# Patient Record
Sex: FEMALE | Race: BLACK OR AFRICAN AMERICAN | NOT HISPANIC OR LATINO | Employment: OTHER | URBAN - METROPOLITAN AREA
[De-identification: names, ages, dates, MRNs, and addresses within clinical notes are randomized per-mention and may not be internally consistent; named-entity substitution may affect disease eponyms.]

---

## 2023-02-22 LAB
CHOLESTEROL (MG/DL) IN SER/PLAS: 217 MG/DL (ref 0–199)
CHOLESTEROL IN HDL (MG/DL) IN SER/PLAS: 104.1 MG/DL
CHOLESTEROL/HDL RATIO: 2.1
COBALAMIN (VITAMIN B12) (PG/ML) IN SER/PLAS: >2000 PG/ML (ref 211–911)
LDL: 90 MG/DL (ref 0–99)
TRIGLYCERIDE (MG/DL) IN SER/PLAS: 114 MG/DL (ref 0–149)
VLDL: 23 MG/DL (ref 0–40)

## 2023-03-31 ENCOUNTER — TELEPHONE (OUTPATIENT)
Dept: PRIMARY CARE | Facility: CLINIC | Age: 78
End: 2023-03-31
Payer: MEDICARE

## 2023-03-31 DIAGNOSIS — M16.11 ARTHRITIS OF RIGHT HIP: Primary | ICD-10-CM

## 2023-03-31 NOTE — TELEPHONE ENCOUNTER
Spoke with patient.  Reviewed X-rays--hip showed  severe arthritis, knee showed mild arthritis.  Refer to hip ortho (also given name of Jun Rodríguez).  CT lung was stable.  Recheck in 1 year.  She will be getting bone marrow biopsy with Dr. Beach.  Discussed follow up in our office in 4 months.

## 2023-04-18 ENCOUNTER — HOSPITAL ENCOUNTER (OUTPATIENT)
Dept: DATA CONVERSION | Facility: HOSPITAL | Age: 78
End: 2023-04-18
Attending: RADIOLOGY | Admitting: RADIOLOGY
Payer: MEDICARE

## 2023-04-18 DIAGNOSIS — D47.2 MONOCLONAL GAMMOPATHY: ICD-10-CM

## 2023-04-19 LAB
ABCP4: NORMAL
CCOLL: NORMAL PER TUBE
CCOUN: 7.55 X10E9/L
CP4PH: NORMAL
FANTI: NORMAL
FCTOR: NORMAL
FCTSO: NORMAL
FSITE: NORMAL
GPERC: 65 %
L10: NEGATIVE
L19: NEGATIVE
L20: NORMAL
L38: NORMAL
L45: NEGATIVE
L56: NEGATIVE
LCD19: 12 % OF LYMPH
LCD4: 46 % OF LYMPH
LCD8: 29 % OF LYMPH
LDESC: NORMAL
LGPD1: NORMAL
LGPNO: NORMAL
LKAPP: NEGATIVE
LLAMB: NORMAL
LNK: 12 % OF LYMPH
LPERC: 13 %
MPERC: 5 %
PDESC: NORMAL
PERC4: 0.2 %
PPERC: 0.3 %
PV194: NORMAL
VIAB: NORMAL

## 2023-04-24 LAB
COMPLETE PATHOLOGY REPORT: NORMAL
CONVERTED ADDENDUM DIAGNOSIS 2: NORMAL
CONVERTED CLINICAL DIAGNOSIS-HISTORY: NORMAL
CONVERTED FINAL DIAGNOSIS: NORMAL
CONVERTED FINAL REPORT PDF LINK TO COPY AND PASTE: NORMAL
CONVERTED GROSS DESCRIPTION: NORMAL
CONVERTED MICROSCOPIC DESCRIPTION: NORMAL

## 2023-05-03 LAB — FISH ISCN RESULTS: NORMAL

## 2023-05-09 DIAGNOSIS — I10 BENIGN ESSENTIAL HYPERTENSION: Primary | ICD-10-CM

## 2023-05-09 LAB
FISH ISCN RESULTS: NORMAL

## 2023-05-09 RX ORDER — AMLODIPINE BESYLATE 10 MG/1
10 TABLET ORAL DAILY
COMMUNITY
End: 2023-05-09 | Stop reason: SDUPTHER

## 2023-05-10 PROBLEM — I10 BENIGN ESSENTIAL HYPERTENSION: Status: ACTIVE | Noted: 2023-05-10

## 2023-05-10 RX ORDER — AMLODIPINE BESYLATE 10 MG/1
10 TABLET ORAL DAILY
Qty: 90 TABLET | Refills: 1 | Status: SHIPPED | OUTPATIENT
Start: 2023-05-10 | End: 2023-08-09 | Stop reason: SDUPTHER

## 2023-05-12 DIAGNOSIS — I10 BENIGN ESSENTIAL HYPERTENSION: Primary | ICD-10-CM

## 2023-05-12 RX ORDER — LISINOPRIL AND HYDROCHLOROTHIAZIDE 10; 12.5 MG/1; MG/1
1 TABLET ORAL DAILY
COMMUNITY
End: 2023-05-12 | Stop reason: SDUPTHER

## 2023-05-13 RX ORDER — LISINOPRIL AND HYDROCHLOROTHIAZIDE 10; 12.5 MG/1; MG/1
1 TABLET ORAL DAILY
Qty: 90 TABLET | Refills: 1 | Status: SHIPPED | OUTPATIENT
Start: 2023-05-13 | End: 2023-08-09 | Stop reason: SDUPTHER

## 2023-05-15 LAB — CYTOGENETICS INTERPRETATION: NORMAL

## 2023-05-22 ENCOUNTER — TELEPHONE (OUTPATIENT)
Dept: PRIMARY CARE | Facility: CLINIC | Age: 78
End: 2023-05-22
Payer: MEDICARE

## 2023-08-09 ENCOUNTER — OFFICE VISIT (OUTPATIENT)
Dept: PRIMARY CARE | Facility: CLINIC | Age: 78
End: 2023-08-09
Payer: MEDICARE

## 2023-08-09 VITALS
HEIGHT: 61 IN | HEART RATE: 54 BPM | TEMPERATURE: 98.4 F | RESPIRATION RATE: 18 BRPM | OXYGEN SATURATION: 94 % | DIASTOLIC BLOOD PRESSURE: 78 MMHG | WEIGHT: 106.4 LBS | SYSTOLIC BLOOD PRESSURE: 124 MMHG | BODY MASS INDEX: 20.09 KG/M2

## 2023-08-09 DIAGNOSIS — E03.9 HYPOTHYROIDISM, UNSPECIFIED TYPE: Primary | ICD-10-CM

## 2023-08-09 DIAGNOSIS — I10 BENIGN ESSENTIAL HYPERTENSION: ICD-10-CM

## 2023-08-09 PROCEDURE — 3078F DIAST BP <80 MM HG: CPT | Performed by: FAMILY MEDICINE

## 2023-08-09 PROCEDURE — 99214 OFFICE O/P EST MOD 30 MIN: CPT | Performed by: FAMILY MEDICINE

## 2023-08-09 PROCEDURE — 3074F SYST BP LT 130 MM HG: CPT | Performed by: FAMILY MEDICINE

## 2023-08-09 PROCEDURE — 1125F AMNT PAIN NOTED PAIN PRSNT: CPT | Performed by: FAMILY MEDICINE

## 2023-08-09 PROCEDURE — 1159F MED LIST DOCD IN RCRD: CPT | Performed by: FAMILY MEDICINE

## 2023-08-09 PROCEDURE — 1160F RVW MEDS BY RX/DR IN RCRD: CPT | Performed by: FAMILY MEDICINE

## 2023-08-09 RX ORDER — LISINOPRIL AND HYDROCHLOROTHIAZIDE 10; 12.5 MG/1; MG/1
1 TABLET ORAL DAILY
Qty: 90 TABLET | Refills: 1 | Status: SHIPPED | OUTPATIENT
Start: 2023-08-09 | End: 2024-02-29 | Stop reason: SDUPTHER

## 2023-08-09 RX ORDER — LEVOTHYROXINE SODIUM 88 UG/1
88 TABLET ORAL DAILY
Qty: 90 TABLET | Refills: 1 | Status: SHIPPED | OUTPATIENT
Start: 2023-08-09 | End: 2024-02-29 | Stop reason: SDUPTHER

## 2023-08-09 RX ORDER — UBIDECARENONE 75 MG
500 CAPSULE ORAL DAILY
COMMUNITY

## 2023-08-09 RX ORDER — LEVOTHYROXINE SODIUM 88 UG/1
1 TABLET ORAL DAILY
COMMUNITY
Start: 2020-03-31 | End: 2023-08-09 | Stop reason: SDUPTHER

## 2023-08-09 RX ORDER — AMLODIPINE BESYLATE 10 MG/1
10 TABLET ORAL DAILY
Qty: 90 TABLET | Refills: 1 | Status: SHIPPED | OUTPATIENT
Start: 2023-08-09 | End: 2024-02-29 | Stop reason: SDUPTHER

## 2023-08-09 NOTE — PROGRESS NOTES
"Subjective   Patient ID: Sophie Mendosa is a 77 y.o. female who presents for Follow-up (HASN'T BEEN SEEN SINCE FEBRUARY AND WANTS TO CHECK ON HER HEALTH AND SHE NEEDS REFILLS ON HER MEDS).  From March--  Spoke with patient.  Reviewed X-rays--hip showed  severe arthritis, knee showed mild arthritis.  Refer to hip ortho (also given name of Jun Shethzgerald).  CT lung was stable.  Recheck in 1 year.  She will be getting bone marrow biopsy with Dr. Beach.  Discussed follow up in our office in 4 months.    Anxiety feels better recently.  Thinks is in part to having some conversations that she needed to have with some of the people inducing stress.    Had right hip injection with Dr. Everett.  Didn't work.  Knee hurts worse than the hip.  She has to think about whether or not she wants a hip replacement, but currently not planning to.    Not checking BP at home.    Itches all the time all over, dry  skin, no  rash  Started since last appointment.  Just using lotion.        Review of Systems    Objective   /78 (BP Location: Left arm, Patient Position: Sitting)   Pulse 54   Temp 36.9 °C (98.4 °F) (Oral)   Resp 18   Ht 1.549 m (5' 0.98\")   Wt 48.3 kg (106 lb 6.4 oz)   SpO2 94%   BMI 20.11 kg/m²     Physical Exam  Vitals reviewed.   Constitutional:       Appearance: Normal appearance.   Cardiovascular:      Rate and Rhythm: Normal rate and regular rhythm.      Heart sounds: No murmur heard.  Pulmonary:      Effort: Pulmonary effort is normal.      Breath sounds: Normal breath sounds.   Musculoskeletal:      Right lower leg: No edema.      Left lower leg: No edema.   Neurological:      Mental Status: She is alert.   Psychiatric:         Mood and Affect: Mood normal.         Behavior: Behavior normal.           Assessment/Plan   Problem List Items Addressed This Visit       Benign essential hypertension    Relevant Medications    amLODIPine (Norvasc) 10 mg tablet    lisinopriL-hydrochlorothiazide 10-12.5 mg " tablet     Other Visit Diagnoses       Hypothyroidism, unspecified type    -  Primary    Relevant Medications    levothyroxine (Synthroid, Levoxyl) 88 mcg tablet

## 2023-08-10 NOTE — PATIENT INSTRUCTIONS
BP stable on current medication, no change.  Blood Pressure Treatment goals include:   BP of 120/80, with no higher than 140/85 on consistent basis.   Exercise at 150 minutes weekly.  Eat a balanced diet, rich in fruits and vegetables, low in sodium and processed foods.  If you are overweight, work toward a goal BMI of less than 25.     Chronic back and right hip pain.  Had  injection for hip by Dr. Everett, but did not help.  Will be following  up with him.     For itching dry skin, can try Cetaphil lotion.    Hypothyroidism, on levothyroxine 88  mcg.  Plan to check levels next visit.    Plan to follow up in November for recheck and Medicare Wellness  visit.

## 2023-10-04 PROBLEM — D47.2 MONOCLONAL GAMMOPATHY OF UNKNOWN SIGNIFICANCE: Status: ACTIVE | Noted: 2023-10-04

## 2023-10-04 PROBLEM — M62.838 MUSCLE SPASM: Status: ACTIVE | Noted: 2023-10-04

## 2023-10-04 PROBLEM — M25.561 CHRONIC PAIN OF RIGHT KNEE: Status: ACTIVE | Noted: 2023-10-04

## 2023-10-04 PROBLEM — M51.36 DEGENERATION OF LUMBAR INTERVERTEBRAL DISC: Status: ACTIVE | Noted: 2023-10-04

## 2023-10-04 PROBLEM — D64.9 ANEMIA, UNSPECIFIED: Status: ACTIVE | Noted: 2018-10-20

## 2023-10-04 PROBLEM — M10.9 GOUT OF RIGHT WRIST: Status: ACTIVE | Noted: 2023-10-04

## 2023-10-04 PROBLEM — M20.20 HALLUX RIGIDUS: Status: ACTIVE | Noted: 2023-10-04

## 2023-10-04 PROBLEM — M51.369 DEGENERATION OF LUMBAR INTERVERTEBRAL DISC: Status: ACTIVE | Noted: 2023-10-04

## 2023-10-04 PROBLEM — K59.00 CONSTIPATION, UNSPECIFIED: Status: ACTIVE | Noted: 2018-10-23

## 2023-10-04 PROBLEM — I67.9 CVD (CEREBROVASCULAR DISEASE): Status: ACTIVE | Noted: 2023-10-04

## 2023-10-04 PROBLEM — M20.11 ACQUIRED HALLUX VALGUS OF RIGHT FOOT: Status: ACTIVE | Noted: 2023-10-04

## 2023-10-04 PROBLEM — M06.9 RHEUMATOID ARTHRITIS (MULTI): Status: ACTIVE | Noted: 2018-10-20

## 2023-10-04 PROBLEM — H25.10 NUCLEAR SCLEROSIS: Status: ACTIVE | Noted: 2023-10-04

## 2023-10-04 PROBLEM — M16.11 ARTHRITIS OF RIGHT HIP: Status: ACTIVE | Noted: 2023-10-04

## 2023-10-04 PROBLEM — E87.8 ELECTROLYTE AND FLUID DISORDER: Status: ACTIVE | Noted: 2023-10-04

## 2023-10-04 PROBLEM — R79.89 ABNORMAL THYROID BLOOD TEST: Status: ACTIVE | Noted: 2023-10-04

## 2023-10-04 PROBLEM — R60.9 EDEMA: Status: ACTIVE | Noted: 2023-10-04

## 2023-10-04 PROBLEM — R73.09 ELEVATED GLUCOSE: Status: ACTIVE | Noted: 2023-10-04

## 2023-10-04 PROBLEM — H18.419 ARCUS SENILIS: Status: ACTIVE | Noted: 2023-10-04

## 2023-10-04 PROBLEM — M48.061 LUMBAR CANAL STENOSIS: Status: ACTIVE | Noted: 2023-10-04

## 2023-10-04 PROBLEM — I73.9 PERIPHERAL ARTERY DISEASE (CMS-HCC): Status: ACTIVE | Noted: 2023-10-04

## 2023-10-04 PROBLEM — G56.00 SEVERE CARPAL TUNNEL SYNDROME: Status: ACTIVE | Noted: 2023-10-04

## 2023-10-04 PROBLEM — R91.8 PULMONARY NODULES: Status: ACTIVE | Noted: 2023-10-04

## 2023-10-04 PROBLEM — R20.2 PARESTHESIA: Status: ACTIVE | Noted: 2023-10-04

## 2023-10-04 PROBLEM — G56.01 RIGHT CARPAL TUNNEL SYNDROME: Status: ACTIVE | Noted: 2023-10-04

## 2023-10-04 PROBLEM — E04.9 HYPERPLASTIC GOITER: Status: ACTIVE | Noted: 2023-10-04

## 2023-10-04 PROBLEM — M20.40 HAMMER TOE: Status: ACTIVE | Noted: 2023-10-04

## 2023-10-04 PROBLEM — E53.8 VITAMIN B12 DEFICIENCY: Status: ACTIVE | Noted: 2023-10-04

## 2023-10-04 PROBLEM — M20.12 ACQUIRED HALLUX VALGUS OF LEFT FOOT: Status: ACTIVE | Noted: 2023-10-04

## 2023-10-04 PROBLEM — R10.13 EPIGASTRIC DISCOMFORT: Status: ACTIVE | Noted: 2023-10-04

## 2023-10-04 PROBLEM — M62.81 MUSCLE WEAKNESS (GENERALIZED): Status: ACTIVE | Noted: 2018-10-20

## 2023-10-04 PROBLEM — I69.354 HEMIPLEGIA AND HEMIPARESIS FOLLOWING CEREBRAL INFARCTION AFFECTING LEFT NON-DOMINANT SIDE (MULTI): Status: ACTIVE | Noted: 2018-10-20

## 2023-10-04 PROBLEM — M20.21 HALLUX RIGIDUS OF RIGHT FOOT: Status: ACTIVE | Noted: 2023-10-04

## 2023-10-04 PROBLEM — H52.03 HYPEROPIA OF BOTH EYES WITH ASTIGMATISM AND PRESBYOPIA: Status: ACTIVE | Noted: 2023-10-04

## 2023-10-04 PROBLEM — D25.9 FIBROID UTERUS: Status: ACTIVE | Noted: 2023-10-04

## 2023-10-04 PROBLEM — T50.8X5A RADIOGRAPHIC CONTRAST AGENT NEPHROPATHY: Status: ACTIVE | Noted: 2023-10-04

## 2023-10-04 PROBLEM — R00.1 SINUS BRADYCARDIA: Status: ACTIVE | Noted: 2023-10-04

## 2023-10-04 PROBLEM — I25.10 CORONARY ARTERY CALCIFICATION SEEN ON CT SCAN: Status: ACTIVE | Noted: 2023-10-04

## 2023-10-04 PROBLEM — R41.841 COGNITIVE COMMUNICATION DEFICIT: Status: ACTIVE | Noted: 2018-10-20

## 2023-10-04 PROBLEM — H25.013 CORTICAL AGE-RELATED CATARACT OF BOTH EYES: Status: ACTIVE | Noted: 2023-10-04

## 2023-10-04 PROBLEM — R63.4 ABNORMAL WEIGHT LOSS: Status: ACTIVE | Noted: 2023-10-04

## 2023-10-04 PROBLEM — Q66.50 CONGENITAL PES PLANUS: Status: ACTIVE | Noted: 2023-10-04

## 2023-10-04 PROBLEM — N14.11 RADIOGRAPHIC CONTRAST AGENT NEPHROPATHY: Status: ACTIVE | Noted: 2023-10-04

## 2023-10-04 PROBLEM — G56.02 LEFT CARPAL TUNNEL SYNDROME: Status: ACTIVE | Noted: 2023-10-04

## 2023-10-04 PROBLEM — I70.0 ATHEROSCLEROSIS OF AORTA (CMS-HCC): Status: ACTIVE | Noted: 2023-10-04

## 2023-10-04 PROBLEM — I63.321: Status: ACTIVE | Noted: 2018-10-20

## 2023-10-04 PROBLEM — R26.2 DIFFICULTY IN WALKING, NOT ELSEWHERE CLASSIFIED: Status: ACTIVE | Noted: 2018-10-20

## 2023-10-04 PROBLEM — F17.210 CIGARETTE SMOKER: Status: ACTIVE | Noted: 2023-10-04

## 2023-10-04 PROBLEM — F32.0 DEPRESSION, MAJOR, SINGLE EPISODE, MILD (CMS-HCC): Status: ACTIVE | Noted: 2023-10-04

## 2023-10-04 PROBLEM — G89.29 CHRONIC PAIN OF RIGHT KNEE: Status: ACTIVE | Noted: 2023-10-04

## 2023-10-04 PROBLEM — E55.9 VITAMIN D DEFICIENCY: Status: ACTIVE | Noted: 2023-10-04

## 2023-10-04 PROBLEM — K21.9 ACID REFLUX: Status: ACTIVE | Noted: 2023-10-04

## 2023-10-04 PROBLEM — R41.4 NEUROLOGIC NEGLECT SYNDROME: Status: ACTIVE | Noted: 2018-10-20

## 2023-10-04 PROBLEM — H52.4 HYPEROPIA OF BOTH EYES WITH ASTIGMATISM AND PRESBYOPIA: Status: ACTIVE | Noted: 2023-10-04

## 2023-10-04 PROBLEM — E78.5 HYPERLIPIDEMIA, UNSPECIFIED: Status: ACTIVE | Noted: 2018-10-20

## 2023-10-04 PROBLEM — E04.0 NONTOXIC (DIFFUSE) GOITER: Status: ACTIVE | Noted: 2023-10-04

## 2023-10-04 PROBLEM — H52.203 HYPEROPIA OF BOTH EYES WITH ASTIGMATISM AND PRESBYOPIA: Status: ACTIVE | Noted: 2023-10-04

## 2023-10-04 RX ORDER — NAPROXEN 500 MG/1
500 TABLET ORAL
COMMUNITY
Start: 2022-05-17 | End: 2024-02-29 | Stop reason: ENTERED-IN-ERROR

## 2023-10-04 RX ORDER — BENZONATATE 200 MG/1
1 CAPSULE ORAL 3 TIMES DAILY PRN
COMMUNITY
Start: 2022-12-06 | End: 2024-02-29 | Stop reason: ENTERED-IN-ERROR

## 2023-10-04 RX ORDER — NAPROXEN SODIUM 220 MG/1
1 TABLET, FILM COATED ORAL DAILY
COMMUNITY
Start: 2018-09-27 | End: 2024-03-05 | Stop reason: HOSPADM

## 2023-10-04 RX ORDER — ATORVASTATIN CALCIUM 40 MG/1
1 TABLET, FILM COATED ORAL DAILY
COMMUNITY
Start: 2018-09-27 | End: 2024-02-29 | Stop reason: SDUPTHER

## 2023-10-05 ENCOUNTER — TELEMEDICINE (OUTPATIENT)
Dept: HEMATOLOGY/ONCOLOGY | Facility: CLINIC | Age: 78
End: 2023-10-05
Payer: MEDICARE

## 2023-10-05 DIAGNOSIS — E53.8 B12 DEFICIENCY: ICD-10-CM

## 2023-10-05 DIAGNOSIS — D47.2 MGUS (MONOCLONAL GAMMOPATHY OF UNKNOWN SIGNIFICANCE): Primary | ICD-10-CM

## 2023-10-05 PROCEDURE — 99213 OFFICE O/P EST LOW 20 MIN: CPT | Performed by: NURSE PRACTITIONER

## 2023-10-05 PROCEDURE — 99213 OFFICE O/P EST LOW 20 MIN: CPT | Mod: 95 | Performed by: NURSE PRACTITIONER

## 2023-10-05 ASSESSMENT — ENCOUNTER SYMPTOMS: FATIGUE: 1

## 2023-10-05 NOTE — PROGRESS NOTES
Patient ID: Sophie Mendosa is a 78 y.o. female.  Referring Physician: No referring provider defined for this encounter.  Primary Care Provider: Madelaine Galvez MD  Visit Type: Follow Up - telephone visit      Subjective    This patient is a 78 year old female presenting for follow up evaluation of MGUS and B12 deficiency. She is a transfer of card from Dr Sidney Beach.    Current complaints include arthritis in her hip.  She is seeing orthopedic surgery soon.  She is also considering seeing rheumatology again for her rheumatoid arthritis.    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        Objective   5/10/23: Patient had a bone marrow biopsy due to complaining of worsening fatigue, rising creatinine and mild hypercalcemia.  We reviewed her results in detail.    -- NORMOCELLULAR BONE MARROW (20%-30%) WITH NORMAL MATURING TRILINEAGE  HEMATOPOIESIS.  -- APPROXIMATELY 5% LAMBDA+ PLASMA CELLS CONSISTENT WITH PERSISTENT PLASMA CELL  NEOPLASM.  -- SMALL LYMPHOID AGGREGATE, FAVOR REACTIVE.    FISH positive for IGH mutation.    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    Family History:  Reviewed    Family History   Problem Relation Name Age of Onset    Other (cardiac disorder) Mother      Other (CVA) Mother      Heart failure Mother      Heart attack Mother      Hypertension Mother      Prostate cancer  "Father      Diabetes Sister      Hypertension Sister      Diabetes Brother      Other (cardiac disorder) Brother      Heart attack Brother      Hypertension Brother      Heart failure Other PATERNAL HALF SISTER      Oncology History    No history exists.       Sophie Mendosa  reports that she has been smoking cigarettes. She has never used smokeless tobacco.  She  reports no history of alcohol use.  She  reports no history of drug use.    Physical Exam    WBC   Date/Time Value Ref Range Status   09/22/2023 11:16 AM 5.1 4.4 - 11.3 x10E9/L Final   04/05/2023 10:30 AM 5.5 4.4 - 11.3 x10E9/L Final   02/09/2023 10:58 AM 6.5 4.4 - 11.3 x10E9/L Final     nRBC   Date Value Ref Range Status   12/16/2021 0.0 0.0 - 0.0 /100 WBC Final   10/18/2018 0.0 0.0 - 0.0 /100 WBC Final   10/15/2018 0.0 0.0 - 0.0 /100 WBC Final     RBC   Date Value Ref Range Status   09/22/2023 4.57 4.00 - 5.20 x10E12/L Final   04/05/2023 4.44 4.00 - 5.20 x10E12/L Final   02/09/2023 4.72 4.00 - 5.20 x10E12/L Final     Hemoglobin   Date Value Ref Range Status   09/22/2023 12.4 12.0 - 16.0 g/dL Final   04/05/2023 12.1 12.0 - 16.0 g/dL Final   02/09/2023 12.8 12.0 - 16.0 g/dL Final     Hematocrit   Date Value Ref Range Status   09/22/2023 38.2 36.0 - 46.0 % Final   04/05/2023 36.6 36.0 - 46.0 % Final   02/09/2023 38.7 36.0 - 46.0 % Final     MCV   Date/Time Value Ref Range Status   09/22/2023 11:16 AM 84 80 - 100 fL Final   04/05/2023 10:30 AM 82 80 - 100 fL Final   02/09/2023 10:58 AM 82 80 - 100 fL Final     No results found for: \"MCH\"  MCHC   Date/Time Value Ref Range Status   09/22/2023 11:16 AM 32.5 32.0 - 36.0 g/dL Final   04/05/2023 10:30 AM 33.1 32.0 - 36.0 g/dL Final   02/09/2023 10:58 AM 33.1 32.0 - 36.0 g/dL Final     RDW   Date/Time Value Ref Range Status   09/22/2023 11:16 AM 15.8 (H) 11.5 - 14.5 % Final   04/05/2023 10:30 AM 15.5 (H) 11.5 - 14.5 % Final   02/09/2023 10:58 AM 15.1 (H) 11.5 - 14.5 % Final     Platelets   Date/Time Value Ref " "Range Status   09/22/2023 11:16  150 - 450 x10E9/L Final   04/05/2023 10:30  150 - 450 x10E9/L Final   02/09/2023 10:58  150 - 450 x10E9/L Final     No results found for: \"MPV\"  Neutrophils %   Date/Time Value Ref Range Status   09/22/2023 11:16 AM 63.0 40.0 - 80.0 % Final   04/05/2023 10:30 AM 56.9 40.0 - 80.0 % Final   02/09/2023 10:58 AM 60.3 40.0 - 80.0 % Final     Immature Granulocytes %, Automated   Date/Time Value Ref Range Status   09/22/2023 11:16 AM 0.0 0.0 - 0.9 % Final     Comment:      Immature Granulocyte Count (IG) includes promyelocytes,    myelocytes and metamyelocytes but does not include bands.   Percent differential counts (%) should be interpreted in the   context of the absolute cell counts (cells/L).     04/05/2023 10:30 AM 0.2 0.0 - 0.9 % Final     Comment:      Immature Granulocyte Count (IG) includes promyelocytes,    myelocytes and metamyelocytes but does not include bands.   Percent differential counts (%) should be interpreted in the   context of the absolute cell counts (cells/L).     02/09/2023 10:58 AM 0.2 0.0 - 0.9 % Final     Comment:      Immature Granulocyte Count (IG) includes promyelocytes,    myelocytes and metamyelocytes but does not include bands.   Percent differential counts (%) should be interpreted in the   context of the absolute cell counts (cells/L).       Lymphocytes %   Date/Time Value Ref Range Status   09/22/2023 11:16 AM 24.7 13.0 - 44.0 % Final   04/05/2023 10:30 AM 26.1 13.0 - 44.0 % Final   02/09/2023 10:58 AM 24.7 13.0 - 44.0 % Final     Monocytes %   Date/Time Value Ref Range Status   09/22/2023 11:16 AM 10.1 2.0 - 10.0 % Final   04/05/2023 10:30 AM 13.2 2.0 - 10.0 % Final   02/09/2023 10:58 AM 10.8 2.0 - 10.0 % Final     Eosinophils %   Date/Time Value Ref Range Status   09/22/2023 11:16 AM 1.6 0.0 - 6.0 % Final   04/05/2023 10:30 AM 2.5 0.0 - 6.0 % Final   02/09/2023 10:58 AM 3.1 0.0 - 6.0 % Final     Basophils %   Date/Time Value Ref " "Range Status   09/22/2023 11:16 AM 0.6 0.0 - 2.0 % Final   04/05/2023 10:30 AM 1.1 0.0 - 2.0 % Final   02/09/2023 10:58 AM 0.9 0.0 - 2.0 % Final     Neutrophils Absolute   Date/Time Value Ref Range Status   09/22/2023 11:16 AM 3.24 1.60 - 5.50 x10E9/L Final     Comment:      Percent differential counts (%) should be interpreted in the   context of the absolute cell counts (cells/L).     04/05/2023 10:30 AM 3.13 1.60 - 5.50 x10E9/L Final     Comment:      Percent differential counts (%) should be interpreted in the   context of the absolute cell counts (cells/L).     02/09/2023 10:58 AM 3.91 1.60 - 5.50 x10E9/L Final     Comment:      Percent differential counts (%) should be interpreted in the   context of the absolute cell counts (cells/L).       No results found for: \"IGABSOL\"  Lymphocytes Absolute   Date/Time Value Ref Range Status   09/22/2023 11:16 AM 1.27 0.80 - 3.00 x10E9/L Final   04/05/2023 10:30 AM 1.44 0.80 - 3.00 x10E9/L Final   02/09/2023 10:58 AM 1.60 0.80 - 3.00 x10E9/L Final     Monocytes Absolute   Date/Time Value Ref Range Status   09/22/2023 11:16 AM 0.52 0.05 - 0.80 x10E9/L Final   04/05/2023 10:30 AM 0.73 0.05 - 0.80 x10E9/L Final   02/09/2023 10:58 AM 0.70 0.05 - 0.80 x10E9/L Final     Eosinophils Absolute   Date/Time Value Ref Range Status   09/22/2023 11:16 AM 0.08 0.00 - 0.40 x10E9/L Final   04/05/2023 10:30 AM 0.14 0.00 - 0.40 x10E9/L Final   02/09/2023 10:58 AM 0.20 0.00 - 0.40 x10E9/L Final     Basophils Absolute   Date/Time Value Ref Range Status   09/22/2023 11:16 AM 0.03 0.00 - 0.10 x10E9/L Final   04/05/2023 10:30 AM 0.06 0.00 - 0.10 x10E9/L Final   02/09/2023 10:58 AM 0.06 0.00 - 0.10 x10E9/L Final       No components found for: \"PT\"  aPTT   Date/Time Value Ref Range Status   04/05/2023 10:30 AM 29 26 - 39 sec Final     Comment:       THE APTT IS NO LONGER USED FOR MONITORING     UNFRACTIONATED HEPARIN THERAPY.    FOR MONITORING HEPARIN THERAPY,     USE THE HEPARIN ASSAY.   "       Assessment/Plan      Assessment and Plan:   Assessment:    This is a 78 year old woman here for follow up evaluation of IgG K MGUS and B12 deficiency.  Doing well with oral B12, ok to continue 500 mcg a day. Bone marrow biopsy consistent with MGUS and not significantly changed from prior.  Therefore I do not think that this is the cause of her hypercalcemia, fatigue, or mildly worse renal function.  I encouraged hydration for now.  Of more concern is her depression.  She is describing extreme fatigue and admits that she has had a strained relationship with her son who is her closest contact.  She has been considering seeking counseling but has not yet had this yet. I encouraged her to do so. Otherwise we will continue monitoring her MGUS every 6 months.      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    # Weight loss, improving  - Ensure up to date age appropriate cancer screening  - Smoking cessation  - Follow with nutrition    # Depression  - Encouraged to seek counseling, patient currently agreeable.    # CKD  - Monitor for now.    Follow up in 6 months with labs and in person visit.       Rosanne M Casal, DNP, APN-BC, AOCNP

## 2023-10-24 ENCOUNTER — TELEPHONE (OUTPATIENT)
Dept: HEMATOLOGY/ONCOLOGY | Facility: HOSPITAL | Age: 78
End: 2023-10-24
Payer: MEDICARE

## 2023-10-25 ENCOUNTER — TELEPHONE (OUTPATIENT)
Dept: HEMATOLOGY/ONCOLOGY | Facility: CLINIC | Age: 78
End: 2023-10-25
Payer: MEDICARE

## 2023-10-25 NOTE — TELEPHONE ENCOUNTER
I was asked to call pt to review her schedule and ensure she was scheduled at the right time with the correct provider, per her preference. I reviewed appt with Casal DNP in 4/2024; pt believes that is too far out considering she hadn't seen Dr. Beach since May. I explained that the NP checked her labs and reported that everything is stable. I offered earlier appts at other locations and she said she is not flexible with location and prefers to be seen at [facility]. I explained that our availability at [facility] is limited. She expressed her frustration and I asked if there was any other way to assist her. She states she will keep the April appt and she will call me if she has any other needs going forward.

## 2023-11-28 ENCOUNTER — APPOINTMENT (OUTPATIENT)
Dept: PRIMARY CARE | Facility: CLINIC | Age: 78
End: 2023-11-28
Payer: MEDICARE

## 2023-12-21 ENCOUNTER — APPOINTMENT (OUTPATIENT)
Dept: HEMATOLOGY/ONCOLOGY | Facility: CLINIC | Age: 78
End: 2023-12-21
Payer: MEDICARE

## 2024-02-08 ENCOUNTER — APPOINTMENT (OUTPATIENT)
Dept: HEMATOLOGY/ONCOLOGY | Facility: CLINIC | Age: 79
End: 2024-02-08
Payer: MEDICARE

## 2024-02-29 ENCOUNTER — OFFICE VISIT (OUTPATIENT)
Dept: PRIMARY CARE | Facility: CLINIC | Age: 79
End: 2024-02-29
Payer: MEDICARE

## 2024-02-29 ENCOUNTER — APPOINTMENT (OUTPATIENT)
Dept: RADIOLOGY | Facility: HOSPITAL | Age: 79
DRG: 176 | End: 2024-02-29
Payer: MEDICARE

## 2024-02-29 ENCOUNTER — APPOINTMENT (OUTPATIENT)
Dept: CARDIOLOGY | Facility: HOSPITAL | Age: 79
DRG: 176 | End: 2024-02-29
Payer: MEDICARE

## 2024-02-29 ENCOUNTER — HOSPITAL ENCOUNTER (INPATIENT)
Facility: HOSPITAL | Age: 79
LOS: 4 days | Discharge: HOME | DRG: 176 | End: 2024-03-05
Attending: INTERNAL MEDICINE | Admitting: INTERNAL MEDICINE
Payer: MEDICARE

## 2024-02-29 VITALS
SYSTOLIC BLOOD PRESSURE: 120 MMHG | BODY MASS INDEX: 19.94 KG/M2 | OXYGEN SATURATION: 100 % | DIASTOLIC BLOOD PRESSURE: 80 MMHG | TEMPERATURE: 97.9 F | WEIGHT: 101.6 LBS | HEART RATE: 98 BPM | HEIGHT: 60 IN

## 2024-02-29 DIAGNOSIS — M20.40 HAMMER TOE, UNSPECIFIED LATERALITY: ICD-10-CM

## 2024-02-29 DIAGNOSIS — R73.09 ELEVATED GLUCOSE: ICD-10-CM

## 2024-02-29 DIAGNOSIS — I69.354 HEMIPLEGIA AND HEMIPARESIS FOLLOWING CEREBRAL INFARCTION AFFECTING LEFT NON-DOMINANT SIDE (MULTI): ICD-10-CM

## 2024-02-29 DIAGNOSIS — I48.92 ATRIAL FLUTTER BY ELECTROCARDIOGRAM (MULTI): ICD-10-CM

## 2024-02-29 DIAGNOSIS — I67.9 CVD (CEREBROVASCULAR DISEASE): ICD-10-CM

## 2024-02-29 DIAGNOSIS — E03.9 HYPOTHYROIDISM, UNSPECIFIED TYPE: ICD-10-CM

## 2024-02-29 DIAGNOSIS — Z00.00 ROUTINE GENERAL MEDICAL EXAMINATION AT HEALTH CARE FACILITY: Primary | ICD-10-CM

## 2024-02-29 DIAGNOSIS — I26.99 PULMONARY EMBOLISM, OTHER, UNSPECIFIED CHRONICITY, UNSPECIFIED WHETHER ACUTE COR PULMONALE PRESENT (MULTI): ICD-10-CM

## 2024-02-29 DIAGNOSIS — I10 BENIGN ESSENTIAL HYPERTENSION: ICD-10-CM

## 2024-02-29 DIAGNOSIS — R06.02 SHORTNESS OF BREATH ON EXERTION: ICD-10-CM

## 2024-02-29 DIAGNOSIS — Z01.89 ENCOUNTER FOR OTHER SPECIFIED SPECIAL EXAMINATIONS: ICD-10-CM

## 2024-02-29 DIAGNOSIS — R22.43 LOCALIZED SWELLING OF BOTH LOWER LEGS: ICD-10-CM

## 2024-02-29 DIAGNOSIS — Z12.31 ENCOUNTER FOR SCREENING MAMMOGRAM FOR BREAST CANCER: ICD-10-CM

## 2024-02-29 DIAGNOSIS — I26.99 PULMONARY EMBOLISM ON RIGHT (MULTI): Primary | ICD-10-CM

## 2024-02-29 DIAGNOSIS — Z87.891 PERSONAL HISTORY OF TOBACCO USE, PRESENTING HAZARDS TO HEALTH: ICD-10-CM

## 2024-02-29 DIAGNOSIS — N18.31 STAGE 3A CHRONIC KIDNEY DISEASE (MULTI): ICD-10-CM

## 2024-02-29 DIAGNOSIS — F32.0 DEPRESSION, MAJOR, SINGLE EPISODE, MILD (CMS-HCC): ICD-10-CM

## 2024-02-29 DIAGNOSIS — M06.9 RHEUMATOID ARTHRITIS, INVOLVING UNSPECIFIED SITE, UNSPECIFIED WHETHER RHEUMATOID FACTOR PRESENT (MULTI): ICD-10-CM

## 2024-02-29 DIAGNOSIS — I26.99 ACUTE PULMONARY EMBOLISM, UNSPECIFIED PULMONARY EMBOLISM TYPE, UNSPECIFIED WHETHER ACUTE COR PULMONALE PRESENT (MULTI): ICD-10-CM

## 2024-02-29 DIAGNOSIS — Z13.6 SCREENING FOR CARDIOVASCULAR CONDITION: ICD-10-CM

## 2024-02-29 DIAGNOSIS — Z13.1 DIABETES MELLITUS SCREENING: ICD-10-CM

## 2024-02-29 DIAGNOSIS — I48.92 ATRIAL FLUTTER, UNSPECIFIED TYPE (MULTI): ICD-10-CM

## 2024-02-29 DIAGNOSIS — Z78.0 ASYMPTOMATIC MENOPAUSAL STATE: ICD-10-CM

## 2024-02-29 LAB
ALBUMIN SERPL BCP-MCNC: 3.8 G/DL (ref 3.4–5)
ALBUMIN SERPL BCP-MCNC: 4.2 G/DL (ref 3.4–5)
ALBUMIN SERPL BCP-MCNC: 4.4 G/DL (ref 3.4–5)
ALP SERPL-CCNC: 100 U/L (ref 33–136)
ALP SERPL-CCNC: 90 U/L (ref 33–136)
ALP SERPL-CCNC: 93 U/L (ref 33–136)
ALT SERPL W P-5'-P-CCNC: 8 U/L (ref 7–45)
ALT SERPL W P-5'-P-CCNC: 8 U/L (ref 7–45)
ALT SERPL W P-5'-P-CCNC: 9 U/L (ref 7–45)
ANION GAP SERPL CALC-SCNC: 14 MMOL/L (ref 10–20)
ANION GAP SERPL CALC-SCNC: 15 MMOL/L (ref 10–20)
ANION GAP SERPL CALC-SCNC: 17 MMOL/L (ref 10–20)
APTT PPP: 30 SECONDS (ref 27–38)
APTT PPP: NORMAL S
AST SERPL W P-5'-P-CCNC: 12 U/L (ref 9–39)
AST SERPL W P-5'-P-CCNC: 14 U/L (ref 9–39)
AST SERPL W P-5'-P-CCNC: 23 U/L (ref 9–39)
BASOPHILS # BLD AUTO: 0.03 X10*3/UL (ref 0–0.1)
BASOPHILS NFR BLD AUTO: 0.7 %
BILIRUB SERPL-MCNC: 0.7 MG/DL (ref 0–1.2)
BNP SERPL-MCNC: 83 PG/ML (ref 0–99)
BUN SERPL-MCNC: 24 MG/DL (ref 6–23)
BUN SERPL-MCNC: 25 MG/DL (ref 6–23)
BUN SERPL-MCNC: 25 MG/DL (ref 6–23)
CALCIUM SERPL-MCNC: 10.2 MG/DL (ref 8.6–10.3)
CALCIUM SERPL-MCNC: 10.6 MG/DL (ref 8.6–10.3)
CALCIUM SERPL-MCNC: 10.7 MG/DL (ref 8.6–10.6)
CARDIAC TROPONIN I PNL SERPL HS: 12 NG/L (ref 0–13)
CHLORIDE SERPL-SCNC: 100 MMOL/L (ref 98–107)
CHLORIDE SERPL-SCNC: 101 MMOL/L (ref 98–107)
CHLORIDE SERPL-SCNC: 99 MMOL/L (ref 98–107)
CHOLEST SERPL-MCNC: 210 MG/DL (ref 0–199)
CHOLESTEROL/HDL RATIO: 2.2
CO2 SERPL-SCNC: 24 MMOL/L (ref 21–32)
CO2 SERPL-SCNC: 25 MMOL/L (ref 21–32)
CO2 SERPL-SCNC: 26 MMOL/L (ref 21–32)
CREAT SERPL-MCNC: 1.1 MG/DL (ref 0.5–1.05)
CREAT SERPL-MCNC: 1.15 MG/DL (ref 0.5–1.05)
CREAT SERPL-MCNC: 1.21 MG/DL (ref 0.5–1.05)
D DIMER PPP FEU-MCNC: 986 NG/ML FEU
EGFRCR SERPLBLD CKD-EPI 2021: 47 ML/MIN/1.73M*2
EGFRCR SERPLBLD CKD-EPI 2021: 49 ML/MIN/1.73M*2
EGFRCR SERPLBLD CKD-EPI 2021: 52 ML/MIN/1.73M*2
EOSINOPHIL # BLD AUTO: 0.05 X10*3/UL (ref 0–0.4)
EOSINOPHIL NFR BLD AUTO: 1.1 %
ERYTHROCYTE [DISTWIDTH] IN BLOOD BY AUTOMATED COUNT: 17.1 % (ref 11.5–14.5)
EST. AVERAGE GLUCOSE BLD GHB EST-MCNC: 128 MG/DL
GLUCOSE SERPL-MCNC: 87 MG/DL (ref 74–99)
GLUCOSE SERPL-MCNC: 92 MG/DL (ref 74–99)
GLUCOSE SERPL-MCNC: 93 MG/DL (ref 74–99)
HBA1C MFR BLD: 6.1 %
HCT VFR BLD AUTO: 39.3 % (ref 36–46)
HDLC SERPL-MCNC: 94.1 MG/DL
HGB BLD-MCNC: 13 G/DL (ref 12–16)
IMM GRANULOCYTES # BLD AUTO: 0.01 X10*3/UL (ref 0–0.5)
IMM GRANULOCYTES NFR BLD AUTO: 0.2 % (ref 0–0.9)
INR PPP: 1.1 (ref 0.9–1.1)
INR PPP: NORMAL
LDLC SERPL CALC-MCNC: 97 MG/DL
LYMPHOCYTES # BLD AUTO: 1.67 X10*3/UL (ref 0.8–3)
LYMPHOCYTES NFR BLD AUTO: 37.1 %
MAGNESIUM SERPL-MCNC: 2.3 MG/DL (ref 1.6–2.4)
MCH RBC QN AUTO: 27.3 PG (ref 26–34)
MCHC RBC AUTO-ENTMCNC: 33.1 G/DL (ref 32–36)
MCV RBC AUTO: 82 FL (ref 80–100)
MONOCYTES # BLD AUTO: 0.54 X10*3/UL (ref 0.05–0.8)
MONOCYTES NFR BLD AUTO: 12 %
NEUTROPHILS # BLD AUTO: 2.2 X10*3/UL (ref 1.6–5.5)
NEUTROPHILS NFR BLD AUTO: 48.9 %
NON HDL CHOLESTEROL: 116 MG/DL (ref 0–149)
NRBC BLD-RTO: 0 /100 WBCS (ref 0–0)
PLATELET # BLD AUTO: 360 X10*3/UL (ref 150–450)
POTASSIUM SERPL-SCNC: 4.2 MMOL/L (ref 3.5–5.3)
POTASSIUM SERPL-SCNC: 4.8 MMOL/L (ref 3.5–5.3)
POTASSIUM SERPL-SCNC: 6.3 MMOL/L (ref 3.5–5.3)
PROT SERPL-MCNC: 6.9 G/DL (ref 6.4–8.2)
PROT SERPL-MCNC: 7.5 G/DL (ref 6.4–8.2)
PROT SERPL-MCNC: 7.7 G/DL (ref 6.4–8.2)
PROTHROMBIN TIME: 12.1 SECONDS (ref 9.8–12.8)
PROTHROMBIN TIME: NORMAL S
RBC # BLD AUTO: 4.77 X10*6/UL (ref 4–5.2)
SODIUM SERPL-SCNC: 133 MMOL/L (ref 136–145)
SODIUM SERPL-SCNC: 136 MMOL/L (ref 136–145)
SODIUM SERPL-SCNC: 137 MMOL/L (ref 136–145)
T4 FREE SERPL-MCNC: 1.21 NG/DL (ref 0.61–1.12)
T4 FREE SERPL-MCNC: 1.59 NG/DL (ref 0.78–1.48)
TRIGL SERPL-MCNC: 93 MG/DL (ref 0–149)
TSH SERPL-ACNC: 0.27 MIU/L (ref 0.44–3.98)
TSH SERPL-ACNC: 0.3 MIU/L (ref 0.44–3.98)
VLDL: 19 MG/DL (ref 0–40)
WBC # BLD AUTO: 4.5 X10*3/UL (ref 4.4–11.3)

## 2024-02-29 PROCEDURE — 99291 CRITICAL CARE FIRST HOUR: CPT | Performed by: INTERNAL MEDICINE

## 2024-02-29 PROCEDURE — 1160F RVW MEDS BY RX/DR IN RCRD: CPT | Performed by: FAMILY MEDICINE

## 2024-02-29 PROCEDURE — 36415 COLL VENOUS BLD VENIPUNCTURE: CPT | Performed by: INTERNAL MEDICINE

## 2024-02-29 PROCEDURE — 84439 ASSAY OF FREE THYROXINE: CPT

## 2024-02-29 PROCEDURE — 83735 ASSAY OF MAGNESIUM: CPT

## 2024-02-29 PROCEDURE — 85025 COMPLETE CBC W/AUTO DIFF WBC: CPT | Performed by: INTERNAL MEDICINE

## 2024-02-29 PROCEDURE — 96361 HYDRATE IV INFUSION ADD-ON: CPT

## 2024-02-29 PROCEDURE — 96374 THER/PROPH/DIAG INJ IV PUSH: CPT | Mod: 59

## 2024-02-29 PROCEDURE — 85730 THROMBOPLASTIN TIME PARTIAL: CPT | Performed by: INTERNAL MEDICINE

## 2024-02-29 PROCEDURE — 96366 THER/PROPH/DIAG IV INF ADDON: CPT

## 2024-02-29 PROCEDURE — 83880 ASSAY OF NATRIURETIC PEPTIDE: CPT | Performed by: INTERNAL MEDICINE

## 2024-02-29 PROCEDURE — 80061 LIPID PANEL: CPT

## 2024-02-29 PROCEDURE — 1159F MED LIST DOCD IN RCRD: CPT | Performed by: FAMILY MEDICINE

## 2024-02-29 PROCEDURE — 3079F DIAST BP 80-89 MM HG: CPT | Performed by: FAMILY MEDICINE

## 2024-02-29 PROCEDURE — 80053 COMPREHEN METABOLIC PANEL: CPT

## 2024-02-29 PROCEDURE — 85610 PROTHROMBIN TIME: CPT | Performed by: INTERNAL MEDICINE

## 2024-02-29 PROCEDURE — 93005 ELECTROCARDIOGRAM TRACING: CPT

## 2024-02-29 PROCEDURE — 2500000005 HC RX 250 GENERAL PHARMACY W/O HCPCS

## 2024-02-29 PROCEDURE — 3074F SYST BP LT 130 MM HG: CPT | Performed by: FAMILY MEDICINE

## 2024-02-29 PROCEDURE — 71275 CT ANGIOGRAPHY CHEST: CPT | Performed by: RADIOLOGY

## 2024-02-29 PROCEDURE — 99213 OFFICE O/P EST LOW 20 MIN: CPT | Performed by: FAMILY MEDICINE

## 2024-02-29 PROCEDURE — 84443 ASSAY THYROID STIM HORMONE: CPT

## 2024-02-29 PROCEDURE — 2550000001 HC RX 255 CONTRASTS: Performed by: INTERNAL MEDICINE

## 2024-02-29 PROCEDURE — 1170F FXNL STATUS ASSESSED: CPT | Performed by: FAMILY MEDICINE

## 2024-02-29 PROCEDURE — 1125F AMNT PAIN NOTED PAIN PRSNT: CPT | Performed by: FAMILY MEDICINE

## 2024-02-29 PROCEDURE — 99285 EMERGENCY DEPT VISIT HI MDM: CPT | Mod: 25

## 2024-02-29 PROCEDURE — 84484 ASSAY OF TROPONIN QUANT: CPT | Performed by: INTERNAL MEDICINE

## 2024-02-29 PROCEDURE — 96376 TX/PRO/DX INJ SAME DRUG ADON: CPT

## 2024-02-29 PROCEDURE — 93000 ELECTROCARDIOGRAM COMPLETE: CPT | Performed by: FAMILY MEDICINE

## 2024-02-29 PROCEDURE — 96365 THER/PROPH/DIAG IV INF INIT: CPT

## 2024-02-29 PROCEDURE — 71046 X-RAY EXAM CHEST 2 VIEWS: CPT

## 2024-02-29 PROCEDURE — 85379 FIBRIN DEGRADATION QUANT: CPT | Performed by: INTERNAL MEDICINE

## 2024-02-29 PROCEDURE — 99397 PER PM REEVAL EST PAT 65+ YR: CPT | Performed by: FAMILY MEDICINE

## 2024-02-29 PROCEDURE — 71046 X-RAY EXAM CHEST 2 VIEWS: CPT | Performed by: RADIOLOGY

## 2024-02-29 PROCEDURE — 2500000004 HC RX 250 GENERAL PHARMACY W/ HCPCS (ALT 636 FOR OP/ED)

## 2024-02-29 PROCEDURE — 80053 COMPREHEN METABOLIC PANEL: CPT | Mod: 91 | Performed by: INTERNAL MEDICINE

## 2024-02-29 PROCEDURE — 71275 CT ANGIOGRAPHY CHEST: CPT

## 2024-02-29 PROCEDURE — G0439 PPPS, SUBSEQ VISIT: HCPCS | Performed by: FAMILY MEDICINE

## 2024-02-29 PROCEDURE — 83036 HEMOGLOBIN GLYCOSYLATED A1C: CPT

## 2024-02-29 RX ORDER — ATORVASTATIN CALCIUM 40 MG/1
40 TABLET, FILM COATED ORAL DAILY
Qty: 90 TABLET | Refills: 1 | Status: SHIPPED | OUTPATIENT
Start: 2024-02-29 | End: 2024-06-10 | Stop reason: SDUPTHER

## 2024-02-29 RX ORDER — METOPROLOL TARTRATE 1 MG/ML
5 INJECTION, SOLUTION INTRAVENOUS
Status: COMPLETED | OUTPATIENT
Start: 2024-02-29 | End: 2024-02-29

## 2024-02-29 RX ORDER — HEPARIN SODIUM 5000 [USP'U]/ML
2000-4000 INJECTION, SOLUTION INTRAVENOUS; SUBCUTANEOUS EVERY 4 HOURS PRN
Status: DISCONTINUED | OUTPATIENT
Start: 2024-02-29 | End: 2024-03-05

## 2024-02-29 RX ORDER — BUPROPION HYDROCHLORIDE 150 MG/1
150 TABLET ORAL EVERY MORNING
Qty: 90 TABLET | Refills: 1 | Status: SHIPPED | OUTPATIENT
Start: 2024-02-29 | End: 2024-08-27

## 2024-02-29 RX ORDER — HEPARIN SODIUM 5000 [USP'U]/ML
80 INJECTION, SOLUTION INTRAVENOUS; SUBCUTANEOUS ONCE
Status: COMPLETED | OUTPATIENT
Start: 2024-02-29 | End: 2024-03-01

## 2024-02-29 RX ORDER — HEPARIN SODIUM 10000 [USP'U]/100ML
0-4500 INJECTION, SOLUTION INTRAVENOUS CONTINUOUS
Status: DISCONTINUED | OUTPATIENT
Start: 2024-02-29 | End: 2024-03-05

## 2024-02-29 RX ORDER — LEVOTHYROXINE SODIUM 88 UG/1
88 TABLET ORAL DAILY
Qty: 90 TABLET | Refills: 1 | Status: SHIPPED | OUTPATIENT
Start: 2024-02-29 | End: 2024-03-05 | Stop reason: HOSPADM

## 2024-02-29 RX ORDER — LISINOPRIL AND HYDROCHLOROTHIAZIDE 10; 12.5 MG/1; MG/1
1 TABLET ORAL DAILY
Qty: 90 TABLET | Refills: 1 | Status: SHIPPED | OUTPATIENT
Start: 2024-02-29 | End: 2024-03-05 | Stop reason: HOSPADM

## 2024-02-29 RX ORDER — AMLODIPINE BESYLATE 10 MG/1
10 TABLET ORAL DAILY
Qty: 90 TABLET | Refills: 1 | Status: SHIPPED | OUTPATIENT
Start: 2024-02-29 | End: 2024-03-05 | Stop reason: HOSPADM

## 2024-02-29 RX ADMIN — IOHEXOL 75 ML: 350 INJECTION, SOLUTION INTRAVENOUS at 21:08

## 2024-02-29 RX ADMIN — METOPROLOL TARTRATE 5 MG: 5 INJECTION INTRAVENOUS at 18:00

## 2024-02-29 RX ADMIN — METOPROLOL TARTRATE 5 MG: 5 INJECTION INTRAVENOUS at 17:55

## 2024-02-29 RX ADMIN — METOPROLOL TARTRATE 5 MG: 5 INJECTION INTRAVENOUS at 18:06

## 2024-02-29 RX ADMIN — SODIUM CHLORIDE, POTASSIUM CHLORIDE, SODIUM LACTATE AND CALCIUM CHLORIDE 1000 ML: 600; 310; 30; 20 INJECTION, SOLUTION INTRAVENOUS at 21:35

## 2024-02-29 ASSESSMENT — ACTIVITIES OF DAILY LIVING (ADL)
BATHING: INDEPENDENT
DRESSING: INDEPENDENT
DOING_HOUSEWORK: INDEPENDENT
TAKING_MEDICATION: INDEPENDENT
MANAGING_FINANCES: INDEPENDENT
GROCERY_SHOPPING: INDEPENDENT

## 2024-02-29 ASSESSMENT — COLUMBIA-SUICIDE SEVERITY RATING SCALE - C-SSRS
6. HAVE YOU EVER DONE ANYTHING, STARTED TO DO ANYTHING, OR PREPARED TO DO ANYTHING TO END YOUR LIFE?: NO
1. IN THE PAST MONTH, HAVE YOU WISHED YOU WERE DEAD OR WISHED YOU COULD GO TO SLEEP AND NOT WAKE UP?: NO
2. HAVE YOU ACTUALLY HAD ANY THOUGHTS OF KILLING YOURSELF?: NO

## 2024-02-29 ASSESSMENT — ENCOUNTER SYMPTOMS
OCCASIONAL FEELINGS OF UNSTEADINESS: 0
DEPRESSION: 1
LOSS OF SENSATION IN FEET: 0

## 2024-02-29 ASSESSMENT — PAIN - FUNCTIONAL ASSESSMENT: PAIN_FUNCTIONAL_ASSESSMENT: 0-10

## 2024-02-29 ASSESSMENT — PAIN SCALES - GENERAL
PAINLEVEL: 8
PAINLEVEL_OUTOF10: 0 - NO PAIN
PAINLEVEL_OUTOF10: 0 - NO PAIN

## 2024-02-29 ASSESSMENT — PATIENT HEALTH QUESTIONNAIRE - PHQ9
10. IF YOU CHECKED OFF ANY PROBLEMS, HOW DIFFICULT HAVE THESE PROBLEMS MADE IT FOR YOU TO DO YOUR WORK, TAKE CARE OF THINGS AT HOME, OR GET ALONG WITH OTHER PEOPLE: SOMEWHAT DIFFICULT
SUM OF ALL RESPONSES TO PHQ9 QUESTIONS 1 AND 2: 2
2. FEELING DOWN, DEPRESSED OR HOPELESS: SEVERAL DAYS
1. LITTLE INTEREST OR PLEASURE IN DOING THINGS: SEVERAL DAYS

## 2024-02-29 NOTE — ED PROVIDER NOTES
CC: Rapid Heart Rate     HPI:  This is a 78-year-old female with past medical history of hypertension and hypothyroidism who presents to the emergency department with rapid heart rate.  Patient was at her outpatient primary care doctor's office and had an elevated heart rate.  An EKG was obtained which apparently showed atrial flutter and she was sent to the emergency department for further evaluation and management.  On evaluation the patient is currently asymptomatic.  She denies chest pain, shortness of breath, lightheadedness.  She did endorse dizziness upon lying flat for the EKG in her primary care doctor's office.  States that over the past couple of weeks she has become short of breath when walking up and down stairs and while doing dishes at home.  She also endorses intermittent swelling of the bilateral lower extremities.  She denies any fevers or recent illnesses.  She does have a history of hypothyroidism and reports compliance with her levothyroxine.  She does drink coffee and black tea however denies any alcohol use.  No other symptoms at this time.    Limitations to history: None  Independent historian(s): None  Records Reviewed: Recent available ED and inpatient notes reviewed in EMR.    PMHx/PSHx:  Per HPI.   - has a past medical history of Major depressive disorder, single episode, unspecified and Personal history of other diseases of the circulatory system.  - has a past surgical history that includes Tonsillectomy (12/16/2013); Tubal ligation (12/16/2013); Other surgical history (12/16/2013); MR angio head wo IV contrast (9/24/2018); MR angio neck wo IV contrast (9/24/2018); and CT guided percutaneous biopsy bone deep (4/18/2023).    Medications:  Reviewed in EMR. See EMR for complete list of medications and doses.    Allergies:  Gabapentin and Garlic    Social History:  - Tobacco:  reports that she has been smoking cigarettes. She has never used smokeless tobacco.   - Alcohol:  reports no  history of alcohol use.   - Illicit Drugs:  reports no history of drug use.     ROS:  Per HPI.       ???????????????????????????????????????????????????????????????  Triage Vitals:  T 36.4 °C (97.5 °F)  HR (!) 140  BP (!) 123/93  RR 18  O2 100 %      Physical Exam    General: Patient resting comfortably in bed, no acute distress, breathing easily, well appearing, and appropriately conversational without confusion or gross mental status changes.  Head: Normocephalic. Atraumatic.  Neck:  FROM. No gross masses.   Eyes: EOMI. No scleral icterus or injection.  ENT: Moist mucous membranes, no apparent trauma or lesions.  CV: Regular rhythm. No murmurs, rubs, gallops appreciated. 2+ radial pulses bilaterally.  Resp: Clear to auscultation bilaterally. No respiratory distress.   GI: Soft, non-distended.  No tenderness with palpation.     EXT: Mild bilateral peripheral edema, contusions, or wounds.  Skin: Warm and dry, no rashes or lesions.  Neuro: Alert and oriented.  Cranial nerves II-XII grossly intact.  No focal neurological deficits.  Motor and sensation intact throughout.  Speech fluent.  Psych: Appropriate mood and behavior, converses and responds appropriately.    ???????????????????????????????????????????????????????????????  Labs:   Labs Reviewed   CBC WITH AUTO DIFFERENTIAL - Abnormal       Result Value    WBC 4.5      nRBC 0.0      RBC 4.77      Hemoglobin 13.0      Hematocrit 39.3      MCV 82      MCH 27.3      MCHC 33.1      RDW 17.1 (*)     Platelets 360      Neutrophils % 48.9      Immature Granulocytes %, Automated 0.2      Lymphocytes % 37.1      Monocytes % 12.0      Eosinophils % 1.1      Basophils % 0.7      Neutrophils Absolute 2.20      Immature Granulocytes Absolute, Automated 0.01      Lymphocytes Absolute 1.67      Monocytes Absolute 0.54      Eosinophils Absolute 0.05      Basophils Absolute 0.03     B-TYPE NATRIURETIC PEPTIDE - Normal    BNP 83      Narrative:        <100 pg/mL - Heart failure  unlikely  100-299 pg/mL - Intermediate probability of acute heart                  failure exacerbation. Correlate with clinical                  context and patient history.    >=300 pg/mL - Heart Failure likely. Correlate with clinical                  context and patient history.    BNP testing is performed using different testing methodology at [facility] than at other [facility] hospitals. Direct result comparisons should only be made within the same method.      TROPONIN SERIES- (INITIAL, 1 HR)    Narrative:     The following orders were created for panel order Troponin Series, (0, 1 HR).  Procedure                               Abnormality         Status                     ---------                               -----------         ------                     Troponin I, High Sensiti...[[id number]]                                                 Troponin, High Sensitivi...[[id number]]                      In process                   Please view results for these tests on the individual orders.   SERIAL TROPONIN, 1 HOUR   PROTIME-INR   APTT   COMPREHENSIVE METABOLIC PANEL   TSH WITH REFLEX TO FREE T4 IF ABNORMAL   MAGNESIUM        Imaging:   XR chest 2 views   Final Result   No acute cardiopulmonary process.        Findings suggestive of COPD.        MACRO:   None        Signed by: Giselle Menon 2/29/2024 4:46 PM   Dictation workstation:   [id number]           EKG:  With mild guarding 16: 07: Rate of 137 bpm, atrial flutter, normal axis, normal QTc interval, normal QRS interval, no evidence of ST segment elevations or depressions, no pattern T wave abnormalities.  Atrial flutter new when compared to prior EKG obtained May 17, 2022.    MDM:  This is a 78-year-old female who presents emergency department with new onset atrial flutter.  She is hemodynamically stable with heart rates in the 140s upon arrival.  Her physical exam is largely unremarkable.  She has mild peripheral edema bilaterally however  no JVD and no rhonchi on exam.  EKG obtained with a rate of 137 showing atrial flutter.  She has no history of atrial fibrillation or atrial flutter.  Differential considered for her onset of atrial flutter include hypothyroidism, infection, heart failure.  Labs are obtained and the patient is treated with 5 mg of metoprolol for 3 doses.  Heart rates came down to the 120s however not substantially rate controlled.  Labs including CBC, CMP, mag, coags, TSH with reflex T4 pending at this time.  Chest x-ray obtained is unremarkable.  Patient's [id number] score is 4, however will defer anticoagulation at this time.  After labs are returned patient would likely require admission for new onset atrial flutter, workup, and rate controlled.  At this time the patient be handed off to oncoming emergency department provider pending these labs and admission.  She otherwise remained hemodynamically stable.    Patient seen by and discussed with the attending emergency medicine physician.     ED Course:  Diagnoses as of 02/29/24 1839   Atrial flutter, unspecified type (CMS/HCC)       Social Determinants Limiting Care:  None identified    Disposition:  Handoff    Justo Briones, DO   Emergency Medicine PGY-2  [facility]      Procedures ? SmartLinks last updated 2/29/2024 6:39 PM        Justo Briones,   Resident  02/29/24 1829       Justo Briones,   Resident  02/29/24 1839

## 2024-02-29 NOTE — PROGRESS NOTES
Emergency Medicine Transition of Care Note.      I received Sophie Mendosa in signout from Dr. Briones.  Please see the previous ED provider note for all HPI, PE and MDM up to the time of signout at 1800. This is in addition to the primary record. In brief Sophie Mendosa is an 78 y.o. female presenting for   Chief Complaint   Patient presents with    Rapid Heart Rate    .  At the time of signout we were awaiting: Lab work and further workup, disposition likely admission      Diagnoses as of 03/05/24 1731   Atrial flutter, unspecified type (CMS/HCC)   Acute pulmonary embolism, unspecified pulmonary embolism type, unspecified whether acute cor pulmonale present (CMS/HCC)   Pulmonary embolism on right (CMS/HCC)     Diagnoses as of 03/05/24 1731   Atrial flutter, unspecified type (CMS/HCC)   Acute pulmonary embolism, unspecified pulmonary embolism type, unspecified whether acute cor pulmonale present (CMS/HCC)   Pulmonary embolism on right (CMS/HCC)         Medical Decision Making    Briefly, this is a 78-year-old female who presented to her primary care physician, was found to be in A-fib/flutter, and recommended to be seen at  emergency department.  While here, has remained asymptomatic.  Lab work was notable for SINTIA, with mildly elevated uremia, in addition to low TSH, and mildy elevated  T4.  D-dimer was noted to be 986, otherwise unremarkable CBC and coags. CT PE was performed and was positive for a PE.  Started on high intensity heparin therapy, remained hemodynamically stable in the ED.  Will be admitted for new onset A-fib/a flutter, in the setting of acute pulmonary embolism.    Impression:  Final diagnoses:   [I48.92] Atrial flutter, unspecified type (CMS/HCC)       Sandeep Sanders, DO  Emergency Medicine , PGY-2

## 2024-02-29 NOTE — PATIENT INSTRUCTIONS
Immunizations recommended:  --Flu shot every fall.  --Pneumonia vaccines up to  date  --Shingrix,to reduce risk of getting shingles.  It can be done at the pharmacy  --Tetanus every 10 years.  --Covid vaccine.  --RSV vaccine once.    Colonoscopy should be done every 10 years after the age of 45, unless there was a previous abnormality, or family history of colon cancer.  Alternatives would be Cologuard every 3 years (looks for genetic evidence of colon cancer in stool), or stool FIT stool test yearly (looks for microscopic blood in stool)    Mammogram screening recommendations are changing, but at this time, I recommend a mammogram every 1-2 years in your 40's, and yearly after the age of 50.  Having a family history of breast cancer may change that.  ordered    You should try to get 150 minutes of exercise weekly.  Be sure to eat a diet rich in fruits and vegetables, and low in saturated fats and sodium.  Strive for a healthy BMI of less than 25.     Make sure to wear sun screen when outside, and check for changing or unusual moles.    Pre-menopause recommendations are for 1000 mg calcium and 1000 units vitamin D3 daily.  After menopause calcium recommendation is 1200 mg, with 1000 units of vitamin D3  Bone density can be done every 2 years to assess bone loss.--ordered    I recommend you get a Living Will and Durable Power of  for Healthcare. These documents state what care you would want if you couldn't speak for yourself. They help your loved ones in caring for you if that happens.   Once you have those forms completed, you can keep a copy on file with your doctor.    Specialists being seen:  Hematologist for MGUS  Orthopedist for  hip arthritis  Cardiologist, Dr. Hackett    Refer to  podiatry for symptomatic bunion.    Discussed   depression  and  stress.  Will check with in office  therapist,  to  see if she has recommendations for therapist that specializes with seniors.  Medication  would also  be  helpful.  Will retry Bupropion, which is  also used for smoking  cessation.  If  not  tolerated,  will  change to  a different one.    History of stroke, with residual left sided weakness.  Advised to restart atorvastatin and aspirin 81  mg to reduce  the risk of having another stroke.    Continue to  work on quitting smoking.  CT  for  lung cancer screening is  due in March.    BP is stable.  No changes in medication made.    For constipation, can try daily miralax, then  as needed.    Fasting blood work done today.  EKG  done in office, since  heart  rate was  elevated  on exam.   It showed atrial  flutter, with fast heart rate.  Sent  to ED for further treatment and evaluation.    Called ahead to ED, son will transport, since stable.

## 2024-02-29 NOTE — PROGRESS NOTES
Subjective   Reason for Visit: Sophie Mendosa is an 78 y.o. female here for a Medicare Wellness visit.     Past Medical, Surgical, and Family History reviewed and updated in chart.         Her with son,  Howard.  He has concern of  her continuing to live on her own  in house with stairs going  up and down, a lot to take care of.  Looking  at getting her into a senior  living environment, and she is agreeable.    Hip continued pain after injection.  Told she needs replacement, but she is not ready for that.    Complains of  foot pain.   Does have bunion    Has PAD    Son notes depression  and stress.  Wonders if there  is  a therapist  that helps with  older folks.  Patient had  tried Bupropion, but  says  it  made her feel crazy.    Is  still smoking.  Currently smokes  1 pack/2  weeks.  Has smoked  for over  50 years.  Due  for CT lung cancer  screen in March.    Has constipation.  Wants to know what would be safe to take.    Reviewed  list of medication.  Not taking  atorvastatin or  aspirin.   Wonders  why  she needs to take it.    Son notes she has had an ongoing cough, although patient thinks it is better than it had been.        Patient Care Team:  Madelaine Galvez MD as PCP - General  Madelaine Galvez MD as PCP - [employer] PCP     Review of Systems    Objective   Vitals:  /80 (BP Location: Right arm, Patient Position: Sitting, BP Cuff Size: Adult)   Pulse 98   Temp 36.6 °C (97.9 °F) (Oral)   Ht 1.524 m (5')   Wt 46.1 kg (101 lb 9.6 oz)   SpO2 100%   BMI 19.84 kg/m²       Physical Exam  Vitals reviewed.   Constitutional:       Appearance: Normal appearance.   Cardiovascular:      Rate and Rhythm: Regular rhythm. Tachycardia present.      Comments: Heart rate 130 when  listening to heart.  Pulmonary:      Effort: Pulmonary effort is normal.      Breath sounds: Normal breath sounds.   Musculoskeletal:      Right lower leg: No edema.      Left lower leg: No edema.      Comments: Anjum  big  toe.   Neurological:      Mental Status: She is alert.   Psychiatric:         Mood and Affect: Mood normal.         Behavior: Behavior normal.         Thought Content: Thought content normal.         Judgment: Judgment normal.         Assessment/Plan   Problem List Items Addressed This Visit       Benign essential hypertension    Relevant Medications    amLODIPine (Norvasc) 10 mg tablet    lisinopriL-hydrochlorothiazide 10-12.5 mg tablet    Other Relevant Orders    3 Month Follow Up In [facility] - PCP    CVD (cerebrovascular disease)    Relevant Medications    atorvastatin (Lipitor) 40 mg tablet    Depression, major, single episode, mild (CMS/HCC)    Relevant Medications    buPROPion XL (Wellbutrin XL) 150 mg 24 hr tablet    Elevated glucose    Relevant Orders    Hemoglobin A1C (Completed)    Hammer toe    Relevant Orders    Referral to Podiatry    Hemiplegia and hemiparesis following cerebral infarction affecting left non-dominant side (CMS/HCC)    Rheumatoid arthritis (CMS/HCC)    Stage 3a chronic kidney disease (CMS/HCC)     Other Visit Diagnoses       Routine general medical examination at health care facility    -  Primary    Diabetes mellitus screening        Relevant Orders    Comprehensive Metabolic Panel (Completed)    Screening for cardiovascular condition        Relevant Orders    ECG 12 lead    Lipid panel (Completed)    Personal history of tobacco use, presenting hazards to health        Relevant Orders    CT lung screening low dose    Asymptomatic menopausal state        Relevant Orders    XR DEXA bone density    Encounter for screening mammogram for breast cancer        Relevant Orders    BI mammo bilateral screening tomosynthesis    Hypothyroidism, unspecified type        Relevant Medications    levothyroxine (Synthroid, Levoxyl) 88 mcg tablet    Other Relevant Orders    TSH with reflex to Free T4 if abnormal (Completed)    Thyroxine, Free (Completed)    Body mass index (BMI) of 20.0  to 20.9 in adult        Atrial flutter by electrocardiogram (CMS/HCC)

## 2024-02-29 NOTE — ED TRIAGE NOTES
PT SENT TO ER BY PCP FOR A-FLUTTER, PT DENIES CARDIAC HX, DENIES CP/N/V/D/F/CHILLS, PT STATES SHE FELT DIZZY LAYING DOWN FOR EKG, C/O SOB FOR APPROX A MONTH

## 2024-03-01 ENCOUNTER — TELEPHONE (OUTPATIENT)
Dept: PRIMARY CARE | Facility: CLINIC | Age: 79
End: 2024-03-01

## 2024-03-01 ENCOUNTER — APPOINTMENT (OUTPATIENT)
Dept: VASCULAR MEDICINE | Facility: HOSPITAL | Age: 79
DRG: 176 | End: 2024-03-01
Payer: MEDICARE

## 2024-03-01 ENCOUNTER — APPOINTMENT (OUTPATIENT)
Dept: CARDIOLOGY | Facility: HOSPITAL | Age: 79
DRG: 176 | End: 2024-03-01
Payer: MEDICARE

## 2024-03-01 PROBLEM — I26.99 PULMONARY EMBOLISM ON RIGHT (MULTI): Status: ACTIVE | Noted: 2024-03-01

## 2024-03-01 PROBLEM — I26.99 PULMONARY EMBOLISM, OTHER, UNSPECIFIED CHRONICITY, UNSPECIFIED WHETHER ACUTE COR PULMONALE PRESENT (MULTI): Status: ACTIVE | Noted: 2024-03-01

## 2024-03-01 LAB
ANION GAP SERPL CALC-SCNC: 13 MMOL/L (ref 10–20)
AORTIC VALVE MEAN GRADIENT: 3.3 MMHG
AORTIC VALVE PEAK VELOCITY: 1.18 M/S
AV PEAK GRADIENT: 5.5 MMHG
AVA (PEAK VEL): 0.84 CM2
AVA (VTI): 0.83 CM2
BASOPHILS # BLD AUTO: 0.04 X10*3/UL (ref 0–0.1)
BASOPHILS NFR BLD AUTO: 0.8 %
BUN SERPL-MCNC: 24 MG/DL (ref 6–23)
CALCIUM SERPL-MCNC: 9.6 MG/DL (ref 8.6–10.3)
CHLORIDE SERPL-SCNC: 101 MMOL/L (ref 98–107)
CO2 SERPL-SCNC: 26 MMOL/L (ref 21–32)
CREAT SERPL-MCNC: 1.13 MG/DL (ref 0.5–1.05)
EGFRCR SERPLBLD CKD-EPI 2021: 51 ML/MIN/1.73M*2
EJECTION FRACTION APICAL 4 CHAMBER: 46.3
EJECTION FRACTION: 50 %
EOSINOPHIL # BLD AUTO: 0.2 X10*3/UL (ref 0–0.4)
EOSINOPHIL NFR BLD AUTO: 3.8 %
ERYTHROCYTE [DISTWIDTH] IN BLOOD BY AUTOMATED COUNT: 15.3 % (ref 11.5–14.5)
FLUAV RNA RESP QL NAA+PROBE: NOT DETECTED
FLUBV RNA RESP QL NAA+PROBE: NOT DETECTED
GLUCOSE SERPL-MCNC: 88 MG/DL (ref 74–99)
HCT VFR BLD AUTO: 39.2 % (ref 36–46)
HGB BLD-MCNC: 13 G/DL (ref 12–16)
HOLD SPECIMEN: NORMAL
IMM GRANULOCYTES # BLD AUTO: 0.01 X10*3/UL (ref 0–0.5)
IMM GRANULOCYTES NFR BLD AUTO: 0.2 % (ref 0–0.9)
LEFT ATRIUM VOLUME AREA LENGTH INDEX BSA: 43.3 ML/M2
LEFT VENTRICLE INTERNAL DIMENSION DIASTOLE: 3.81 CM (ref 3.5–6)
LEFT VENTRICULAR OUTFLOW TRACT DIAMETER: 1.61 CM
LYMPHOCYTES # BLD AUTO: 2.03 X10*3/UL (ref 0.8–3)
LYMPHOCYTES NFR BLD AUTO: 38.2 %
MAGNESIUM SERPL-MCNC: 2 MG/DL (ref 1.6–2.4)
MCH RBC QN AUTO: 27.2 PG (ref 26–34)
MCHC RBC AUTO-ENTMCNC: 33.2 G/DL (ref 32–36)
MCV RBC AUTO: 82 FL (ref 80–100)
MITRAL VALVE E/A RATIO: 3.27
MONOCYTES # BLD AUTO: 0.53 X10*3/UL (ref 0.05–0.8)
MONOCYTES NFR BLD AUTO: 10 %
NEUTROPHILS # BLD AUTO: 2.5 X10*3/UL (ref 1.6–5.5)
NEUTROPHILS NFR BLD AUTO: 47 %
NRBC BLD-RTO: 0 /100 WBCS (ref 0–0)
PLATELET # BLD AUTO: 278 X10*3/UL (ref 150–450)
POTASSIUM SERPL-SCNC: 4.1 MMOL/L (ref 3.5–5.3)
RBC # BLD AUTO: 4.78 X10*6/UL (ref 4–5.2)
RIGHT VENTRICLE PEAK SYSTOLIC PRESSURE: 26.9 MMHG
SARS-COV-2 RNA RESP QL NAA+PROBE: NOT DETECTED
SODIUM SERPL-SCNC: 136 MMOL/L (ref 136–145)
TRICUSPID ANNULAR PLANE SYSTOLIC EXCURSION: 1.8 CM
UFH PPP CHRO-ACNC: 0.5 IU/ML
UFH PPP CHRO-ACNC: 0.5 IU/ML
UFH PPP CHRO-ACNC: 1.2 IU/ML
WBC # BLD AUTO: 5.3 X10*3/UL (ref 4.4–11.3)

## 2024-03-01 PROCEDURE — 93970 EXTREMITY STUDY: CPT

## 2024-03-01 PROCEDURE — 1100000001 HC PRIVATE ROOM DAILY

## 2024-03-01 PROCEDURE — 93306 TTE W/DOPPLER COMPLETE: CPT | Performed by: INTERNAL MEDICINE

## 2024-03-01 PROCEDURE — 85025 COMPLETE CBC W/AUTO DIFF WBC: CPT | Performed by: HOSPITALIST

## 2024-03-01 PROCEDURE — 93306 TTE W/DOPPLER COMPLETE: CPT

## 2024-03-01 PROCEDURE — 87636 SARSCOV2 & INF A&B AMP PRB: CPT | Performed by: NURSE PRACTITIONER

## 2024-03-01 PROCEDURE — 2500000001 HC RX 250 WO HCPCS SELF ADMINISTERED DRUGS (ALT 637 FOR MEDICARE OP): Performed by: NURSE PRACTITIONER

## 2024-03-01 PROCEDURE — 83735 ASSAY OF MAGNESIUM: CPT | Performed by: HOSPITALIST

## 2024-03-01 PROCEDURE — 99222 1ST HOSP IP/OBS MODERATE 55: CPT | Performed by: INTERNAL MEDICINE

## 2024-03-01 PROCEDURE — 80048 BASIC METABOLIC PNL TOTAL CA: CPT | Performed by: HOSPITALIST

## 2024-03-01 PROCEDURE — 99222 1ST HOSP IP/OBS MODERATE 55: CPT | Performed by: NURSE PRACTITIONER

## 2024-03-01 PROCEDURE — 36415 COLL VENOUS BLD VENIPUNCTURE: CPT | Performed by: HOSPITALIST

## 2024-03-01 PROCEDURE — 93970 EXTREMITY STUDY: CPT | Performed by: INTERNAL MEDICINE

## 2024-03-01 PROCEDURE — 93971 EXTREMITY STUDY: CPT | Performed by: INTERNAL MEDICINE

## 2024-03-01 PROCEDURE — 2500000004 HC RX 250 GENERAL PHARMACY W/ HCPCS (ALT 636 FOR OP/ED)

## 2024-03-01 PROCEDURE — 2500000001 HC RX 250 WO HCPCS SELF ADMINISTERED DRUGS (ALT 637 FOR MEDICARE OP): Performed by: HOSPITALIST

## 2024-03-01 PROCEDURE — 36415 COLL VENOUS BLD VENIPUNCTURE: CPT | Performed by: INTERNAL MEDICINE

## 2024-03-01 PROCEDURE — 85520 HEPARIN ASSAY: CPT | Performed by: INTERNAL MEDICINE

## 2024-03-01 PROCEDURE — 85520 HEPARIN ASSAY: CPT

## 2024-03-01 RX ORDER — UBIDECARENONE 75 MG
500 CAPSULE ORAL DAILY
Status: DISCONTINUED | OUTPATIENT
Start: 2024-03-01 | End: 2024-03-05 | Stop reason: HOSPADM

## 2024-03-01 RX ORDER — ATORVASTATIN CALCIUM 40 MG/1
40 TABLET, FILM COATED ORAL DAILY
Status: DISCONTINUED | OUTPATIENT
Start: 2024-03-01 | End: 2024-03-05 | Stop reason: HOSPADM

## 2024-03-01 RX ORDER — ACETAMINOPHEN 325 MG/1
650 TABLET ORAL EVERY 4 HOURS PRN
Status: DISCONTINUED | OUTPATIENT
Start: 2024-03-01 | End: 2024-03-05 | Stop reason: HOSPADM

## 2024-03-01 RX ORDER — POLYETHYLENE GLYCOL 3350 17 G/17G
17 POWDER, FOR SOLUTION ORAL DAILY
Status: DISCONTINUED | OUTPATIENT
Start: 2024-03-01 | End: 2024-03-05 | Stop reason: HOSPADM

## 2024-03-01 RX ORDER — BUPROPION HYDROCHLORIDE 150 MG/1
150 TABLET ORAL EVERY MORNING
Status: DISCONTINUED | OUTPATIENT
Start: 2024-03-01 | End: 2024-03-05 | Stop reason: HOSPADM

## 2024-03-01 RX ORDER — NAPROXEN SODIUM 220 MG/1
81 TABLET, FILM COATED ORAL DAILY
Status: DISCONTINUED | OUTPATIENT
Start: 2024-03-01 | End: 2024-03-05

## 2024-03-01 RX ORDER — ACETAMINOPHEN 325 MG/1
650 TABLET ORAL EVERY 4 HOURS PRN
Status: DISCONTINUED | OUTPATIENT
Start: 2024-03-01 | End: 2024-03-01

## 2024-03-01 RX ORDER — ONDANSETRON 4 MG/1
4 TABLET, ORALLY DISINTEGRATING ORAL EVERY 8 HOURS PRN
Status: DISCONTINUED | OUTPATIENT
Start: 2024-03-01 | End: 2024-03-05 | Stop reason: HOSPADM

## 2024-03-01 RX ORDER — ONDANSETRON HYDROCHLORIDE 2 MG/ML
4 INJECTION, SOLUTION INTRAVENOUS EVERY 8 HOURS PRN
Status: DISCONTINUED | OUTPATIENT
Start: 2024-03-01 | End: 2024-03-05 | Stop reason: HOSPADM

## 2024-03-01 RX ORDER — TALC
3 POWDER (GRAM) TOPICAL DAILY
Status: DISCONTINUED | OUTPATIENT
Start: 2024-03-01 | End: 2024-03-05 | Stop reason: HOSPADM

## 2024-03-01 RX ORDER — METOPROLOL TARTRATE 25 MG/1
25 TABLET, FILM COATED ORAL 2 TIMES DAILY
Status: DISCONTINUED | OUTPATIENT
Start: 2024-03-01 | End: 2024-03-05 | Stop reason: HOSPADM

## 2024-03-01 RX ORDER — LEVOTHYROXINE SODIUM 75 UG/1
75 TABLET ORAL DAILY
Status: DISCONTINUED | OUTPATIENT
Start: 2024-03-01 | End: 2024-03-05 | Stop reason: HOSPADM

## 2024-03-01 RX ORDER — AMLODIPINE BESYLATE 10 MG/1
10 TABLET ORAL DAILY
Status: DISCONTINUED | OUTPATIENT
Start: 2024-03-01 | End: 2024-03-05 | Stop reason: HOSPADM

## 2024-03-01 RX ADMIN — LEVOTHYROXINE SODIUM 75 MCG: 75 TABLET ORAL at 09:20

## 2024-03-01 RX ADMIN — METOPROLOL TARTRATE 25 MG: 25 TABLET, FILM COATED ORAL at 11:04

## 2024-03-01 RX ADMIN — HEPARIN SODIUM 800 UNITS/HR: 10000 INJECTION, SOLUTION INTRAVENOUS at 00:01

## 2024-03-01 RX ADMIN — CYANOCOBALAMIN TAB 500 MCG 500 MCG: 500 TAB at 09:20

## 2024-03-01 RX ADMIN — Medication 3 MG: at 20:10

## 2024-03-01 RX ADMIN — AMLODIPINE BESYLATE 10 MG: 10 TABLET ORAL at 09:15

## 2024-03-01 RX ADMIN — LISINOPRIL: 10 TABLET ORAL at 09:17

## 2024-03-01 RX ADMIN — ATORVASTATIN CALCIUM 40 MG: 40 TABLET, FILM COATED ORAL at 09:15

## 2024-03-01 RX ADMIN — HEPARIN SODIUM 3700 UNITS: 5000 INJECTION INTRAVENOUS; SUBCUTANEOUS at 00:01

## 2024-03-01 RX ADMIN — ASPIRIN 81 MG CHEWABLE TABLET 81 MG: 81 TABLET CHEWABLE at 09:19

## 2024-03-01 RX ADMIN — BUPROPION HYDROCHLORIDE 150 MG: 150 TABLET, FILM COATED, EXTENDED RELEASE ORAL at 09:17

## 2024-03-01 SDOH — SOCIAL STABILITY: SOCIAL INSECURITY: ARE THERE ANY APPARENT SIGNS OF INJURIES/BEHAVIORS THAT COULD BE RELATED TO ABUSE/NEGLECT?: NO

## 2024-03-01 SDOH — SOCIAL STABILITY: SOCIAL INSECURITY: DO YOU FEEL UNSAFE GOING BACK TO THE PLACE WHERE YOU ARE LIVING?: NO

## 2024-03-01 SDOH — SOCIAL STABILITY: SOCIAL INSECURITY: ARE YOU OR HAVE YOU BEEN THREATENED OR ABUSED PHYSICALLY, EMOTIONALLY, OR SEXUALLY BY ANYONE?: NO

## 2024-03-01 SDOH — SOCIAL STABILITY: SOCIAL INSECURITY: ABUSE: ADULT

## 2024-03-01 SDOH — SOCIAL STABILITY: SOCIAL INSECURITY: DOES ANYONE TRY TO KEEP YOU FROM HAVING/CONTACTING OTHER FRIENDS OR DOING THINGS OUTSIDE YOUR HOME?: NO

## 2024-03-01 SDOH — SOCIAL STABILITY: SOCIAL INSECURITY: HAS ANYONE EVER THREATENED TO HURT YOUR FAMILY OR YOUR PETS?: NO

## 2024-03-01 SDOH — SOCIAL STABILITY: SOCIAL INSECURITY: HAVE YOU HAD THOUGHTS OF HARMING ANYONE ELSE?: NO

## 2024-03-01 SDOH — SOCIAL STABILITY: SOCIAL INSECURITY: WERE YOU ABLE TO COMPLETE ALL THE BEHAVIORAL HEALTH SCREENINGS?: YES

## 2024-03-01 SDOH — SOCIAL STABILITY: SOCIAL INSECURITY: DO YOU FEEL ANYONE HAS EXPLOITED OR TAKEN ADVANTAGE OF YOU FINANCIALLY OR OF YOUR PERSONAL PROPERTY?: NO

## 2024-03-01 ASSESSMENT — COGNITIVE AND FUNCTIONAL STATUS - GENERAL
DAILY ACTIVITIY SCORE: 24
MOVING TO AND FROM BED TO CHAIR: A LITTLE
WALKING IN HOSPITAL ROOM: A LITTLE
DAILY ACTIVITIY SCORE: 24
PATIENT BASELINE BEDBOUND: NO
STANDING UP FROM CHAIR USING ARMS: A LITTLE
MOBILITY SCORE: 24
MOBILITY SCORE: 20
CLIMB 3 TO 5 STEPS WITH RAILING: A LITTLE

## 2024-03-01 ASSESSMENT — PAIN SCALES - GENERAL
PAINLEVEL_OUTOF10: 0 - NO PAIN

## 2024-03-01 ASSESSMENT — PATIENT HEALTH QUESTIONNAIRE - PHQ9
SUM OF ALL RESPONSES TO PHQ9 QUESTIONS 1 & 2: 1
2. FEELING DOWN, DEPRESSED OR HOPELESS: SEVERAL DAYS
1. LITTLE INTEREST OR PLEASURE IN DOING THINGS: NOT AT ALL

## 2024-03-01 ASSESSMENT — ENCOUNTER SYMPTOMS
HEMATOLOGIC/LYMPHATIC NEGATIVE: 1
ARTHRALGIAS: 1
ALLERGIC/IMMUNOLOGIC NEGATIVE: 1
NEUROLOGICAL NEGATIVE: 1
ENDOCRINE NEGATIVE: 1
FATIGUE: 1
PALPITATIONS: 1
SHORTNESS OF BREATH: 1
GASTROINTESTINAL NEGATIVE: 1
PSYCHIATRIC NEGATIVE: 1

## 2024-03-01 ASSESSMENT — ACTIVITIES OF DAILY LIVING (ADL)
LACK_OF_TRANSPORTATION: NO
JUDGMENT_ADEQUATE_SAFELY_COMPLETE_DAILY_ACTIVITIES: YES
GROOMING: INDEPENDENT
HEARING - RIGHT EAR: FUNCTIONAL
ADEQUATE_TO_COMPLETE_ADL: YES
HEARING - LEFT EAR: FUNCTIONAL
WALKS IN HOME: INDEPENDENT
LACK_OF_TRANSPORTATION: YES
BATHING: INDEPENDENT
DRESSING YOURSELF: INDEPENDENT
PATIENT'S MEMORY ADEQUATE TO SAFELY COMPLETE DAILY ACTIVITIES?: YES
FEEDING YOURSELF: INDEPENDENT
TOILETING: INDEPENDENT

## 2024-03-01 ASSESSMENT — LIFESTYLE VARIABLES
PRESCIPTION_ABUSE_PAST_12_MONTHS: NO
HOW OFTEN DO YOU HAVE A DRINK CONTAINING ALCOHOL: NEVER
AUDIT-C TOTAL SCORE: 0
HOW MANY STANDARD DRINKS CONTAINING ALCOHOL DO YOU HAVE ON A TYPICAL DAY: PATIENT DOES NOT DRINK
HOW OFTEN DO YOU HAVE 6 OR MORE DRINKS ON ONE OCCASION: NEVER
SUBSTANCE_ABUSE_PAST_12_MONTHS: NO
SKIP TO QUESTIONS 9-10: 1
AUDIT-C TOTAL SCORE: 0

## 2024-03-01 ASSESSMENT — PAIN - FUNCTIONAL ASSESSMENT: PAIN_FUNCTIONAL_ASSESSMENT: 0-10

## 2024-03-01 NOTE — CARE PLAN
The patient's goals for the shift include      The clinical goals for the shift include to stay HDS

## 2024-03-01 NOTE — HOSPITAL COURSE
Sophie Mendosa is a 78 y.o. female, with a PMH of HTN, depression, and hypothyroidism, who is being admitted to the observation unit at [facility] on 2/29/2024 for acute PE and new onset atrial flutter.     Patient intitally presented to the ED on 2/29/2024 for rapid heart rate. She was seen by her PCP earlier that day, found to be in atrial flutter per EKG, and referred to the ED. Patient is asymptomatic for the most part, denying CP, SOB, and palpations. She does note some dizziness when laying flat, DEL VALLE, and intermittent BLE edema.    In the ED, VS notable for HR of 140 and EKG revealed atrial flutter with RVR 2:1 conduction. Labs with Cr at baseline, normal BNP, elevated d-dimer, and abnormal TFTs. CXR without acute process, though suggestive of COPD, and CT PE revealed RLL acute PE w/ PAH, as well as severe coronary artery calcifications and developing interstitial edema.      Patient is a 78 y.o. female admitted under observation status for acute RLL pulmonary embolism and new onset atrial flutter.     Acute RLL PE  Pulmonary Artery HTN  - BLE/BUE US ordered to r/o DVT as source  - Echo ordered to r/o right heart strain/failure  - Continue Heparin gtt, likely transition to DOAC  - Monitor for bleeding/complications at least 12 hours    New onset Atrial Flutter  - RVR with 2:1 conduction per EKG in the ED  - Given 5mg IVP Metop x3  - Rhythm currently ***  - Start PO BB if no right heart failure on echo  - Anticoagulated with Heparin gtt  - Cardiology consulted  - Continuous tele  - Keep K+ >4 and Mg >2    Coronary Artery Calcifications  - Per chest CT  - Negative stress test 2021  - Continue ASA and statin    Hypothyroidism  - TSH low 0.27 and free T4 elevate 1.21  - Decrease Levothyroxine to 75mcg daily  - Follow up outpatient in 6 weeks for TFTs after discharge     HTN  - BP stable   - c/w home Norvasc and hydrochlorothiazide-Lisinopril  - Adjust medications as indicated     HLD  - Continue statin  -  FU for lipid panel as indicated outpatient     Hx of Anxiety/Depression  - Stable on current regimen  - c/w Wellbutrin    Non-Contributory Comorbidities  - Monitor for acute issues and treat as indicated  - c/w home meds as appropriate, adjust based on clinical course      VTE Prophylaxis: SCDs/TEDs, ambulation, SQ Heparin gtt  Code Status: Full Code    Chart, medical history, and labs/testing reviewed in detail.     Disposition: Home pending cardiology recommendations and transition to PO anticoagulation

## 2024-03-01 NOTE — CONSULTS
Inpatient consult to Cardiology  Consult performed by: SHANNON Chaves  Consult ordered by: SHANNON Hong  Reason for consult: New a flutter with PE      History Of Present Illness:    Sophie Mendosa is a 78 y.o. female with past medical history significant for Coronary artery calcification on CT scan, Hypertension, Hypothyroidism, Gout, CVA 9/2018, GERD. Presented with elevated heart rate and shortness of breath.  Cardiology is consulted for new atrial flutter with PE.     Patient reports that she went to his scheduled PCP appointment yesterday.  States for the last several months that she has noticed on and off shortness of breath.  States may be secondary to possible COPD and her chronic tobacco use.  Also endorses intermittent chest discomfort which she thought was secondary to possible reflux as it was associated when she felt abdominal fullness.  Discomfort was nonpleuritic nonexertional.  While at her PCP office she was noted to be tachycardic.  ECG was obtained which showed new onset atrial flutter with rapid ventricular response. States she did not feel any palpations or her heart racing.  Denies any dizziness, lightheadedness, syncope or near syncope.   She was referred to the emergency room for further evaluation.    EKG on presentation showed atrial flutter 2:1 AV conduction.  CT PE demonstrated small filling defect in a right lower lobe segmental branch extending distally consistent with acute pulmonary embolus, calculated RV to LV ratio of 1.0, right pulmonary artery is dilated up to 3 cm which can be seen with pulmonary arterial hypertension, cardiomegaly, severe coronary artery calcifications noted, mild interlobular septal thickening suggestive of developing interstitial edema.  Initial vital signs temp 36.4  RR 18 /93 pulse ox 100% on room air. She was treated with LR 1,000ml, IV metoprolol 5mg x 3 and started on heparin drip.     Follows with cardiologist   "Megan.     Home cardiac meds  Amlodipine 10 mg daily  Aspirin 81 mg daily  Atorvastatin 40 mg daily  Lisinopril-hydrochlorothiazide 10-12.5 mg daily      Last Recorded Vitals:  Vitals:    03/01/24 0230 03/01/24 0300 03/01/24 0324 03/01/24 0817   BP: 115/69 113/76 115/74 111/71   BP Location:   Right arm Left arm   Patient Position:   Lying Lying   Pulse: 70 81 59 (!) 5   Resp: 17 18 18 18   Temp:   36.5 °C (97.7 °F) 36.6 °C (97.9 °F)   TempSrc:   Temporal Oral   SpO2: 94% 96% 93% 100%   Weight:       Height:           Last Labs:  LABS:  CMP:  Results from last 7 days   Lab Units 03/01/24 0459 02/29/24 1926 02/29/24 1825 02/29/24  1526   SODIUM mmol/L 136 136 133* 137   POTASSIUM mmol/L 4.1 4.2 6.3* 4.8   CHLORIDE mmol/L 101 101 100 99   CO2 mmol/L 26 25 24 26   ANION GAP mmol/L 13 14 15 17   BUN mg/dL 24* 24* 25* 25*   CREATININE mg/dL 1.13* 1.10* 1.15* 1.21*   EGFR mL/min/1.73m*2 51* 52* 49* 47*   MAGNESIUM mg/dL 2.00  --  2.30  --    ALBUMIN g/dL  --  3.8 4.2 4.4   ALT U/L  --  8 8 9   AST U/L  --  12 23 14   BILIRUBIN TOTAL mg/dL  --  0.7 0.7 0.7     CBC:  Results from last 7 days   Lab Units 03/01/24 0458 02/29/24  1644   WBC AUTO x10*3/uL 5.3 4.5   HEMOGLOBIN g/dL 13.0 13.0   HEMATOCRIT % 39.2 39.3   PLATELETS AUTO x10*3/uL 278 360   MCV fL 82 82     COAG:   Results from last 7 days   Lab Units 02/29/24 1926   INR  1.1     ABO: No results found for: \"ABO\"  HEME/ENDO:  Results from last 7 days   Lab Units 02/29/24 1825 02/29/24  1526   TSH mIU/L 0.27* 0.30*   HEMOGLOBIN A1C %  --  6.1*      CARDIAC:   Results from last 7 days   Lab Units 02/29/24  1825 02/29/24  1644   TROPHS ng/L 12  --    BNP pg/mL  --  83     Recent Labs     02/29/24  1526 02/21/23  0000 10/28/20  1502 05/12/20  1329   CHOL 210* 217* 160 209*   LDLF  --  90 54 92   LDLCALC 97  --   --   --    HDL 94.1 104.1 92.5 91.3   TRIG 93 114 67 128        Imagine results  CT angio chest for pulmonary embolism   Final Result   1. Small filling " defect in a right lower lobe segmental branch   extending distally consistent with acute pulmonary embolus.   Calculated RV to LV ratio of 1.0.   2. Right pulmonary artery is dilated up to 3 cm which can be seen   with pulmonary arterial hypertension.   3. Cardiomegaly. Severe coronary artery calcifications noted.   4. Mild interlobular septal thickening suggestive of developing   interstitial edema.   5. Centrilobular and paraseptal emphysema noted.   6. Additional findings as described above.             MACRO:   Giselle Menon discussed the significance and urgency of this critical   finding by telephone with  Dr. Wilkerson on 2/29/2024 at 9:45 pm.   (**-RCF-**) Findings:  See findings.        Signed by: Giselle Menon 2/29/2024 9:46 PM   Dictation workstation:   [id number]      XR chest 2 views   Final Result   No acute cardiopulmonary process.        Findings suggestive of COPD.        MACRO:   None        Signed by: Giselle Menon 2/29/2024 4:46 PM   Dictation workstation:   [id number]      Transthoracic Echo (TTE) Complete    (Results Pending)      Last I/O:  I/O last 3 completed shifts:  In: 1000 (21.6 mL/kg) [IV Piggyback:1000]  Out: - (0 mL/kg)   Weight: 46.2 kg     Past Cardiology Tests (Last 3 Years):  EKG:  ECG 12 lead 02/29/2024 (Preliminary)    I reviewed telemetry which showed afluter flutter RVR HR 100s    Echo:  3/1/2024   1. Left ventricular systolic function is low normal with a 50-55% estimated ejection fraction.   2. Apical inferior segment is abnormal.   3. The left atrium is moderately dilated.   4. RVSP within normal limits.   5. Left ventricular cavity size is decreased.   6. A speherical Echodensity measuring roughly 1.4 cm X 1 cm is seen attached to the inferior apical wall. There is mild hypokinesia in this region. This is felt to represent thrombus although cardiac mass is not fully excluded. Consider cardiac MRI.   7. Critical Findings of LV Thrombus communicated to Primary Service and  Cardiology Consult Service at 09:38 via [employer] Secure Chat.    9/26/2018   1. The left ventricular systolic function is normal with a 60-65% estimated ejection fraction.   2. Slightly elevated RVSP.   3. There is mild to moderate tricuspid regurgitation.   4. The estimated PASP is 37-42 mmHg.   5. There is plaque visualized in the ascending aorta.      Cath:  No results found for this or any previous visit from the past 1095 days.    Stress Test:  1/20/2021   1. The resting ejection fraction was estimated at 55 to 60% with a peak exercise ejection fraction estimated at 65 to 70%.   2. Normal global left ventricular systolic function.   3. The submaximal level of stress was achieved.   4. There were no stress-induced wall motion abnormalities. This is a negative stress echo test for ischemia.    Cardiac Imaging:  No results found for this or any previous visit from the past 1095 days.      Past Medical History:  She has a past medical history of Major depressive disorder, single episode, unspecified and Personal history of other diseases of the circulatory system.    Past Surgical History:  She has a past surgical history that includes Tonsillectomy (12/16/2013); Tubal ligation (12/16/2013); Other surgical history (12/16/2013); MR angio head wo IV contrast (9/24/2018); MR angio neck wo IV contrast (9/24/2018); and CT guided percutaneous biopsy bone deep (4/18/2023).      Social History:  She reports that she has been smoking cigarettes. She has never used smokeless tobacco. She reports that she does not drink alcohol and does not use drugs.    Family History:  Family History   Problem Relation Name Age of Onset    Other (cardiac disorder) Mother      Other (CVA) Mother      Heart failure Mother      Heart attack Mother      Hypertension Mother      Prostate cancer Father      Diabetes Sister      Hypertension Sister      Diabetes Brother      Other (cardiac disorder) Brother      Heart attack Brother      Hypertension  Brother      Heart failure Other PATERNAL HALF SISTER         Allergies:  Gabapentin and Garlic    Inpatient Medications:  Scheduled medications   Medication Dose Route Frequency    amLODIPine  10 mg oral Daily    aspirin  81 mg oral Daily    atorvastatin  40 mg oral Daily    buPROPion XL  150 mg oral q AM    cyanocobalamin  500 mcg oral Daily    levothyroxine  75 mcg oral Daily    lisinopril 10 mg, hydroCHLOROthiazide 12.5 mg for Zestoretic/Prinizide   oral Daily    melatonin  3 mg oral Daily    perflutren lipid microspheres  0.5-10 mL of dilution intravenous Once in imaging    perflutren protein A microsphere  0.5 mL intravenous Once in imaging    polyethylene glycol  17 g oral Daily    sulfur hexafluoride microsphr  2 mL intravenous Once in imaging     PRN medications   Medication    acetaminophen    heparin    ondansetron ODT    Or    ondansetron     Continuous Medications   Medication Dose Last Rate    heparin  0-4,500 Units/hr 600 Units/hr (03/01/24 0815)     Outpatient Medications:  Current Outpatient Medications   Medication Instructions    amLODIPine (NORVASC) 10 mg, oral, Daily    aspirin 81 mg chewable tablet 1 tablet, oral, Daily    atorvastatin (LIPITOR) 40 mg, oral, Daily    buPROPion XL (WELLBUTRIN XL) 150 mg, oral, Every morning, Do not crush, chew, or split.    cyanocobalamin (VITAMIN B-12) 500 mcg, oral, Daily    levothyroxine (SYNTHROID, LEVOXYL) 88 mcg, oral, Daily    lisinopriL-hydrochlorothiazide 10-12.5 mg tablet 1 tablet, oral, Daily       Physical Exam:  GENERAL: alert, cooperative, pleasant, in no acute distress  SKIN: warm, dry  NECK: no JVD  CARDIAC: Regular rate and rhythm no murmurs  CHEST: Normal respiratory efforts, lungs clear to auscultation bilaterally.  ABDOMEN: soft, nondistended  EXTREMITIES: no lower extremity edema  NEURO: Alert and oriented x 3.  Grossly normal.  Moves all 4 extremities.      Assessment/Plan   Sophie Mendosa is a 78 y.o. female with past medical history  "significant for Coronary artery calcification on CT scan, Hypertension, Hypothyroidism, Gout, CVA 9/2018, GERD. Presented with elevated heart rate and shortness of breath.  Cardiology is consulted for new atrial flutter with PE.     New onset atrial flutter with rapid ventricular response- [id number] score for Atrial Fibrillation Stroke Risk is 6  which puts pt at \"moderate-high\" for thromboembolic event. Previously on no AV laquita blockers.   Left ventricular thrombus- noted on echo 1.4 cm x 1cm attached to inferior apical wall.   Pulmonary embolism noted on CT scan  Severe coronary artery calcification on CT scan-On outpatient statin   HTN- acceptable at this time   Coronary artery calcification noted on CT scan  Abnormal thyroid studies/hx of hypothyroidism- management per primary       Recommendations   Recommend obtaining cardiac MRI for further evaluation of LV thrombus vs possible mass  Rate control with metoprolol tartrate 25 mg BID  Hold amlodipine and Lisinopril/hydrochlorothiazide to allow room for beta blocker uptitration  Okay to transition to NOAC- Per PE protocol   No cardiac indications to remain on aspirin.         Code Status:  Full Code    Sean Ray, APRN-CNP  "

## 2024-03-01 NOTE — SIGNIFICANT EVENT
Patient has been identified as having an emergent need for administration of iodinated contrast for CT scan prior to result of laboratory studies OR despite known elevated GFR due to possibility of life and/or limb threatening pathology.    I acknowledge the risks and benefits of emergently proceeding with contrast administration including that, at present, it is the position of the American College of Radiology that contrast induced nephropathy (SANDRA) is a rare but possible consequence. At this time the benefits of proceeding with contrast administration outweigh the risks.    Attempts will be made to mitigate possible SANDRA risk with IV fluid hydration if able.    Sandeep Sanders, DO  PGY-2, Emergency medicine   [facility]

## 2024-03-01 NOTE — CARE PLAN
The patient's goals for the shift include  Getting a therapeutic assay.    The clinical goals for the shift include to stay HDS    Problem: Pain  Goal: My pain/discomfort is manageable  Outcome: Progressing     Problem: Safety  Goal: Patient will be injury free during hospitalization  Outcome: Progressing  Goal: I will remain free of falls  Outcome: Progressing     Problem: Daily Care  Goal: Daily care needs are met  Outcome: Progressing     Problem: Psychosocial Needs  Goal: Demonstrates ability to cope with hospitalization/illness  Outcome: Progressing  Goal: Collaborate with me, my family, and caregiver to identify my specific goals  Outcome: Progressing     Problem: Discharge Barriers  Goal: My discharge needs are met  Outcome: Progressing     Problem: Skin  Goal: Decreased wound size/increased tissue granulation at next dressing change  Outcome: Progressing  Goal: Participates in plan/prevention/treatment measures  Outcome: Progressing  Goal: Prevent/manage excess moisture  Outcome: Progressing  Goal: Prevent/minimize sheer/friction injuries  Outcome: Progressing  Goal: Promote/optimize nutrition  Outcome: Progressing  Goal: Promote skin healing  Outcome: Progressing     Problem: Pain - Adult  Goal: Verbalizes/displays adequate comfort level or baseline comfort level  Outcome: Progressing     Problem: Safety - Adult  Goal: Free from fall injury  Outcome: Progressing     Problem: Discharge Planning  Goal: Discharge to home or other facility with appropriate resources  Outcome: Progressing     Problem: Chronic Conditions and Co-morbidities  Goal: Patient's chronic conditions and co-morbidity symptoms are monitored and maintained or improved  Outcome: Progressing

## 2024-03-01 NOTE — H&P
History Of Present Illness  Sophie Mendosa is a 78 y.o. female presenting with palpitations. Patient was at her primary care office and on EKG was noted to be in  aflutter RVR.  She was told she will need to go to the ER.   She was brought to the hospital her son who is a .  She was in aflutter RVR and also noted to have a PE, admitted for cards consult and heparinized.     Past Medical History  Past Medical History:   Diagnosis Date    Major depressive disorder, single episode, unspecified     Major depression, single episode    Personal history of other diseases of the circulatory system     History of hypertension       Surgical History  Past Surgical History:   Procedure Laterality Date    CT GUIDED PERCUTANEOUS BIOPSY BONE DEEP  4/18/2023    CT GUIDED PERCUTANEOUS BIOPSY BONE DEEP [facility] CT    MR HEAD ANGIO WO IV CONTRAST  9/24/2018    MR HEAD ANGIO WO IV CONTRAST 9/24/2018 [facility] CLINICAL LEGACY    MR NECK ANGIO WO IV CONTRAST  9/24/2018    MR NECK ANGIO WO IV CONTRAST 9/24/2018 [facility] CLINICAL LEGACY    OTHER SURGICAL HISTORY  12/16/2013    Breast Biopsy During Breast Surgery    TONSILLECTOMY  12/16/2013    Tonsillectomy    TUBAL LIGATION  12/16/2013    Tubal Ligation        Social History  She reports that she has been smoking cigarettes. She has never used smokeless tobacco. She reports that she does not drink alcohol and does not use drugs.    Family History  Family History   Problem Relation Name Age of Onset    Other (cardiac disorder) Mother      Other (CVA) Mother      Heart failure Mother      Heart attack Mother      Hypertension Mother      Prostate cancer Father      Diabetes Sister      Hypertension Sister      Diabetes Brother      Other (cardiac disorder) Brother      Heart attack Brother      Hypertension Brother      Heart failure Other PATERNAL HALF SISTER         Allergies  Gabapentin and Garlic    Review of Systems   Constitutional:  Positive for fatigue.   HENT: Negative.    "  Respiratory:  Positive for shortness of breath.    Cardiovascular:  Positive for palpitations.   Gastrointestinal: Negative.    Endocrine: Negative.    Genitourinary: Negative.    Musculoskeletal:  Positive for arthralgias.   Skin: Negative.    Allergic/Immunologic: Negative.    Neurological: Negative.    Hematological: Negative.    Psychiatric/Behavioral: Negative.          Physical Exam  Constitutional:       Appearance: Normal appearance.   HENT:      Mouth/Throat:      Mouth: Mucous membranes are moist.   Cardiovascular:      Rate and Rhythm: Rhythm irregular.      Comments: A flutter on tele   Pulmonary:      Effort: Pulmonary effort is normal.      Breath sounds: Normal breath sounds.   Abdominal:      Palpations: Abdomen is soft.   Musculoskeletal:         General: Normal range of motion.   Skin:     General: Skin is warm and dry.   Neurological:      General: No focal deficit present.      Mental Status: She is alert.          Last Recorded Vitals  Blood pressure 101/65, pulse 75, temperature 36.6 °C (97.9 °F), temperature source Oral, resp. rate 17, height 1.5 m (4' 11.06\"), weight 46.2 kg (101 lb 14.4 oz), SpO2 100 %.    Relevant Results  Scheduled medications  [Held by provider] amLODIPine, 10 mg, oral, Daily  aspirin, 81 mg, oral, Daily  atorvastatin, 40 mg, oral, Daily  buPROPion XL, 150 mg, oral, q AM  cyanocobalamin, 500 mcg, oral, Daily  levothyroxine, 75 mcg, oral, Daily  [Held by provider] lisinopril 10 mg, hydroCHLOROthiazide 12.5 mg for Zestoretic/Prinizide, , oral, Daily  melatonin, 3 mg, oral, Daily  metoprolol tartrate, 25 mg, oral, BID  perflutren lipid microspheres, 0.5-10 mL of dilution, intravenous, Once in imaging  perflutren protein A microsphere, 0.5 mL, intravenous, Once in imaging  polyethylene glycol, 17 g, oral, Daily  sulfur hexafluoride microsphr, 2 mL, intravenous, Once in imaging      Continuous medications  heparin, 0-4,500 Units/hr, Last Rate: 600 Units/hr (03/01/24 " 0815)      PRN medications  PRN medications: acetaminophen, heparin, ondansetron ODT **OR** ondansetron     Upper extremity venous duplex bilateral   Final Result      Lower extremity venous duplex bilateral   Final Result      Transthoracic Echo (TTE) Complete   Final Result      CT angio chest for pulmonary embolism   Final Result   1. Small filling defect in a right lower lobe segmental branch   extending distally consistent with acute pulmonary embolus.   Calculated RV to LV ratio of 1.0.   2. Right pulmonary artery is dilated up to 3 cm which can be seen   with pulmonary arterial hypertension.   3. Cardiomegaly. Severe coronary artery calcifications noted.   4. Mild interlobular septal thickening suggestive of developing   interstitial edema.   5. Centrilobular and paraseptal emphysema noted.   6. Additional findings as described above.             MACRO:   Giselle Menon discussed the significance and urgency of this critical   finding by telephone with  Dr. Wilkerson on 2/29/2024 at 9:45 pm.   (**-RCF-**) Findings:  See findings.        Signed by: Giselle Menon 2/29/2024 9:46 PM   Dictation workstation:   [id number]      XR chest 2 views   Final Result   No acute cardiopulmonary process.        Findings suggestive of COPD.        MACRO:   None        Signed by: Giselle Menon 2/29/2024 4:46 PM   Dictation workstation:   [id number]      MR cardiac angio chest w and wo IV contrast for morph FUNCT valve DZ and GREAT vessels    (Results Pending)             Assessment/Plan   Principal Problem:    Pulmonary embolism, other, unspecified chronicity, unspecified whether acute cor pulmonale present (CMS/HCC)  Active Problems:    Pulmonary embolism on right (CMS/HCC)    Sophie Mendosa is a 78 y.o. female, with a PMH of HTN, depression, and hypothyroidism, who is being admitted to the observation unit at [facility] on 2/29/2024 for acute PE and new onset atrial flutter.     Patient intitally presented to the ED  on 2/29/2024 for rapid heart rate. She was seen by her PCP earlier that day, found to be in atrial flutter per EKG, and referred to the ED. Patient is asymptomatic for the most part, denying CP, SOB, and palpations. She does note some dizziness when laying flat, DEL VALLE, and intermittent BLE edema.    In the ED, VS notable for HR of 140 and EKG revealed atrial flutter with RVR 2:1 conduction. Labs with Cr at baseline, normal BNP, elevated d-dimer, and abnormal TFTs. CXR without acute process, though suggestive of COPD, and CT PE revealed RLL acute PE w/ PAH, as well as severe coronary artery calcifications and developing interstitial edema.      Patient is a 78 y.o. female admitted under observation status for acute RLL pulmonary embolism and new onset atrial flutter.     Acute RLL PE  Pulmonary Artery HTN  - BLE/BUE US ordered to r/o DVT as source  - Echo ordered to r/o right heart strain/failure  - Continue Heparin gtt, likely transition to DOAC  - Monitor for bleeding/complications at least 12 hours    New onset Atrial Flutter  - RVR with 2:1 conduction per EKG in the ED  - Given 5mg IVP Metop x3  - Rhythm currently aflutter   - Start PO BB if no right heart failure on echo  - Anticoagulated with Heparin gtt  - Cardiology consulted  - Continuous tele  - Keep K+ >4 and Mg >2    Coronary Artery Calcifications  - Per chest CT  - Negative stress test 2021  - Continue ASA and statin    Hypothyroidism  - TSH low 0.27 and free T4 elevate 1.21  - Decrease Levothyroxine to 75mcg daily  - Follow up outpatient in 6 weeks for TFTs after discharge     HTN  - BP stable   - c/w home Norvasc and hydrochlorothiazide-Lisinopril  - Adjust medications as indicated     HLD  - Continue statin  - FU for lipid panel as indicated outpatient     Hx of Anxiety/Depression  - Stable on current regimen  - c/w Wellbutrin    Non-Contributory Comorbidities  - Monitor for acute issues and treat as indicated  - c/w home meds as appropriate, adjust  based on clinical course      VTE Prophylaxis: SCDs/TEDs, ambulation, SQ Heparin gtt  Code Status: Full Code    Chart, medical history, and labs/testing reviewed in detail.     Disposition: Home pending cardiology recommendations and transition to PO anticoagulation     Deandra Reeves CNP

## 2024-03-01 NOTE — PROGRESS NOTES
03/01/24 1439   Discharge Planning   Living Arrangements Alone   Support Systems Children   Assistance Needed Patient uses a cane   Type of Residence Private residence   Home or Post Acute Services None   Patient expects to be discharged to: Home   Does the patient need discharge transport arranged? Yes   RoundTrip coordination needed? Yes   Financial Resource Strain   How hard is it for you to pay for the very basics like food, housing, medical care, and heating? Somewhat   Housing Stability   In the last 12 months, was there a time when you were not able to pay the mortgage or rent on time? N   In the last 12 months, how many places have you lived? 1   In the last 12 months, was there a time when you did not have a steady place to sleep or slept in a shelter (including now)? N   Transportation Needs   In the past 12 months, has lack of transportation kept you from medical appointments or from getting medications? no   In the past 12 months, has lack of transportation kept you from meetings, work, or from getting things needed for daily living? No     Met with patient at bedside to  discuss her preferences for care upon discharge. Discussed how patient manages health at home. Lives alone in a house .  Independent in all ADLs.  No home O2 or dialysis.  Patient uses a cane. Patient normally uses paratransit to get to appointments.  Her son also helps her as needed.   No additional resources or needs identified. Reviewed today's plan of care.  Patient verbalized understanding as evidenced by teach back method. Patient plans to return home at discharge & follow up with her PCP.    Patient upgraded to inpatient status  Pharm is Francheska Horton and Suman Barker, OLIVIAN RN [facility]

## 2024-03-01 NOTE — PROGRESS NOTES
03/01/24 1444   Current Planned Discharge Disposition   Current Planned Discharge Disposition Home

## 2024-03-01 NOTE — PROGRESS NOTES
Pharmacy Medication History Review    Sophie Mendosa is a 78 y.o. female admitted for No Principal Problem: There is no principal problem currently on the Problem List. Please update the Problem List and refresh.. Pharmacy reviewed the patient's [id number] medications and allergies for accuracy.    The list below reflectives the updated PTA list. Please review each medication in order reconciliation for additional clarification and justification.  Prior to Admission medications    Medication Sig Start Date End Date Taking? Authorizing Provider                                                                                                                The list below reflectives the updated allergy list. Please review each documented allergy for additional clarification and justification.  Allergies  Reviewed by Janine Walsh RN on 2/29/2024        Severity Reactions Comments    Gabapentin Not Specified Other     Garlic Not Specified Unknown             Below are additional concerns with the patient's PTA list.  Prior to Admission Medications   Prescriptions Last Dose Informant   amLODIPine (Norvasc) 10 mg tablet 2/28/2024    Sig: Take 1 tablet (10 mg) by mouth once daily.   aspirin 81 mg chewable tablet 2/28/2024    Sig: Chew 1 tablet (81 mg) once daily.   atorvastatin (Lipitor) 40 mg tablet     Sig: Take 1 tablet (40 mg) by mouth once daily.   buPROPion XL (Wellbutrin XL) 150 mg 24 hr tablet     Sig: Take 1 tablet (150 mg) by mouth once daily in the morning. Do not crush, chew, or split.   cyanocobalamin (Vitamin B-12) 500 mcg tablet 2/28/2024    Sig: Take 1 tablet (500 mcg) by mouth once daily.   levothyroxine (Synthroid, Levoxyl) 88 mcg tablet 2/29/2024    Sig: Take 1 tablet (88 mcg) by mouth once daily.   lisinopriL-hydrochlorothiazide 10-12.5 mg tablet 2/28/2024    Sig: Take 1 tablet by mouth once daily.      Facility-Administered Medications: None      Per patient. The Doctor just started the  Wellbutrin and Lipitor again. Hasn't started taking yet.     Shivani Edwards, SALhT

## 2024-03-02 ENCOUNTER — APPOINTMENT (OUTPATIENT)
Dept: CARDIOLOGY | Facility: HOSPITAL | Age: 79
DRG: 176 | End: 2024-03-02
Payer: MEDICARE

## 2024-03-02 LAB
ANION GAP SERPL CALC-SCNC: 11 MMOL/L (ref 10–20)
ATRIAL RATE: 274 BPM
BUN SERPL-MCNC: 23 MG/DL (ref 6–23)
CALCIUM SERPL-MCNC: 9.3 MG/DL (ref 8.6–10.3)
CHLORIDE SERPL-SCNC: 103 MMOL/L (ref 98–107)
CO2 SERPL-SCNC: 27 MMOL/L (ref 21–32)
CREAT SERPL-MCNC: 1.11 MG/DL (ref 0.5–1.05)
EGFRCR SERPLBLD CKD-EPI 2021: 52 ML/MIN/1.73M*2
ERYTHROCYTE [DISTWIDTH] IN BLOOD BY AUTOMATED COUNT: 16 % (ref 11.5–14.5)
GLUCOSE SERPL-MCNC: 97 MG/DL (ref 74–99)
HCT VFR BLD AUTO: 38.9 % (ref 36–46)
HGB BLD-MCNC: 12.2 G/DL (ref 12–16)
HOLD SPECIMEN: NORMAL
MAGNESIUM SERPL-MCNC: 2 MG/DL (ref 1.6–2.4)
MCH RBC QN AUTO: 27.1 PG (ref 26–34)
MCHC RBC AUTO-ENTMCNC: 31.4 G/DL (ref 32–36)
MCV RBC AUTO: 86 FL (ref 80–100)
NRBC BLD-RTO: 0 /100 WBCS (ref 0–0)
P AXIS: 110 DEGREES
P OFFSET: 215 MS
P ONSET: 151 MS
PLATELET # BLD AUTO: 230 X10*3/UL (ref 150–450)
POTASSIUM SERPL-SCNC: 4.3 MMOL/L (ref 3.5–5.3)
Q ONSET: 225 MS
QRS COUNT: 22 BEATS
QRS DURATION: 78 MS
QT INTERVAL: 262 MS
QTC CALCULATION(BAZETT): 395 MS
QTC FREDERICIA: 344 MS
R AXIS: 61 DEGREES
RBC # BLD AUTO: 4.5 X10*6/UL (ref 4–5.2)
SODIUM SERPL-SCNC: 137 MMOL/L (ref 136–145)
T AXIS: 252 DEGREES
T OFFSET: 356 MS
UFH PPP CHRO-ACNC: 0.5 IU/ML
VENTRICULAR RATE: 137 BPM
WBC # BLD AUTO: 4.3 X10*3/UL (ref 4.4–11.3)

## 2024-03-02 PROCEDURE — 36415 COLL VENOUS BLD VENIPUNCTURE: CPT | Performed by: INTERNAL MEDICINE

## 2024-03-02 PROCEDURE — 83735 ASSAY OF MAGNESIUM: CPT | Performed by: NURSE PRACTITIONER

## 2024-03-02 PROCEDURE — 2500000004 HC RX 250 GENERAL PHARMACY W/ HCPCS (ALT 636 FOR OP/ED): Performed by: NURSE PRACTITIONER

## 2024-03-02 PROCEDURE — 2500000001 HC RX 250 WO HCPCS SELF ADMINISTERED DRUGS (ALT 637 FOR MEDICARE OP): Performed by: NURSE PRACTITIONER

## 2024-03-02 PROCEDURE — 93005 ELECTROCARDIOGRAM TRACING: CPT

## 2024-03-02 PROCEDURE — 80048 BASIC METABOLIC PNL TOTAL CA: CPT | Performed by: NURSE PRACTITIONER

## 2024-03-02 PROCEDURE — 85520 HEPARIN ASSAY: CPT | Performed by: INTERNAL MEDICINE

## 2024-03-02 PROCEDURE — 36415 COLL VENOUS BLD VENIPUNCTURE: CPT | Performed by: NURSE PRACTITIONER

## 2024-03-02 PROCEDURE — 85027 COMPLETE CBC AUTOMATED: CPT | Performed by: NURSE PRACTITIONER

## 2024-03-02 PROCEDURE — 1100000001 HC PRIVATE ROOM DAILY

## 2024-03-02 RX ADMIN — BUPROPION HYDROCHLORIDE 150 MG: 150 TABLET, FILM COATED, EXTENDED RELEASE ORAL at 09:33

## 2024-03-02 RX ADMIN — ATORVASTATIN CALCIUM 40 MG: 40 TABLET, FILM COATED ORAL at 09:33

## 2024-03-02 RX ADMIN — Medication 3 MG: at 20:51

## 2024-03-02 RX ADMIN — CYANOCOBALAMIN TAB 500 MCG 500 MCG: 500 TAB at 09:33

## 2024-03-02 RX ADMIN — METOPROLOL TARTRATE 25 MG: 25 TABLET, FILM COATED ORAL at 20:52

## 2024-03-02 RX ADMIN — LEVOTHYROXINE SODIUM 75 MCG: 75 TABLET ORAL at 09:33

## 2024-03-02 RX ADMIN — METOPROLOL TARTRATE 25 MG: 25 TABLET, FILM COATED ORAL at 09:33

## 2024-03-02 RX ADMIN — POLYETHYLENE GLYCOL 3350 17 G: 17 POWDER, FOR SOLUTION ORAL at 09:33

## 2024-03-02 RX ADMIN — ASPIRIN 81 MG CHEWABLE TABLET 81 MG: 81 TABLET CHEWABLE at 09:33

## 2024-03-02 RX ADMIN — HEPARIN SODIUM 600 UNITS/HR: 10000 INJECTION, SOLUTION INTRAVENOUS at 09:34

## 2024-03-02 ASSESSMENT — COGNITIVE AND FUNCTIONAL STATUS - GENERAL
MOBILITY SCORE: 24
DAILY ACTIVITIY SCORE: 24
MOBILITY SCORE: 24
DAILY ACTIVITIY SCORE: 24

## 2024-03-02 ASSESSMENT — PAIN SCALES - GENERAL
PAINLEVEL_OUTOF10: 0 - NO PAIN

## 2024-03-02 ASSESSMENT — PAIN - FUNCTIONAL ASSESSMENT
PAIN_FUNCTIONAL_ASSESSMENT: 0-10
PAIN_FUNCTIONAL_ASSESSMENT: 0-10

## 2024-03-02 NOTE — CARE PLAN
Addended by: IRENE PAREDES on: 9/12/2019 03:45 PM     Modules accepted: Orders     The patient's goals for the shift include  Control heart rate      The clinical goals for the shift include free from falls      Problem: Pain  Goal: My pain/discomfort is manageable  Outcome: Progressing     Problem: Safety  Goal: Patient will be injury free during hospitalization  Outcome: Progressing  Goal: I will remain free of falls  Outcome: Progressing     Problem: Daily Care  Goal: Daily care needs are met  Outcome: Progressing     Problem: Psychosocial Needs  Goal: Demonstrates ability to cope with hospitalization/illness  Outcome: Progressing  Goal: Collaborate with me, my family, and caregiver to identify my specific goals  Outcome: Progressing     Problem: Discharge Barriers  Goal: My discharge needs are met  Outcome: Progressing     Problem: Skin  Goal: Decreased wound size/increased tissue granulation at next dressing change  Outcome: Progressing  Goal: Participates in plan/prevention/treatment measures  Outcome: Progressing  Goal: Prevent/manage excess moisture  Outcome: Progressing  Goal: Prevent/minimize sheer/friction injuries  Outcome: Progressing  Goal: Promote/optimize nutrition  Outcome: Progressing  Goal: Promote skin healing  Outcome: Progressing     Problem: Pain - Adult  Goal: Verbalizes/displays adequate comfort level or baseline comfort level  Outcome: Progressing     Problem: Safety - Adult  Goal: Free from fall injury  Outcome: Progressing     Problem: Discharge Planning  Goal: Discharge to home or other facility with appropriate resources  Outcome: Progressing     Problem: Chronic Conditions and Co-morbidities  Goal: Patient's chronic conditions and co-morbidity symptoms are monitored and maintained or improved  Outcome: Progressing

## 2024-03-02 NOTE — PROGRESS NOTES
PROGRESS NOTE - INTERNAL MEDICINE     PATIENT NAME:  Sophie Mendosa    MRN:  [id number]  SERVICE DATE:  3/2/2024       ADMITTING PHYSICIAN:  Lena Cordero MD    ASSESSMENT AND PLAN    Principal Problem:    Pulmonary embolism, other, unspecified chronicity, unspecified whether acute cor pulmonale present (CMS/HCC)  Active Problems:    Pulmonary embolism on right (CMS/HCC)      Right lower lobe segmental PE, without cor pulmonale  New onset atrial flutter with RVR  Probable LV thrombus versus prominent trabeculae versus cardiac tumor  Severe coronary artery calcifications on CT scan  Hypertension  Hypothyroidism, with suppressed TSH on oral supplement therapy    Plan:  IV heparin with plan to transition to oral agents  Awaiting cardiac MRI  Continue statins  Continue metoprolol, titrate for heart rate/BP      DISPOSITION PLAN:  Pending MRI and cardiol input.      INTERVAL HISTORY OF PRESENT ILLNESS:  No chest pain/sob. No n/v/d. Oral intake good. Feeling better. No fever/chills. acute events from last 24 hrs reviewed.    Pertinent ROS:  No abdominal pain / No Bleeding / No rashes    Discussed with nursing and case management team and the specialists involved in this patient's care. Reviewed the EMR and documentation from other care-givers.      OBJECTIVE  PHYSICAL EXAM:     GENERAL: AAOx3, cooperative resting comfortably  SKIN: Skin turgor normal. No rashes  HEENT: no epistaxis, Moist mucosa.  LUNGS: Vesicular breath sounds, with no wheeze, no crepitations  CARDIAC: REGULAR. S1 and S2; no rubs, no murmur  ABDOMEN: Abdomen soft, non-tender. +BS.  EXTREMITIES: No edema, Good capillary refill.   NEURO: Insight GOOD. No invol movements. Gait not assessed  MUSCULOSKELETAL: No acute inflammation           3/1/2024     3:24 AM 3/1/2024     8:17 AM 3/1/2024    11:45 AM 3/1/2024     4:38 PM 3/1/2024     7:36 PM 3/2/2024    12:05 AM 3/2/2024     4:21 AM   Vitals   Systolic 115 111 101 92 96 91 117   Diastolic 74 71 65 57 57  56 76   Heart Rate 59 5 75 64 96 64 83   Temp 36.5 °C (97.7 °F) 36.6 °C (97.9 °F)  36.7 °C (98.1 °F) 36.6 °C (97.9 °F) 36.4 °C (97.5 °F) 36.7 °C (98.1 °F)   Resp 18 18 17 17 16 16 18     Body mass index is 20.54 kg/m².    Intake/Output Summary (Last 24 hours) at 3/2/2024 0730  Last data filed at 3/1/2024 1800  Gross per 24 hour   Intake 372.37 ml   Output --   Net 372.37 ml           Current Facility-Administered Medications:     acetaminophen (Tylenol) tablet 650 mg, 650 mg, oral, q4h PRN, TOLU Arroyo-CNP    [Held by provider] amLODIPine (Norvasc) tablet 10 mg, 10 mg, oral, Daily, Basilia Lincoln MD, 10 mg at 03/01/24 0915    aspirin chewable tablet 81 mg, 81 mg, oral, Daily, Basilia Lincoln MD, 81 mg at 03/01/24 0919    atorvastatin (Lipitor) tablet 40 mg, 40 mg, oral, Daily, Basilia Lincoln MD, 40 mg at 03/01/24 0915    buPROPion XL (Wellbutrin XL) 24 hr tablet 150 mg, 150 mg, oral, q AM, Basilia Lincoln MD, 150 mg at 03/01/24 0917    cyanocobalamin (Vitamin B-12) tablet 500 mcg, 500 mcg, oral, Daily, Basilia Lincoln MD, 500 mcg at 03/01/24 0920    heparin (porcine) injection 2,000-4,000 Units, 2,000-4,000 Units, intravenous, q4h PRN, Sandeep King, DO    heparin 25,000 Units in dextrose 5% 250 mL (100 Units/mL) infusion (premix), 0-4,500 Units/hr, intravenous, Continuous, Sandeep King, DO, Last Rate: 6 mL/hr at 03/01/24 1649, 600 Units/hr at 03/01/24 1649    levothyroxine (Synthroid, Levoxyl) tablet 75 mcg, 75 mcg, oral, Daily, Basilia Lincoln MD, 75 mcg at 03/01/24 0920    [Held by provider] lisinopril 10 mg, hydroCHLOROthiazide 12.5 mg for Zestoretic/Prinizide, , oral, Daily, Basilia Lincoln MD, Given at 03/01/24 0917    melatonin tablet 3 mg, 3 mg, oral, Daily, Basilia Lincoln MD, 3 mg at 03/01/24 2010    metoprolol tartrate (Lopressor) tablet 25 mg, 25 mg, oral, BID, Sean Ray, APRN-CNP, 25 mg at 03/01/24 1104    ondansetron ODT (Zofran-ODT) disintegrating tablet 4 mg, 4 mg, oral, q8h  "PRN **OR** ondansetron (Zofran) injection 4 mg, 4 mg, intravenous, q8h PRN, Basilia Lincoln MD    perflutren lipid microspheres (Definity) injection 0.5-10 mL of dilution, 0.5-10 mL of dilution, intravenous, Once in imaging, Basilia Lincoln MD    perflutren protein A microsphere (Optison) injection 0.5 mL, 0.5 mL, intravenous, Once in imaging, Basilia Lincoln MD    polyethylene glycol (Glycolax, Miralax) packet 17 g, 17 g, oral, Daily, Basilia Lincoln MD    sulfur hexafluoride microsphr (Lumason) injection 24.28 mg, 2 mL, intravenous, Once in imaging, Basilia Lincoln MD    DATA:   Diagnostic tests reviewed for today's visit:    Most recent labs  Results from last 7 days   Lab Units 03/02/24  0547 03/01/24  0458 02/29/24  1644   WBC AUTO x10*3/uL 4.3* 5.3 4.5   HEMOGLOBIN g/dL 12.2 13.0 13.0   HEMATOCRIT % 38.9 39.2 39.3   PLATELETS AUTO x10*3/uL 230 278 360     Results from last 7 days   Lab Units 03/01/24  0459 02/29/24  1926 02/29/24  1825 02/29/24  1526   SODIUM mmol/L 136 136 133* 137   POTASSIUM mmol/L 4.1 4.2 6.3* 4.8   CHLORIDE mmol/L 101 101 100 99   CO2 mmol/L 26 25 24 26   BUN mg/dL 24* 24* 25* 25*   CREATININE mg/dL 1.13* 1.10* 1.15* 1.21*   CALCIUM mg/dL 9.6 10.2 10.6* 10.7*   PROTEIN TOTAL g/dL  --  6.9 7.7 7.5   BILIRUBIN TOTAL mg/dL  --  0.7 0.7 0.7   ALK PHOS U/L  --  90 93 100   ALT U/L  --  8 8 9   AST U/L  --  12 23 14   GLUCOSE mg/dL 88 93 87 92             No results found for: \"TROPONINT\"         Upper extremity venous duplex bilateral   Final Result      Lower extremity venous duplex bilateral   Final Result      Transthoracic Echo (TTE) Complete   Final Result      CT angio chest for pulmonary embolism   Final Result   1. Small filling defect in a right lower lobe segmental branch   extending distally consistent with acute pulmonary embolus.   Calculated RV to LV ratio of 1.0.   2. Right pulmonary artery is dilated up to 3 cm which can be seen   with pulmonary arterial hypertension.   3. " Cardiomegaly. Severe coronary artery calcifications noted.   4. Mild interlobular septal thickening suggestive of developing   interstitial edema.   5. Centrilobular and paraseptal emphysema noted.   6. Additional findings as described above.             MACRO:   Giselle Menon discussed the significance and urgency of this critical   finding by telephone with  Dr. Wilkerson on 2/29/2024 at 9:45 pm.   (**-RCF-**) Findings:  See findings.        Signed by: Giselle Menon 2/29/2024 9:46 PM   Dictation workstation:   [id number]      XR chest 2 views   Final Result   No acute cardiopulmonary process.        Findings suggestive of COPD.        MACRO:   None        Signed by: Giselle Menon 2/29/2024 4:46 PM   Dictation workstation:   [id number]      MR cardiac angio chest w and wo IV contrast for morph FUNCT valve DZ and GREAT vessels    (Results Pending)         SIGNATURE: Wallace Finn MD PATIENT NAME: Sophie Mendosa   DATE: 3/2/2024 MRN: [id number]   TIME: 7:30 AM

## 2024-03-03 PROBLEM — N18.31 STAGE 3A CHRONIC KIDNEY DISEASE (MULTI): Status: ACTIVE | Noted: 2024-03-03

## 2024-03-03 LAB
ANION GAP SERPL CALC-SCNC: 13 MMOL/L (ref 10–20)
BUN SERPL-MCNC: 22 MG/DL (ref 6–23)
CALCIUM SERPL-MCNC: 9.1 MG/DL (ref 8.6–10.3)
CHLORIDE SERPL-SCNC: 103 MMOL/L (ref 98–107)
CO2 SERPL-SCNC: 26 MMOL/L (ref 21–32)
CREAT SERPL-MCNC: 1.11 MG/DL (ref 0.5–1.05)
EGFRCR SERPLBLD CKD-EPI 2021: 52 ML/MIN/1.73M*2
ERYTHROCYTE [DISTWIDTH] IN BLOOD BY AUTOMATED COUNT: 15.2 % (ref 11.5–14.5)
GLUCOSE SERPL-MCNC: 91 MG/DL (ref 74–99)
HCT VFR BLD AUTO: 35.4 % (ref 36–46)
HGB BLD-MCNC: 11.5 G/DL (ref 12–16)
MAGNESIUM SERPL-MCNC: 1.9 MG/DL (ref 1.6–2.4)
MCH RBC QN AUTO: 26.5 PG (ref 26–34)
MCHC RBC AUTO-ENTMCNC: 32.5 G/DL (ref 32–36)
MCV RBC AUTO: 82 FL (ref 80–100)
NRBC BLD-RTO: 0 /100 WBCS (ref 0–0)
PLATELET # BLD AUTO: 246 X10*3/UL (ref 150–450)
POTASSIUM SERPL-SCNC: 4.5 MMOL/L (ref 3.5–5.3)
RBC # BLD AUTO: 4.34 X10*6/UL (ref 4–5.2)
SODIUM SERPL-SCNC: 137 MMOL/L (ref 136–145)
UFH PPP CHRO-ACNC: 0.5 IU/ML
WBC # BLD AUTO: 4.4 X10*3/UL (ref 4.4–11.3)

## 2024-03-03 PROCEDURE — 2500000004 HC RX 250 GENERAL PHARMACY W/ HCPCS (ALT 636 FOR OP/ED): Performed by: NURSE PRACTITIONER

## 2024-03-03 PROCEDURE — 36415 COLL VENOUS BLD VENIPUNCTURE: CPT | Performed by: NURSE PRACTITIONER

## 2024-03-03 PROCEDURE — 80048 BASIC METABOLIC PNL TOTAL CA: CPT | Performed by: NURSE PRACTITIONER

## 2024-03-03 PROCEDURE — 85520 HEPARIN ASSAY: CPT | Performed by: INTERNAL MEDICINE

## 2024-03-03 PROCEDURE — 1100000001 HC PRIVATE ROOM DAILY

## 2024-03-03 PROCEDURE — 2500000001 HC RX 250 WO HCPCS SELF ADMINISTERED DRUGS (ALT 637 FOR MEDICARE OP): Performed by: NURSE PRACTITIONER

## 2024-03-03 PROCEDURE — 85027 COMPLETE CBC AUTOMATED: CPT | Performed by: NURSE PRACTITIONER

## 2024-03-03 PROCEDURE — 83735 ASSAY OF MAGNESIUM: CPT | Performed by: NURSE PRACTITIONER

## 2024-03-03 RX ADMIN — POLYETHYLENE GLYCOL 3350 17 G: 17 POWDER, FOR SOLUTION ORAL at 08:45

## 2024-03-03 RX ADMIN — METOPROLOL TARTRATE 25 MG: 25 TABLET, FILM COATED ORAL at 08:43

## 2024-03-03 RX ADMIN — BUPROPION HYDROCHLORIDE 150 MG: 150 TABLET, FILM COATED, EXTENDED RELEASE ORAL at 08:37

## 2024-03-03 RX ADMIN — METOPROLOL TARTRATE 25 MG: 25 TABLET, FILM COATED ORAL at 20:37

## 2024-03-03 RX ADMIN — ATORVASTATIN CALCIUM 40 MG: 40 TABLET, FILM COATED ORAL at 08:43

## 2024-03-03 RX ADMIN — CYANOCOBALAMIN TAB 500 MCG 500 MCG: 500 TAB at 08:37

## 2024-03-03 RX ADMIN — ASPIRIN 81 MG CHEWABLE TABLET 81 MG: 81 TABLET CHEWABLE at 08:37

## 2024-03-03 RX ADMIN — LEVOTHYROXINE SODIUM 75 MCG: 75 TABLET ORAL at 08:37

## 2024-03-03 RX ADMIN — Medication 3 MG: at 20:37

## 2024-03-03 ASSESSMENT — COGNITIVE AND FUNCTIONAL STATUS - GENERAL
DAILY ACTIVITIY SCORE: 24
MOBILITY SCORE: 24

## 2024-03-03 ASSESSMENT — PAIN SCALES - GENERAL: PAINLEVEL_OUTOF10: 0 - NO PAIN

## 2024-03-03 NOTE — NURSING NOTE
Patient awake and alert resting in bed. Denies any issues. Very pleasant and cooperative with assessment. Safety maintained.

## 2024-03-03 NOTE — PROGRESS NOTES
PROGRESS NOTE - INTERNAL MEDICINE     PATIENT NAME:  Sophie Mendosa    MRN:  [id number]  SERVICE DATE:  3/3/2024       ADMITTING PHYSICIAN:  Lena Cordero MD    ASSESSMENT AND PLAN    Principal Problem:    Pulmonary embolism, other, unspecified chronicity, unspecified whether acute cor pulmonale present (CMS/HCC)  Active Problems:    Pulmonary embolism on right (CMS/HCC)    Hypertension with CKD 3a.  Right lower lobe segmental PE, without cor pulmonale  New onset atrial flutter with RVR  Probable LV thrombus versus prominent trabeculae versus cardiac tumor  Severe coronary artery calcifications on CT scan  Hypothyroidism, with suppressed TSH on oral supplement therapy  CKD3a POA.     Plan:  IV heparin with plan to transition to oral agents  Awaiting cardiac MRI  Continue statins  Continue metoprolol, titrate for heart rate/BP        DISPOSITION PLAN:  Pending MRI and cardiol input.        INTERVAL HISTORY OF PRESENT ILLNESS:  No chest pain/sob. No n/v/d. Oral intake good. Feeling better. No fever/chills. acute events from last 24 hrs reviewed.     Pertinent ROS:  No abdominal pain / No Bleeding / No rashes     Discussed with nursing and case management team and the specialists involved in this patient's care. Reviewed the EMR and documentation from other care-givers.        OBJECTIVE  PHYSICAL EXAM:      GENERAL: AAOx3, cooperative resting comfortably  SKIN: Skin turgor normal. No rashes  HEENT: no epistaxis, Moist mucosa.  LUNGS: Vesicular breath sounds, with no wheeze, no crepitations  CARDIAC: REGULAR. S1 and S2; no rubs, no murmur  ABDOMEN: Abdomen soft, non-tender. +BS.  EXTREMITIES: No edema, Good capillary refill.   NEURO: Insight GOOD. No invol movements. Gait not assessed  MUSCULOSKELETAL: No acute inflammation            3/2/2024     8:07 AM 3/2/2024    11:15 AM 3/2/2024     3:54 PM 3/2/2024     8:52 PM 3/2/2024    11:36 PM 3/3/2024     3:31 AM 3/3/2024     8:00 AM   Vitals   Systolic 111 108 106 96 107  97 101   Diastolic 74 60 72 84 72 59 62   Heart Rate 77 71 64 72 63 62 69   Temp 36.6 °C (97.9 °F) 36.7 °C (98 °F) 36.6 °C (97.9 °F)  36.3 °C (97.4 °F) 36.6 °C (97.9 °F) 36.5 °C (97.7 °F)   Resp 18 18 18  16 16 16     Body mass index is 20.54 kg/m².  No intake or output data in the 24 hours ending 03/03/24 0943        Current Facility-Administered Medications:     acetaminophen (Tylenol) tablet 650 mg, 650 mg, oral, q4h PRN, SHANNON Hong    [Held by provider] amLODIPine (Norvasc) tablet 10 mg, 10 mg, oral, Daily, SHANNON Hong, 10 mg at 03/01/24 0915    aspirin chewable tablet 81 mg, 81 mg, oral, Daily, SHANNON Hong, 81 mg at 03/03/24 0837    atorvastatin (Lipitor) tablet 40 mg, 40 mg, oral, Daily, SHANNON Hong, 40 mg at 03/03/24 0843    buPROPion XL (Wellbutrin XL) 24 hr tablet 150 mg, 150 mg, oral, q AM, SHANNON Hong, 150 mg at 03/03/24 0837    cyanocobalamin (Vitamin B-12) tablet 500 mcg, 500 mcg, oral, Daily, SHANNON Hong, 500 mcg at 03/03/24 0837    heparin (porcine) injection 2,000-4,000 Units, 2,000-4,000 Units, intravenous, q4h PRN, SHANNON Hong    heparin 25,000 Units in dextrose 5% 250 mL (100 Units/mL) infusion (premix), 0-4,500 Units/hr, intravenous, Continuous, SHANNON Hong, Last Rate: 6 mL/hr at 03/02/24 1311, 600 Units/hr at 03/02/24 1311    levothyroxine (Synthroid, Levoxyl) tablet 75 mcg, 75 mcg, oral, Daily, SHANNON Hong, 75 mcg at 03/03/24 0837    [Held by provider] lisinopril 10 mg, hydroCHLOROthiazide 12.5 mg for Zestoretic/Prinizide, , oral, Daily, SHANNON Hong, Given at 03/01/24 0917    melatonin tablet 3 mg, 3 mg, oral, Daily, SHANNON Hong, 3 mg at 03/02/24 2051    metoprolol tartrate (Lopressor) tablet 25 mg, 25 mg, oral, BID, SHANNON Hong, 25 mg at 03/03/24 0843    ondansetron ODT (Zofran-ODT) disintegrating tablet 4 mg, 4 mg, oral, q8h  "PRN **OR** ondansetron (Zofran) injection 4 mg, 4 mg, intravenous, q8h PRN, SHANNON Hong    perflutren lipid microspheres (Definity) injection 0.5-10 mL of dilution, 0.5-10 mL of dilution, intravenous, Once in imaging, SHANNON Hong    perflutren protein A microsphere (Optison) injection 0.5 mL, 0.5 mL, intravenous, Once in imaging, SHANNON Hong    polyethylene glycol (Glycolax, Miralax) packet 17 g, 17 g, oral, Daily, SHANNON Hong, 17 g at 03/03/24 0845    sulfur hexafluoride microsphr (Lumason) injection 24.28 mg, 2 mL, intravenous, Once in imaging, SHANNON Hong    DATA:   Diagnostic tests reviewed for today's visit:    Most recent labs  Results from last 7 days   Lab Units 03/03/24  0649 03/02/24  0547 03/01/24  0458   WBC AUTO x10*3/uL 4.4 4.3* 5.3   HEMOGLOBIN g/dL 11.5* 12.2 13.0   HEMATOCRIT % 35.4* 38.9 39.2   PLATELETS AUTO x10*3/uL 246 230 278     Results from last 7 days   Lab Units 03/03/24  0649 03/02/24  0547 03/01/24  0459 02/29/24  1926 02/29/24  1825 02/29/24  1526   SODIUM mmol/L 137 137 136 136 133* 137   POTASSIUM mmol/L 4.5 4.3 4.1 4.2 6.3* 4.8   CHLORIDE mmol/L 103 103 101 101 100 99   CO2 mmol/L 26 27 26 25 24 26   BUN mg/dL 22 23 24* 24* 25* 25*   CREATININE mg/dL 1.11* 1.11* 1.13* 1.10* 1.15* 1.21*   CALCIUM mg/dL 9.1 9.3 9.6 10.2 10.6* 10.7*   PROTEIN TOTAL g/dL  --   --   --  6.9 7.7 7.5   BILIRUBIN TOTAL mg/dL  --   --   --  0.7 0.7 0.7   ALK PHOS U/L  --   --   --  90 93 100   ALT U/L  --   --   --  8 8 9   AST U/L  --   --   --  12 23 14   GLUCOSE mg/dL 91 97 88 93 87 92             No results found for: \"TROPONINT\"         Upper extremity venous duplex bilateral   Final Result      Lower extremity venous duplex bilateral   Final Result      Transthoracic Echo (TTE) Complete   Final Result      CT angio chest for pulmonary embolism   Final Result   1. Small filling defect in a right lower lobe segmental branch   extending " distally consistent with acute pulmonary embolus.   Calculated RV to LV ratio of 1.0.   2. Right pulmonary artery is dilated up to 3 cm which can be seen   with pulmonary arterial hypertension.   3. Cardiomegaly. Severe coronary artery calcifications noted.   4. Mild interlobular septal thickening suggestive of developing   interstitial edema.   5. Centrilobular and paraseptal emphysema noted.   6. Additional findings as described above.             MACRO:   Giselle Menon discussed the significance and urgency of this critical   finding by telephone with  Dr. Wilkerson on 2/29/2024 at 9:45 pm.   (**-RCF-**) Findings:  See findings.        Signed by: Giselle Menon 2/29/2024 9:46 PM   Dictation workstation:   [id number]      XR chest 2 views   Final Result   No acute cardiopulmonary process.        Findings suggestive of COPD.        MACRO:   None        Signed by: Giselle Menon 2/29/2024 4:46 PM   Dictation workstation:   [id number]      MR cardiac angio chest w and wo IV contrast for morph FUNCT valve DZ and GREAT vessels    (Results Pending)         SIGNATURE: Wallace Finn MD PATIENT NAME: Sophie Mendosa   DATE: 3/3/2024 MRN: [id number]   TIME: 9:43 AM

## 2024-03-04 ENCOUNTER — APPOINTMENT (OUTPATIENT)
Dept: RADIOLOGY | Facility: HOSPITAL | Age: 79
DRG: 176 | End: 2024-03-04
Payer: MEDICARE

## 2024-03-04 LAB
ANION GAP SERPL CALC-SCNC: 10 MMOL/L (ref 10–20)
BUN SERPL-MCNC: 24 MG/DL (ref 6–23)
CALCIUM SERPL-MCNC: 9 MG/DL (ref 8.6–10.3)
CHLORIDE SERPL-SCNC: 102 MMOL/L (ref 98–107)
CO2 SERPL-SCNC: 29 MMOL/L (ref 21–32)
CREAT SERPL-MCNC: 1.21 MG/DL (ref 0.5–1.05)
EGFRCR SERPLBLD CKD-EPI 2021: 47 ML/MIN/1.73M*2
ERYTHROCYTE [DISTWIDTH] IN BLOOD BY AUTOMATED COUNT: 15.5 % (ref 11.5–14.5)
GLUCOSE SERPL-MCNC: 101 MG/DL (ref 74–99)
HCT VFR BLD AUTO: 35.1 % (ref 36–46)
HGB BLD-MCNC: 11.4 G/DL (ref 12–16)
MAGNESIUM SERPL-MCNC: 1.8 MG/DL (ref 1.6–2.4)
MCH RBC QN AUTO: 26.7 PG (ref 26–34)
MCHC RBC AUTO-ENTMCNC: 32.5 G/DL (ref 32–36)
MCV RBC AUTO: 82 FL (ref 80–100)
NRBC BLD-RTO: 0 /100 WBCS (ref 0–0)
PLATELET # BLD AUTO: 238 X10*3/UL (ref 150–450)
POTASSIUM SERPL-SCNC: 4.5 MMOL/L (ref 3.5–5.3)
RBC # BLD AUTO: 4.27 X10*6/UL (ref 4–5.2)
SODIUM SERPL-SCNC: 136 MMOL/L (ref 136–145)
WBC # BLD AUTO: 5.8 X10*3/UL (ref 4.4–11.3)

## 2024-03-04 PROCEDURE — 2500000001 HC RX 250 WO HCPCS SELF ADMINISTERED DRUGS (ALT 637 FOR MEDICARE OP): Performed by: NURSE PRACTITIONER

## 2024-03-04 PROCEDURE — 99232 SBSQ HOSP IP/OBS MODERATE 35: CPT | Performed by: NURSE PRACTITIONER

## 2024-03-04 PROCEDURE — 75565 CARD MRI VELOC FLOW MAPPING: CPT

## 2024-03-04 PROCEDURE — A9575 INJ GADOTERATE MEGLUMI 0.1ML: HCPCS | Performed by: INTERNAL MEDICINE

## 2024-03-04 PROCEDURE — 36415 COLL VENOUS BLD VENIPUNCTURE: CPT | Performed by: NURSE PRACTITIONER

## 2024-03-04 PROCEDURE — 2500000004 HC RX 250 GENERAL PHARMACY W/ HCPCS (ALT 636 FOR OP/ED): Performed by: NURSE PRACTITIONER

## 2024-03-04 PROCEDURE — RXMED WILLOW AMBULATORY MEDICATION CHARGE

## 2024-03-04 PROCEDURE — 1200000002 HC GENERAL ROOM WITH TELEMETRY DAILY

## 2024-03-04 PROCEDURE — 83735 ASSAY OF MAGNESIUM: CPT | Performed by: NURSE PRACTITIONER

## 2024-03-04 PROCEDURE — 71555 MRI ANGIO CHEST W OR W/O DYE: CPT | Performed by: INTERNAL MEDICINE

## 2024-03-04 PROCEDURE — 85027 COMPLETE CBC AUTOMATED: CPT | Performed by: NURSE PRACTITIONER

## 2024-03-04 PROCEDURE — 2550000001 HC RX 255 CONTRASTS: Performed by: INTERNAL MEDICINE

## 2024-03-04 PROCEDURE — 80048 BASIC METABOLIC PNL TOTAL CA: CPT | Performed by: NURSE PRACTITIONER

## 2024-03-04 RX ORDER — GADOTERATE MEGLUMINE 376.9 MG/ML
16 INJECTION INTRAVENOUS
Status: COMPLETED | OUTPATIENT
Start: 2024-03-04 | End: 2024-03-04

## 2024-03-04 RX ORDER — LEVOTHYROXINE SODIUM 75 UG/1
75 TABLET ORAL DAILY
Qty: 30 TABLET | Refills: 0 | Status: SHIPPED | OUTPATIENT
Start: 2024-03-05 | End: 2024-04-09 | Stop reason: SDUPTHER

## 2024-03-04 RX ADMIN — METOPROLOL TARTRATE 25 MG: 25 TABLET, FILM COATED ORAL at 10:18

## 2024-03-04 RX ADMIN — POLYETHYLENE GLYCOL 3350 17 G: 17 POWDER, FOR SOLUTION ORAL at 10:18

## 2024-03-04 RX ADMIN — HEPARIN SODIUM 600 UNITS/HR: 10000 INJECTION, SOLUTION INTRAVENOUS at 05:28

## 2024-03-04 RX ADMIN — Medication 3 MG: at 21:13

## 2024-03-04 RX ADMIN — ASPIRIN 81 MG CHEWABLE TABLET 81 MG: 81 TABLET CHEWABLE at 10:18

## 2024-03-04 RX ADMIN — METOPROLOL TARTRATE 25 MG: 25 TABLET, FILM COATED ORAL at 21:13

## 2024-03-04 RX ADMIN — CYANOCOBALAMIN TAB 500 MCG 500 MCG: 500 TAB at 10:18

## 2024-03-04 RX ADMIN — GADOTERATE MEGLUMINE 16 ML: 376.9 INJECTION INTRAVENOUS at 15:21

## 2024-03-04 RX ADMIN — BUPROPION HYDROCHLORIDE 150 MG: 150 TABLET, FILM COATED, EXTENDED RELEASE ORAL at 10:18

## 2024-03-04 RX ADMIN — ATORVASTATIN CALCIUM 40 MG: 40 TABLET, FILM COATED ORAL at 10:18

## 2024-03-04 RX ADMIN — LEVOTHYROXINE SODIUM 75 MCG: 75 TABLET ORAL at 10:18

## 2024-03-04 ASSESSMENT — PAIN SCALES - GENERAL: PAINLEVEL_OUTOF10: 0 - NO PAIN

## 2024-03-04 NOTE — PROGRESS NOTES
"Subjective Data:  Denies chest pain,   No further shortness of breath..    Tele,  A flutter, rat e60    Overnight Events:    none     Objective Data:  Last Recorded Vitals:  Vitals:    24 2326 24 04524 07   BP: 122/84 107/68 116/75 120/73   BP Location: Left arm Right arm Right arm Right arm   Patient Position: Lying Lying Lying Lying   Pulse: 82 62 68 62   Resp: 16 16 16 18   Temp: 36.6 °C (97.9 °F) 36.4 °C (97.6 °F) 36.3 °C (97.3 °F) 36.6 °C (97.9 °F)   TempSrc: Temporal Temporal Temporal Temporal   SpO2: 99% 99% 99% 97%   Weight:       Height:         Medical Gas Therapy: None (Room air)  Weight  Av.2 kg (101 lb 12 oz)  Min: 46.1 kg (101 lb 9.6 oz)  Max: 46.2 kg (101 lb 14.4 oz)    LABS:  CMP:  Results from last 7 days   Lab Units 24  0659 24  0649 24  0547 24  0459 24  1926 24  1825 24  1526   SODIUM mmol/L 136 137 137 136 136 133* 137   POTASSIUM mmol/L 4.5 4.5 4.3 4.1 4.2 6.3* 4.8   CHLORIDE mmol/L 102 103 103 101 101 100 99   CO2 mmol/L 29 26 27 26 25 24 26   ANION GAP mmol/L 10 13 11 13 14 15 17   BUN mg/dL 24* 22 23 24* 24* 25* 25*   CREATININE mg/dL 1.21* 1.11* 1.11* 1.13* 1.10* 1.15* 1.21*   EGFR mL/min/1.73m*2 47* 52* 52* 51* 52* 49* 47*   MAGNESIUM mg/dL 1.80 1.90 2.00 2.00  --  2.30  --    ALBUMIN g/dL  --   --   --   --  3.8 4.2 4.4   ALT U/L  --   --   --   --  8 8 9   AST U/L  --   --   --   --  12 23 14   BILIRUBIN TOTAL mg/dL  --   --   --   --  0.7 0.7 0.7     CBC:  Results from last 7 days   Lab Units 24  0659 24  0649 24  0547 24  0458 24  1644   WBC AUTO x10*3/uL 5.8 4.4 4.3* 5.3 4.5   HEMOGLOBIN g/dL 11.4* 11.5* 12.2 13.0 13.0   HEMATOCRIT % 35.1* 35.4* 38.9 39.2 39.3   PLATELETS AUTO x10*3/uL 238 246 230 278 360   MCV fL 82 82 86 82 82     COAG:   Results from last 7 days   Lab Units 24  1926   INR  1.1     ABO: No results found for: \"ABO\"  HEME/ENDO:  Results from last  " days   Lab Units 02/29/24  1825 02/29/24  1526   TSH mIU/L 0.27* 0.30*   HEMOGLOBIN A1C %  --  6.1*      CARDIAC:   Results from last 7 days   Lab Units 02/29/24  1825 02/29/24  1644   TROPHS ng/L 12  --    BNP pg/mL  --  83      Results from last 7 days   Lab Units 02/29/24  1526   HEMOGLOBIN A1C % 6.1*   LDL CALC mg/dL 97   VLDL mg/dL 19   CHOLESTEROL/HDL RATIO  2.2        Last I/O:  No intake or output data in the 24 hours ending 03/04/24 1157  Net IO Since Admission: 1,372.37 mL [03/04/24 1157]      Imaging Results:  ECG 12 lead    Result Date: 3/3/2024  Atrial flutter  with 2:1 conduction. Rate 134    ECG 12 lead    Result Date: 3/2/2024  Atrial flutter with 2:1 AV conduction Anterior infarct (cited on or before 25-SEP-2018) ST & T wave abnormality, consider lateral ischemia Abnormal ECG When compared with ECG of 17-MAY-2022 11:34, Significant changes have occurred See ED provider note for full interpretation and clinical correlation Confirmed by Denise Anaya ([id number]) on 3/2/2024 7:59:24 PM    Lower extremity venous duplex bilateral    Result Date: 3/1/2024             [place] [address]   Tel [phone] and Fax [phone]  Vascular Lab Report [facility] US LOWER EXTREMITY VENOUS DUPLEX BILATERAL  Patient Name:      AMY NO     Reading Physician:  [id number] Marco Salcedo MD, RPVI Study Date:        3/1/2024             Ordering Physician: [id number] JAKE LEON MRN/PID:           [id number]             Technologist:       Chrissy Bullard RVT Accession#:        [id number]         Technologist 2: Date of Birth/Age: 1945 / 78 years Encounter#:         [id number] Gender:            F Admission Status:  Observation          Location Performed: [facility]  Diagnosis/ICD: Other pulmonary embolism without acute cor pulmonale-I26.99  CPT Codes:     78139 Peripheral venous duplex scan for DVT complete  CONCLUSIONS: Right Lower Venous: No evidence of acute deep vein thrombus visualized in the right lower extremity. There are chronic changes visualized in the popliteal vein. Duplicated femoral vein. Left Lower Venous: No evidence of acute deep vein thrombus visualized in the left lower extremity. There are chronic changes visualized in the popliteal vein.  Imaging & Doppler Findings:  Right                 Compressible Thrombus        Flow Distal External Iliac     Yes        None   Spontaneous/Phasic CFV                       Yes        None   Spontaneous/Phasic PFV                       Yes        None FV Proximal               Yes        None   Spontaneous/Phasic FV Mid                    Yes        None FV Distal                 Yes        None Popliteal               Partial    Chronic  Spontaneous/Phasic Peroneal                  Yes        None PTV                       Yes        None  Left                  Compress Thrombus        Flow Distal External Iliac   Yes      None   Spontaneous/Phasic CFV                     Yes      None   Spontaneous/Phasic PFV                     Yes      None FV Proximal             Yes      None   Spontaneous/Phasic FV Mid                  Yes      None FV Distal               Yes      None Popliteal             Partial  Chronic  Spontaneous/Phasic Peroneal                Yes      None PTV                     Yes      None  [id number] Marco Salcedo MD, RPVI Electronically signed by [id number] Marco Salcedo MD, RPVI on 3/1/2024 at 11:02:18 AM  ** Final **     Upper extremity venous duplex bilateral    Result Date: 3/1/2024             [place] [address]   Tel [phone] and Fax [phone]  Vascular Lab Report [facility] US UPPER EXTREMITY VENOUS DUPLEX BILATERAL  Patient Name:      AMY GANDHI MARYBEL     Reading Physician:  [id number] Salcedo                                                              KEITH FLORES Study Date:        3/1/2024             Ordering Physician: [id number] JAKE LEON MRN/PID:           [id number]             Technologist:       Chrissy Bullard RVT Accession#:        [id number]         Technologist 2: Date of Birth/Age: 1945 / 78 years Encounter#:         [id number] Gender:            F Admission Status:  Observation          Location Performed: [facility]  Diagnosis/ICD: Other pulmonary embolism without acute cor pulmonale-I26.99 CPT Codes:     99253 Peripheral venous duplex scan for DVT complete  CONCLUSIONS: Right Upper Venous: No evidence of acute deep vein thrombus visualized in the right upper extremity. Left Upper Venous: No evidence of acute deep vein thrombus visualized in the left upper extremity.  Imaging & Doppler Findings:  Right               Compressible Thrombus        Flow Internal Jugular        Yes        None   Spontaneous/Phasic Subclavian              Yes        None Subclavian Proximal     Yes        None   Spontaneous/Phasic Subclavian Mid          Yes        None Subclavian Distal       Yes        None   Spontaneous/Phasic Axillary                Yes        None   Spontaneous/Phasic Brachial                Yes        None Cephalic                Yes        None Basilic                 Yes        None  Left                Compress Thrombus        Flow Internal Jugular      Yes      None   Spontaneous/Phasic Subclavian            Yes      None Subclavian Proximal   Yes      None   Spontaneous/Phasic Subclavian Mid        Yes      None Subclavian Distal     Yes      None   Spontaneous/Phasic Axillary              Yes      None   Spontaneous/Phasic Brachial              Yes      None Cephalic              Yes      None Basilic               Yes      None  [id number] Marco Salcedo MD, KEITH Electronically signed by [id number] KEITH Fuentes MD on 3/1/2024 at 11:01:01 AM  **  Final **     Transthoracic Echo (TTE) Complete    Result Date: 3/1/2024   [facility], [address] [place] [address]              Tel [phone] and Fax [phone] TRANSTHORACIC ECHOCARDIOGRAM REPORT  Patient Name:      AMY NO    Reading Physician:    [id number] Mohamud Jimenez DO Study Date:        3/1/2024            Ordering Provider:    [id number] CHEL BLAIR MRN/PID:           [id number]            Fellow: Accession#:        [id number]        Nurse: Date of Birth/Age: 1945 / 78      Sonographer:          Natalya levine                                     RDNYLA Gender:            F                   Additional Staff: Height:            150.00 cm           Admit Date:           2/29/2024 Weight:            46.20 kg            Admission Status:     Observation -                                                              Priority discharge BSA / BMI:         1.39 m2 / 20.53     Encounter#:           [id number]                    kg/m2                                        Department Location:  [facility] Non                                                              Invasive Blood Pressure: 115 /69 mmHg Study Type:    TRANSTHORACIC ECHO (TTE) COMPLETE Diagnosis/ICD: Encounter for other specified special examinations-Z01.89 CPT Code:      Echo Complete w Full Doppler-96062 Patient History: Pertinent History: HTN. Study Detail: The following Echo studies were performed: 2D, M-Mode, Doppler,               color flow and 3D. Technically challenging study due to body               habitus and prominent lung artifact.  Critical Event Critical Event: Test was completed as per department protocol. Critical Finding: Echo dencity seen in LV. Time Test was Completed: 9:00:10 AM Notified: Jerod. Attending notification time: 9:13:18 AM  PHYSICIAN  INTERPRETATION: Left Ventricle: The left ventricular systolic function is low normal, with an estimated ejection fraction of 50-55%. Wall motion is abnormal. The left ventricular cavity size is decreased. The left ventricular septal wall thickness is mildly increased. There is mildly increased left ventricular posterior wall thickness. There is mild concentric left ventricular hypertrophy. Left ventricular diastolic filling was indeterminate. A speherical Echodensity measuring roughly 1.4 cm X 1 cm is seen attached to the inferior apical wall. There is mild hypokinesia in this region. This is felt to represent thrombus although cardiac mass is not fully excluded. Consider cardiac MRI. LV Wall Scoring: The apical inferior segment is hypokinetic. Left Atrium: The left atrium is moderately dilated. Right Ventricle: The right ventricle is normal in size. There is normal right ventricular global systolic function. Right Atrium: The right atrium is mildly dilated. Aortic Valve: The aortic valve appears structurally normal. There is no evidence of aortic valve regurgitation. The peak instantaneous gradient of the aortic valve is 5.5 mmHg. The mean gradient of the aortic valve is 3.3 mmHg. Mitral Valve: The mitral valve is mildly thickened. There is mild mitral valve regurgitation which is centrally directed. Tricuspid Valve: The tricuspid valve is structurally normal. There is mild tricuspid regurgitation. The tricuspid valve regurgitant jet flows toward the atrial septum. The Doppler estimated RVSP is within normal limits at 26.9 mmHg. Pulmonic Valve: The pulmonic valve is not well visualized. The pulmonic valve regurgitation was not well visualized. Pericardium: There is no pericardial effusion noted. Aorta: The aortic root is normal. There is no dilatation of the ascending aorta. There is no dilatation of the aortic root. Systemic Veins: The inferior vena cava appears to be of normal size. In comparison to the previous  echocardiogram(s): Compared with study from 9/26/2018,. Interval decline in the claculated LVEF and development of LV Thrombus.  CONCLUSIONS:  1. Left ventricular systolic function is low normal with a 50-55% estimated ejection fraction.  2. Apical inferior segment is abnormal.  3. The left atrium is moderately dilated.  4. RVSP within normal limits.  5. Left ventricular cavity size is decreased.  6. A speherical Echodensity measuring roughly 1.4 cm X 1 cm is seen attached to the inferior apical wall. There is mild hypokinesia in this region. This is felt to represent thrombus although cardiac mass is not fully excluded. Consider cardiac MRI.  7. Critical Findings of LV Thrombus communicated to Primary Service and Cardiology Consult Service at 09:38 via [employer]. QUANTITATIVE DATA SUMMARY: 2D MEASUREMENTS:                           Normal Ranges: LAs:           5.13 cm    (2.7-4.0cm) IVSd:          1.30 cm    (0.6-1.1cm) LVPWd:         1.34 cm    (0.6-1.1cm) LVIDd:         3.81 cm    (3.9-5.9cm) LVIDs:         2.95 cm LV Mass Index: 128.0 g/m2 LV % FS        22.4 % LA VOLUME:                               Normal Ranges: LA Vol A4C:        62.9 ml    (22+/-6mL/m2) LA Vol A2C:        55.2 ml LA Vol BP:         59.9 ml LA Vol Index A4C:  45.4 ml/m2 LA Vol Index A2C:  39.8 ml/m2 LA Vol Index BP:   43.3 ml/m2 LA Area A4C:       20.9 cm2 LA Area A2C:       19.9 cm2 LA Major Axis A4C: 5.9 cm LA Major Axis A2C: 6.1 cm LA Volume Index:   43.3 ml/m2 LA Vol A4C:        57.3 ml LA Vol A2C:        52.7 ml M-MODE MEASUREMENTS:                  Normal Ranges: Ao Root: 2.70 cm (2.0-3.7cm) LAs:     5.10 cm (2.7-4.0cm) AORTA MEASUREMENTS:                      Normal Ranges: Ao Sinus, d: 2.90 cm (2.1-3.5cm) Ao STJ, d:   3.20 cm (1.7-3.4cm) Asc Ao, d:   3.20 cm (2.1-3.4cm) LV SYSTOLIC FUNCTION BY 2D PLANIMETRY (MOD):                     Normal Ranges: EF-A4C View: 46.3 % (>=55%) EF-A2C View: 58.3 % EF-Biplane:  49.8 % LV  DIASTOLIC FUNCTION:                             Normal Ranges: MV Peak E:      0.88 m/s    (0.7-1.2 m/s) MV Peak A:      0.27 m/s    (0.42-0.7 m/s) E/A Ratio:      3.27        (1.0-2.2) MV A Dur:       129.18 msec PulmV Sys Mina:  33.71 cm/s PulmV Vazquez Mina: 44.39 cm/s PulmV S/D Mina:  0.76 MITRAL VALVE:                      Normal Ranges: MV Vmax:    0.95 m/s (<=1.3m/s) MV peak PG: 3.6 mmHg (<5mmHg) MV mean P.2 mmHg (<2mmHg) MV VTI:     21.45 cm (10-13cm) MV DT:      181 msec (150-240msec) AORTIC VALVE:                                   Normal Ranges: AoV Vmax:                1.18 m/s (<=1.7m/s) AoV Peak P.5 mmHg (<20mmHg) AoV Mean PG:             3.3 mmHg (1.7-11.5mmHg) LVOT Max Mina:            0.48 m/s (<=1.1m/s) AoV VTI:                 22.06 cm (18-25cm) LVOT VTI:                8.90 cm LVOT Diameter:           1.61 cm  (1.8-2.4cm) AoV Area, VTI:           0.83 cm2 (2.5-5.5cm2) AoV Area,Vmax:           0.84 cm2 (2.5-4.5cm2) AoV Dimensionless Index: 0.40  RIGHT VENTRICLE: RV Basal 3.70 cm RV Mid   2.70 cm RV Major 6.5 cm TAPSE:   18.0 mm TRICUSPID VALVE/RVSP:                             Normal Ranges: Peak TR Velocity: 2.44 m/s Est. RA Pressure: 3 mmHg RV Syst Pressure: 26.9 mmHg (< 30mmHg) IVC Diam:         1.70 cm PULMONIC VALVE:                         Normal Ranges: PV Accel Time: 97 msec  (>120ms) PV Max Mina:    0.9 m/s  (0.6-0.9m/s) PV Max PG:     3.0 mmHg Pulmonary Veins: PulmV Vazquez Mina: 44.39 cm/s PulmV S/D Mina:  0.76 PulmV Sys Mina:  33.71 cm/s  [id number] Mohamud Jimenez DO Electronically signed on 3/1/2024 at 9:42:38 AM  Wall Scoring  ** Final **     CT angio chest for pulmonary embolism    Result Date: 2024  Interpreted By:  Giselle Menon, STUDY: CT ANGIO CHEST FOR PULMONARY EMBOLISM;  2024 9:18 pm   INDICATION: Signs/Symptoms:r/o PE.   COMPARISON: CT scan of the chest 03/15/2023   ACCESSION NUMBER(S): [id number]   ORDERING CLINICIAN: ZAC TRIPATHI   TECHNIQUE: Helical data  acquisition of the chest was obtained after intravenous administration of  75 mL of Omnipaque 350. Images were reformatted in axial, coronal, and sagittal planes. Axial and coronal MIPS were reconstructed and reviewed.   FINDINGS: HEART AND VESSELS: There is a small filling defect in a right lower lobe segmental branch extending distally consistent with acute pulmonary embolus. Calculated RV to LV ratio 1.0   Right pulmonary artery is dilated up to 3 cm which can be seen with pulmonary arterial hypertension.   Ectatic ascending thoracic aorta. There is scattered calcified plaque in the aorta and arch vessels.   Severe coronary artery calcifications are seen.The study is not optimized for evaluation of coronary arteries.   The cardiac chambers are enlarged.   Small pericardial effusion.   MEDIASTINUM AND HILLARY, LOWER NECK AND AXILLA: Heterogeneous enlarged thyroid gland.   No evidence of thoracic lymphadenopathy by CT criteria.   Small hiatal hernia.   LUNGS AND AIRWAYS: Note is made of debris in the mid trachea likely relate to retained mucus..   Advanced centrilobular and paraseptal emphysema. There is mild interlobular septal thickening and ground-glass opacities suggestive of developing interstitial edema. No effusion. Bibasilar atelectasis and or scarring. No pneumothorax.   UPPER ABDOMEN: The visualized subdiaphragmatic structures demonstrate no remarkable findings.   CHEST WALL AND OSSEOUS STRUCTURES: Bilateral shoulder osteoarthrosis. Multilevel degenerative changes are present.       1. Small filling defect in a right lower lobe segmental branch extending distally consistent with acute pulmonary embolus. Calculated RV to LV ratio of 1.0. 2. Right pulmonary artery is dilated up to 3 cm which can be seen with pulmonary arterial hypertension. 3. Cardiomegaly. Severe coronary artery calcifications noted. 4. Mild interlobular septal thickening suggestive of developing interstitial edema. 5. Centrilobular and  paraseptal emphysema noted. 6. Additional findings as described above.     MACRO: Giselle Menon discussed the significance and urgency of this critical finding by telephone with  Dr. Wilkerson on 2/29/2024 at 9:45 pm. (**-F-**) Findings:  See findings.   Signed by: Giselle Menon 2/29/2024 9:46 PM Dictation workstation:   [id number]    XR chest 2 views    Result Date: 2/29/2024  Interpreted By:  Giselle Menon, STUDY: XR CHEST 2 VIEWS;  2/29/2024 4:38 pm   INDICATION: Signs/Symptoms:New atrial flutter.   COMPARISON: Chest x-ray 09/26/2020   ACCESSION NUMBER(S): [id number]   ORDERING CLINICIAN: VIRGIL WILKERSON   FINDINGS: Multiple overlying leads are present.   CARDIOMEDIASTINAL SILHOUETTE: Cardiomediastinal silhouette is stable in size and configuration. Atherosclerotic calcification of the aorta.   LUNGS: No consolidation, pleural effusion or pneumothorax. Hyperinflation of the lungs with flattening of the hemidiaphragms and interstitial prominence suggestive of COPD..   ABDOMEN: No remarkable upper abdominal findings.   BONES: Multilevel degenerative changes of the spine.       No acute cardiopulmonary process.   Findings suggestive of COPD.   MACRO: None   Signed by: Giselle Menon 2/29/2024 4:46 PM Dictation workstation:   [id number]          Past Cardiology Tests (Last 3 Years):  EKG:  Results for orders placed during the hospital encounter of 02/29/24    ECG 12 lead    Narrative  Atrial flutter with 2:1 AV conduction  Anterior infarct (cited on or before 25-SEP-2018)  ST & T wave abnormality, consider lateral ischemia  Abnormal ECG  When compared with ECG of 17-MAY-2022 11:34,  Significant changes have occurred  See ED provider note for full interpretation and clinical correlation  Confirmed by Denise Anaya ([id number]) on 3/2/2024 7:59:24 PM      Results for orders placed in visit on 02/29/24    ECG 12 lead    Narrative  Atrial flutter  with 2:1 conduction.  Rate 134    Echo:    3/1/2024   1. Left  ventricular systolic function is low normal with a 50-55% estimated ejection fraction.   2. Apical inferior segment is abnormal.   3. The left atrium is moderately dilated.   4. RVSP within normal limits.   5. Left ventricular cavity size is decreased.   6. A speherical Echodensity measuring roughly 1.4 cm X 1 cm is seen attached to the inferior apical wall. There is mild hypokinesia in this region. This is felt to represent thrombus although cardiac mass is not fully excluded. Consider cardiac MRI.   7. Critical Findings of LV Thrombus communicated to Primary Service and Cardiology Consult Service at 09:38 via [employer].     9/26/2018   1. The left ventricular systolic function is normal with a 60-65% estimated ejection fraction.   2. Slightly elevated RVSP.   3. There is mild to moderate tricuspid regurgitation.   4. The estimated PASP is 37-42 mmHg.   5. There is plaque visualized in the ascending aorta.    Cath:  No results found for this or any previous visit.    Stress Test:  No results found for this or any previous visit.    Cardiac Imaging:  No results found for this or any previous visit.      Inpatient Medications:  Scheduled medications   Medication Dose Route Frequency    [Held by provider] amLODIPine  10 mg oral Daily    aspirin  81 mg oral Daily    atorvastatin  40 mg oral Daily    buPROPion XL  150 mg oral q AM    cyanocobalamin  500 mcg oral Daily    levothyroxine  75 mcg oral Daily    [Held by provider] lisinopril 10 mg, hydroCHLOROthiazide 12.5 mg for Zestoretic/Prinizide   oral Daily    melatonin  3 mg oral Daily    metoprolol tartrate  25 mg oral BID    perflutren lipid microspheres  0.5-10 mL of dilution intravenous Once in imaging    perflutren protein A microsphere  0.5 mL intravenous Once in imaging    polyethylene glycol  17 g oral Daily    sulfur hexafluoride microsphr  2 mL intravenous Once in imaging     PRN medications   Medication    acetaminophen    heparin    ondansetron ODT  "   Or    ondansetron     Continuous Medications   Medication Dose Last Rate    heparin  0-4,500 Units/hr 600 Units/hr (03/04/24 0543)       Physical Exam:    GENERAL: alert, cooperative, pleasant, in no acute distress  SKIN: warm, dry  NECK: no JVD  CARDIAC: Regular rate and rhythm no murmurs  CHEST: Normal respiratory efforts, lungs clear to auscultation bilaterally.  ABDOMEN: soft, nondistended  EXTREMITIES: no lower extremity edema  NEURO: Alert and oriented x 3.  Grossly normal.  Moves all 4 extremities.      Assessment/Plan     Sophie Mendosa is a 78 y.o. female with past medical history significant for Coronary artery calcification on CT scan, Hypertension, Hypothyroidism, Gout, CVA 9/2018, GERD. Presented with elevated heart rate and shortness of breath.  Cardiology is consulted for new atrial flutter with PE.      New onset atrial flutter with rapid ventricular response- [id number] score for Atrial Fibrillation Stroke Risk is 6  which puts pt at \"moderate-high\" for thromboembolic event. Previously on no AV laquita blockers.  Rate now controlled.   Left ventricular thrombus- noted on echo 1.4 cm x 1cm attached to inferior apical wall.   Pulmonary embolism noted on CT scan  Severe coronary artery calcification on CT scan- On outpatient statin   HTN- acceptable at this time   Abnormal thyroid studies/hx of hypothyroidism- management per primary         Recommendations   Recommend obtaining cardiac MRI for further evaluation of LV thrombus vs possible mass  Rate control with metoprolol tartrate 25 mg BID  Hold amlodipine and Lisinopril/hydrochlorothiazide to allow room for beta blocker uptitration  Okay to transition to DOAC- Per PE protocol   No cardiac indications to remain on aspirin.     Code Status:  Full Code    Salina Pan, APRN-CNP  "

## 2024-03-04 NOTE — PROGRESS NOTES
PROGRESS NOTE - INTERNAL MEDICINE     PATIENT NAME:  Sophie Mendosa    MRN:  [id number]  SERVICE DATE:  3/4/2024       ADMITTING PHYSICIAN:  Lena Cordero MD    ASSESSMENT AND PLAN    Principal Problem:    Pulmonary embolism, other, unspecified chronicity, unspecified whether acute cor pulmonale present (CMS/HCC)  Active Problems:    Pulmonary embolism on right (CMS/HCC)    Hypertension with CKD 3a.  Right lower lobe segmental PE, without cor pulmonale  New onset atrial flutter with RVR  LV thrombus confirmed by MRI reported 7.15pm  Acute systolic HF. Nil acute. EF 48%.  Severe coronary artery calcifications on CT scan  Hypothyroidism, with suppressed TSH on oral supplement therapy  CKD3a POA.     Plan:  IV heparin with plan to transition to oral agents  Continue statins  Continue metoprolol, titrate for heart rate/BP  ?DOAC vs Warfarin.        DISPOSITION PLAN:  Pending AC      INTERVAL HISTORY OF PRESENT ILLNESS:  No chest pain/sob. No n/v/d. Oral intake good. Feeling better. No fever/chills. acute events from last 24 hrs reviewed.     Pertinent ROS:  No abdominal pain / No Bleeding / No rashes     Discussed with nursing and case management team and the specialists involved in this patient's care. Reviewed the EMR and documentation from other care-givers.        OBJECTIVE  PHYSICAL EXAM:      GENERAL: AAOx3, cooperative resting comfortably  SKIN: Skin turgor normal. No rashes  HEENT: no epistaxis, Moist mucosa.  LUNGS: Vesicular breath sounds, with no wheeze, no crepitations  CARDIAC: REGULAR. S1 and S2; no rubs, no murmur  ABDOMEN: Abdomen soft, non-tender. +BS.  EXTREMITIES: No edema, Good capillary refill.   NEURO: Insight GOOD. No invol movements. Gait not assessed  MUSCULOSKELETAL: No acute inflammation            3/3/2024     8:00 AM 3/3/2024    11:43 AM 3/3/2024     3:45 PM 3/3/2024     8:10 PM 3/3/2024    11:26 PM 3/4/2024     4:51 AM 3/4/2024     7:27 AM   Vitals   Systolic 101 108 118 122 107 116 120    Diastolic 62 84 79 84 68 75 73   Heart Rate 69 64 84 82 62 68 62   Temp 36.5 °C (97.7 °F) 36.7 °C (98.1 °F) 36.6 °C (97.9 °F) 36.6 °C (97.9 °F) 36.4 °C (97.6 °F) 36.3 °C (97.3 °F) 36.6 °C (97.9 °F)   Resp 16 16 16 16 16 16 18     Body mass index is 20.54 kg/m².  No intake or output data in the 24 hours ending 03/04/24 0920        Current Facility-Administered Medications:     acetaminophen (Tylenol) tablet 650 mg, 650 mg, oral, q4h PRN, SHANNON Hong    [Held by provider] amLODIPine (Norvasc) tablet 10 mg, 10 mg, oral, Daily, SHANNON Hong, 10 mg at 03/01/24 0915    aspirin chewable tablet 81 mg, 81 mg, oral, Daily, SHANNON Hong, 81 mg at 03/03/24 0837    atorvastatin (Lipitor) tablet 40 mg, 40 mg, oral, Daily, SHANNON Hong, 40 mg at 03/03/24 0843    buPROPion XL (Wellbutrin XL) 24 hr tablet 150 mg, 150 mg, oral, q AM, SHANNON Hong, 150 mg at 03/03/24 0837    cyanocobalamin (Vitamin B-12) tablet 500 mcg, 500 mcg, oral, Daily, SHANNON Hong, 500 mcg at 03/03/24 0837    heparin (porcine) injection 2,000-4,000 Units, 2,000-4,000 Units, intravenous, q4h PRN, SHANNON Hong    heparin 25,000 Units in dextrose 5% 250 mL (100 Units/mL) infusion (premix), 0-4,500 Units/hr, intravenous, Continuous, SHANNON Hong, Last Rate: 6 mL/hr at 03/04/24 0528, 600 Units/hr at 03/04/24 0528    levothyroxine (Synthroid, Levoxyl) tablet 75 mcg, 75 mcg, oral, Daily, SHANNON Hong, 75 mcg at 03/03/24 0837    [Held by provider] lisinopril 10 mg, hydroCHLOROthiazide 12.5 mg for Zestoretic/Prinizide, , oral, Daily, SHANNON Hong, Given at 03/01/24 0917    melatonin tablet 3 mg, 3 mg, oral, Daily, SHANNON Hong, 3 mg at 03/03/24 2037    metoprolol tartrate (Lopressor) tablet 25 mg, 25 mg, oral, BID, SHANNON Hong, 25 mg at 03/03/24 2037    ondansetron ODT (Zofran-ODT) disintegrating tablet 4 mg, 4  "mg, oral, q8h PRN **OR** ondansetron (Zofran) injection 4 mg, 4 mg, intravenous, q8h PRN, SHANNON Hong    perflutren lipid microspheres (Definity) injection 0.5-10 mL of dilution, 0.5-10 mL of dilution, intravenous, Once in imaging, SHANNON Hong    perflutren protein A microsphere (Optison) injection 0.5 mL, 0.5 mL, intravenous, Once in imaging, SHANNON Hong    polyethylene glycol (Glycolax, Miralax) packet 17 g, 17 g, oral, Daily, SHANNON Hong, 17 g at 03/03/24 0845    sulfur hexafluoride microsphr (Lumason) injection 24.28 mg, 2 mL, intravenous, Once in imaging, SHANNON Hong    DATA:   Diagnostic tests reviewed for today's visit:    Most recent labs  Results from last 7 days   Lab Units 03/04/24  0659 03/03/24  0649 03/02/24  0547   WBC AUTO x10*3/uL 5.8 4.4 4.3*   HEMOGLOBIN g/dL 11.4* 11.5* 12.2   HEMATOCRIT % 35.1* 35.4* 38.9   PLATELETS AUTO x10*3/uL 238 246 230     Results from last 7 days   Lab Units 03/04/24  0659 03/03/24  0649 03/02/24  0547 03/01/24  0459 02/29/24  1926 02/29/24  1825 02/29/24  1526   SODIUM mmol/L 136 137 137   < > 136 133* 137   POTASSIUM mmol/L 4.5 4.5 4.3   < > 4.2 6.3* 4.8   CHLORIDE mmol/L 102 103 103   < > 101 100 99   CO2 mmol/L 29 26 27   < > 25 24 26   BUN mg/dL 24* 22 23   < > 24* 25* 25*   CREATININE mg/dL 1.21* 1.11* 1.11*   < > 1.10* 1.15* 1.21*   CALCIUM mg/dL 9.0 9.1 9.3   < > 10.2 10.6* 10.7*   PROTEIN TOTAL g/dL  --   --   --   --  6.9 7.7 7.5   BILIRUBIN TOTAL mg/dL  --   --   --   --  0.7 0.7 0.7   ALK PHOS U/L  --   --   --   --  90 93 100   ALT U/L  --   --   --   --  8 8 9   AST U/L  --   --   --   --  12 23 14   GLUCOSE mg/dL 101* 91 97   < > 93 87 92    < > = values in this interval not displayed.             No results found for: \"TROPONINT\"         Upper extremity venous duplex bilateral   Final Result      Lower extremity venous duplex bilateral   Final Result      Transthoracic Echo (TTE) " Complete   Final Result      CT angio chest for pulmonary embolism   Final Result   1. Small filling defect in a right lower lobe segmental branch   extending distally consistent with acute pulmonary embolus.   Calculated RV to LV ratio of 1.0.   2. Right pulmonary artery is dilated up to 3 cm which can be seen   with pulmonary arterial hypertension.   3. Cardiomegaly. Severe coronary artery calcifications noted.   4. Mild interlobular septal thickening suggestive of developing   interstitial edema.   5. Centrilobular and paraseptal emphysema noted.   6. Additional findings as described above.             MACRO:   Giselle Menon discussed the significance and urgency of this critical   finding by telephone with  Dr. Wilkerson on 2/29/2024 at 9:45 pm.   (**-RCF-**) Findings:  See findings.        Signed by: Giselle Menon 2/29/2024 9:46 PM   Dictation workstation:   [id number]      XR chest 2 views   Final Result   No acute cardiopulmonary process.        Findings suggestive of COPD.        MACRO:   None        Signed by: Giselle Menon 2/29/2024 4:46 PM   Dictation workstation:   [id number]      MR cardiac angio chest w and wo IV contrast for morph FUNCT valve DZ and GREAT vessels    (Results Pending)         SIGNATURE: Wallace Finn MD PATIENT NAME: Sophie Mendosa   DATE: 3/4/2024 MRN: [id number]   TIME: 9:20 AM

## 2024-03-04 NOTE — NURSING NOTE
Rapid Response Nurse Note:     Due to bedside nurse difficulty obtaining access, an ultrasound-guided IV was placed.  Access was obtained after 1 attempt an 20 gauge was placed in the right Forearm.  Confirmed via ultrasound, blood return noted.  Saline lock placed.  Tegaderm used to secure IV. Patient tolerated the procedure well. Education provided to bedside nurse and patient. No questions/concerns at this time.

## 2024-03-05 ENCOUNTER — PHARMACY VISIT (OUTPATIENT)
Dept: PHARMACY | Facility: CLINIC | Age: 79
End: 2024-03-05
Payer: COMMERCIAL

## 2024-03-05 ENCOUNTER — APPOINTMENT (OUTPATIENT)
Dept: CARDIOLOGY | Facility: HOSPITAL | Age: 79
DRG: 176 | End: 2024-03-05
Payer: MEDICARE

## 2024-03-05 VITALS
HEART RATE: 90 BPM | TEMPERATURE: 98.1 F | RESPIRATION RATE: 18 BRPM | BODY MASS INDEX: 20.54 KG/M2 | SYSTOLIC BLOOD PRESSURE: 118 MMHG | OXYGEN SATURATION: 98 % | DIASTOLIC BLOOD PRESSURE: 80 MMHG | HEIGHT: 59 IN | WEIGHT: 101.9 LBS

## 2024-03-05 LAB
ANION GAP SERPL CALC-SCNC: 10 MMOL/L (ref 10–20)
BUN SERPL-MCNC: 20 MG/DL (ref 6–23)
CALCIUM SERPL-MCNC: 8.9 MG/DL (ref 8.6–10.3)
CHLORIDE SERPL-SCNC: 103 MMOL/L (ref 98–107)
CO2 SERPL-SCNC: 28 MMOL/L (ref 21–32)
CREAT SERPL-MCNC: 1.17 MG/DL (ref 0.5–1.05)
EGFRCR SERPLBLD CKD-EPI 2021: 48 ML/MIN/1.73M*2
ERYTHROCYTE [DISTWIDTH] IN BLOOD BY AUTOMATED COUNT: 15.6 % (ref 11.5–14.5)
GLUCOSE SERPL-MCNC: 94 MG/DL (ref 74–99)
HCT VFR BLD AUTO: 34.4 % (ref 36–46)
HGB BLD-MCNC: 11 G/DL (ref 12–16)
MAGNESIUM SERPL-MCNC: 1.7 MG/DL (ref 1.6–2.4)
MCH RBC QN AUTO: 26.3 PG (ref 26–34)
MCHC RBC AUTO-ENTMCNC: 32 G/DL (ref 32–36)
MCV RBC AUTO: 82 FL (ref 80–100)
NRBC BLD-RTO: 0 /100 WBCS (ref 0–0)
PLATELET # BLD AUTO: 232 X10*3/UL (ref 150–450)
POTASSIUM SERPL-SCNC: 4.6 MMOL/L (ref 3.5–5.3)
RBC # BLD AUTO: 4.18 X10*6/UL (ref 4–5.2)
SODIUM SERPL-SCNC: 136 MMOL/L (ref 136–145)
WBC # BLD AUTO: 6 X10*3/UL (ref 4.4–11.3)

## 2024-03-05 PROCEDURE — 93005 ELECTROCARDIOGRAM TRACING: CPT

## 2024-03-05 PROCEDURE — 99232 SBSQ HOSP IP/OBS MODERATE 35: CPT | Performed by: NURSE PRACTITIONER

## 2024-03-05 PROCEDURE — 83735 ASSAY OF MAGNESIUM: CPT | Performed by: NURSE PRACTITIONER

## 2024-03-05 PROCEDURE — RXMED WILLOW AMBULATORY MEDICATION CHARGE

## 2024-03-05 PROCEDURE — 82374 ASSAY BLOOD CARBON DIOXIDE: CPT | Performed by: NURSE PRACTITIONER

## 2024-03-05 PROCEDURE — 2500000001 HC RX 250 WO HCPCS SELF ADMINISTERED DRUGS (ALT 637 FOR MEDICARE OP): Performed by: NURSE PRACTITIONER

## 2024-03-05 PROCEDURE — 85027 COMPLETE CBC AUTOMATED: CPT | Performed by: NURSE PRACTITIONER

## 2024-03-05 PROCEDURE — 36415 COLL VENOUS BLD VENIPUNCTURE: CPT | Performed by: NURSE PRACTITIONER

## 2024-03-05 RX ORDER — WARFARIN SODIUM 5 MG/1
5 TABLET ORAL DAILY
Status: DISCONTINUED | OUTPATIENT
Start: 2024-03-05 | End: 2024-03-05

## 2024-03-05 RX ORDER — METOPROLOL TARTRATE 25 MG/1
25 TABLET, FILM COATED ORAL 2 TIMES DAILY
Qty: 60 TABLET | Refills: 0 | Status: SHIPPED | OUTPATIENT
Start: 2024-03-05 | End: 2024-03-22 | Stop reason: HOSPADM

## 2024-03-05 RX ADMIN — CYANOCOBALAMIN TAB 500 MCG 500 MCG: 500 TAB at 09:07

## 2024-03-05 RX ADMIN — APIXABAN 10 MG: 5 TABLET, FILM COATED ORAL at 10:39

## 2024-03-05 RX ADMIN — ATORVASTATIN CALCIUM 40 MG: 40 TABLET, FILM COATED ORAL at 09:07

## 2024-03-05 RX ADMIN — BUPROPION HYDROCHLORIDE 150 MG: 150 TABLET, FILM COATED, EXTENDED RELEASE ORAL at 09:07

## 2024-03-05 RX ADMIN — LEVOTHYROXINE SODIUM 75 MCG: 75 TABLET ORAL at 09:07

## 2024-03-05 RX ADMIN — METOPROLOL TARTRATE 25 MG: 25 TABLET, FILM COATED ORAL at 09:07

## 2024-03-05 NOTE — CARE PLAN
The patient's goals for the shift include      The clinical goals for the shift include maintain safety/ free from injury this shift    Over the shift, the patient did not make progress toward the following goals.

## 2024-03-05 NOTE — PROGRESS NOTES
PROGRESS NOTE - INTERNAL MEDICINE     PATIENT NAME:  Sophie Mendosa    MRN:  [id number]  SERVICE DATE:  3/5/2024       ADMITTING PHYSICIAN:  Lena Cordero MD    ASSESSMENT AND PLAN    Principal Problem:    Pulmonary embolism, other, unspecified chronicity, unspecified whether acute cor pulmonale present (CMS/[facility])  Active Problems:    Pulmonary embolism on right (CMS/HCC)      Hypertension with CKD 3a.  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 Nil acute. EF 48%.  Severe coronary artery calcifications on CT scan  Hypothyroidism, with suppressed TSH on oral supplement therapy  CKD3a POA.     Plan:  DOACs.   Continue statins  Continue metoprolol, titrate for heart rate/BP  Discharge is planned for today to home. Hospital course , medication changes and Investigation's conducted were reviewed with the patient / Family. Activity, Diet and Medications after discharge were reviewed with the patient / Family. Medication reconciliation done - pls refer to medication reconciliation sheet.Follow up with Primary Care Physician were reviewed with the patient / Family. Discharge planning was discussed with staff. Refer to discharge orders sheet. Total time to coordinate disch process incl counseling family, coordinating with other care givers,  and pharmacist was >35 min.         INTERVAL HISTORY OF PRESENT ILLNESS:  No chest pain/sob. No n/v/d. Oral intake good. Feeling better. No fever/chills. acute events from last 24 hrs reviewed.     Pertinent ROS:  No abdominal pain / No Bleeding / No rashes     Discussed with nursing and case management team and the specialists involved in this patient's care. Reviewed the EMR and documentation from other care-givers.        OBJECTIVE  PHYSICAL EXAM:      GENERAL: AAOx3, cooperative resting comfortably  SKIN: Skin turgor normal. No rashes  HEENT: no epistaxis, Moist  mucosa.  LUNGS: Vesicular breath sounds, with no wheeze, no crepitations  CARDIAC: REGULAR. S1 and S2; no rubs, no murmur  ABDOMEN: Abdomen soft, non-tender. +BS.  EXTREMITIES: No edema, Good capillary refill.   NEURO: Insight GOOD. No invol movements. Gait not assessed  MUSCULOSKELETAL: No acute inflammation              3/4/2024     7:27 AM 3/4/2024    12:17 PM 3/4/2024     4:11 PM 3/4/2024     7:40 PM 3/4/2024    11:53 PM 3/5/2024     4:08 AM 3/5/2024     7:45 AM   Vitals   Systolic 120 132 124 100 115 106 112   Diastolic 73 77 88 60 61 66 66   Heart Rate 62 61 84 81 61 67 58   Temp 36.6 °C (97.9 °F) 37 °C (98.6 °F) 36.7 °C (98.1 °F) 36.6 °C (97.9 °F) 36.7 °C (98.1 °F) 37.1 °C (98.8 °F) 36 °C (96.8 °F)   Resp 18 18 18 16 16 16 17     Body mass index is 20.54 kg/m².  No intake or output data in the 24 hours ending 03/05/24 0933        Current Facility-Administered Medications:     acetaminophen (Tylenol) tablet 650 mg, 650 mg, oral, q4h PRN, SHANNON Hong    [Held by provider] amLODIPine (Norvasc) tablet 10 mg, 10 mg, oral, Daily, SHANNON Hong, 10 mg at 03/01/24 0915    atorvastatin (Lipitor) tablet 40 mg, 40 mg, oral, Daily, SHANNON Hnog, 40 mg at 03/05/24 0907    buPROPion XL (Wellbutrin XL) 24 hr tablet 150 mg, 150 mg, oral, q AM, SHANNON oHng, 150 mg at 03/05/24 0907    cyanocobalamin (Vitamin B-12) tablet 500 mcg, 500 mcg, oral, Daily, SHANNON Hong, 500 mcg at 03/05/24 0907    heparin (porcine) injection 2,000-4,000 Units, 2,000-4,000 Units, intravenous, q4h PRN, SHANNON Hong    heparin 25,000 Units in dextrose 5% 250 mL (100 Units/mL) infusion (premix), 0-4,500 Units/hr, intravenous, Continuous, SHANNON Hong, Last Rate: 6 mL/hr at 03/04/24 0528, 600 Units/hr at 03/04/24 0528    levothyroxine (Synthroid, Levoxyl) tablet 75 mcg, 75 mcg, oral, Daily, SHANNON Hong, 75 mcg at 03/05/24 0907    [Held by provider]  lisinopril 10 mg, hydroCHLOROthiazide 12.5 mg for Zestoretic/Prinizide, , oral, Daily, SHANNON Hong, Given at 03/01/24 0917    melatonin tablet 3 mg, 3 mg, oral, Daily, SHANNON Hong, 3 mg at 03/04/24 2113    metoprolol tartrate (Lopressor) tablet 25 mg, 25 mg, oral, BID, SHANNON oHng, 25 mg at 03/05/24 0907    ondansetron ODT (Zofran-ODT) disintegrating tablet 4 mg, 4 mg, oral, q8h PRN **OR** ondansetron (Zofran) injection 4 mg, 4 mg, intravenous, q8h PRN, SHANNON Hong    perflutren lipid microspheres (Definity) injection 0.5-10 mL of dilution, 0.5-10 mL of dilution, intravenous, Once in imaging, SHANNON Hong    perflutren protein A microsphere (Optison) injection 0.5 mL, 0.5 mL, intravenous, Once in imaging, SHANNON Hong    polyethylene glycol (Glycolax, Miralax) packet 17 g, 17 g, oral, Daily, SHANNON Hong, 17 g at 03/04/24 1018    sulfur hexafluoride microsphr (Lumason) injection 24.28 mg, 2 mL, intravenous, Once in imaging, SHANNON Hong    DATA:   Diagnostic tests reviewed for today's visit:    Most recent labs  Results from last 7 days   Lab Units 03/05/24  0602 03/04/24  0659 03/03/24  0649   WBC AUTO x10*3/uL 6.0 5.8 4.4   HEMOGLOBIN g/dL 11.0* 11.4* 11.5*   HEMATOCRIT % 34.4* 35.1* 35.4*   PLATELETS AUTO x10*3/uL 232 238 246     Results from last 7 days   Lab Units 03/05/24  0602 03/04/24  0659 03/03/24  0649 03/01/24  0459 02/29/24  1926 02/29/24  1825 02/29/24  1526   SODIUM mmol/L 136 136 137   < > 136 133* 137   POTASSIUM mmol/L 4.6 4.5 4.5   < > 4.2 6.3* 4.8   CHLORIDE mmol/L 103 102 103   < > 101 100 99   CO2 mmol/L 28 29 26   < > 25 24 26   BUN mg/dL 20 24* 22   < > 24* 25* 25*   CREATININE mg/dL 1.17* 1.21* 1.11*   < > 1.10* 1.15* 1.21*   CALCIUM mg/dL 8.9 9.0 9.1   < > 10.2 10.6* 10.7*   PROTEIN TOTAL g/dL  --   --   --   --  6.9 7.7 7.5   BILIRUBIN TOTAL mg/dL  --   --   --   --  0.7 0.7 0.7  "  ALK PHOS U/L  --   --   --   --  90 93 100   ALT U/L  --   --   --   --  8 8 9   AST U/L  --   --   --   --  12 23 14   GLUCOSE mg/dL 94 101* 91   < > 93 87 92    < > = values in this interval not displayed.             No results found for: \"TROPONINT\"         MR cardiac angio chest w and wo IV contrast for morph FUNCT valve DZ and GREAT vessels   Final Result   1. Normal LV size (EDVi 68 ml/m2) with mildly reduced systolic   function (LVEF 48%).   2. Severe hypokinesis of the apical inferior segment and true apex.   3. There is evidence of a small LV thrombus measuring 1.1 x 0.58 cm   adherent to the apical inferior wall.   4. 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   5. Moderate mitral regurgitation.   6. Normal RV size and systolic function (RVEF 59%).                  Signed by: Tenisha Mendez 3/4/2024 7:14 PM   Dictation workstation:   [id number]      Upper extremity venous duplex bilateral   Final Result      Lower extremity venous duplex bilateral   Final Result      Transthoracic Echo (TTE) Complete   Final Result      CT angio chest for pulmonary embolism   Final Result   1. Small filling defect in a right lower lobe segmental branch   extending distally consistent with acute pulmonary embolus.   Calculated RV to LV ratio of 1.0.   2. Right pulmonary artery is dilated up to 3 cm which can be seen   with pulmonary arterial hypertension.   3. Cardiomegaly. Severe coronary artery calcifications noted.   4. Mild interlobular septal thickening suggestive of developing   interstitial edema.   5. Centrilobular and paraseptal emphysema noted.   6. Additional findings as described above.             MACRO:   Giselle Menon discussed the significance and urgency of this critical   finding by telephone with  Dr. Wilkerson on 2/29/2024 at 9:45 pm.   (**-RCF-**) Findings:  See findings.        Signed by: Giselle " Lynsey 2/29/2024 9:46 PM   Dictation workstation:   [id number]      XR chest 2 views   Final Result   No acute cardiopulmonary process.        Findings suggestive of COPD.        MACRO:   None        Signed by: Giselle Menon 2/29/2024 4:46 PM   Dictation workstation:   [id number]            SIGNATURE: Wallace Finn MD PATIENT NAME: Sophie Mendosa   DATE: 3/5/2024 MRN: [id number]   TIME: 9:33 AM

## 2024-03-05 NOTE — PROGRESS NOTES
Subjective Data:  Denies chest pain,   No further shortness of breath.  Hoping to be discharged home today    Reviewed plan and results of cardiac MRI with patient.     Tele,  A flutter, rate 60    Overnight Events:    none     Objective Data:  Last Recorded Vitals:  Vitals:    24 1940 24 2353 24 0408 24 0745   BP: 100/60 115/61 106/66 112/66   BP Location:   Left arm    Patient Position:   Lying    Pulse: 81 61 67 58   Resp: 16 16 16 17   Temp: 36.6 °C (97.9 °F) 36.7 °C (98.1 °F) 37.1 °C (98.8 °F) 36 °C (96.8 °F)   TempSrc:   Temporal Temporal   SpO2: 100% 98% 98% 100%   Weight:       Height:         Medical Gas Therapy: None (Room air)  Weight  Av.2 kg (101 lb 12 oz)  Min: 46.1 kg (101 lb 9.6 oz)  Max: 46.2 kg (101 lb 14.4 oz)    LABS:  CMP:  Results from last 7 days   Lab Units 24  0602 24  0659 24  0649 24  0547 24  0459 24  1926 24  1825 24  1526   SODIUM mmol/L 136 136 137 137 136 136 133* 137   POTASSIUM mmol/L 4.6 4.5 4.5 4.3 4.1 4.2 6.3* 4.8   CHLORIDE mmol/L 103 102 103 103 101 101 100 99   CO2 mmol/L 28 29 26 27 26 25 24 26   ANION GAP mmol/L 10 10 13 11 13 14 15 17   BUN mg/dL 20 24* 22 23 24* 24* 25* 25*   CREATININE mg/dL 1.17* 1.21* 1.11* 1.11* 1.13* 1.10* 1.15* 1.21*   EGFR mL/min/1.73m*2 48* 47* 52* 52* 51* 52* 49* 47*   MAGNESIUM mg/dL 1.70 1.80 1.90 2.00 2.00  --  2.30  --    ALBUMIN g/dL  --   --   --   --   --  3.8 4.2 4.4   ALT U/L  --   --   --   --   --  8 8 9   AST U/L  --   --   --   --   --  12 23 14   BILIRUBIN TOTAL mg/dL  --   --   --   --   --  0.7 0.7 0.7       CBC:  Results from last 7 days   Lab Units 24  0602 24  0659 24  0649 24  0547 24  0458 24  1644   WBC AUTO x10*3/uL 6.0 5.8 4.4 4.3* 5.3 4.5   HEMOGLOBIN g/dL 11.0* 11.4* 11.5* 12.2 13.0 13.0   HEMATOCRIT % 34.4* 35.1* 35.4* 38.9 39.2 39.3   PLATELETS AUTO x10*3/uL 232 238 246 230 278 360   MCV fL 82 82 82 86 82 82  "      COAG:   Results from last 7 days   Lab Units 02/29/24  1926   INR  1.1       ABO: No results found for: \"ABO\"  HEME/ENDO:  Results from last 7 days   Lab Units 02/29/24  1825 02/29/24  1526   TSH mIU/L 0.27* 0.30*   HEMOGLOBIN A1C %  --  6.1*        CARDIAC:   Results from last 7 days   Lab Units 02/29/24  1825 02/29/24  1644   TROPHS ng/L 12  --    BNP pg/mL  --  83        Results from last 7 days   Lab Units 02/29/24  1526   HEMOGLOBIN A1C % 6.1*   LDL CALC mg/dL 97   VLDL mg/dL 19   CHOLESTEROL/HDL RATIO  2.2          Last I/O:  No intake or output data in the 24 hours ending 03/05/24 1132  Net IO Since Admission: 1,372.37 mL [03/05/24 1132]      Imaging Results:  ECG 12 lead    Result Date: 3/3/2024  Atrial flutter  with 2:1 conduction. Rate 134    ECG 12 lead    Result Date: 3/2/2024  Atrial flutter with 2:1 AV conduction Anterior infarct (cited on or before 25-SEP-2018) ST & T wave abnormality, consider lateral ischemia Abnormal ECG When compared with ECG of 17-MAY-2022 11:34, Significant changes have occurred See ED provider note for full interpretation and clinical correlation Confirmed by Denise Anaya ([id number]) on 3/2/2024 7:59:24 PM    Lower extremity venous duplex bilateral    Result Date: 3/1/2024             [place] [address]   Tel [phone] and Fax [phone]  Vascular Lab Report [facility] US LOWER EXTREMITY VENOUS DUPLEX BILATERAL  Patient Name:      AMY A MARYBEL     Reading Physician:  [id number] Marco Salcedo MD, RPVI Study Date:        3/1/2024             Ordering Physician: [id number] JAKE LEON MRN/PID:           [id number]             Technologist:       Chrissy Bullard RVT Accession#:        [id number]         Technologist 2: Date of Birth/Age: 1945 / 78 years Encounter#:         [id number] Gender:            F " Admission Status:  Observation          Location Performed: [facility]  Diagnosis/ICD: Other pulmonary embolism without acute cor pulmonale-I26.99 CPT Codes:     89380 Peripheral venous duplex scan for DVT complete  CONCLUSIONS: Right Lower Venous: No evidence of acute deep vein thrombus visualized in the right lower extremity. There are chronic changes visualized in the popliteal vein. Duplicated femoral vein. Left Lower Venous: No evidence of acute deep vein thrombus visualized in the left lower extremity. There are chronic changes visualized in the popliteal vein.  Imaging & Doppler Findings:  Right                 Compressible Thrombus        Flow Distal External Iliac     Yes        None   Spontaneous/Phasic CFV                       Yes        None   Spontaneous/Phasic PFV                       Yes        None FV Proximal               Yes        None   Spontaneous/Phasic FV Mid                    Yes        None FV Distal                 Yes        None Popliteal               Partial    Chronic  Spontaneous/Phasic Peroneal                  Yes        None PTV                       Yes        None  Left                  Compress Thrombus        Flow Distal External Iliac   Yes      None   Spontaneous/Phasic CFV                     Yes      None   Spontaneous/Phasic PFV                     Yes      None FV Proximal             Yes      None   Spontaneous/Phasic FV Mid                  Yes      None FV Distal               Yes      None Popliteal             Partial  Chronic  Spontaneous/Phasic Peroneal                Yes      None PTV                     Yes      None  [id number] Marco Salcedo MD, RPVI Electronically signed by [id number] Marco Salcedo MD, RPGABRIELLE on 3/1/2024 at 11:02:18 AM  ** Final **     Upper extremity venous duplex bilateral    Result Date: 3/1/2024             [place] [address]   Tel [phone] and Fax [phone]  Vascular Lab Report [facility] US  UPPER EXTREMITY VENOUS DUPLEX BILATERAL  Patient Name:      AMY NO     Reading Physician:  [id number] Marco Salcedo MD, RPVI Study Date:        3/1/2024             Ordering Physician: [id number] JAKE LEON MRN/PID:           [id number]             Technologist:       Chrissy Bullard RVT Accession#:        [id number]         Technologist 2: Date of Birth/Age: 1945 / 78 years Encounter#:         [id number] Gender:            F Admission Status:  Observation          Location Performed: [facility]  Diagnosis/ICD: Other pulmonary embolism without acute cor pulmonale-I26.99 CPT Codes:     02320 Peripheral venous duplex scan for DVT complete  CONCLUSIONS: Right Upper Venous: No evidence of acute deep vein thrombus visualized in the right upper extremity. Left Upper Venous: No evidence of acute deep vein thrombus visualized in the left upper extremity.  Imaging & Doppler Findings:  Right               Compressible Thrombus        Flow Internal Jugular        Yes        None   Spontaneous/Phasic Subclavian              Yes        None Subclavian Proximal     Yes        None   Spontaneous/Phasic Subclavian Mid          Yes        None Subclavian Distal       Yes        None   Spontaneous/Phasic Axillary                Yes        None   Spontaneous/Phasic Brachial                Yes        None Cephalic                Yes        None Basilic                 Yes        None  Left                Compress Thrombus        Flow Internal Jugular      Yes      None   Spontaneous/Phasic Subclavian            Yes      None Subclavian Proximal   Yes      None   Spontaneous/Phasic Subclavian Mid        Yes      None Subclavian Distal     Yes      None   Spontaneous/Phasic Axillary              Yes      None   Spontaneous/Phasic Brachial              Yes      None Cephalic              Yes       None Basilic               Yes      None  [id number] Marco Salcedo MD, RPGABRIELLE Electronically signed by [id number] Marco Salcedo MD, RPGABRIELLE on 3/1/2024 at 11:01:01 AM  ** Final **     Transthoracic Echo (TTE) Complete    Result Date: 3/1/2024   [facility], [address] [place] [address]              Tel [phone] and Fax [phone] TRANSTHORACIC ECHOCARDIOGRAM REPORT  Patient Name:      AMY NO    Reading Physician:    [id number] Jimenez DO Study Date:        3/1/2024            Ordering Provider:    [id number] CHEL BLAIR MRN/PID:           [id number]            Fellow: Accession#:        [id number]        Nurse: Date of Birth/Age: 1945 / 78      Sonographer:          Natalya levine RDCS Gender:            F                   Additional Staff: Height:            150.00 cm           Admit Date:           2/29/2024 Weight:            46.20 kg            Admission Status:     Observation -                                                              Priority discharge BSA / BMI:         1.39 m2 / 20.53     Encounter#:           [id number]                    kg/m2                                        Department Location:  [facility] Non                                                              Invasive Blood Pressure: 115 /69 mmHg Study Type:    TRANSTHORACIC ECHO (TTE) COMPLETE Diagnosis/ICD: Encounter for other specified special examinations-Z01.89 CPT Code:      Echo Complete w Full Doppler-32570 Patient History: Pertinent History: HTN. Study Detail: The following Echo studies were performed: 2D, M-Mode, Doppler,               color flow and 3D. Technically challenging study due to body               habitus and prominent lung artifact.  Critical Event Critical Event: Test was completed as per department  protocol. Critical Finding: Echo dencity seen in LV. Time Test was Completed: 9:00:10 AM Notified: Cireddu. Attending notification time: 9:13:18 AM  PHYSICIAN INTERPRETATION: Left Ventricle: The left ventricular systolic function is low normal, with an estimated ejection fraction of 50-55%. Wall motion is abnormal. The left ventricular cavity size is decreased. The left ventricular septal wall thickness is mildly increased. There is mildly increased left ventricular posterior wall thickness. There is mild concentric left ventricular hypertrophy. Left ventricular diastolic filling was indeterminate. A speherical Echodensity measuring roughly 1.4 cm X 1 cm is seen attached to the inferior apical wall. There is mild hypokinesia in this region. This is felt to represent thrombus although cardiac mass is not fully excluded. Consider cardiac MRI. LV Wall Scoring: The apical inferior segment is hypokinetic. Left Atrium: The left atrium is moderately dilated. Right Ventricle: The right ventricle is normal in size. There is normal right ventricular global systolic function. Right Atrium: The right atrium is mildly dilated. Aortic Valve: The aortic valve appears structurally normal. There is no evidence of aortic valve regurgitation. The peak instantaneous gradient of the aortic valve is 5.5 mmHg. The mean gradient of the aortic valve is 3.3 mmHg. Mitral Valve: The mitral valve is mildly thickened. There is mild mitral valve regurgitation which is centrally directed. Tricuspid Valve: The tricuspid valve is structurally normal. There is mild tricuspid regurgitation. The tricuspid valve regurgitant jet flows toward the atrial septum. The Doppler estimated RVSP is within normal limits at 26.9 mmHg. Pulmonic Valve: The pulmonic valve is not well visualized. The pulmonic valve regurgitation was not well visualized. Pericardium: There is no pericardial effusion noted. Aorta: The aortic root is normal. There is no dilatation of the  ascending aorta. There is no dilatation of the aortic root. Systemic Veins: The inferior vena cava appears to be of normal size. In comparison to the previous echocardiogram(s): Compared with study from 9/26/2018,. Interval decline in the claculated LVEF and development of LV Thrombus.  CONCLUSIONS:  1. Left ventricular systolic function is low normal with a 50-55% estimated ejection fraction.  2. Apical inferior segment is abnormal.  3. The left atrium is moderately dilated.  4. RVSP within normal limits.  5. Left ventricular cavity size is decreased.  6. A speherical Echodensity measuring roughly 1.4 cm X 1 cm is seen attached to the inferior apical wall. There is mild hypokinesia in this region. This is felt to represent thrombus although cardiac mass is not fully excluded. Consider cardiac MRI.  7. Critical Findings of LV Thrombus communicated to Primary Service and Cardiology Consult Service at 09:38 via [employer]. QUANTITATIVE DATA SUMMARY: 2D MEASUREMENTS:                           Normal Ranges: LAs:           5.13 cm    (2.7-4.0cm) IVSd:          1.30 cm    (0.6-1.1cm) LVPWd:         1.34 cm    (0.6-1.1cm) LVIDd:         3.81 cm    (3.9-5.9cm) LVIDs:         2.95 cm LV Mass Index: 128.0 g/m2 LV % FS        22.4 % LA VOLUME:                               Normal Ranges: LA Vol A4C:        62.9 ml    (22+/-6mL/m2) LA Vol A2C:        55.2 ml LA Vol BP:         59.9 ml LA Vol Index A4C:  45.4 ml/m2 LA Vol Index A2C:  39.8 ml/m2 LA Vol Index BP:   43.3 ml/m2 LA Area A4C:       20.9 cm2 LA Area A2C:       19.9 cm2 LA Major Axis A4C: 5.9 cm LA Major Axis A2C: 6.1 cm LA Volume Index:   43.3 ml/m2 LA Vol A4C:        57.3 ml LA Vol A2C:        52.7 ml M-MODE MEASUREMENTS:                  Normal Ranges: Ao Root: 2.70 cm (2.0-3.7cm) LAs:     5.10 cm (2.7-4.0cm) AORTA MEASUREMENTS:                      Normal Ranges: Ao Sinus, d: 2.90 cm (2.1-3.5cm) Ao STJ, d:   3.20 cm (1.7-3.4cm) Asc Ao, d:   3.20 cm  (2.1-3.4cm) LV SYSTOLIC FUNCTION BY 2D PLANIMETRY (MOD):                     Normal Ranges: EF-A4C View: 46.3 % (>=55%) EF-A2C View: 58.3 % EF-Biplane:  49.8 % LV DIASTOLIC FUNCTION:                             Normal Ranges: MV Peak E:      0.88 m/s    (0.7-1.2 m/s) MV Peak A:      0.27 m/s    (0.42-0.7 m/s) E/A Ratio:      3.27        (1.0-2.2) MV A Dur:       129.18 msec PulmV Sys Mina:  33.71 cm/s PulmV Vazquez Mina: 44.39 cm/s PulmV S/D Mina:  0.76 MITRAL VALVE:                      Normal Ranges: MV Vmax:    0.95 m/s (<=1.3m/s) MV peak PG: 3.6 mmHg (<5mmHg) MV mean P.2 mmHg (<2mmHg) MV VTI:     21.45 cm (10-13cm) MV DT:      181 msec (150-240msec) AORTIC VALVE:                                   Normal Ranges: AoV Vmax:                1.18 m/s (<=1.7m/s) AoV Peak P.5 mmHg (<20mmHg) AoV Mean PG:             3.3 mmHg (1.7-11.5mmHg) LVOT Max Mina:            0.48 m/s (<=1.1m/s) AoV VTI:                 22.06 cm (18-25cm) LVOT VTI:                8.90 cm LVOT Diameter:           1.61 cm  (1.8-2.4cm) AoV Area, VTI:           0.83 cm2 (2.5-5.5cm2) AoV Area,Vmax:           0.84 cm2 (2.5-4.5cm2) AoV Dimensionless Index: 0.40  RIGHT VENTRICLE: RV Basal 3.70 cm RV Mid   2.70 cm RV Major 6.5 cm TAPSE:   18.0 mm TRICUSPID VALVE/RVSP:                             Normal Ranges: Peak TR Velocity: 2.44 m/s Est. RA Pressure: 3 mmHg RV Syst Pressure: 26.9 mmHg (< 30mmHg) IVC Diam:         1.70 cm PULMONIC VALVE:                         Normal Ranges: PV Accel Time: 97 msec  (>120ms) PV Max Mina:    0.9 m/s  (0.6-0.9m/s) PV Max PG:     3.0 mmHg Pulmonary Veins: PulmV Vazquez Mina: 44.39 cm/s PulmV S/D Mina:  0.76 PulmV Sys Mina:  33.71 cm/s  [id number] Mohamud Jiemnez DO Electronically signed on 3/1/2024 at 9:42:38 AM  Wall Scoring  ** Final **     CT angio chest for pulmonary embolism    Result Date: 2024  Interpreted By:  Giselle Menon, STUDY: CT ANGIO CHEST FOR PULMONARY EMBOLISM;  2024 9:18 pm   INDICATION:  Signs/Symptoms:r/o PE.   COMPARISON: CT scan of the chest 03/15/2023   ACCESSION NUMBER(S): [id number]   ORDERING CLINICIAN: ZAC TRIPATHI   TECHNIQUE: Helical data acquisition of the chest was obtained after intravenous administration of  75 mL of Omnipaque 350. Images were reformatted in axial, coronal, and sagittal planes. Axial and coronal MIPS were reconstructed and reviewed.   FINDINGS: HEART AND VESSELS: There is a small filling defect in a right lower lobe segmental branch extending distally consistent with acute pulmonary embolus. Calculated RV to LV ratio 1.0   Right pulmonary artery is dilated up to 3 cm which can be seen with pulmonary arterial hypertension.   Ectatic ascending thoracic aorta. There is scattered calcified plaque in the aorta and arch vessels.   Severe coronary artery calcifications are seen.The study is not optimized for evaluation of coronary arteries.   The cardiac chambers are enlarged.   Small pericardial effusion.   MEDIASTINUM AND HILLARY, LOWER NECK AND AXILLA: Heterogeneous enlarged thyroid gland.   No evidence of thoracic lymphadenopathy by CT criteria.   Small hiatal hernia.   LUNGS AND AIRWAYS: Note is made of debris in the mid trachea likely relate to retained mucus..   Advanced centrilobular and paraseptal emphysema. There is mild interlobular septal thickening and ground-glass opacities suggestive of developing interstitial edema. No effusion. Bibasilar atelectasis and or scarring. No pneumothorax.   UPPER ABDOMEN: The visualized subdiaphragmatic structures demonstrate no remarkable findings.   CHEST WALL AND OSSEOUS STRUCTURES: Bilateral shoulder osteoarthrosis. Multilevel degenerative changes are present.       1. Small filling defect in a right lower lobe segmental branch extending distally consistent with acute pulmonary embolus. Calculated RV to LV ratio of 1.0. 2. Right pulmonary artery is dilated up to 3 cm which can be seen with pulmonary arterial hypertension. 3.  Cardiomegaly. Severe coronary artery calcifications noted. 4. Mild interlobular septal thickening suggestive of developing interstitial edema. 5. Centrilobular and paraseptal emphysema noted. 6. Additional findings as described above.     MACRO: Giselle Menon discussed the significance and urgency of this critical finding by telephone with  Dr. Wilkerson on 2/29/2024 at 9:45 pm. (**-RCF-**) Findings:  See findings.   Signed by: Giselle Menon 2/29/2024 9:46 PM Dictation workstation:   [id number]    XR chest 2 views    Result Date: 2/29/2024  Interpreted By:  Giselle Menon, STUDY: XR CHEST 2 VIEWS;  2/29/2024 4:38 pm   INDICATION: Signs/Symptoms:New atrial flutter.   COMPARISON: Chest x-ray 09/26/2020   ACCESSION NUMBER(S): [id number]   ORDERING CLINICIAN: VIRGIL WILKERSON   FINDINGS: Multiple overlying leads are present.   CARDIOMEDIASTINAL SILHOUETTE: Cardiomediastinal silhouette is stable in size and configuration. Atherosclerotic calcification of the aorta.   LUNGS: No consolidation, pleural effusion or pneumothorax. Hyperinflation of the lungs with flattening of the hemidiaphragms and interstitial prominence suggestive of COPD..   ABDOMEN: No remarkable upper abdominal findings.   BONES: Multilevel degenerative changes of the spine.       No acute cardiopulmonary process.   Findings suggestive of COPD.   MACRO: None   Signed by: Giselle Menon 2/29/2024 4:46 PM Dictation workstation:   [id number]          Past Cardiology Tests (Last 3 Years):  EKG:  Results for orders placed during the hospital encounter of 02/29/24    ECG 12 lead    Narrative  Atrial flutter with 2:1 AV conduction  Anterior infarct (cited on or before 25-SEP-2018)  ST & T wave abnormality, consider lateral ischemia  Abnormal ECG  When compared with ECG of 17-MAY-2022 11:34,  Significant changes have occurred  See ED provider note for full interpretation and clinical correlation  Confirmed by Denise Anaya ([id number]) on 3/2/2024 7:59:24  PM      Results for orders placed in visit on 02/29/24    ECG 12 lead    Narrative  Atrial flutter  with 2:1 conduction.  Rate 134    Echo:    3/1/2024   1. Left ventricular systolic function is low normal with a 50-55% estimated ejection fraction.   2. Apical inferior segment is abnormal.   3. The left atrium is moderately dilated.   4. RVSP within normal limits.   5. Left ventricular cavity size is decreased.   6. A speherical Echodensity measuring roughly 1.4 cm X 1 cm is seen attached to the inferior apical wall. There is mild hypokinesia in this region. This is felt to represent thrombus although cardiac mass is not fully excluded. Consider cardiac MRI.   7. Critical Findings of LV Thrombus communicated to Primary Service and Cardiology Consult Service at 09:38 via [employer].     9/26/2018   1. The left ventricular systolic function is normal with a 60-65% estimated ejection fraction.   2. Slightly elevated RVSP.   3. There is mild to moderate tricuspid regurgitation.   4. The estimated PASP is 37-42 mmHg.   5. There is plaque visualized in the ascending aorta.    Cath:  No results found for this or any previous visit.    Stress Test:  No results found for this or any previous visit.    Cardiac Imaging:  No results found for this or any previous visit.      Inpatient Medications:  Scheduled medications   Medication Dose Route Frequency    [Held by provider] amLODIPine  10 mg oral Daily    apixaban  10 mg oral BID    Followed by    [START ON 3/12/2024] apixaban  5 mg oral BID    atorvastatin  40 mg oral Daily    buPROPion XL  150 mg oral q AM    cyanocobalamin  500 mcg oral Daily    levothyroxine  75 mcg oral Daily    [Held by provider] lisinopril 10 mg, hydroCHLOROthiazide 12.5 mg for Zestoretic/Prinizide   oral Daily    melatonin  3 mg oral Daily    metoprolol tartrate  25 mg oral BID    perflutren lipid microspheres  0.5-10 mL of dilution intravenous Once in imaging    perflutren protein A microsphere   "0.5 mL intravenous Once in imaging    polyethylene glycol  17 g oral Daily    sulfur hexafluoride microsphr  2 mL intravenous Once in imaging     PRN medications   Medication    acetaminophen    ondansetron ODT    Or    ondansetron     Continuous Medications   Medication Dose Last Rate       Physical Exam:    GENERAL: alert, cooperative, pleasant, in no acute distress  SKIN: warm, dry  NECK: no JVD  CARDIAC: Regular rate and rhythm no murmurs  CHEST: Normal respiratory efforts, lungs clear to auscultation bilaterally.  ABDOMEN: soft, nondistended  EXTREMITIES: no lower extremity edema  NEURO: Alert and oriented x 3.  Grossly normal.  Moves all 4 extremities.      Assessment/Plan     Cardiac MRI on 3/4/2024  IMPRESSION:  1. Normal LV size (EDVi 68 ml/m2) with mildly reduced systolic  function (LVEF 48%).  2. Severe hypokinesis of the apical inferior segment and true apex.  3. There is evidence of a small LV thrombus measuring 1.1 x 0.58 cm  adherent to the apical inferior wall.  4. Subendocardial infarction of the apical inferior wall (50% wall  thickness) and transmural infarction of the true apex (% wall  thickness).  5. Moderate mitral regurgitation.  6. Normal RV size and systolic function (RVEF 59%).    Sophie Mendosa is a 78 y.o. female with past medical history significant for Coronary artery calcification on CT scan, Hypertension, Hypothyroidism, Gout, CVA 9/2018, GERD. Presented with elevated heart rate and shortness of breath.  Cardiology is consulted for new atrial flutter with PE.      New onset atrial flutter with rapid ventricular response- [id number] score for Atrial Fibrillation Stroke Risk is 6  which puts pt at \"moderate-high\" for thromboembolic event. Previously on no AV laquita blockers.  Rate now controlled.   Left ventricular thrombus- noted on echo 1.4 cm x 1cm attached to inferior apical wall.   Pulmonary embolism noted on CT scan  Severe coronary artery calcification on CT scan- On " outpatient statin   HTN- acceptable at this time , see med changes below  Abnormal thyroid studies/hx of hypothyroidism- management per primary       Recommendations     -- Rate control with metoprolol tartrate 25 mg BID, continue upon discharge.   -- Hold amlodipine and Lisinopril/hydrochlorothiazide and do not continue upon discharge.   -- Continue with Eliquis for PE / LV thrombus / Atrial flutter  -- Continue statin    OK to discharge home from a cardiology standpoint at the discretion of the primary team  Follow up with established cardiologist, Dr. CHELSEY Hackett within 3 weeks of hospital discharge.     Discussed plan of care with Dr. Dolan    Code Status:  Full Code    Salina Pan, APRN-CNP

## 2024-03-06 ENCOUNTER — PATIENT OUTREACH (OUTPATIENT)
Dept: PRIMARY CARE | Facility: CLINIC | Age: 79
End: 2024-03-06
Payer: MEDICARE

## 2024-03-06 DIAGNOSIS — I26.99 PULMONARY EMBOLISM, UNSPECIFIED CHRONICITY, UNSPECIFIED PULMONARY EMBOLISM TYPE, UNSPECIFIED WHETHER ACUTE COR PULMONALE PRESENT (MULTI): ICD-10-CM

## 2024-03-06 NOTE — DISCHARGE SUMMARY
Discharge Summary    Admit Date: 2/29/2024  Discharge Date: 3/5/2024      Discharge Diagnosis  Hypertension with CKD 3a.  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 Nil acute. EF 48%.  Severe coronary artery calcifications on CT scan  Hypothyroidism, with suppressed TSH on oral supplement therapy  CKD3a POA.       Issues Requiring Follow-Up  LV thrombus    Test Results Pending At Discharge  Pending Labs       No current pending labs.            Hospital Course  Admitted for palpitations.  Noted to have right lower lower lobe segmental PE.  Echo suggestive of LV thrombus versus hypertrophic chordae.  MRI confirmed LV thrombus.  Patient was on IV heparin, that was transition to DOAC's prior to discharge.  She will needs ongoing follow-up with cardiology as OP.    Pertinent Physical Exam At Time of Discharge  Physical Exam    Home Medications     Medication List      START taking these medications     Eliquis DVT-PE Treat 30D Start 5 mg (74 tabs) tablet; Generic drug:   apixaban; Take 2 tablets (10 mg) by mouth 2 times a day for 7 days, then   take 1 tablet (5 mg) by mouth 2 times a day.   metoprolol tartrate 25 mg tablet; Commonly known as: Lopressor; Take 1   tablet (25 mg) by mouth 2 times a day.     CHANGE how you take these medications     levothyroxine 75 mcg tablet; Commonly known as: Synthroid, Levoxyl; Take   1 tablet (75 mcg) by mouth once daily. Do not start before March 5, 2024.;   What changed: medication strength, how much to take     CONTINUE taking these medications     atorvastatin 40 mg tablet; Commonly known as: Lipitor; Take 1 tablet (40   mg) by mouth once daily.   buPROPion  mg 24 hr tablet; Commonly known as: Wellbutrin XL; Take   1 tablet (150 mg) by mouth once daily in the morning. Do not crush, chew,   or split.   cyanocobalamin 500 mcg tablet; Commonly known as: Vitamin B-12     STOP taking  these medications     amLODIPine 10 mg tablet; Commonly known as: Norvasc   aspirin 81 mg chewable tablet   lisinopriL-hydrochlorothiazide 10-12.5 mg tablet       Outpatient Follow-Up  Future Appointments   Date Time Provider Department Center   3/18/2024 10:00 AM Familia Hackett MD [id number] [facility]   4/4/2024  3:00 PM Rosanne M Casal, APRN-CNP, DNP [id number] [facility]   5/30/2024 10:30 AM Madelaine Galvez MD [id number] [facility]       Wallace Finn MD

## 2024-03-06 NOTE — PROGRESS NOTES
Discharge Facility: [facility]  Discharge Diagnosis: Pulmonary embolism right lower lobe  Admission Date: 2/29/2024  Discharge Date: 3/5/2024    PCP Appointment Date: 3/12/2024 11:00  Specialist Appointment Date: 3/18/2024 10:00 Dr. Familia Hackett Cardiology,  4/4/2024 15:00 Rosanne Casal, CNP Hem/Oncology  Hospital Encounter and Summary: Linked     2 attempts were made to reach patient to assess needs. No contact made. See summary:  Wrap Up  Wrap Up Additional Comments: 78yoF PMHx of coronary artery calcification on CT scan, hypertension, hypothyroidism, gout, CVA 9/2018, GERD. admitted for SOB and palpitations. Noted to have right lower lower lobe segmental PE. MRI confirmed LV thrombus. Cardiology consulted for new diagnosis of atrial flutter and PE. Patient treated with IV heparin and metoprolol. Patient discharged to home self care. Rx's for apixaban and metoprolol given. Levothyroxine decreased to 75 mcg daily. Amlodipine, aspirin, and lisinopril-HCTZ stopped. Cardiology and Hematology/Oncology follow ups scheduled. (3/6/2024 11:41 AM)

## 2024-03-08 DIAGNOSIS — F32.0 DEPRESSION, MAJOR, SINGLE EPISODE, MILD (CMS-HCC): Primary | ICD-10-CM

## 2024-03-08 NOTE — RESULT ENCOUNTER NOTE
The thyroid dose is too high, but it looks like it was adjusted while in the hospital.  Will recheck level  in about 2 months.  A1C is in the prediabetic range, but better than  last check (2 years ago).  Cholesterol level  is good.  Kidney function is slightly reduced.  Controlling BP and glucose will help.  Also should avoid taking frequent anti-inflammatories, such as ibuprofen and naproxen (Advil, Motrin, Aleve).  Tylenol (acetaminophen) is OK.    Also, can let patient know that our in house therapist recommends referral to [facility] for help managing depression.  I've put a referral order.

## 2024-03-12 ENCOUNTER — OFFICE VISIT (OUTPATIENT)
Dept: PRIMARY CARE | Facility: CLINIC | Age: 79
End: 2024-03-12
Payer: MEDICARE

## 2024-03-12 VITALS
SYSTOLIC BLOOD PRESSURE: 120 MMHG | BODY MASS INDEX: 20.88 KG/M2 | HEIGHT: 59 IN | WEIGHT: 103.6 LBS | HEART RATE: 75 BPM | DIASTOLIC BLOOD PRESSURE: 94 MMHG

## 2024-03-12 DIAGNOSIS — I48.3 TYPICAL ATRIAL FLUTTER (MULTI): ICD-10-CM

## 2024-03-12 DIAGNOSIS — I10 PRIMARY HYPERTENSION: ICD-10-CM

## 2024-03-12 DIAGNOSIS — I26.99 PULMONARY EMBOLISM ON RIGHT (MULTI): Primary | ICD-10-CM

## 2024-03-12 PROCEDURE — 1111F DSCHRG MED/CURRENT MED MERGE: CPT | Performed by: FAMILY MEDICINE

## 2024-03-12 PROCEDURE — 3080F DIAST BP >= 90 MM HG: CPT | Performed by: FAMILY MEDICINE

## 2024-03-12 PROCEDURE — 1159F MED LIST DOCD IN RCRD: CPT | Performed by: FAMILY MEDICINE

## 2024-03-12 PROCEDURE — 4004F PT TOBACCO SCREEN RCVD TLK: CPT | Performed by: FAMILY MEDICINE

## 2024-03-12 PROCEDURE — 3074F SYST BP LT 130 MM HG: CPT | Performed by: FAMILY MEDICINE

## 2024-03-12 PROCEDURE — 1160F RVW MEDS BY RX/DR IN RCRD: CPT | Performed by: FAMILY MEDICINE

## 2024-03-12 PROCEDURE — 1126F AMNT PAIN NOTED NONE PRSNT: CPT | Performed by: FAMILY MEDICINE

## 2024-03-12 PROCEDURE — 99495 TRANSJ CARE MGMT MOD F2F 14D: CPT | Performed by: FAMILY MEDICINE

## 2024-03-12 ASSESSMENT — PATIENT HEALTH QUESTIONNAIRE - PHQ9
2. FEELING DOWN, DEPRESSED OR HOPELESS: SEVERAL DAYS
SUM OF ALL RESPONSES TO PHQ9 QUESTIONS 1 AND 2: 2
10. IF YOU CHECKED OFF ANY PROBLEMS, HOW DIFFICULT HAVE THESE PROBLEMS MADE IT FOR YOU TO DO YOUR WORK, TAKE CARE OF THINGS AT HOME, OR GET ALONG WITH OTHER PEOPLE: SOMEWHAT DIFFICULT
1. LITTLE INTEREST OR PLEASURE IN DOING THINGS: SEVERAL DAYS

## 2024-03-12 ASSESSMENT — ENCOUNTER SYMPTOMS
OCCASIONAL FEELINGS OF UNSTEADINESS: 0
DEPRESSION: 1
LOSS OF SENSATION IN FEET: 0

## 2024-03-12 ASSESSMENT — PAIN SCALES - GENERAL: PAINLEVEL: 0-NO PAIN

## 2024-03-12 NOTE — PROGRESS NOTES
"Subjective   Patient ID: Sophie Mendosa is a 78 y.o. female who presents for Follow-up (Patient is In for Hospital follow-up. Patient is doing well.).  Seen for wellness visit on 2/29/24, and found to be in atrial  flutter  with rapid response.  Sent  to ED, and was  admitted  2/29/24-3/5/24.    Copied  from discharge summary:  \"Discharge Diagnosis  Hypertension with CKD 3a.  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 Nil acute. EF 48%.  Severe coronary artery calcifications on CT scan  Hypothyroidism, with suppressed TSH on oral supplement therapy  CKD3a POA.      Issues Requiring Follow-Up  LV thrombus     Test Results Pending At Discharge  Pending Labs        No current pending labs.      Hospital Course  Admitted for palpitations.  Noted to have right lower lower lobe segmental PE.  Echo suggestive of LV thrombus versus hypertrophic chordae.  MRI confirmed LV thrombus.  Patient was on IV heparin, that was transition to DOAC's prior to discharge.  She will needs ongoing follow-up with cardiology as OP.     Pertinent Physical Exam At Time of Discharge  Physical Exam     Home Medications     Medication List      START taking these medications    · Eliquis DVT-PE Treat 30D Start 5 mg (74 tabs) tablet; Generic drug:   apixaban; Take 2 tablets (10 mg) by mouth 2 times a day for 7 days, then   take 1 tablet (5 mg) by mouth 2 times a day.  · metoprolol tartrate 25 mg tablet; Commonly known as: Lopressor; Take 1   tablet (25 mg) by mouth 2 times a day.     CHANGE how you take these medications    · levothyroxine 75 mcg tablet; Commonly known as: Synthroid, Levoxyl; Take   1 tablet (75 mcg) by mouth once daily. Do not start before March 5, 2024.;   What changed: medication strength, how much to take     CONTINUE taking these medications    · atorvastatin 40 mg tablet; Commonly known as: Lipitor; Take 1 tablet (40   mg) by " "mouth once daily.  · buPROPion  mg 24 hr tablet; Commonly known as: Wellbutrin XL; Take   1 tablet (150 mg) by mouth once daily in the morning. Do not crush, chew,   or split.  · cyanocobalamin 500 mcg tablet; Commonly known as: Vitamin B-12     STOP taking these medications    · amLODIPine 10 mg tablet; Commonly known as: Norvasc  · aspirin 81 mg chewable tablet  · lisinopriL-hydrochlorothiazide 10-12.5 mg tablet\"      Some of the medications make nauseous, but doesn't last long.  For constipation, miralax in the hospital helped,  so will  continue.  Continues  to work on smoking cessation.  Taking  bupropion.  Thyroid medication dosage decreased based on  level  drawn in  office.  Hasn't  felt any CP or palpitations.  No CP.  Is  feeling better.        Review of Systems    Objective   BP (!) 120/94 (BP Location: Left arm, Patient Position: Sitting, BP Cuff Size: Adult)   Pulse 75   Ht 1.499 m (4' 11\")   Wt 47 kg (103 lb 9.6 oz)   BMI 20.92 kg/m²     Physical Exam  Vitals reviewed.   Constitutional:       Appearance: Normal appearance.   Cardiovascular:      Rate and Rhythm: Regular rhythm. Tachycardia present.      Comments: Pulse 110 when listening  to heart,   then slowed down.    Pulmonary:      Effort: Pulmonary effort is normal.      Breath sounds: Normal breath sounds.   Musculoskeletal:      Right lower leg: No edema.      Left lower leg: No edema.   Neurological:      Mental Status: She is alert.           Assessment/Plan   Problem List Items Addressed This Visit       Hypertension    Pulmonary embolism on right (CMS/HCC) - Primary     Other Visit Diagnoses       Typical atrial flutter (CMS/HCC)                   "

## 2024-03-12 NOTE — DOCUMENTATION CLARIFICATION NOTE
"    PATIENT:               AMY NO  ACCT #:                  [id number]  MRN:                       [id number]  :                       1945  ADMIT DATE:       2024 4:13 PM  DISCH DATE:        3/5/2024 3:27 PM  RESPONDING PROVIDER #:        [id number]          PROVIDER RESPONSE TEXT:    new onset atrial flutter    CDI QUERY TEXT:    [facility]_CV CHF      Instruction:    Based on your assessment of the patient and the clinical information, please provide the requested documentation by clicking on the appropriate radio button and enter any additional information if prompted.    Question: Please clarify the diagnosis of  Acute systolic heart failure      When answering this query, please exercise your independent professional judgment. The fact that a question is being asked, does not imply that any particular answer is desired or expected.    The patient's clinical indicators include:  Clinical Information: Patient admitted with acute PE without mention of acute cor pulmonale and new onset of atrial flutter    Documented Diagnosis: Acute systolic heart failure on the 3/4/24 Medicine note    Clinical Indicators and Documentation:  -Vital Signs:  -ECHO: 3/1/24 TTE: \"The left ventricular systolic function is low normal, with an estimated ejection fraction of 50-55 percent.\"  -CXR: 24 CXR: \"Cardiomediastinal silhouette is stable in size and configuration. No consolidation, pleural effusion or pneumothorax\"  -BNP: 24-83  -Physical exam findings:  3/1/24 cardiology note  -Lung Sounds: lungs clear to auscultation bilaterally  -JVD: negative  -Peripheral Edema: negative  -Supplemental Oxygen:  none  -Cardiac Consult-yes  -3/1/24 Cardiac MRI: \"Normal LV size -EDVi 68 ml/m2- with mildly reduced systolic function LVEF 48 percent\"      Treatment: none    Risk Factors: PE, atrial flutter, advanced age  Options provided:  -- Acute systolic heart failure was ruled out after study, patient not diagnosed with any type " of heart failure at this time  -- Acute systolic heart failure was ruled out after study, patient has chronic diastolic heart failure  -- Acute systolic heart failure as evidenced by, Please specify additional information below  -- Other - I will add my own diagnosis  -- Refer to Clinical Documentation Reviewer    Query created by: Ambika Ingram on 3/12/2024 1:49 PM      Electronically signed by:  RICARDO ORTEGA 3/12/2024 3:55 PM

## 2024-03-12 NOTE — DOCUMENTATION CLARIFICATION NOTE
"    PATIENT:               AMY NO  ACCT #:                  [id number]  MRN:                       [id number]  :                       1945  ADMIT DATE:       2024 4:13 PM  DISCH DATE:        3/5/2024 3:27 PM  RESPONDING PROVIDER #:        [id number]          PROVIDER RESPONSE TEXT:    The subendocardial infarction/transmural infarction found on the 3/1/24 cardiac MRI is considered an old MI    CDI QUERY TEXT:    [facility]_Abnormal Studies      Instruction:    Based on your assessment of the patient and the clinical information, please provide the requested documentation by clicking on the appropriate radio button and enter any additional information if prompted.    Question: Is there a diagnosis indicative of the 3/1/24 cardiac MRI image study showing subendocardial infarction of the apical inferior wall 50% wall thickness and transmural infarction    When answering this query, please exercise your independent professional judgment. The fact that a question is being asked, does not imply that any particular answer is desired or expected.    The patient's clinical indicators include:  Clinical Information: Patient admitted with acute PE without mention of acute cor pulmonale and new onset of atrial flutter    Clinical Indicators:  -24 CXR: \"Cardiomediastinal silhouette is stable in size and configuration. No consolidation, pleural effusion or pneumothorax.\"    -24 CT angio of chest: \"Cardiomegaly. Severe coronary artery calcifications noted. Mild interlobular septal thickening suggestive of developing interstitial edema.\"    -3/1/24 TTE: \"The left ventricular systolic function is low normal, with an estimated ejection fraction of 50-55%. Wall motion is abnormal.\"    -3/1/24 MR cardiac angio chest: \"Subendocardial infarction of the apical inferior wall 50% wall thickness and transmural infarction of the true apex % wall thickness\"    Treatment: Heparin, aspirin, Atorvastatin, " Metoprolol    Risk Factors: Advanced age, HTN, coronary artery calcifications, abnormal cardiac MRI  Options provided:  -- The subendocardial infarction/transmural infarction found on the 3/1/24 cardiac MRI is considered an old MI  -- The subendocardial infarction/transmural infarction found on the 3/1/24 cardiac MRI is considered an acute MI  -- Other - I will add my own diagnosis  -- Refer to Clinical Documentation Reviewer    Query created by: Ambika Ingram on 3/12/2024 2:30 PM      Electronically signed by:  RICARDO ORTEGA 3/12/2024 3:55 PM

## 2024-03-13 NOTE — DOCUMENTATION CLARIFICATION NOTE
"    PATIENT:               AMY NO  ACCT #:                  [id number]  MRN:                       [id number]  :                       1945  ADMIT DATE:       2024 4:13 PM  DISCH DATE:        3/5/2024 3:27 PM  RESPONDING PROVIDER #:        [id number]          PROVIDER RESPONSE TEXT:    Acute systolic heart failure was ruled out after study, patient has chronic diastolic heart failure    CDI QUERY TEXT:    [facility]_CV CHF      Instruction:    Based on your assessment of the patient and the clinical information, please provide the requested documentation by clicking on the appropriate radio button and enter any additional information if prompted.    Question: Please clarify the diagnosis of  Acute systolic heart failure      When answering this query, please exercise your independent professional judgment. The fact that a question is being asked, does not imply that any particular answer is desired or expected.    The patient's clinical indicators include:  Clinical Information: Patient admitted with acute PE without mention of acute cor pulmonale and new onset of atrial flutter    Documented Diagnosis: Acute systolic heart failure on the 3/4/24 Medicine note    Clinical Indicators and Documentation:  -Vital Signs:  -ECHO: 3/1/24 TTE: \"The left ventricular systolic function is low normal, with an estimated ejection fraction of 50-55 percent.\"  -CXR: 24 CXR: \"Cardiomediastinal silhouette is stable in size and configuration. No consolidation, pleural effusion or pneumothorax\"  -BNP: 24-83  -Physical exam findings:  3/1/24 cardiology note  -Lung Sounds: lungs clear to auscultation bilaterally  -JVD: negative  -Peripheral Edema: negative  -Supplemental Oxygen:  none  -Cardiac Consult-yes  -3/1/24 Cardiac MRI: \"Normal LV size -EDVi 68 ml/m2- with mildly reduced systolic function LVEF 48 percent\"      Treatment: none    Risk Factors: PE, atrial flutter, advanced age  Options provided:  -- Acute systolic " heart failure was ruled out after study, patient not diagnosed with any type of heart failure at this time  -- Acute systolic heart failure was ruled out after study, patient has chronic diastolic heart failure  -- Acute systolic heart failure as evidenced by, Please specify additional information below  -- Other - I will add my own diagnosis  -- Refer to Clinical Documentation Reviewer    Query created by: Ambika Ingram on 3/12/2024 5:58 PM      Electronically signed by:  RICARDO ORTEGA 3/13/2024 11:31 AM

## 2024-03-13 NOTE — PATIENT INSTRUCTIONS
Follow  up of hospitalization for new onset atrial  flutter with rapid response, and pulmonary embolism.  Taking  eliquis as blood thinner to  reduce risk of another blood clot.  Taking metoprolol to  help slow the rate, and control BP.  Lisinopril hydrochlorothiazide and aspirin have been stopped.    Will  be  following up with cardiology, Dr. Familia Hackett.    For cholesterol,  continue atorvastatin.    For depression,  and to help quit smoking, continue bupropion.    For  thyroid, dose changed to 75 mcg daily.  Will need  to recheck the level in about 2 months.    For help with depression, referral is in for geriatric psychiatry.

## 2024-03-17 ENCOUNTER — APPOINTMENT (OUTPATIENT)
Dept: RADIOLOGY | Facility: HOSPITAL | Age: 79
DRG: 291 | End: 2024-03-17
Payer: MEDICARE

## 2024-03-17 ENCOUNTER — HOSPITAL ENCOUNTER (INPATIENT)
Facility: HOSPITAL | Age: 79
LOS: 5 days | Discharge: HOME | DRG: 291 | End: 2024-03-22
Attending: EMERGENCY MEDICINE | Admitting: INTERNAL MEDICINE
Payer: MEDICARE

## 2024-03-17 ENCOUNTER — APPOINTMENT (OUTPATIENT)
Dept: CARDIOLOGY | Facility: HOSPITAL | Age: 79
DRG: 291 | End: 2024-03-17
Payer: MEDICARE

## 2024-03-17 DIAGNOSIS — N18.31 STAGE 3A CHRONIC KIDNEY DISEASE (MULTI): ICD-10-CM

## 2024-03-17 DIAGNOSIS — I10 BENIGN ESSENTIAL HYPERTENSION: ICD-10-CM

## 2024-03-17 DIAGNOSIS — K29.70 GASTRITIS WITHOUT BLEEDING, UNSPECIFIED CHRONICITY, UNSPECIFIED GASTRITIS TYPE: ICD-10-CM

## 2024-03-17 DIAGNOSIS — I48.92 ATRIAL FLUTTER, UNSPECIFIED TYPE (MULTI): ICD-10-CM

## 2024-03-17 DIAGNOSIS — R06.02 SOB (SHORTNESS OF BREATH): ICD-10-CM

## 2024-03-17 DIAGNOSIS — I49.5 SINUS NODE DYSFUNCTION (MULTI): Chronic | ICD-10-CM

## 2024-03-17 DIAGNOSIS — J18.9 PNEUMONIA DUE TO INFECTIOUS ORGANISM, UNSPECIFIED LATERALITY, UNSPECIFIED PART OF LUNG: Primary | ICD-10-CM

## 2024-03-17 DIAGNOSIS — I10 PRIMARY HYPERTENSION: ICD-10-CM

## 2024-03-17 DIAGNOSIS — I48.4 ATYPICAL ATRIAL FLUTTER (MULTI): ICD-10-CM

## 2024-03-17 DIAGNOSIS — K76.9 LIVER LESION: ICD-10-CM

## 2024-03-17 DIAGNOSIS — I48.3 TYPICAL ATRIAL FLUTTER (MULTI): ICD-10-CM

## 2024-03-17 LAB
ALBUMIN SERPL BCP-MCNC: 4 G/DL (ref 3.4–5)
ALP SERPL-CCNC: 93 U/L (ref 33–136)
ALT SERPL W P-5'-P-CCNC: 37 U/L (ref 7–45)
ANION GAP SERPL CALC-SCNC: 20 MMOL/L (ref 10–20)
AST SERPL W P-5'-P-CCNC: 31 U/L (ref 9–39)
BASOPHILS # BLD AUTO: 0.02 X10*3/UL (ref 0–0.1)
BASOPHILS NFR BLD AUTO: 0.2 %
BILIRUB SERPL-MCNC: 0.9 MG/DL (ref 0–1.2)
BNP SERPL-MCNC: 3535 PG/ML (ref 0–99)
BUN SERPL-MCNC: 26 MG/DL (ref 6–23)
CALCIUM SERPL-MCNC: 9.7 MG/DL (ref 8.6–10.3)
CARDIAC TROPONIN I PNL SERPL HS: 45 NG/L (ref 0–13)
CARDIAC TROPONIN I PNL SERPL HS: 53 NG/L (ref 0–13)
CHLORIDE SERPL-SCNC: 99 MMOL/L (ref 98–107)
CO2 SERPL-SCNC: 21 MMOL/L (ref 21–32)
CREAT SERPL-MCNC: 1.21 MG/DL (ref 0.5–1.05)
D DIMER PPP FEU-MCNC: 320 NG/ML FEU
EGFRCR SERPLBLD CKD-EPI 2021: 47 ML/MIN/1.73M*2
EOSINOPHIL # BLD AUTO: 0 X10*3/UL (ref 0–0.4)
EOSINOPHIL NFR BLD AUTO: 0 %
ERYTHROCYTE [DISTWIDTH] IN BLOOD BY AUTOMATED COUNT: 15.5 % (ref 11.5–14.5)
FLUAV RNA RESP QL NAA+PROBE: NOT DETECTED
FLUBV RNA RESP QL NAA+PROBE: NOT DETECTED
GLUCOSE SERPL-MCNC: 168 MG/DL (ref 74–99)
HCT VFR BLD AUTO: 35.7 % (ref 36–46)
HGB BLD-MCNC: 11.6 G/DL (ref 12–16)
IMM GRANULOCYTES # BLD AUTO: 0.03 X10*3/UL (ref 0–0.5)
IMM GRANULOCYTES NFR BLD AUTO: 0.3 % (ref 0–0.9)
LYMPHOCYTES # BLD AUTO: 1.03 X10*3/UL (ref 0.8–3)
LYMPHOCYTES NFR BLD AUTO: 11.3 %
MCH RBC QN AUTO: 26.7 PG (ref 26–34)
MCHC RBC AUTO-ENTMCNC: 32.5 G/DL (ref 32–36)
MCV RBC AUTO: 82 FL (ref 80–100)
MONOCYTES # BLD AUTO: 0.73 X10*3/UL (ref 0.05–0.8)
MONOCYTES NFR BLD AUTO: 8 %
MRSA DNA SPEC QL NAA+PROBE: NOT DETECTED
NEUTROPHILS # BLD AUTO: 7.34 X10*3/UL (ref 1.6–5.5)
NEUTROPHILS NFR BLD AUTO: 80.2 %
NRBC BLD-RTO: 0 /100 WBCS (ref 0–0)
PLATELET # BLD AUTO: 298 X10*3/UL (ref 150–450)
POTASSIUM SERPL-SCNC: 4.6 MMOL/L (ref 3.5–5.3)
PROT SERPL-MCNC: 6.9 G/DL (ref 6.4–8.2)
RBC # BLD AUTO: 4.34 X10*6/UL (ref 4–5.2)
SARS-COV-2 RNA RESP QL NAA+PROBE: NOT DETECTED
SODIUM SERPL-SCNC: 135 MMOL/L (ref 136–145)
WBC # BLD AUTO: 9.2 X10*3/UL (ref 4.4–11.3)

## 2024-03-17 PROCEDURE — 74177 CT ABD & PELVIS W/CONTRAST: CPT | Performed by: RADIOLOGY

## 2024-03-17 PROCEDURE — 2500000001 HC RX 250 WO HCPCS SELF ADMINISTERED DRUGS (ALT 637 FOR MEDICARE OP): Performed by: STUDENT IN AN ORGANIZED HEALTH CARE EDUCATION/TRAINING PROGRAM

## 2024-03-17 PROCEDURE — 71046 X-RAY EXAM CHEST 2 VIEWS: CPT | Performed by: STUDENT IN AN ORGANIZED HEALTH CARE EDUCATION/TRAINING PROGRAM

## 2024-03-17 PROCEDURE — 85025 COMPLETE CBC W/AUTO DIFF WBC: CPT | Performed by: EMERGENCY MEDICINE

## 2024-03-17 PROCEDURE — 93010 ELECTROCARDIOGRAM REPORT: CPT | Performed by: STUDENT IN AN ORGANIZED HEALTH CARE EDUCATION/TRAINING PROGRAM

## 2024-03-17 PROCEDURE — 87636 SARSCOV2 & INF A&B AMP PRB: CPT | Performed by: EMERGENCY MEDICINE

## 2024-03-17 PROCEDURE — 93005 ELECTROCARDIOGRAM TRACING: CPT

## 2024-03-17 PROCEDURE — 2500000004 HC RX 250 GENERAL PHARMACY W/ HCPCS (ALT 636 FOR OP/ED): Performed by: EMERGENCY MEDICINE

## 2024-03-17 PROCEDURE — 36415 COLL VENOUS BLD VENIPUNCTURE: CPT | Performed by: EMERGENCY MEDICINE

## 2024-03-17 PROCEDURE — 2500000004 HC RX 250 GENERAL PHARMACY W/ HCPCS (ALT 636 FOR OP/ED): Performed by: STUDENT IN AN ORGANIZED HEALTH CARE EDUCATION/TRAINING PROGRAM

## 2024-03-17 PROCEDURE — 2500000001 HC RX 250 WO HCPCS SELF ADMINISTERED DRUGS (ALT 637 FOR MEDICARE OP): Performed by: EMERGENCY MEDICINE

## 2024-03-17 PROCEDURE — 74177 CT ABD & PELVIS W/CONTRAST: CPT

## 2024-03-17 PROCEDURE — 2500000004 HC RX 250 GENERAL PHARMACY W/ HCPCS (ALT 636 FOR OP/ED): Performed by: PEDIATRICS

## 2024-03-17 PROCEDURE — 83880 ASSAY OF NATRIURETIC PEPTIDE: CPT | Performed by: EMERGENCY MEDICINE

## 2024-03-17 PROCEDURE — 85379 FIBRIN DEGRADATION QUANT: CPT | Performed by: EMERGENCY MEDICINE

## 2024-03-17 PROCEDURE — 71046 X-RAY EXAM CHEST 2 VIEWS: CPT

## 2024-03-17 PROCEDURE — 99285 EMERGENCY DEPT VISIT HI MDM: CPT | Mod: 25

## 2024-03-17 PROCEDURE — 96374 THER/PROPH/DIAG INJ IV PUSH: CPT

## 2024-03-17 PROCEDURE — 2500000005 HC RX 250 GENERAL PHARMACY W/O HCPCS: Performed by: EMERGENCY MEDICINE

## 2024-03-17 PROCEDURE — 2500000004 HC RX 250 GENERAL PHARMACY W/ HCPCS (ALT 636 FOR OP/ED): Performed by: INTERNAL MEDICINE

## 2024-03-17 PROCEDURE — 96375 TX/PRO/DX INJ NEW DRUG ADDON: CPT

## 2024-03-17 PROCEDURE — 80053 COMPREHEN METABOLIC PANEL: CPT | Performed by: EMERGENCY MEDICINE

## 2024-03-17 PROCEDURE — 2550000001 HC RX 255 CONTRASTS: Performed by: EMERGENCY MEDICINE

## 2024-03-17 PROCEDURE — 99223 1ST HOSP IP/OBS HIGH 75: CPT | Performed by: STUDENT IN AN ORGANIZED HEALTH CARE EDUCATION/TRAINING PROGRAM

## 2024-03-17 PROCEDURE — 1100000001 HC PRIVATE ROOM DAILY

## 2024-03-17 PROCEDURE — 87641 MR-STAPH DNA AMP PROBE: CPT | Performed by: EMERGENCY MEDICINE

## 2024-03-17 PROCEDURE — 84484 ASSAY OF TROPONIN QUANT: CPT | Performed by: EMERGENCY MEDICINE

## 2024-03-17 PROCEDURE — 2500000001 HC RX 250 WO HCPCS SELF ADMINISTERED DRUGS (ALT 637 FOR MEDICARE OP): Performed by: INTERNAL MEDICINE

## 2024-03-17 RX ORDER — POLYETHYLENE GLYCOL 3350 17 G/17G
17 POWDER, FOR SOLUTION ORAL 2 TIMES DAILY
Status: DISCONTINUED | OUTPATIENT
Start: 2024-03-17 | End: 2024-03-22 | Stop reason: HOSPADM

## 2024-03-17 RX ORDER — ACETAMINOPHEN 650 MG/1
650 SUPPOSITORY RECTAL EVERY 4 HOURS PRN
Status: DISCONTINUED | OUTPATIENT
Start: 2024-03-17 | End: 2024-03-22 | Stop reason: HOSPADM

## 2024-03-17 RX ORDER — UBIDECARENONE 75 MG
500 CAPSULE ORAL DAILY
Status: DISCONTINUED | OUTPATIENT
Start: 2024-03-17 | End: 2024-03-22 | Stop reason: HOSPADM

## 2024-03-17 RX ORDER — VANCOMYCIN HYDROCHLORIDE 1 G/200ML
1000 INJECTION, SOLUTION INTRAVENOUS ONCE
Status: COMPLETED | OUTPATIENT
Start: 2024-03-17 | End: 2024-03-17

## 2024-03-17 RX ORDER — ACETAMINOPHEN 325 MG/1
650 TABLET ORAL EVERY 4 HOURS PRN
Status: DISCONTINUED | OUTPATIENT
Start: 2024-03-17 | End: 2024-03-22 | Stop reason: HOSPADM

## 2024-03-17 RX ORDER — LEVOTHYROXINE SODIUM 75 UG/1
75 TABLET ORAL DAILY
Status: DISCONTINUED | OUTPATIENT
Start: 2024-03-17 | End: 2024-03-17

## 2024-03-17 RX ORDER — METOPROLOL TARTRATE 25 MG/1
25 TABLET, FILM COATED ORAL 2 TIMES DAILY
Status: DISCONTINUED | OUTPATIENT
Start: 2024-03-17 | End: 2024-03-17

## 2024-03-17 RX ORDER — LEVOTHYROXINE SODIUM 50 UG/1
50 TABLET ORAL DAILY
Status: DISCONTINUED | OUTPATIENT
Start: 2024-03-18 | End: 2024-03-22 | Stop reason: HOSPADM

## 2024-03-17 RX ORDER — ONDANSETRON HYDROCHLORIDE 2 MG/ML
4 INJECTION, SOLUTION INTRAVENOUS EVERY 8 HOURS PRN
Status: DISCONTINUED | OUTPATIENT
Start: 2024-03-17 | End: 2024-03-22 | Stop reason: HOSPADM

## 2024-03-17 RX ORDER — FUROSEMIDE 10 MG/ML
40 INJECTION INTRAMUSCULAR; INTRAVENOUS DAILY
Status: DISCONTINUED | OUTPATIENT
Start: 2024-03-17 | End: 2024-03-17

## 2024-03-17 RX ORDER — ONDANSETRON 4 MG/1
4 TABLET, FILM COATED ORAL EVERY 8 HOURS PRN
Status: DISCONTINUED | OUTPATIENT
Start: 2024-03-17 | End: 2024-03-22 | Stop reason: HOSPADM

## 2024-03-17 RX ORDER — DIGOXIN 0.25 MG/ML
125 INJECTION INTRAMUSCULAR; INTRAVENOUS DAILY
Status: DISCONTINUED | OUTPATIENT
Start: 2024-03-18 | End: 2024-03-18

## 2024-03-17 RX ORDER — BUPROPION HYDROCHLORIDE 150 MG/1
150 TABLET ORAL EVERY MORNING
Status: DISCONTINUED | OUTPATIENT
Start: 2024-03-17 | End: 2024-03-22 | Stop reason: HOSPADM

## 2024-03-17 RX ORDER — DILTIAZEM HYDROCHLORIDE 30 MG/1
30 TABLET, FILM COATED ORAL EVERY 6 HOURS
Status: DISCONTINUED | OUTPATIENT
Start: 2024-03-17 | End: 2024-03-18

## 2024-03-17 RX ORDER — FUROSEMIDE 10 MG/ML
40 INJECTION INTRAMUSCULAR; INTRAVENOUS
Status: DISCONTINUED | OUTPATIENT
Start: 2024-03-18 | End: 2024-03-18

## 2024-03-17 RX ORDER — DIGOXIN 0.25 MG/ML
500 INJECTION INTRAMUSCULAR; INTRAVENOUS ONCE
Status: COMPLETED | OUTPATIENT
Start: 2024-03-17 | End: 2024-03-17

## 2024-03-17 RX ORDER — METOPROLOL TARTRATE 50 MG/1
50 TABLET ORAL 2 TIMES DAILY
Status: DISCONTINUED | OUTPATIENT
Start: 2024-03-17 | End: 2024-03-18

## 2024-03-17 RX ORDER — FUROSEMIDE 10 MG/ML
20 INJECTION INTRAMUSCULAR; INTRAVENOUS ONCE
Status: COMPLETED | OUTPATIENT
Start: 2024-03-17 | End: 2024-03-17

## 2024-03-17 RX ORDER — ATORVASTATIN CALCIUM 40 MG/1
40 TABLET, FILM COATED ORAL NIGHTLY
Status: DISCONTINUED | OUTPATIENT
Start: 2024-03-17 | End: 2024-03-22 | Stop reason: HOSPADM

## 2024-03-17 RX ORDER — BISACODYL 10 MG/1
10 SUPPOSITORY RECTAL ONCE
Status: COMPLETED | OUTPATIENT
Start: 2024-03-17 | End: 2024-03-17

## 2024-03-17 RX ORDER — DIGOXIN 0.25 MG/ML
250 INJECTION INTRAMUSCULAR; INTRAVENOUS ONCE
Status: COMPLETED | OUTPATIENT
Start: 2024-03-17 | End: 2024-03-17

## 2024-03-17 RX ORDER — POLYETHYLENE GLYCOL 3350 17 G/17G
17 POWDER, FOR SOLUTION ORAL DAILY
Status: DISCONTINUED | OUTPATIENT
Start: 2024-03-17 | End: 2024-03-17

## 2024-03-17 RX ORDER — ACETAMINOPHEN 160 MG/5ML
650 SOLUTION ORAL EVERY 4 HOURS PRN
Status: DISCONTINUED | OUTPATIENT
Start: 2024-03-17 | End: 2024-03-22 | Stop reason: HOSPADM

## 2024-03-17 RX ADMIN — CYANOCOBALAMIN TAB 500 MCG 500 MCG: 500 TAB at 13:13

## 2024-03-17 RX ADMIN — DIGOXIN 250 MCG: 0.25 INJECTION INTRAMUSCULAR; INTRAVENOUS at 20:00

## 2024-03-17 RX ADMIN — BUPROPION HYDROCHLORIDE 150 MG: 150 TABLET, EXTENDED RELEASE ORAL at 13:13

## 2024-03-17 RX ADMIN — METOPROLOL TARTRATE 25 MG: 25 TABLET, FILM COATED ORAL at 13:13

## 2024-03-17 RX ADMIN — VANCOMYCIN HYDROCHLORIDE 1000 MG: 1 INJECTION, SOLUTION INTRAVENOUS at 05:58

## 2024-03-17 RX ADMIN — FUROSEMIDE 40 MG: 10 INJECTION, SOLUTION INTRAVENOUS at 14:05

## 2024-03-17 RX ADMIN — DIGOXIN 500 MCG: 0.25 INJECTION INTRAMUSCULAR; INTRAVENOUS at 15:12

## 2024-03-17 RX ADMIN — PIPERACILLIN SODIUM AND TAZOBACTAM SODIUM 3.38 G: 3; .375 INJECTION, SOLUTION INTRAVENOUS at 19:09

## 2024-03-17 RX ADMIN — IOHEXOL 75 ML: 350 INJECTION, SOLUTION INTRAVENOUS at 04:23

## 2024-03-17 RX ADMIN — PIPERACILLIN SODIUM AND TAZOBACTAM SODIUM 3.38 G: 3; .375 INJECTION, SOLUTION INTRAVENOUS at 14:05

## 2024-03-17 RX ADMIN — POLYETHYLENE GLYCOL (3350) 17 G: 17 POWDER, FOR SOLUTION ORAL at 09:00

## 2024-03-17 RX ADMIN — LEVOTHYROXINE SODIUM 75 MCG: 75 TABLET ORAL at 13:13

## 2024-03-17 RX ADMIN — BISACODYL 10 MG: 10 SUPPOSITORY RECTAL at 13:45

## 2024-03-17 RX ADMIN — APIXABAN 5 MG: 5 TABLET, FILM COATED ORAL at 20:00

## 2024-03-17 RX ADMIN — APIXABAN 5 MG: 5 TABLET, FILM COATED ORAL at 05:58

## 2024-03-17 RX ADMIN — POLYETHYLENE GLYCOL 3350 17 G: 17 POWDER, FOR SOLUTION ORAL at 20:00

## 2024-03-17 RX ADMIN — METOPROLOL TARTRATE 50 MG: 50 TABLET, FILM COATED ORAL at 20:00

## 2024-03-17 RX ADMIN — DIPHENHYDRAMINE HYDROCHLORIDE AND LIDOCAINE HYDROCHLORIDE AND ALUMINUM HYDROXIDE AND MAGNESIUM HYDRO 10 ML: KIT at 06:09

## 2024-03-17 RX ADMIN — DILTIAZEM HYDROCHLORIDE 30 MG: 30 TABLET, FILM COATED ORAL at 20:00

## 2024-03-17 RX ADMIN — FUROSEMIDE 20 MG: 10 INJECTION, SOLUTION INTRAVENOUS at 05:58

## 2024-03-17 RX ADMIN — DILTIAZEM HYDROCHLORIDE 30 MG: 30 TABLET, FILM COATED ORAL at 14:05

## 2024-03-17 RX ADMIN — ATORVASTATIN CALCIUM 40 MG: 40 TABLET, FILM COATED ORAL at 20:00

## 2024-03-17 SDOH — SOCIAL STABILITY: SOCIAL INSECURITY: HAS ANYONE EVER THREATENED TO HURT YOUR FAMILY OR YOUR PETS?: NO

## 2024-03-17 SDOH — SOCIAL STABILITY: SOCIAL INSECURITY: WERE YOU ABLE TO COMPLETE ALL THE BEHAVIORAL HEALTH SCREENINGS?: YES

## 2024-03-17 SDOH — SOCIAL STABILITY: SOCIAL INSECURITY: DO YOU FEEL UNSAFE GOING BACK TO THE PLACE WHERE YOU ARE LIVING?: NO

## 2024-03-17 SDOH — SOCIAL STABILITY: SOCIAL INSECURITY: DOES ANYONE TRY TO KEEP YOU FROM HAVING/CONTACTING OTHER FRIENDS OR DOING THINGS OUTSIDE YOUR HOME?: NO

## 2024-03-17 SDOH — SOCIAL STABILITY: SOCIAL INSECURITY: HAVE YOU HAD THOUGHTS OF HARMING ANYONE ELSE?: NO

## 2024-03-17 SDOH — SOCIAL STABILITY: SOCIAL INSECURITY: ARE YOU OR HAVE YOU BEEN THREATENED OR ABUSED PHYSICALLY, EMOTIONALLY, OR SEXUALLY BY ANYONE?: NO

## 2024-03-17 SDOH — SOCIAL STABILITY: SOCIAL INSECURITY: ABUSE: ADULT

## 2024-03-17 SDOH — SOCIAL STABILITY: SOCIAL INSECURITY: DO YOU FEEL ANYONE HAS EXPLOITED OR TAKEN ADVANTAGE OF YOU FINANCIALLY OR OF YOUR PERSONAL PROPERTY?: NO

## 2024-03-17 SDOH — SOCIAL STABILITY: SOCIAL INSECURITY: ARE THERE ANY APPARENT SIGNS OF INJURIES/BEHAVIORS THAT COULD BE RELATED TO ABUSE/NEGLECT?: NO

## 2024-03-17 ASSESSMENT — LIFESTYLE VARIABLES
HOW OFTEN DO YOU HAVE 6 OR MORE DRINKS ON ONE OCCASION: NEVER
SKIP TO QUESTIONS 9-10: 1
AUDIT-C TOTAL SCORE: 0
AUDIT-C TOTAL SCORE: 0
HOW OFTEN DO YOU HAVE A DRINK CONTAINING ALCOHOL: NEVER
HOW MANY STANDARD DRINKS CONTAINING ALCOHOL DO YOU HAVE ON A TYPICAL DAY: PATIENT DOES NOT DRINK

## 2024-03-17 ASSESSMENT — COGNITIVE AND FUNCTIONAL STATUS - GENERAL
HELP NEEDED FOR BATHING: A LITTLE
DAILY ACTIVITIY SCORE: 18
MOVING FROM LYING ON BACK TO SITTING ON SIDE OF FLAT BED WITH BEDRAILS: A LITTLE
STANDING UP FROM CHAIR USING ARMS: A LITTLE
DAILY ACTIVITIY SCORE: 24
EATING MEALS: A LITTLE
WALKING IN HOSPITAL ROOM: A LITTLE
MOBILITY SCORE: 24
DRESSING REGULAR UPPER BODY CLOTHING: A LITTLE
MOVING TO AND FROM BED TO CHAIR: A LITTLE
TOILETING: A LITTLE
MOBILITY SCORE: 18
DRESSING REGULAR LOWER BODY CLOTHING: A LITTLE
CLIMB 3 TO 5 STEPS WITH RAILING: A LITTLE
TURNING FROM BACK TO SIDE WHILE IN FLAT BAD: A LITTLE
PATIENT BASELINE BEDBOUND: NO
PERSONAL GROOMING: A LITTLE

## 2024-03-17 ASSESSMENT — ACTIVITIES OF DAILY LIVING (ADL)
JUDGMENT_ADEQUATE_SAFELY_COMPLETE_DAILY_ACTIVITIES: YES
FEEDING YOURSELF: INDEPENDENT
WALKS IN HOME: INDEPENDENT
HEARING - RIGHT EAR: FUNCTIONAL
GROOMING: INDEPENDENT
LACK_OF_TRANSPORTATION: NO
DRESSING YOURSELF: INDEPENDENT
HEARING - LEFT EAR: FUNCTIONAL
PATIENT'S MEMORY ADEQUATE TO SAFELY COMPLETE DAILY ACTIVITIES?: YES
TOILETING: INDEPENDENT
ADEQUATE_TO_COMPLETE_ADL: YES
BATHING: INDEPENDENT

## 2024-03-17 ASSESSMENT — PAIN DESCRIPTION - ORIENTATION
ORIENTATION: MID
ORIENTATION: MID

## 2024-03-17 ASSESSMENT — PAIN - FUNCTIONAL ASSESSMENT
PAIN_FUNCTIONAL_ASSESSMENT: 0-10

## 2024-03-17 ASSESSMENT — PAIN SCALES - GENERAL
PAINLEVEL_OUTOF10: 0 - NO PAIN
PAINLEVEL_OUTOF10: 7
PAINLEVEL_OUTOF10: 7
PAINLEVEL_OUTOF10: 0 - NO PAIN
PAINLEVEL_OUTOF10: 0 - NO PAIN

## 2024-03-17 ASSESSMENT — PAIN DESCRIPTION - DESCRIPTORS
DESCRIPTORS: ACHING

## 2024-03-17 ASSESSMENT — PAIN DESCRIPTION - PAIN TYPE
TYPE: ACUTE PAIN
TYPE: ACUTE PAIN

## 2024-03-17 ASSESSMENT — PAIN DESCRIPTION - FREQUENCY: FREQUENCY: CONSTANT/CONTINUOUS

## 2024-03-17 ASSESSMENT — PATIENT HEALTH QUESTIONNAIRE - PHQ9
SUM OF ALL RESPONSES TO PHQ9 QUESTIONS 1 & 2: 0
1. LITTLE INTEREST OR PLEASURE IN DOING THINGS: NOT AT ALL
2. FEELING DOWN, DEPRESSED OR HOPELESS: NOT AT ALL

## 2024-03-17 ASSESSMENT — PAIN DESCRIPTION - ONSET: ONSET: AWAKENED FROM SLEEP

## 2024-03-17 ASSESSMENT — PAIN DESCRIPTION - PROGRESSION
CLINICAL_PROGRESSION: NOT CHANGED
CLINICAL_PROGRESSION: NOT CHANGED

## 2024-03-17 ASSESSMENT — PAIN DESCRIPTION - LOCATION
LOCATION: CHEST
LOCATION: CHEST

## 2024-03-17 NOTE — ED PROVIDER NOTES
HPI   Chief Complaint   Patient presents with    Chest Pain     Patient presents to ED via EMS from home for CP 7/10 that started yesterday. Patient has associated nausea and vomiting with SOB. Slight headache on left side. She started taking a new med, Eliquis and metoprolol, and has felt bad ever since.       HPI  Patient is a 78-year-old female with a past medical history significant for recently diagnosed PE, atrial flutter with RVR currently on Eliquis who presented to the emergency room for nausea without vomiting, gagging, epigastric discomfort, chest heaviness with shortness of breath.  She has chills and feels warm.  It has been ongoing since she was hospitalized a couple of weeks ago.  She states that she was recently admitted for tachycardia and was told that she had a blood clot and started on the Eliquis which she has been taking except for tonight dose.  She has no new leg pain or swelling.  She has no pleuritic chest discomfort.  Per report she was hypoxic to 89% and she states that she quit smoking 2 weeks ago and has not needed oxygen at home since prior hospitalization.  She states that today she was more short of breath with laying down but again only feels some chest heaviness without pleurisy.    PMHx: As above  PSHx: Denies  FamilyHx: Denies pertinent   SocialHx:  Quit smoking 2 weeks ago  Allergies: Gabapentin  Medications: See Medication Reconciliation     ROS  As above but otherwise denies    Physical Exam    GENERAL: Awake and Alert, No Acute Distress  HEENT: AT/NC, PERRL, EOMI, Normal Oropharynx, No Signs of Dehydration  NECK: Normal Inspection, No JVD  CARDIOVASCULAR: RRR, No M/R/G  RESPIRATORY: mild basilar crackles. No Wheezes, Rales or Rhonchi, Chest Wall Non-tender  ABDOMEN: Tender over the epigastric region but otherwise soft, non-tender abdomen, Normal Bowel Sounds, No Distention  BACK: No CVA Tenderness  SKIN: Normal Color, Warm, Dry, No Rashes   EXTREMITIES: Non-Tender, Full ROM,  No Pedal Edema  NEURO: A&O x 3, Normal Motor and Sensation, Normal Mood and Affect    Nursing Assessment and Vitals Reviewed    EKG on arrival showed a sinus tachycardia at 125 bpm.  There are T wave inversions in lead V3 through V6.  V3 is new compared to prior.  No ischemic ST changes.  No axis deviation.    Medical Decision  Patient is a 78-year-old female with a past medical history significant for recently diagnosed PE, atrial flutter with RVR currently on Eliquis who presented to the emergency room for nausea without vomiting, gagging, epigastric discomfort, chest heaviness with shortness of breath.  She has chills and feels warm.  It has been ongoing since she was hospitalized a couple of weeks ago.  She states that she was recently admitted for tachycardia and was told that she had a blood clot and started on the Eliquis which she has been taking except for tonight dose.  She has no new leg pain or swelling.  She has no pleuritic chest discomfort.  Per report she was hypoxic to 89% and she states that she quit smoking 2 weeks ago and has not needed oxygen at home since prior hospitalization.  She states that today she was more short of breath with laying down but again only feels some chest heaviness without pleurisy.    On evaluation patient have mild bibasilar crackles..  No audible murmurs and abdomen is tender in the epigastric region.    Review of records show patient was discharged from our facility on March 5.  At that time she had a right lower lobe segmental PE without cor pulmonale and atrial flutter with RVR.  She had noted acute systolic heart failure with an EF of 40%.  There is severe coronary artery calcifications on CT.    Workup for patient today included labs that revealed an elevated BUN and creatinine.  BNP is 3535.  Initial troponin is 45 and a delta is ordered and currently pending.  She has hyperglycemia without signs of acidosis.  She has no leukocytosis and anemia is improved from  prior.  She is negative for influenza and COVID.  Chest x-ray showed focal wedge-shaped consolidation in the peripheral lung with a small new right pleural effusion.  Patient is started on antibiotics due to concern for pneumonia and Lasix due to concern for fluid overload.  CT PE showed findings concerning for hepatic congestion or hepatitis, hepatic lesions of unknown significance at this time requiring further evaluation.  There are findings concerning for gastritis.  Other chronic findings per report she has a small right-sided pleural effusion and cardiomegaly with right atrial enlargement.  There are degenerative changes to the right hip.    Patient is started on BMX, antibiotics, Lasix and admitted to Dr. Grant for further workup and management. I discussed rectal disimpaction with patient given CT findings, however, patient is declining at this time and is opting for MiraLAX first to see if it helps her along. It is ordered at this time.                                [place] Coma Scale Score: 15                     Patient History   Past Medical History:   Diagnosis Date    Major depressive disorder, single episode, unspecified     Major depression, single episode    Personal history of other diseases of the circulatory system     History of hypertension     Past Surgical History:   Procedure Laterality Date    CT GUIDED PERCUTANEOUS BIOPSY BONE DEEP  4/18/2023    CT GUIDED PERCUTANEOUS BIOPSY BONE DEEP [facility] CT    MR HEAD ANGIO WO IV CONTRAST  9/24/2018    MR HEAD ANGIO WO IV CONTRAST 9/24/2018 [facility]GAC[facility]    MR NECK ANGIO WO IV CONTRAST  9/24/2018    MR NECK ANGIO WO IV CONTRAST 9/24/2018 [facility] CLINICAL LEGAC[facility]    OTHER SURGICAL HISTORY  12/16/2013    Breast Biopsy During Breast Surgery    TONSILLECTOMY  12/16/2013    Tonsillectomy    TUBAL LIGATION  12/16/2013    Tubal Ligation     Family History   Problem Relation Name Age of Onset    Other (cardiac disorder) Mother      Other (CVA) Mother       Heart failure Mother      Heart attack Mother      Hypertension Mother      Prostate cancer Father      Diabetes Sister      Hypertension Sister      Diabetes Brother      Other (cardiac disorder) Brother      Heart attack Brother      Hypertension Brother      Heart failure Other PATERNAL HALF SISTER      Social History     Tobacco Use    Smoking status: Every Day     Types: Cigarettes    Smokeless tobacco: Never    Tobacco comments:     Working on quitting   Vaping Use    Vaping Use: Never used   Substance Use Topics    Alcohol use: Never    Drug use: Never       Physical Exam   ED Triage Vitals [03/17/24 0218]   Temperature Heart Rate Respirations BP   36.3 °C (97.4 °F) (!) 125 16 (!) 137/105      Pulse Ox Temp Source Heart Rate Source Patient Position   (!) 92 % Oral Monitor Sitting      BP Location FiO2 (%)     Right arm --       Physical Exam    ED Course & MDM   Diagnoses as of 03/17/24 0557   Pneumonia due to infectious organism, unspecified laterality, unspecified part of lung   SOB (shortness of breath)   Gastritis without bleeding, unspecified chronicity, unspecified gastritis type   Liver lesion       Medical Decision Making      Procedure  Procedures     Deidre Hassan MD  03/17/24 0555       Deidre Hassan MD  03/17/24 0557       Deidre Hassan MD  03/17/24 0609       Deidre Hassan MD  03/17/24 0609

## 2024-03-17 NOTE — CONSULTS
Inpatient consult to Cardiology  Consult performed by: Delon DENNISON MD  Consult ordered by: Wallace Finn MD  Reason for consult: HF and atrial fibrillation        History Of Present Illness:    Sophie Mendosa is a 78 y.o. female with history of acute on chronic systolic heart failure, probably precipitated by AF with RVR, recently diagnosed LV thrombus, noted in MRI, recent right lower lobe segmental PE, without cor pulmonale, hypertension, CKD, severe coronary artery calcification, hypothyroidism presenting with  progressively worsening shortness of breath, PND, orthopnea.      A full hospital course review was completed. She felt fullness in the epigastric region.  She also felt nauseous.  No documented fever but felt warm and sweaty when she was short of breath.  She felt like wearing a tight dress.  She denies having chest pain.     In the ER, she was noted to have atrial fibrillation with tachycardia.  Imaging was suggestive of possible pulmonary infection versus congestion and hepatic congestion with possible liver lesions.  There was gastric wall thickening also.  Patient was given a dose of vancomycin.      Last Recorded Vitals:  Vitals:    03/17/24 0218 03/17/24 0400 03/17/24 0924 03/17/24 1143   BP: (!) 137/105 (!) 142/112 (!) 141/102 (!) 135/98   BP Location: Right arm      Patient Position: Sitting      Pulse: (!) 125 (!) 125 (!) 128 (!) 129   Resp: 16 (!) 29 18 17   Temp: 36.3 °C (97.4 °F)  36.6 °C (97.9 °F) 36.6 °C (97.8 °F)   TempSrc: Oral   Temporal   SpO2: (!) 92% 99% 92% 92%   Weight: 45.8 kg (101 lb)      Height: 1.524 m (5')          Last Labs:  CBC - 3/17/2024:  2:24 AM  9.2 11.6 298    35.7      CMP - 3/17/2024:  2:24 AM  9.7 6.9 31 --- 0.9   _ 4.0 37 93      PTT - 2/29/2024:  7:26 PM  1.1   12.1 30     Troponin I, High Sensitivity   Date/Time Value Ref Range Status   03/17/2024 06:01 AM 53 (HH) 0 - 13 ng/L Final   03/17/2024 02:24 AM 45 (H) 0 - 13 ng/L Final   02/29/2024 06:25 PM  12 0 - 13 ng/L Final     BNP   Date/Time Value Ref Range Status   03/17/2024 02:24 AM 3,535 (H) 0 - 99 pg/mL Final   02/29/2024 04:44 PM 83 0 - 99 pg/mL Final     Hemoglobin A1C   Date/Time Value Ref Range Status   02/29/2024 03:26 PM 6.1 (H) see below % Final   12/16/2021 04:00 PM 6.4 (A) % Final     Comment:          Diagnosis of Diabetes-Adults   Non-Diabetic: < or = 5.6%   Increased risk for developing diabetes: 5.7-6.4%   Diagnostic of diabetes: > or = 6.5%  .       Monitoring of Diabetes                Age (y)     Therapeutic Goal (%)   Adults:          >18           <7.0   Pediatrics:    13-18           <7.5                   7-12           <8.0                   0- 6            7.5-8.5   American Diabetes Association. Diabetes Care 33(S1), Jan 2010.     09/24/2018 12:00 AM 5.9 % Final     Comment:          Diagnosis of Diabetes-Adults   Non-Diabetic: < or = 5.6%   Increased risk for developing diabetes: 5.7-6.4%   Diagnostic of diabetes: > or = 6.5%  .       Monitoring of Diabetes                Age (y)     Therapeutic Goal (%)   Adults:          >18           <7.0   Pediatrics:    13-18           <7.5                   7-12           <8.0                   0- 6            7.5-8.5   American Diabetes Association. Diabetes Care 33(S1), Jan 2010.       LDL Calculated   Date/Time Value Ref Range Status   02/29/2024 03:26 PM 97 <=99 mg/dL Final     Comment:                                 Near   Borderline      AGE      Desirable  Optimal    High     High     Very High     0-19 Y     0 - 109     ---    110-129   >/= 130     ----    20-24 Y     0 - 119     ---    120-159   >/= 160     ----      >24 Y     0 -  99   100-129  130-159   160-189     >/=190       VLDL   Date/Time Value Ref Range Status   02/29/2024 03:26 PM 19 0 - 40 mg/dL Final   02/21/2023 12:00 AM 23 0 - 40 mg/dL Final   10/28/2020 03:02 PM 13 0 - 40 mg/dL Final   05/12/2020 01:29 PM 26 0 - 40 mg/dL Final      Last I/O:  No intake/output data  recorded.    Past Cardiology Tests (Last 3 Years):  EKG:  ECG 12 lead 02/29/2024      ECG 12 lead 02/29/2024    Echo:  Transthoracic Echo (TTE) Complete 03/01/2024    Ejection Fractions:  EF   Date/Time Value Ref Range Status   03/01/2024 09:20 AM 50 %      Cath:  No results found for this or any previous visit from the past 1095 days.    Stress Test:  No results found for this or any previous visit from the past 1095 days.    Cardiac Imaging:  MR cardiac angio chest w and wo IV contrast for morph FUNCT valve DZ and GREAT vessels 03/04/2024      Past Medical History:  She has a past medical history of Arthritis, Diabetes mellitus (CMS/[facility]), Disease of thyroid gland, Hypertension, Major depressive disorder, single episode, unspecified, Personal history of other diseases of the circulatory system, and Stroke (CMS/[facility]).    Past Surgical History:  She has a past surgical history that includes Tonsillectomy (12/16/2013); Tubal ligation (12/16/2013); Other surgical history (12/16/2013); MR angio head wo IV contrast (9/24/2018); MR angio neck wo IV contrast (9/24/2018); and CT guided percutaneous biopsy bone deep (4/18/2023).      Social History:  She reports that she has been smoking cigarettes. She has never used smokeless tobacco. She reports that she does not drink alcohol and does not use drugs.    Family History:  Family History   Problem Relation Name Age of Onset    Other (cardiac disorder) Mother      Other (CVA) Mother      Heart failure Mother      Heart attack Mother      Hypertension Mother      Prostate cancer Father      Diabetes Sister      Hypertension Sister      Diabetes Brother      Other (cardiac disorder) Brother      Heart attack Brother      Hypertension Brother      Heart failure Other PATERNAL HALF SISTER         Allergies:  Gabapentin and Garlic    Inpatient Medications:  Scheduled medications   Medication Dose Route Frequency    apixaban  5 mg oral BID    atorvastatin  40 mg oral Nightly     bisacodyl  10 mg rectal Once    buPROPion XL  150 mg oral q AM    cyanocobalamin  500 mcg oral Daily    dilTIAZem  30 mg oral q6h    furosemide  40 mg intravenous Daily    [START ON 3/18/2024] levothyroxine  50 mcg oral Daily    metoprolol tartrate  25 mg oral BID    piperacillin-tazobactam  3.375 g intravenous q6h    polyethylene glycol  17 g oral BID     PRN medications   Medication    acetaminophen    Or    acetaminophen    Or    acetaminophen    ondansetron    Or    ondansetron     Continuous Medications   Medication Dose Last Rate     Outpatient Medications:  Current Outpatient Medications   Medication Instructions    apixaban (Eliquis) 5 mg (74 tabs) tablet Take 2 tablets (10 mg) by mouth 2 times a day for 7 days, then take 1 tablet (5 mg) by mouth 2 times a day.    atorvastatin (LIPITOR) 40 mg, oral, Daily    buPROPion XL (WELLBUTRIN XL) 150 mg, oral, Every morning, Do not crush, chew, or split.    cyanocobalamin (VITAMIN B-12) 500 mcg, oral, Daily    levothyroxine (SYNTHROID, LEVOXYL) 75 mcg, oral, Daily    metoprolol tartrate (LOPRESSOR) 25 mg, oral, 2 times daily       Physical Exam:  General:  Patient is awake, alert, and oriented.  Patient is in no acute distress.  HEENT:  Pupils equal and reactive.  Normocephalic.  Moist mucosa.    Neck:  No thyromegaly.  11 cm  Jugular Venous Pressure.  Cardiovascular:  Regular rate and rhythm.  Normal S1 and S2.  Pulmonary:  Clear to auscultation bilaterally.  Abdomen:  Soft. Non-tender.   Non-distended.  Positive bowel sounds.  Lower Extremities:  2+ pedal pulses. 1+ LE edema.  Neurologic:  Cranial nerves intact.  No focal deficit.   Skin: Skin warm and dry, normal skin turgor.   Psychiatric: Normal affect.      Assessment/Plan   Sophie Mendosa is a 78 y.o. female with history of acute on chronic systolic heart failure, probably precipitated by AF with RVR, recently diagnosed LV thrombus, noted in MRI, recent right lower lobe segmental PE, without cor pulmonale,  hypertension, CKD, severe coronary artery calcification, hypothyroidism presenting with  progressively worsening shortness of breath, PND, orthopnea.  BNP 2 weeks ago was 83 but is now up to 3535 consistent with acute on chronic systolic heart failure decompensation in setting of A-fib with RVR.  Recent hospitalization was reviewed as well as recent cardiac MRI.  No clear ACS or active ischemia.  High-sensitivity troponin was elevated to 45 and 53 most consistent with type II ischemia in the setting of heart failure and A-fib with RVR.    3/4/2024 cardiac MRI  1. Normal LV size (EDVi 68 ml/m2) with mildly reduced systolic  function (LVEF 48%).  2. Severe hypokinesis of the apical inferior segment and true apex.  3. There is evidence of a small LV thrombus measuring 1.1 x 0.58 cm  adherent to the apical inferior wall.  4. Subendocardial infarction of the apical inferior wall (50% wall  thickness) and transmural infarction of the true apex (% wall  thickness).  5. Moderate mitral regurgitation.  6. Normal RV size and systolic function (RVEF 59%).    Increase metoprolol to tartrate from 25 mg twice daily to 50 mg twice daily.  Will plan to eventually increase to 100 mg twice daily and discontinue diltiazem.    Start digoxin loading.  Plan to reduce digoxin to 0.125 mg daily.  Patient will need outpatient digoxin level within 2 to 3 weeks of discharge.    Currently on diltiazem immediate release 30 mg every 6 hours we will plan to wean this off as possible in the setting of cardiomyopathy.    I would plan to diurese with Lasix 40 mg IV twice daily for goal diuresis of 1.5-2 L per 24 hour period.  Electrolytes will need to be monitored closely and repleted.  Patient will need accurate I/O's and daily standing weights. Patient should be on a salt restricted diet. Free water should be restricted to 1500 cc per day.     Follow up with established cardiologist, Dr. CHELSEY Hackett within 3 weeks of hospital discharge.      Thank you for allowing me to participate in their care.  Please feel free to call me with any further questions or concerns.        Delon Newsome MD, FACC, [facility]IL[facility]ANG  Division of Cardiovascular Medicine  System Director, Nuclear Cardiology   Medical Director, [facility] [place], [facility]   Clinical , [employer]  [email or web address]   Office:  [phone]

## 2024-03-17 NOTE — CARE PLAN
The patient's goals for the shift include      The clinical goals for the shift include monitor safety    Over the shift, the patient did make some progress, patient remained safe this shift and is not medically ready for discharge yet.

## 2024-03-17 NOTE — H&P
HISTORY AND PHYSICAL EXAMINATION    PATIENT NAME: Sophie Mendosa    MRN: [id number]  SERVICE DATE: 3/17/2024     PRIMARY CARE PHYSICIAN: Madelaine Galvez MD          ASSESSMENT AND PLAN     Principal Problem:    Pneumonia due to infectious organism, unspecified laterality, unspecified part of lung    Acute on chronic systolic heart failure, probably precipitated by AF with RVR.  Recently diagnosed LV thrombus, noted in MRI  Recent right lower lobe segmental PE, without cor pulmonale  Hypertension  CKD 3A  Severe coronary artery calcification  Hypothyroidism, with suppressed TSH on oral supplement therapy  Constipation  ?previously known liver spot (per pt) - was under follow up with Dr Sergio Soto    PLAN:  Resume metoprolol, Eliquis. Add diltiazem for rate control. ?Digoxin.  IV Lasix  Decrease dose of levothyroxine  Cardiology consult  Zosyn IV, pending ID recommendations.  Check procalcitonin  GI consult regarding abnormal gastric wall and liver lesions  Defer DVT prophylaxis while on Eliquis.  Monitor for bleeding while on Eliquis  2 g sodium, cardiac diet with fluid restriction.      Discussed with nurses/case management team and the specialists involved in this patient's care. Reviewed the EMR and documentation from other care-givers.    SUBJECTIVE  CHIEF COMPLAINT:  SOB    HPI: This is a 78 y.o. female who came to ER overnight with progressively worsening shortness of breath, PND, orthopnea.  She felt fullness in the epigastric region.  She also felt nauseous.  No documented fever but felt warm and sweaty when she was short of breath.  She felt like wearing a tight dress though she was not.  She denies having chest pain.    In the ER, she was noted to have atrial fibrillation with tachycardia.  Imaging was suggestive of possible pulmonary infection versus congestion and hepatic congestion with possible liver lesions.  There was gastric wall thickening also.  Patient was given a dose of vancomycin.  Her MRSA  swab has turned out negative since then.  Patient is being admitted for further medical therapy.      PAST MEDICAL HISTORY:   Past Medical History:   Diagnosis Date    Arthritis     Diabetes mellitus (CMS/HCC)     Disease of thyroid gland     Hypertension     Major depressive disorder, single episode, unspecified     Major depression, single episode    Personal history of other diseases of the circulatory system     History of hypertension    Stroke (CMS/HCC)      PAST SURGICAL HISTORY:   Past Surgical History:   Procedure Laterality Date    CT GUIDED PERCUTANEOUS BIOPSY BONE DEEP  4/18/2023    CT GUIDED PERCUTANEOUS BIOPSY BONE DEEP [facility] CT    MR HEAD ANGIO WO IV CONTRAST  9/24/2018    MR HEAD ANGIO WO IV CONTRAST 9/24/2018 [facility]ICAL [facility]GAC[facility]    MR NECK ANGIO WO IV CONTRAST  9/24/2018    MR NECK ANGIO WO IV CONTRAST 9/24/2018 [facility] CLINICAL LEGACY    OTHER SURGICAL HISTORY  12/16/2013    Breast Biopsy During Breast Surgery    TONSILLECTOMY  12/16/2013    Tonsillectomy    TUBAL LIGATION  12/16/2013    Tubal Ligation     FAMILY HISTORY:   Family History   Problem Relation Name Age of Onset    Other (cardiac disorder) Mother      Other (CVA) Mother      Heart failure Mother      Heart attack Mother      Hypertension Mother      Prostate cancer Father      Diabetes Sister      Hypertension Sister      Diabetes Brother      Other (cardiac disorder) Brother      Heart attack Brother      Hypertension Brother      Heart failure Other PATERNAL HALF SISTER      SOCIAL HISTORY:   Social History     Tobacco Use    Smoking status: Every Day     Types: Cigarettes    Smokeless tobacco: Never    Tobacco comments:     Working on quitting   Vaping Use    Vaping Use: Never used   Substance Use Topics    Alcohol use: Never    Drug use: Never       MEDICATIONS: Prior to Admission Medications  Medications Prior to Admission   Medication Sig Dispense Refill Last Dose    apixaban (Eliquis) 5 mg (74 tabs) tablet Take 2 tablets (10 mg) by  "mouth 2 times a day for 7 days, then take 1 tablet (5 mg) by mouth 2 times a day. 74 tablet 0 Past Week    atorvastatin (Lipitor) 40 mg tablet Take 1 tablet (40 mg) by mouth once daily. 90 tablet 1 Past Week    buPROPion XL (Wellbutrin XL) 150 mg 24 hr tablet Take 1 tablet (150 mg) by mouth once daily in the morning. Do not crush, chew, or split. 90 tablet 1 Past Week    cyanocobalamin (Vitamin B-12) 500 mcg tablet Take 1 tablet (500 mcg) by mouth once daily.   Past Week    levothyroxine (Synthroid, Levoxyl) 75 mcg tablet Take 1 tablet (75 mcg) by mouth once daily. Do not start before March 5, 2024. 30 tablet 0 Past Week    metoprolol tartrate (Lopressor) 25 mg tablet Take 1 tablet (25 mg) by mouth 2 times a day. 60 tablet 0 Past Week      CURRENT ALLERGIES:   Allergies   Allergen Reactions    Gabapentin Other    Garlic Unknown       COMPLETE REVIEW OF SYSTEMS:      GENERAL: ?fever, appetite low with abdo fullness..  HEENT: No epistaxis, no mouth ulcers  NECK: no neck pain  RESPIRATORY: No new resp symptoms.  CARDIOVASCULAR: No cp, no leg edema, No orthopnea  GI: No NVD, no GI Bleed. Feels constipated  : No hematuria, no dysuria  MUSCULOSKELETAL: No new jt pains or swelling  SKIN: No rashes, no ulcers  PSYCH: +anxious  HEMATOLOGY/LYMPHOLOGY: +hx of VTE  ENDOCRINE: No hx of DM  NEURO: No hx of seizures, No hx of CVA      OBJECTIVE  PHYSICAL EXAM:       3/5/2024     7:45 AM 3/5/2024    11:35 AM 3/12/2024    11:01 AM 3/17/2024     2:18 AM 3/17/2024     4:00 AM 3/17/2024     9:24 AM 3/17/2024    11:43 AM   Vitals   Systolic 112 118 120 137 142 141 135   Diastolic 66 80 94 105 112 102 98   Heart Rate 58 90 75 125 125 128 129   Temp 36 °C (96.8 °F) 36.7 °C (98.1 °F)  36.3 °C (97.4 °F)  36.6 °C (97.9 °F) 36.6 °C (97.8 °F)   Resp 17 18  16 29 18 17   Height (in)   1.499 m (4' 11\") 1.524 m (5')      Weight (lb)   103.6 101      BMI   20.92 kg/m2 19.73 kg/m2      BSA (m2)   1.4 m2 1.39 m2      Visit Report   Report     "     Body mass index is 19.73 kg/m².    GENERAL: awake, alert, Ox3, cooperative resting comfortably. thin  SKIN: Skin turgor normal.   HEENT: no epistaxis, Moist mucosa.  NECK: +JVD  LUNGS: Vesicular breath sounds, with +bibasal fine crepitations.   CARDIAC: IRREGULAR. no rubs  ABDOMEN: Abdomen soft, +RUQ/epig discomfort/tenderness. BS+  EXTREMITIES: No edema, Good capillary refill.   NEURO: Insight GOOD. Motor and sensory exam wnl. No invol movements. No ataxia.  MUSCULOSKELETAL: No acute inflammation       .  Current Facility-Administered Medications:     acetaminophen (Tylenol) tablet 650 mg, 650 mg, oral, q4h PRN **OR** acetaminophen (Tylenol) oral liquid 650 mg, 650 mg, oral, q4h PRN **OR** acetaminophen (Tylenol) suppository 650 mg, 650 mg, rectal, q4h PRN, Wallace Finn MD    apixaban (Eliquis) tablet 5 mg, 5 mg, oral, BID, Wallace Finn MD    atorvastatin (Lipitor) tablet 40 mg, 40 mg, oral, Daily, Wallace Finn MD    buPROPion XL (Wellbutrin XL) 24 hr tablet 150 mg, 150 mg, oral, q AM, Wallace Finn MD    cyanocobalamin (Vitamin B-12) tablet 500 mcg, 500 mcg, oral, Daily, Wallace Finn MD    levothyroxine (Synthroid, Levoxyl) tablet 75 mcg, 75 mcg, oral, Daily, Wallace Finn MD    metoprolol tartrate (Lopressor) tablet 25 mg, 25 mg, oral, BID, Wallace Finn MD    ondansetron (Zofran) tablet 4 mg, 4 mg, oral, q8h PRN **OR** ondansetron (Zofran) injection 4 mg, 4 mg, intravenous, q8h PRN, Wallace Finn MD    polyethylene glycol (Glycolax, Miralax) packet 17 g, 17 g, oral, BID, Wallace Finn MD    DATA:   Diagnostic tests reviewed for today's visit:    Most recent labs  Results from last 7 days   Lab Units 03/17/24  0224   WBC AUTO x10*3/uL 9.2   HEMOGLOBIN g/dL 11.6*   HEMATOCRIT % 35.7*   PLATELETS AUTO x10*3/uL 298     Results from last 7 days   Lab Units 03/17/24  0224   SODIUM mmol/L 135*   POTASSIUM mmol/L 4.6   CHLORIDE mmol/L 99   CO2 mmol/L 21   BUN  "mg/dL 26*   CREATININE mg/dL 1.21*   CALCIUM mg/dL 9.7   PROTEIN TOTAL g/dL 6.9   BILIRUBIN TOTAL mg/dL 0.9   ALK PHOS U/L 93   ALT U/L 37   AST U/L 31   GLUCOSE mg/dL 168*             No results found for: \"TROPONINT\"             CT abdomen pelvis w IV contrast   Final Result   1. Heterogeneous enhancement of the liver, may be seen with hepatic   congestion or hepatitis. Please correlate with laboratory values.   2. Hepatic lesions, underlying neoplasm is not excludable.   Nonemergent contrast-enhanced MRI/MRCP can be obtained for further   evaluation.   3. Diffuse wall thickening at the stomach, may be secondary to   underdistention versus gastritis. Evaluation with upper endoscopy as   clinically warranted.   4. Stool ball distending the rectum.   5. Renal cortical scarring, may represent sequela of prior infection.   6. Enlarged fibroid uterus.   7. Small right pleural effusion. Mild bibasilar atelectasis.   8. Cardiomegaly with right atrial enlargement.   9. Severe degenerative changes at the right hip.             MACRO:   Critical Finding:  See findings. Notification was initiated on   3/17/2024 at 4:50 am by  Neena Diana.  (**-YCF-**) Instructions:        Signed by: Neena Diana 3/17/2024 4:56 AM   Dictation workstation:   [id number]      XR chest 2 views   Final Result   New focal wedge-shaped consolidation at the peripheral right lung   base adjacent to the lateral costophrenic angle with a small new   right pleural effusion. Given these features, a pneumonia and   pulmonary infarct are differential considerations depending on the   clinical context. Note, patient had recent posterior right lower lobe   pulmonary embolism on 02/29/2024. CT pulmonary embolism protocol   would better evaluate as deemed clinically warranted.             MACRO:   None.        Signed by: Logan Akhtar 3/17/2024 3:22 AM   Dictation workstation:   [id number]        EKG: tachycardia.      SIGNATURE: Wallace N " MD Aakash  DATE: March 17, 2024  TIME: 12:24 PM

## 2024-03-18 ENCOUNTER — APPOINTMENT (OUTPATIENT)
Dept: CARDIOLOGY | Facility: CLINIC | Age: 79
End: 2024-03-18
Payer: MEDICARE

## 2024-03-18 ENCOUNTER — APPOINTMENT (OUTPATIENT)
Dept: CARDIOLOGY | Facility: HOSPITAL | Age: 79
DRG: 291 | End: 2024-03-18
Payer: MEDICARE

## 2024-03-18 PROBLEM — I48.92 ATRIAL FLUTTER (MULTI): Status: ACTIVE | Noted: 2024-03-18

## 2024-03-18 LAB
ALBUMIN SERPL BCP-MCNC: 3.6 G/DL (ref 3.4–5)
ALP SERPL-CCNC: 81 U/L (ref 33–136)
ALT SERPL W P-5'-P-CCNC: 32 U/L (ref 7–45)
ANION GAP SERPL CALC-SCNC: 15 MMOL/L (ref 10–20)
AST SERPL W P-5'-P-CCNC: 30 U/L (ref 9–39)
ATRIAL RATE: 125 BPM
BILIRUB SERPL-MCNC: 0.9 MG/DL (ref 0–1.2)
BUN SERPL-MCNC: 26 MG/DL (ref 6–23)
CALCIUM SERPL-MCNC: 9.3 MG/DL (ref 8.6–10.3)
CHLORIDE SERPL-SCNC: 98 MMOL/L (ref 98–107)
CO2 SERPL-SCNC: 27 MMOL/L (ref 21–32)
CREAT SERPL-MCNC: 1.42 MG/DL (ref 0.5–1.05)
DIGOXIN SERPL-MCNC: 2.34 NG/ML (ref 0.8–?)
EGFRCR SERPLBLD CKD-EPI 2021: 38 ML/MIN/1.73M*2
ERYTHROCYTE [DISTWIDTH] IN BLOOD BY AUTOMATED COUNT: 15.4 % (ref 11.5–14.5)
GLUCOSE SERPL-MCNC: 82 MG/DL (ref 74–99)
HCT VFR BLD AUTO: 39.5 % (ref 36–46)
HGB BLD-MCNC: 13.3 G/DL (ref 12–16)
MCH RBC QN AUTO: 27.1 PG (ref 26–34)
MCHC RBC AUTO-ENTMCNC: 33.7 G/DL (ref 32–36)
MCV RBC AUTO: 81 FL (ref 80–100)
NRBC BLD-RTO: 0 /100 WBCS (ref 0–0)
P AXIS: 66 DEGREES
P OFFSET: 158 MS
P ONSET: 137 MS
PLATELET # BLD AUTO: 321 X10*3/UL (ref 150–450)
POTASSIUM SERPL-SCNC: 4 MMOL/L (ref 3.5–5.3)
PR INTERVAL: 182 MS
PROT SERPL-MCNC: 6.3 G/DL (ref 6.4–8.2)
Q ONSET: 228 MS
QRS COUNT: 21 BEATS
QRS DURATION: 70 MS
QT INTERVAL: 324 MS
QTC CALCULATION(BAZETT): 467 MS
QTC FREDERICIA: 413 MS
R AXIS: 63 DEGREES
RBC # BLD AUTO: 4.9 X10*6/UL (ref 4–5.2)
SODIUM SERPL-SCNC: 136 MMOL/L (ref 136–145)
T AXIS: 221 DEGREES
T OFFSET: 390 MS
VENTRICULAR RATE: 125 BPM
WBC # BLD AUTO: 8.4 X10*3/UL (ref 4.4–11.3)

## 2024-03-18 PROCEDURE — 36415 COLL VENOUS BLD VENIPUNCTURE: CPT | Performed by: STUDENT IN AN ORGANIZED HEALTH CARE EDUCATION/TRAINING PROGRAM

## 2024-03-18 PROCEDURE — 80053 COMPREHEN METABOLIC PANEL: CPT | Performed by: INTERNAL MEDICINE

## 2024-03-18 PROCEDURE — 93005 ELECTROCARDIOGRAM TRACING: CPT

## 2024-03-18 PROCEDURE — 2500000001 HC RX 250 WO HCPCS SELF ADMINISTERED DRUGS (ALT 637 FOR MEDICARE OP): Performed by: STUDENT IN AN ORGANIZED HEALTH CARE EDUCATION/TRAINING PROGRAM

## 2024-03-18 PROCEDURE — 2500000004 HC RX 250 GENERAL PHARMACY W/ HCPCS (ALT 636 FOR OP/ED): Performed by: STUDENT IN AN ORGANIZED HEALTH CARE EDUCATION/TRAINING PROGRAM

## 2024-03-18 PROCEDURE — 36415 COLL VENOUS BLD VENIPUNCTURE: CPT | Performed by: INTERNAL MEDICINE

## 2024-03-18 PROCEDURE — 80162 ASSAY OF DIGOXIN TOTAL: CPT | Performed by: STUDENT IN AN ORGANIZED HEALTH CARE EDUCATION/TRAINING PROGRAM

## 2024-03-18 PROCEDURE — 2500000001 HC RX 250 WO HCPCS SELF ADMINISTERED DRUGS (ALT 637 FOR MEDICARE OP): Performed by: INTERNAL MEDICINE

## 2024-03-18 PROCEDURE — 85027 COMPLETE CBC AUTOMATED: CPT | Performed by: INTERNAL MEDICINE

## 2024-03-18 PROCEDURE — 1200000002 HC GENERAL ROOM WITH TELEMETRY DAILY

## 2024-03-18 PROCEDURE — 99222 1ST HOSP IP/OBS MODERATE 55: CPT | Performed by: INTERNAL MEDICINE

## 2024-03-18 PROCEDURE — 99233 SBSQ HOSP IP/OBS HIGH 50: CPT | Performed by: STUDENT IN AN ORGANIZED HEALTH CARE EDUCATION/TRAINING PROGRAM

## 2024-03-18 PROCEDURE — 2500000004 HC RX 250 GENERAL PHARMACY W/ HCPCS (ALT 636 FOR OP/ED): Performed by: INTERNAL MEDICINE

## 2024-03-18 RX ORDER — METOPROLOL TARTRATE 50 MG/1
100 TABLET ORAL 2 TIMES DAILY
Status: DISCONTINUED | OUTPATIENT
Start: 2024-03-18 | End: 2024-03-20

## 2024-03-18 RX ADMIN — PIPERACILLIN SODIUM AND TAZOBACTAM SODIUM 3.38 G: 3; .375 INJECTION, SOLUTION INTRAVENOUS at 06:45

## 2024-03-18 RX ADMIN — POLYETHYLENE GLYCOL 3350 17 G: 17 POWDER, FOR SOLUTION ORAL at 21:03

## 2024-03-18 RX ADMIN — FUROSEMIDE 40 MG: 10 INJECTION, SOLUTION INTRAVENOUS at 08:52

## 2024-03-18 RX ADMIN — PIPERACILLIN SODIUM AND TAZOBACTAM SODIUM 3.38 G: 3; .375 INJECTION, SOLUTION INTRAVENOUS at 01:06

## 2024-03-18 RX ADMIN — METOPROLOL TARTRATE 100 MG: 50 TABLET, FILM COATED ORAL at 21:03

## 2024-03-18 RX ADMIN — APIXABAN 5 MG: 5 TABLET, FILM COATED ORAL at 21:03

## 2024-03-18 RX ADMIN — DIGOXIN 125 MCG: 0.25 INJECTION INTRAMUSCULAR; INTRAVENOUS at 08:52

## 2024-03-18 RX ADMIN — APIXABAN 5 MG: 5 TABLET, FILM COATED ORAL at 11:24

## 2024-03-18 RX ADMIN — DILTIAZEM HYDROCHLORIDE 30 MG: 30 TABLET, FILM COATED ORAL at 01:06

## 2024-03-18 RX ADMIN — ATORVASTATIN CALCIUM 40 MG: 40 TABLET, FILM COATED ORAL at 21:03

## 2024-03-18 SDOH — SOCIAL STABILITY: SOCIAL NETWORK: ARE YOU MARRIED, WIDOWED, DIVORCED, SEPARATED, NEVER MARRIED, OR LIVING WITH A PARTNER?: DIVORCED

## 2024-03-18 SDOH — HEALTH STABILITY: MENTAL HEALTH: HOW MANY STANDARD DRINKS CONTAINING ALCOHOL DO YOU HAVE ON A TYPICAL DAY?: PATIENT DOES NOT DRINK

## 2024-03-18 SDOH — ECONOMIC STABILITY: HOUSING INSECURITY
IN THE LAST 12 MONTHS, WAS THERE A TIME WHEN YOU DID NOT HAVE A STEADY PLACE TO SLEEP OR SLEPT IN A SHELTER (INCLUDING NOW)?: NO

## 2024-03-18 SDOH — ECONOMIC STABILITY: INCOME INSECURITY: IN THE PAST 12 MONTHS, HAS THE ELECTRIC, GAS, OIL, OR WATER COMPANY THREATENED TO SHUT OFF SERVICE IN YOUR HOME?: NO

## 2024-03-18 SDOH — ECONOMIC STABILITY: TRANSPORTATION INSECURITY
IN THE PAST 12 MONTHS, HAS LACK OF TRANSPORTATION KEPT YOU FROM MEETINGS, WORK, OR FROM GETTING THINGS NEEDED FOR DAILY LIVING?: NO

## 2024-03-18 SDOH — HEALTH STABILITY: MENTAL HEALTH: HOW OFTEN DO YOU HAVE A DRINK CONTAINING ALCOHOL?: NEVER

## 2024-03-18 SDOH — ECONOMIC STABILITY: TRANSPORTATION INSECURITY
IN THE PAST 12 MONTHS, HAS THE LACK OF TRANSPORTATION KEPT YOU FROM MEDICAL APPOINTMENTS OR FROM GETTING MEDICATIONS?: NO

## 2024-03-18 SDOH — ECONOMIC STABILITY: INCOME INSECURITY: IN THE LAST 12 MONTHS, WAS THERE A TIME WHEN YOU WERE NOT ABLE TO PAY THE MORTGAGE OR RENT ON TIME?: NO

## 2024-03-18 SDOH — HEALTH STABILITY: MENTAL HEALTH: HOW OFTEN DO YOU HAVE 6 OR MORE DRINKS ON ONE OCCASION?: NEVER

## 2024-03-18 SDOH — ECONOMIC STABILITY: FOOD INSECURITY: WITHIN THE PAST 12 MONTHS, THE FOOD YOU BOUGHT JUST DIDN'T LAST AND YOU DIDN'T HAVE MONEY TO GET MORE.: NEVER TRUE

## 2024-03-18 SDOH — ECONOMIC STABILITY: INCOME INSECURITY: HOW HARD IS IT FOR YOU TO PAY FOR THE VERY BASICS LIKE FOOD, HOUSING, MEDICAL CARE, AND HEATING?: SOMEWHAT HARD

## 2024-03-18 SDOH — ECONOMIC STABILITY: HOUSING INSECURITY: IN THE LAST 12 MONTHS, HOW MANY PLACES HAVE YOU LIVED?: 1

## 2024-03-18 SDOH — HEALTH STABILITY: MENTAL HEALTH
STRESS IS WHEN SOMEONE FEELS TENSE, NERVOUS, ANXIOUS, OR CAN'T SLEEP AT NIGHT BECAUSE THEIR MIND IS TROUBLED. HOW STRESSED ARE YOU?: RATHER MUCH

## 2024-03-18 SDOH — ECONOMIC STABILITY: FOOD INSECURITY: WITHIN THE PAST 12 MONTHS, YOU WORRIED THAT YOUR FOOD WOULD RUN OUT BEFORE YOU GOT MONEY TO BUY MORE.: NEVER TRUE

## 2024-03-18 ASSESSMENT — COGNITIVE AND FUNCTIONAL STATUS - GENERAL
STANDING UP FROM CHAIR USING ARMS: A LITTLE
DRESSING REGULAR LOWER BODY CLOTHING: A LITTLE
MOBILITY SCORE: 20
WALKING IN HOSPITAL ROOM: A LITTLE
DAILY ACTIVITIY SCORE: 20
PERSONAL GROOMING: A LITTLE
WALKING IN HOSPITAL ROOM: A LITTLE
MOVING TO AND FROM BED TO CHAIR: A LITTLE
DRESSING REGULAR LOWER BODY CLOTHING: A LITTLE
DAILY ACTIVITIY SCORE: 20
CLIMB 3 TO 5 STEPS WITH RAILING: A LITTLE
DRESSING REGULAR UPPER BODY CLOTHING: A LITTLE
CLIMB 3 TO 5 STEPS WITH RAILING: A LITTLE
TOILETING: A LITTLE
MOVING TO AND FROM BED TO CHAIR: A LITTLE
DRESSING REGULAR UPPER BODY CLOTHING: A LITTLE
MOBILITY SCORE: 20
STANDING UP FROM CHAIR USING ARMS: A LITTLE
PERSONAL GROOMING: A LITTLE
TOILETING: A LITTLE

## 2024-03-18 ASSESSMENT — LIFESTYLE VARIABLES
AUDIT-C TOTAL SCORE: 0
SKIP TO QUESTIONS 9-10: 1

## 2024-03-18 ASSESSMENT — PAIN SCALES - GENERAL
PAINLEVEL_OUTOF10: 0 - NO PAIN

## 2024-03-18 ASSESSMENT — PAIN - FUNCTIONAL ASSESSMENT
PAIN_FUNCTIONAL_ASSESSMENT: 0-10
PAIN_FUNCTIONAL_ASSESSMENT: 0-10

## 2024-03-18 NOTE — PROGRESS NOTES
"   03/18/24 125   Discharge Planning   Living Arrangements Alone   Support Systems Children;Family members   Assistance Needed None   Type of Residence Private residence   Number of Stairs to Enter Residence 1   Number of Stairs Within Residence 8   Do you have animals or pets at home? No   Home or Post Acute Services None   Patient expects to be discharged to: Home   Does the patient need discharge transport arranged? No   Financial Resource Strain   How hard is it for you to pay for the very basics like food, housing, medical care, and heating? Somewhat   Housing Stability   In the last 12 months, was there a time when you were not able to pay the mortgage or rent on time? N   In the last 12 months, how many places have you lived? 1   In the last 12 months, was there a time when you did not have a steady place to sleep or slept in a shelter (including now)? N   Transportation Needs   In the past 12 months, has lack of transportation kept you from medical appointments or from getting medications? no   In the past 12 months, has lack of transportation kept you from meetings, work, or from getting things needed for daily living? No     3/18/24 [id number]  Met with patient at bedside to discuss discharge planning.  Patient lives at home alone in a house.  She is independent with ADLs.  She uses a cane for ambulation.  She does not drive and uses [employer]rans[employer] for transport.  She plans on going home at discharge and does not anticipate any discharge needs.  She does have financial concerns.  She also became tearful talking about she is not able to do things that she used to enjoy doing.  Did not want to discuss any further at this time because she said \"I do not want to cry\".  Offered to contact  to visit her but she declined.  EMJIA consult placed and STEPHEN BA notified to follow up with patient.  Harriett Be RN [facility]  "

## 2024-03-18 NOTE — PROGRESS NOTES
.PROGRESS NOTE - INTERNAL MEDICINE     PATIENT NAME:  Sophie Mendosa    MRN:  [id number]  SERVICE DATE:  3/18/2024       ADMITTING PHYSICIAN:  Wallace Finn MD    ASSESSMENT AND PLAN    Principal Problem:    Pneumonia due to infectious organism, unspecified laterality, unspecified part of lung    SINTIA on CKD 3A  Acute on chronic systolic heart failure, probably precipitated by AF with RVR.  Recently diagnosed LV thrombus, noted in MRI  Recent right lower lobe segmental PE, without cor pulmonale  Hypertension  Severe coronary artery calcification  Hypothyroidism, with suppressed TSH on oral supplement therapy  Constipation  ?previously known liver spot (per pt) - was under follow up with Dr Sergio Soto     PLAN:  Resume metoprolol, Eliquis. Digoxin added. off diltiazem  Diuretics held based on labs.  Decrease dose of levothyroxine  Cardiology consult noted  Off Abx per ID recomm.  GI consult noted. OP MRI liver.   Defer DVT prophylaxis while on Eliquis.  Monitor for bleeding while on Eliquis  2 g sodium, cardiac diet with fluid restriction.  Planning RAISA/DCCV abel      DISPOSITION PLAN:  Pending cardiol intervention.    INTERVAL HISTORY OF PRESENT ILLNESS:  +palp and high HR on tele. No chest pain/sob. No n/v/d. Oral intake mod. Feeling better. No fever/chills. acute events from last 24 hrs reviewed.    Pertinent ROS:  No Bleeding / No rashes    Discussed with nursing and case management team and the specialists involved in this patient's care. Reviewed the EMR and documentation from other care-givers.      OBJECTIVE  PHYSICAL EXAM:     GENERAL: AAOx3, cooperative resting comfortably. thin  SKIN: Skin turgor normal.   HEENT: no epistaxis, Moist mucosa.  LUNGS: Vesicular breath sounds, with +bibasal fine crepitations.   CARDIAC: IRREGULAR. no rubs  ABDOMEN: Abdomen soft, +RUQ/epig discomfort/tenderness. BS+  EXTREMITIES: No edema, Good capillary refill.   NEURO: Insight GOOD. No invol movements  MUSCULOSKELETAL:  No acute inflammation          3/17/2024     3:00 PM 3/17/2024     4:11 PM 3/17/2024     4:57 PM 3/17/2024     8:16 PM 3/17/2024    11:46 PM 3/18/2024     3:58 AM 3/18/2024     7:49 AM   Vitals   Systolic  133  127 134 113 118   Diastolic  98  90 73 82 81   Heart Rate 128 127 127 127 66 129 129   Temp  36.3 °C (97.4 °F)  37.3 °C (99.1 °F) 36.8 °C (98.3 °F) 36.6 °C (97.8 °F) 36.8 °C (98.3 °F)   Resp  17  16 16 16      Body mass index is 19.73 kg/m².    Intake/Output Summary (Last 24 hours) at 3/18/2024 0827  Last data filed at 3/17/2024 2100  Gross per 24 hour   Intake 360 ml   Output --   Net 360 ml           Current Facility-Administered Medications:     acetaminophen (Tylenol) tablet 650 mg, 650 mg, oral, q4h PRN **OR** acetaminophen (Tylenol) oral liquid 650 mg, 650 mg, oral, q4h PRN **OR** acetaminophen (Tylenol) suppository 650 mg, 650 mg, rectal, q4h PRN, Wallace Finn MD    apixaban (Eliquis) tablet 5 mg, 5 mg, oral, BID, Wallace Finn MD, 5 mg at 03/17/24 2000    atorvastatin (Lipitor) tablet 40 mg, 40 mg, oral, Nightly, Wallace Finn MD, 40 mg at 03/17/24 2000    buPROPion XL (Wellbutrin XL) 24 hr tablet 150 mg, 150 mg, oral, q AM, Wallace Finn MD, 150 mg at 03/17/24 1313    cyanocobalamin (Vitamin B-12) tablet 500 mcg, 500 mcg, oral, Daily, Wallace Finn MD, 500 mcg at 03/17/24 1313    [COMPLETED] digoxin (Lanoxin) injection 500 mcg, 500 mcg, intravenous, Once, 500 mcg at 03/17/24 1512 **FOLLOWED BY** [COMPLETED] digoxin (Lanoxin) injection 250 mcg, 250 mcg, intravenous, Once, 250 mcg at 03/17/24 2000 **FOLLOWED BY** digoxin (Lanoxin) injection 125 mcg, 125 mcg, intravenous, Daily, Delon DENNISON MD    dilTIAZem (Cardizem) immediate release tablet 30 mg, 30 mg, oral, q6h, Wallace Finn MD, 30 mg at 03/18/24 0106    furosemide (Lasix) injection 40 mg, 40 mg, intravenous, BID, Delon DENNISON MD    levothyroxine (Synthroid, Levoxyl) tablet 50 mcg, 50 mcg, oral, Daily,  "Wallace Finn MD    metoprolol tartrate (Lopressor) tablet 50 mg, 50 mg, oral, BID, Deoln DENNISON MD, 50 mg at 03/17/24 2000    ondansetron (Zofran) tablet 4 mg, 4 mg, oral, q8h PRN **OR** ondansetron (Zofran) injection 4 mg, 4 mg, intravenous, q8h PRN, Wallace Finn MD    piperacillin-tazobactam-dextrose (Zosyn) IV 3.375 g, 3.375 g, intravenous, q6h, Wallace Finn MD, Stopped at 03/18/24 0715    polyethylene glycol (Glycolax, Miralax) packet 17 g, 17 g, oral, BID, Wallace Finn MD, 17 g at 03/17/24 2000    DATA:   Diagnostic tests reviewed for today's visit:    Most recent labs  Results from last 7 days   Lab Units 03/18/24  0517 03/17/24  0224   WBC AUTO x10*3/uL 8.4 9.2   HEMOGLOBIN g/dL 13.3 11.6*   HEMATOCRIT % 39.5 35.7*   PLATELETS AUTO x10*3/uL 321 298     Results from last 7 days   Lab Units 03/18/24  0517 03/17/24  0224   SODIUM mmol/L 136 135*   POTASSIUM mmol/L 4.0 4.6   CHLORIDE mmol/L 98 99   CO2 mmol/L 27 21   BUN mg/dL 26* 26*   CREATININE mg/dL 1.42* 1.21*   CALCIUM mg/dL 9.3 9.7   PROTEIN TOTAL g/dL 6.3* 6.9   BILIRUBIN TOTAL mg/dL 0.9 0.9   ALK PHOS U/L 81 93   ALT U/L 32 37   AST U/L 30 31   GLUCOSE mg/dL 82 168*             No results found for: \"TROPONINT\"         CT abdomen pelvis w IV contrast   Final Result   1. Heterogeneous enhancement of the liver, may be seen with hepatic   congestion or hepatitis. Please correlate with laboratory values.   2. Hepatic lesions, underlying neoplasm is not excludable.   Nonemergent contrast-enhanced MRI/MRCP can be obtained for further   evaluation.   3. Diffuse wall thickening at the stomach, may be secondary to   underdistention versus gastritis. Evaluation with upper endoscopy as   clinically warranted.   4. Stool ball distending the rectum.   5. Renal cortical scarring, may represent sequela of prior infection.   6. Enlarged fibroid uterus.   7. Small right pleural effusion. Mild bibasilar atelectasis.   8. Cardiomegaly with " right atrial enlargement.   9. Severe degenerative changes at the right hip.             MACRO:   Critical Finding:  See findings. Notification was initiated on   3/17/2024 at 4:50 am by  Neena Diana.  (**-YCF-**) Instructions:        Signed by: Neena Diana 3/17/2024 4:56 AM   Dictation workstation:   [id number]      XR chest 2 views   Final Result   New focal wedge-shaped consolidation at the peripheral right lung   base adjacent to the lateral costophrenic angle with a small new   right pleural effusion. Given these features, a pneumonia and   pulmonary infarct are differential considerations depending on the   clinical context. Note, patient had recent posterior right lower lobe   pulmonary embolism on 02/29/2024. CT pulmonary embolism protocol   would better evaluate as deemed clinically warranted.             MACRO:   None.        Signed by: Logan Akhtar 3/17/2024 3:22 AM   Dictation workstation:   [id number]            SIGNATURE: Wallace Finn MD PATIENT NAME: Sophie Mendosa   DATE: 3/18/2024 MRN: [id number]   TIME: 8:27 AM

## 2024-03-18 NOTE — PROGRESS NOTES
Subjective Data:  NAEO. Largely asymptomatic this AM from cardiac perspective. Does feel her heart beating fast but only if she presses her chest. Having ongoing nausea and back pain. Per pt is constipated and working on getting bowels moving again. Denies CP, SOB, and lightheadedness. ROS negative. Tele reviewed showing afib vs more likely 2:1 flutter at rate 120s-130s.      Objective Data:  Last Recorded Vitals:  Vitals:    03/17/24 2346 03/18/24 0358 03/18/24 0749 03/18/24 1139   BP: 134/73 113/82 118/81 126/88   BP Location: Right arm Right arm Right arm Right arm   Patient Position: Lying Lying Lying Sitting   Pulse: 66 (!) 129 (!) 129 (!) 128   Resp: 16 16     Temp: 36.8 °C (98.3 °F) 36.6 °C (97.8 °F) 36.8 °C (98.3 °F) 36.4 °C (97.5 °F)   TempSrc: Temporal Temporal Oral Oral   SpO2: 94% 97% 96% 98%   Weight:       Height:           Last Labs:  CBC - 3/18/2024:  5:17 AM  8.4 13.3 321    39.5      CMP - 3/18/2024:  5:17 AM  9.3 6.3 30 --- 0.9   _ 3.6 32 81      PTT - 2/29/2024:  7:26 PM  1.1   12.1 30     TROPHS   Date/Time Value Ref Range Status   03/17/2024 06:01 AM 53 0 - 13 ng/L Final   03/17/2024 02:24 AM 45 0 - 13 ng/L Final   02/29/2024 06:25 PM 12 0 - 13 ng/L Final     BNP   Date/Time Value Ref Range Status   03/17/2024 02:24 AM 3,535 0 - 99 pg/mL Final   02/29/2024 04:44 PM 83 0 - 99 pg/mL Final     HGBA1C   Date/Time Value Ref Range Status   02/29/2024 03:26 PM 6.1 see below % Final   12/16/2021 04:00 PM 6.4 % Final     Comment:          Diagnosis of Diabetes-Adults   Non-Diabetic: < or = 5.6%   Increased risk for developing diabetes: 5.7-6.4%   Diagnostic of diabetes: > or = 6.5%  .       Monitoring of Diabetes                Age (y)     Therapeutic Goal (%)   Adults:          >18           <7.0   Pediatrics:    13-18           <7.5                   7-12           <8.0                   0- 6            7.5-8.5   American Diabetes Association. Diabetes Care 33(S1), Jan 2010.     09/24/2018 12:00 AM  5.9 % Final     Comment:          Diagnosis of Diabetes-Adults   Non-Diabetic: < or = 5.6%   Increased risk for developing diabetes: 5.7-6.4%   Diagnostic of diabetes: > or = 6.5%  .       Monitoring of Diabetes                Age (y)     Therapeutic Goal (%)   Adults:          >18           <7.0   Pediatrics:    13-18           <7.5                   7-12           <8.0                   0- 6            7.5-8.5   American Diabetes Association. Diabetes Care 33(S1), Jan 2010.       LDLCALC   Date/Time Value Ref Range Status   02/29/2024 03:26 PM 97 <=99 mg/dL Final     Comment:                                 Near   Borderline      AGE      Desirable  Optimal    High     High     Very High     0-19 Y     0 - 109     ---    110-129   >/= 130     ----    20-24 Y     0 - 119     ---    120-159   >/= 160     ----      >24 Y     0 -  99   100-129  130-159   160-189     >/=190       VLDL   Date/Time Value Ref Range Status   02/29/2024 03:26 PM 19 0 - 40 mg/dL Final   02/21/2023 12:00 AM 23 0 - 40 mg/dL Final   10/28/2020 03:02 PM 13 0 - 40 mg/dL Final   05/12/2020 01:29 PM 26 0 - 40 mg/dL Final      Last I/O:  I/O last 3 completed shifts:  In: 360 (7.9 mL/kg) [P.O.:360]  Out: - (0 mL/kg)   Weight: 45.8 kg     Past Cardiology Tests (Last 3 Years):  EKG:  ECG 12 Lead 03/17/2024 (Preliminary)      ECG 12 lead 03/17/2024 (Preliminary)      ECG 12 lead 02/29/2024      ECG 12 lead 02/29/2024    Echo:  Transthoracic Echo (TTE) Complete 03/01/2024    Ejection Fractions:  EF   Date/Time Value Ref Range Status   03/01/2024 09:20 AM 50 %      Cath:  No results found for this or any previous visit from the past 1095 days.    Stress Test:  No results found for this or any previous visit from the past 1095 days.    Cardiac Imaging:  MR cardiac angio chest w and wo IV contrast for morph FUNCT valve DZ and GREAT vessels 03/04/2024      Inpatient Medications:  Scheduled medications   Medication Dose Route Frequency    apixaban  5 mg oral  BID    atorvastatin  40 mg oral Nightly    buPROPion XL  150 mg oral q AM    cyanocobalamin  500 mcg oral Daily    digoxin  125 mcg intravenous Daily    dilTIAZem  30 mg oral q6h    furosemide  40 mg intravenous BID    levothyroxine  50 mcg oral Daily    metoprolol tartrate  50 mg oral BID    polyethylene glycol  17 g oral BID     PRN medications   Medication    acetaminophen    Or    acetaminophen    Or    acetaminophen    ondansetron    Or    ondansetron     Continuous Medications   Medication Dose Last Rate       Physical Exam:  GEN: NAD, pleasant  CV: RRR, S1/S2, no mrg, JVD to midneck @ 10 deg  PULM: Lungs CTAB, no wrr, no increased wob on RA  EXT: no LE edema      Assessment/Plan   Sophie Mendosa is a 78 y.o. female with history of acute on chronic systolic heart failure, probably precipitated by AF with RVR, recently diagnosed LV thrombus, noted in MRI, recent right lower lobe segmental PE, without cor pulmonale, hypertension, CKD, severe coronary artery calcification, hypothyroidism presenting with  progressively worsening shortness of breath, PND, orthopnea.  BNP 2 weeks ago was 83 but is now up to 3535 consistent with acute on chronic systolic heart failure decompensation in setting of A-fib with RVR.  Recent hospitalization was reviewed as well as recent cardiac MRI.  No clear ACS or active ischemia.  High-sensitivity troponin was elevated to 45 and 53 most consistent with type II ischemia in the setting of heart failure and A-fib with RVR.     3/4/2024 cardiac MRI  1. Normal LV size (EDVi 68 ml/m2) with mildly reduced systolic  function (LVEF 48%).  2. Severe hypokinesis of the apical inferior segment and true apex.  3. There is evidence of a small LV thrombus measuring 1.1 x 0.58 cm  adherent to the apical inferior wall.  4. Subendocardial infarction of the apical inferior wall (50% wall  thickness) and transmural infarction of the true apex (% wall  thickness).  5. Moderate mitral  regurgitation.  6. Normal RV size and systolic function (RVEF 59%).    3/18: NAEO. Largely asymptomatic this AM from cardiac perspective. Does feel her heart beating fast but only if she presses her chest. Having ongoing nausea and back pain. Per pt is constipated and working on getting bowels moving again. Denies CP, SOB, and lightheadedness. ROS negative. Tele reviewed showing afib vs more likely 2:1 flutter at rate 120s-130s which was seen on EKG 3/17. Only input charted for past 24-48 hrs no output charted. Weighed only once at 101lbs on 3/16. Clinically appears to be approaching if not euvolemic.    Recommendations:  -Increase metop to 100 twice daily  -Stop diltiazem iso structural heart disease  -Continue digoxin  -Hold IV diuresis for now given Cr bump from 1.2 to 1.4  -NPO @ [place] for RAISA/DCCV tomorrow  -Follow up with established cardiologist, Dr. CHELSEY Hackett within 3 weeks of hospital discharge.    Code Status:  Full Code    I saw and evaluated the patient. I personally obtained the key and critical portions of the history and physical exam. I reviewed the fellow's documentation and discussed the patient with the fellow. I agree with the fellow's medical decision making as documented in the fellow's note and have edited it as necessary.  I personally reviewed the patient's recent labs, medications, orders, EKGs, and pertinent cardiac imaging/ echocardiography.     Thank you for allowing me to participate in their care.  Please feel free to call me with any further questions or concerns.        Delon Newsome MD, FACC, MATILDA DANG  Division of Cardiovascular Medicine  System Director, Nuclear Cardiology   Medical Director, [facility] [place], [facility]   Clinical , [facility] [employer]  [email or web address]   Office:  [phone]

## 2024-03-18 NOTE — CONSULTS
INFECTIOUS DISEASE INPATIENT INITIAL CONSULTATION    Referred By: Wallace Finn    Reason For Consult: abn CXR and liver lesions. ?infection vs other etiol     HPI:  This is a 78 y.o. female with PMH of DM II, HTN, chronic systolic HF, A. Fib, LV thrombus, PE, CKD, hypothyroidism who presented with shortness of breath and orthopnea.    Denies fevers but would feel warm/sweaty when out of breath, mostly with exertion. Feels fullness in epigastrium and some nausea. No chest pain.    Tmax 99.1 here. WBC is normal. BNP 3500. COVID/Flu negative. MRSA nares negative. CT A/P showing liver enhancement - congestion vs hepatitis with hepatic lesions also. Stomach wall thickening. Small right pleural effusion. Received IV Vanc/Zosyn and currently on Zosyn.    Allergies:  Gabapentin and Garlic     Vitals (Last 24 Hours):  Heart Rate:  []   Temp:  [36.3 °C (97.4 °F)-37.3 °C (99.1 °F)]   Resp:  [16-18]   BP: (113-141)/()   SpO2:  [92 %-99 %]      PHYSICAL EXAM:  Gen - NAD  Heart - tachycardic, no murmurs  Lungs - clear bilaterally, no wheezing  Abd - soft, no ttp, BS present  Ext - no LE edema  Skin - no rash    MEDS:    Current Facility-Administered Medications:     acetaminophen (Tylenol) tablet 650 mg, 650 mg, oral, q4h PRN **OR** acetaminophen (Tylenol) oral liquid 650 mg, 650 mg, oral, q4h PRN **OR** acetaminophen (Tylenol) suppository 650 mg, 650 mg, rectal, q4h PRN, Wallace Finn MD    apixaban (Eliquis) tablet 5 mg, 5 mg, oral, BID, Wallace Finn MD, 5 mg at 03/17/24 2000    atorvastatin (Lipitor) tablet 40 mg, 40 mg, oral, Nightly, Wallace Finn MD, 40 mg at 03/17/24 2000    buPROPion XL (Wellbutrin XL) 24 hr tablet 150 mg, 150 mg, oral, q AM, Wallace Finn MD, 150 mg at 03/17/24 1313    cyanocobalamin (Vitamin B-12) tablet 500 mcg, 500 mcg, oral, Daily, Wallace Finn MD, 500 mcg at 03/17/24 1313    [COMPLETED] digoxin (Lanoxin) injection 500 mcg, 500 mcg, intravenous, Once,  500 mcg at 03/17/24 1512 **FOLLOWED BY** [COMPLETED] digoxin (Lanoxin) injection 250 mcg, 250 mcg, intravenous, Once, 250 mcg at 03/17/24 2000 **FOLLOWED BY** digoxin (Lanoxin) injection 125 mcg, 125 mcg, intravenous, Daily, Delon DENNISON MD    dilTIAZem (Cardizem) immediate release tablet 30 mg, 30 mg, oral, q6h, Wallace Finn MD, 30 mg at 03/18/24 0106    furosemide (Lasix) injection 40 mg, 40 mg, intravenous, BID, Delon DENNISON MD    levothyroxine (Synthroid, Levoxyl) tablet 50 mcg, 50 mcg, oral, Daily, Wallace Finn MD    metoprolol tartrate (Lopressor) tablet 50 mg, 50 mg, oral, BID, Delon DENNISON MD, 50 mg at 03/17/24 2000    ondansetron (Zofran) tablet 4 mg, 4 mg, oral, q8h PRN **OR** ondansetron (Zofran) injection 4 mg, 4 mg, intravenous, q8h PRN, Wallace Finn MD    piperacillin-tazobactam-dextrose (Zosyn) IV 3.375 g, 3.375 g, intravenous, q6h, Wallace Finn MD, Stopped at 03/18/24 0715    polyethylene glycol (Glycolax, Miralax) packet 17 g, 17 g, oral, BID, Wallace Finn MD, 17 g at 03/17/24 2000     LABS:  Lab Results   Component Value Date    WBC 8.4 03/18/2024    HGB 13.3 03/18/2024    HCT 39.5 03/18/2024    MCV 81 03/18/2024     03/18/2024      Results from last 72 hours   Lab Units 03/18/24  0517   SODIUM mmol/L 136   POTASSIUM mmol/L 4.0   CHLORIDE mmol/L 98   CO2 mmol/L 27   BUN mg/dL 26*   CREATININE mg/dL 1.42*   GLUCOSE mg/dL 82   CALCIUM mg/dL 9.3   ANION GAP mmol/L 15   EGFR mL/min/1.73m*2 38*     Results from last 72 hours   Lab Units 03/18/24  0517   ALK PHOS U/L 81   BILIRUBIN TOTAL mg/dL 0.9   PROTEIN TOTAL g/dL 6.3*   ALT U/L 32   AST U/L 30   ALBUMIN g/dL 3.6     Estimated Creatinine Clearance: 24.2 mL/min (A) (by C-G formula based on SCr of 1.42 mg/dL (H)).      IMAGING:  CT A/P 3/17  Impression:     1. Heterogeneous enhancement of the liver, may be seen with hepatic  congestion or hepatitis. Please correlate with laboratory values.  2.  Hepatic lesions, underlying neoplasm is not excludable.  Nonemergent contrast-enhanced MRI/MRCP can be obtained for further  evaluation.  3. Diffuse wall thickening at the stomach, may be secondary to  underdistention versus gastritis. Evaluation with upper endoscopy as  clinically warranted.  4. Stool ball distending the rectum.  5. Renal cortical scarring, may represent sequela of prior infection.  6. Enlarged fibroid uterus.  7. Small right pleural effusion. Mild bibasilar atelectasis.  8. Cardiomegaly with right atrial enlargement.  9. Severe degenerative changes at the right hip.     CXR 3/17  Impression:     New focal wedge-shaped consolidation at the peripheral right lung  base adjacent to the lateral costophrenic angle with a small new  right pleural effusion. Given these features, a pneumonia and  pulmonary infarct are differential considerations depending on the  clinical context. Note, patient had recent posterior right lower lobe  pulmonary embolism on 02/29/2024. CT pulmonary embolism protocol  would better evaluate as deemed clinically warranted.       ASSESSMENT/PLAN:    Acute on Chronic Systolic HF Exacerbation  Congestive Hepatopathy  CKD  A. Fib with RVR    I don't think patient has an infection currently. Reviewed CT images personally, there is a small effusion in the RLL with some atelectasis but no consolidation. Liver lesion is not likely to be an abscess. Afebrile and no leukocytosis. Agree with diuresis and HR control for A. Fib.    Stopping Zosyn and I will continue to follow/monitor for changes. Thanks!    Miguel Carbajal MD  [facility]  Office #[phone]

## 2024-03-18 NOTE — CARE PLAN
Problem: Pain  Goal: My pain/discomfort is manageable  Outcome: Progressing     Problem: Safety  Goal: Patient will be injury free during hospitalization  Outcome: Progressing  Goal: I will remain free of falls  Outcome: Progressing     Problem: Daily Care  Goal: Daily care needs are met  Outcome: Progressing     Problem: Psychosocial Needs  Goal: Demonstrates ability to cope with hospitalization/illness  Outcome: Progressing  Goal: Collaborate with me, my family, and caregiver to identify my specific goals  Outcome: Progressing     Problem: Discharge Barriers  Goal: My discharge needs are met  Outcome: Progressing     Problem: Psychosocial Needs  Goal: Demonstrates ability to cope with hospitalization/illness  Outcome: Progressing  Goal: Collaborate with me, my family, and caregiver to identify my specific goals  Outcome: Progressing     Problem: Daily Care  Goal: Daily care needs are met  Outcome: Progressing

## 2024-03-18 NOTE — CONSULTS
"Reason For Consult  gastric wall thickening and abn Liver lesions     History Of Present Illness  Sophie Mendosa is a 78 y.o. female with h/o acute on chronic systolic heart failure, probably precipitated by AF with RVR, recently diagnosed LV thrombus, noted in MRI, recent right lower lobe segmental PE, without cor pulmonale, hypertension, CKD, severe coronary artery calcification, hypothyroidism presenting with  progressively worsening shortness of breath, PND, orthopnea.  Patient stated she was discharged last week, but still has some shortness of breath, chest tightness and upper abdominal distention, associated with palpitation, unable to sleep on the left side.  In ER she was found to have A-fib with RVR, with imaging showing possible pneumonia or congestion, possible liver lesion and hepatic congestion as well as gastric wall thickening.  Cardiology consulted, treating patient for acute on chronic heart failure, he medications have been adjusted.  GI consulted due to gastric wall thickening and possible liver lesions.  Patient reports nausea, no vomiting or regurgitation, stated she has \"difficulty swallowing,\" but food does not get stuck, no odynophagia or acid reflux.  She reports history of liver lesion, in the past she followed with Dr. Mark Soto, also had colonoscopy done by the same doctor, no reports available or found in his EMR.  She denies any significant weight loss.  She reports constipation.  Noted possible stool ball in the rectum on imaging in ER.  Received MiraLAX, suppository and enema, passed liquid and small pieces of formed stool.  Denies melena or hematochezia.     Past Medical History  She has a past medical history of Arthritis, Diabetes mellitus (CMS/HCC), Disease of thyroid gland, Hypertension, Major depressive disorder, single episode, unspecified, Personal history of other diseases of the circulatory system, and Stroke (CMS/HCC).    Surgical History  She has a past surgical " history that includes Tonsillectomy (12/16/2013); Tubal ligation (12/16/2013); Other surgical history (12/16/2013); MR angio head wo IV contrast (9/24/2018); MR angio neck wo IV contrast (9/24/2018); and CT guided percutaneous biopsy bone deep (4/18/2023).     Social History  She reports that she has been smoking cigarettes. She has never used smokeless tobacco. She reports that she does not drink alcohol and does not use drugs.    Family History  Family History   Problem Relation Name Age of Onset    Other (cardiac disorder) Mother      Other (CVA) Mother      Heart failure Mother      Heart attack Mother      Hypertension Mother      Prostate cancer Father      Diabetes Sister      Hypertension Sister      Diabetes Brother      Other (cardiac disorder) Brother      Heart attack Brother      Hypertension Brother      Heart failure Other PATERNAL HALF SISTER         Allergies  Gabapentin and Garlic    Review of Systems  10 systems reviewed and negative other than HPI     Physical Exam  Physical Exam  Vitals reviewed.   Constitutional:       Appearance: Normal appearance.   HENT:      Head: Normocephalic and atraumatic.      Nose: Nose normal.      Mouth/Throat:      Mouth: Mucous membranes are moist.      Pharynx: Oropharynx is clear.   Eyes:      Conjunctiva/sclera: Conjunctivae normal.   Cardiovascular:      Rate and Rhythm: Tachycardia present. Rhythm irregular.      Heart sounds: Normal heart sounds.   Pulmonary:      Effort: Pulmonary effort is normal.      Breath sounds: Normal breath sounds.   Abdominal:      General: Bowel sounds are normal. There is no distension.      Palpations: Abdomen is soft. There is no mass.      Tenderness: There is no abdominal tenderness. There is no guarding or rebound.      Hernia: No hernia is present.   Neurological:      General: No focal deficit present.      Mental Status: She is alert and oriented to person, place, and time.   Psychiatric:         Mood and Affect: Mood  normal.         Behavior: Behavior normal.            Last Recorded Vitals  Blood pressure 118/81, pulse (!) 129, temperature 36.8 °C (98.3 °F), temperature source Oral, resp. rate 16, height 1.524 m (5'), weight 45.8 kg (101 lb), SpO2 96 %.    Relevant Results      Scheduled medications  apixaban, 5 mg, oral, BID  atorvastatin, 40 mg, oral, Nightly  buPROPion XL, 150 mg, oral, q AM  cyanocobalamin, 500 mcg, oral, Daily  digoxin, 125 mcg, intravenous, Daily  dilTIAZem, 30 mg, oral, q6h  furosemide, 40 mg, intravenous, BID  levothyroxine, 50 mcg, oral, Daily  metoprolol tartrate, 50 mg, oral, BID  piperacillin-tazobactam, 3.375 g, intravenous, q6h  polyethylene glycol, 17 g, oral, BID      Continuous medications     PRN medications  PRN medications: acetaminophen **OR** acetaminophen **OR** acetaminophen, ondansetron **OR** ondansetron    ECG 12 lead    Result Date: 3/18/2024  Sinus tachycardia Septal infarct (cited on or before 25-SEP-2018) ST & T wave abnormality, consider inferior ischemia ST & T wave abnormality, consider anterolateral ischemia Abnormal ECG When compared with ECG of 29-FEB-2024 16:07, Sinus rhythm has replaced Atrial flutter Questionable change in initial forces of Anteroseptal leads T wave inversion now evident in Anterior leads    ECG 12 Lead    Result Date: 3/18/2024  Sinus tachycardia ST & T wave abnormality, consider inferior ischemia ST & T wave abnormality, consider anterolateral ischemia Abnormal ECG When compared with ECG of 17-MAR-2024 02:12, (unconfirmed) Criteria for Septal infarct are no longer Present    CT abdomen pelvis w IV contrast    Result Date: 3/17/2024    1. Heterogeneous enhancement of the liver, may be seen with hepatic congestion or hepatitis. Please correlate with laboratory values. 2. Hepatic lesions, underlying neoplasm is not excludable. Nonemergent contrast-enhanced MRI/MRCP can be obtained for further evaluation. 3. Diffuse wall thickening at the stomach, may be  secondary to underdistention versus gastritis. Evaluation with upper endoscopy as clinically warranted. 4. Stool ball distending the rectum. 5. Renal cortical scarring, may represent sequela of prior infection. 6. Enlarged fibroid uterus. 7. Small right pleural effusion. Mild bibasilar atelectasis. 8. Cardiomegaly with right atrial enlargement. 9. Severe degenerative changes at the right hip.     MACRO: Critical Finding:  See findings. Notification was initiated on 3/17/2024 at 4:50 am by  Neena Diana.  (**-YCF-**) Instructions:   Signed by: Neena Diana 3/17/2024 4:56 AM Dictation workstation:   [id number]    XR chest 2 views    Result Date: 3/17/2024  Interpreted By:  Logan Akhtar, STUDY: XR CHEST 2 VIEWS;  3/17/2024 2:58 am   INDICATION: Signs/Symptoms:cp sob.   COMPARISON: 02/29/2024 CXR and CT PE chest   ACCESSION NUMBER(S): [id number]   ORDERING CLINICIAN: GONZALO RAMIREZ   FINDINGS:     The heart is enlarged. The aorta is atherosclerotic. There is a new focal wedge-shaped opacity at the peripheral right lung base adjacent to the costophrenic angle. A trace right pleural effusion is suspected. No significant left pleural effusion. No pneumothorax. There is redemonstration of an azygos fissure, a normal variant. There is new linear opacity in the medial left lower lobe likely representing atelectasis.         New focal wedge-shaped consolidation at the peripheral right lung base adjacent to the lateral costophrenic angle with a small new right pleural effusion. Given these features, a pneumonia and pulmonary infarct are differential considerations depending on the clinical context. Note, patient had recent posterior right lower lobe pulmonary embolism on 02/29/2024. CT pulmonary embolism protocol would better evaluate as deemed clinically warranted.     MACRO: None.   Signed by: Logan Akhtar 3/17/2024 3:22 AM Dictation workstation:   [id number]    MR cardiac angio chest w and  wo IV contrast for morph FUNCT valve DZ and GREAT vessels    Result Date: 3/4/2024  Interpreted By:  Tenisha Mendez,  and Virginie Perla STUDY: MR CARDIAC ANGIO CHEST W AND WO IV CONTRAST FOR MORPH FUNCT VALVE DZ AND GREAT VESSELS;  3/4/2024 3:45 pm   INDICATION: Signs/Symptoms:Evaluate for LV thrombus.   COMPARISON: None.   ACCESSION NUMBER(S): [id number]   ORDERING CLINICIAN: NIKITA VELAZQUEZ   TECHNIQUE: Gonzalez 3.0 Katerine MRI scanner. Turbo spin echo and balanced steady state free precession (bSSFP) imaging for anatomic definition. Dynamic cine bSSFP for cardiac chamber and wall-motion analysis, and valvular analysis. Flow quantification sequences for hemodynamics. Delayed gadolinium enhancement analysis after injection of gadolinium-chelate (mL of Dotarem, 0.2 mmol/kg).   HT-150 cm; WT-45 kg; BSA-1.35 m2   FINDINGS: CARDIAC CHAMBERS Normal atrioventricular and ventriculoarterial concordance   LEFT ATRIUM Severely dilated (DAVE-103.9 ml/m2).   RIGHT ATRIUM Normal size (RA max 4ch-25.12 cm2)   INTERATRIAL SEPTUM Intact.   LEFT VENTRICLE: 1. Normal LV size (EDVi 68 ml/m2) with mildly reduced systolic function (LVEF 48%). 2. Severe hypokinesis of the apical inferior segment and true apex. 3. Normal LV wall thickness and normal LV indexed mass. 4. Normal native T2 values (<55ms)/ and normal T2 STIR imaging. 5. Evidence of small LV thrombus measuring 1.1 x 0.58 cm (no early or late contrast enhancement) adherent to the apical inferior wall. 6. Following administration of gadolinium, in the late phase, the subendocardial enhancement of the apical septum (50% wall thickness) and transmural enhancement of the apical inferior wall/true apex.   Quantitative left ventricular functional values are as follows: EDV = 94 cc; EDVi = 68 cc/m2 ESV = 49 cc; ESVi = 85 cc/m2 Stroke volume = 45 cc; SVi = 33 cc/m2 LVEF = 48 % Absolute Cardiac Output = 2.79 l/min.; COi = 2.04 l/min/m2 LV mass = 84 gm; LVMi = 61 gm/m2   LVEDD: 4.6 cm LVESD:  3.8 cm LV septal wall thickness (anterobasal): 0.9 cm LV postero-inferior wall thickness: 0.8 cm   RIGHT VENTRICLE The right ventricle appears normal in size(EDVi-58 ml/m2), shape, and has normal qualitative systolic function. Quantitative RVEF59 % no segmental wall motion abnormalities. No abnormal delayed enhancement in the myocardium.   INTERVENTRICULAR SEPTUM Intact.   AORTIC VALVE The aortic valve is trileaflet There is  trivial aortic regurgitation. Flow quantification through the ascending aorta: Forward volume =26 cc/beat Reverse volume = 1 cc/beat Net forward volume = 25 cc/beat Aortic regurgitant fraction = 4 %   MITRAL VALVE There is  moderate mitral regurgitation. Integrating LV volumetric and aortic flow quantification data reveals: Quantitative mitral regurgitant volume = 19 cc/beat Quantitative mitral regurgitant fraction = 42 %   TRICUSPID VALVE There is qualitative moderate tricuspid regurgitation.   PULMONARY VALVE: Not assessed.   PERICARDIUM: The pericardium is normal. There is no pericardial effussion.   THORACIC AORTA The thoracic aorta appears normal in course, caliber, and contour. There is no evidence for acute aortic pathology. The arch vessel branching pattern is  normal.   All the arch branch vessels appear widely patent in their proximal portions.   AORTIC ROOT DIMENSIONS: Aortic root(sinus of valsalva): 2.8 cm Annulus: 1.9 cm Sinotubular junction:2.4 cm   PULMONARY ARTERIES The central pulmonary arteries appear  normal (MPA-2.47 cm, RPA-2.2 cm, LPA-2.4 cm).   SYSTEMIC AND PULMONARY VEINS Normal systemic venous and pulmonary venous return. The SVC is of normal caliber. IVC appears normal Normal pulmonary venous anatomy.   CHEST The chest wall is normal. Limited imaging through the lungs reveals no gross abnormalities. No pleural effusion.   UPPER ABDOMEN Limited imaging through the upper abdomen reveals no abnormalities of the visualized organs.       1. Normal LV size (EDVi 68 ml/m2)  with mildly reduced systolic function (LVEF 48%). 2. Severe hypokinesis of the apical inferior segment and true apex. 3. There is evidence of a small LV thrombus measuring 1.1 x 0.58 cm adherent to the apical inferior wall. 4. Subendocardial infarction of the apical inferior wall (50% wall thickness) and transmural infarction of the true apex (% wall thickness). 5. Moderate mitral regurgitation. 6. Normal RV size and systolic function (RVEF 59%).       Signed by: Tenisha Mendez 3/4/2024 7:14 PM Dictation workstation:   [id number]    ECG 12 lead    Result Date: 3/3/2024  Atrial flutter  with 2:1 conduction. Rate 134    ECG 12 lead    Result Date: 3/2/2024  Atrial flutter with 2:1 AV conduction Anterior infarct (cited on or before 25-SEP-2018) ST & T wave abnormality, consider lateral ischemia Abnormal ECG When compared with ECG of 17-MAY-2022 11:34, Significant changes have occurred See ED provider note for full interpretation and clinical correlation Confirmed by Denise Anaya ([id number]) on 3/2/2024 7:59:24 PM    Lower extremity venous duplex bilateral    Result Date: 3/1/2024             [place] [address]   Tel [phone] and Fax [phone]  Vascular Lab Report [facility] LOWER EXTREMITY VENOUS DUPLEX BILATERAL  Patient Name:      AMY GANDHI MARYBEL     Reading Physician:  [id number] Marco Salcedo MD, RPVI Study Date:        3/1/2024             Ordering Physician: [id number] LEON MRN/PID:           [id number]             Technologist:       Chrissy Bullard RVT Accession#:        [id number]         Technologist 2: Date of Birth/Age: 1945 / 78 years Encounter#:         [id number] Gender:            F Admission Status:  Observation          Location Performed: [facility]  Diagnosis/ICD: Other pulmonary embolism without acute  cor pulmonale-I26.99 CPT Codes:     93781 Peripheral venous duplex scan for DVT complete  CONCLUSIONS: Right Lower Venous: No evidence of acute deep vein thrombus visualized in the right lower extremity. There are chronic changes visualized in the popliteal vein. Duplicated femoral vein. Left Lower Venous: No evidence of acute deep vein thrombus visualized in the left lower extremity. There are chronic changes visualized in the popliteal vein.  Imaging & Doppler Findings:  Right                 Compressible Thrombus        Flow Distal External Iliac     Yes        None   Spontaneous/Phasic CFV                       Yes        None   Spontaneous/Phasic PFV                       Yes        None FV Proximal               Yes        None   Spontaneous/Phasic FV Mid                    Yes        None FV Distal                 Yes        None Popliteal               Partial    Chronic  Spontaneous/Phasic Peroneal                  Yes        None PTV                       Yes        None  Left                  Compress Thrombus        Flow Distal External Iliac   Yes      None   Spontaneous/Phasic CFV                     Yes      None   Spontaneous/Phasic PFV                     Yes      None FV Proximal             Yes      None   Spontaneous/Phasic FV Mid                  Yes      None FV Distal               Yes      None Popliteal             Partial  Chronic  Spontaneous/Phasic Peroneal                Yes      None PTV                     Yes      None  [id number] Marco Salcedo MD, RPGABRIELLE Electronically signed by [id number] Marco Salcedo MD, RPGABRIELLE on 3/1/2024 at 11:02:18 AM  ** Final **     Upper extremity venous duplex bilateral    Result Date: 3/1/2024             [place] [address]   Tel [phone] and Fax [phone]  Vascular Lab Report [facility] US UPPER EXTREMITY VENOUS DUPLEX BILATERAL  Patient Name:      AMY NO     Reading Physician:  [id number] Salcedo                                                              MD, KEITH Study Date:        3/1/2024             Ordering Physician: [id number] JAKE LEON MRN/PID:           [id number]             Technologist:       Chrissy Bullard RVT Accession#:        [id number]         Technologist 2: Date of Birth/Age: 1945 / 78 years Encounter#:         [id number] Gender:            F Admission Status:  Observation          Location Performed: [facility]  Diagnosis/ICD: Other pulmonary embolism without acute cor pulmonale-I26.99 CPT Codes:     88658 Peripheral venous duplex scan for DVT complete  CONCLUSIONS: Right Upper Venous: No evidence of acute deep vein thrombus visualized in the right upper extremity. Left Upper Venous: No evidence of acute deep vein thrombus visualized in the left upper extremity.  Imaging & Doppler Findings:  Right               Compressible Thrombus        Flow Internal Jugular        Yes        None   Spontaneous/Phasic Subclavian              Yes        None Subclavian Proximal     Yes        None   Spontaneous/Phasic Subclavian Mid          Yes        None Subclavian Distal       Yes        None   Spontaneous/Phasic Axillary                Yes        None   Spontaneous/Phasic Brachial                Yes        None Cephalic                Yes        None Basilic                 Yes        None  Left                Compress Thrombus        Flow Internal Jugular      Yes      None   Spontaneous/Phasic Subclavian            Yes      None Subclavian Proximal   Yes      None   Spontaneous/Phasic Subclavian Mid        Yes      None Subclavian Distal     Yes      None   Spontaneous/Phasic Axillary              Yes      None   Spontaneous/Phasic Brachial              Yes      None Cephalic              Yes      None Basilic               Yes      None  [id number] Marco Salcedo MD, KEITH Electronically signed by [id number] Marco Salcedo MD, KEITH on  3/1/2024 at 11:01:01 AM  ** Final **     Transthoracic Echo (TTE) Complete    Result Date: 3/1/2024   [facility], [address] [place] [address]              Tel [phone] and Fax [phone] TRANSTHORACIC ECHOCARDIOGRAM REPORT  Patient Name:      AMY NO    Reading Physician:    [id number] Mohamud Jimenez DO Study Date:        3/1/2024            Ordering Provider:    [id number] CHEL BLAIR MRN/PID:           [id number]            Fellow: Accession#:        [id number]        Nurse: Date of Birth/Age: 1945 / 78      Sonographer:          Natalya levine RDCS Gender:            F                   Additional Staff: Height:            150.00 cm           Admit Date:           2/29/2024 Weight:            46.20 kg            Admission Status:     Observation -                                                              Priority discharge BSA / BMI:         1.39 m2 / 20.53     Encounter#:           [id number]                    kg/m2                                        Department Location:  [facility] Non                                                              Invasive Blood Pressure: 115 /69 mmHg Study Type:    TRANSTHORACIC ECHO (TTE) COMPLETE Diagnosis/ICD: Encounter for other specified special examinations-Z01.89 CPT Code:      Echo Complete w Full Doppler-23073 Patient History: Pertinent History: HTN. Study Detail: The following Echo studies were performed: 2D, M-Mode, Doppler,               color flow and 3D. Technically challenging study due to body               habitus and prominent lung artifact.  Critical Event Critical Event: Test was completed as per department protocol. Critical Finding: Echo dencity seen in LV. Time Test was Completed: 9:00:10 AM Notified: Jerod. Attending notification  time: 9:13:18 AM  PHYSICIAN INTERPRETATION: Left Ventricle: The left ventricular systolic function is low normal, with an estimated ejection fraction of 50-55%. Wall motion is abnormal. The left ventricular cavity size is decreased. The left ventricular septal wall thickness is mildly increased. There is mildly increased left ventricular posterior wall thickness. There is mild concentric left ventricular hypertrophy. Left ventricular diastolic filling was indeterminate. A speherical Echodensity measuring roughly 1.4 cm X 1 cm is seen attached to the inferior apical wall. There is mild hypokinesia in this region. This is felt to represent thrombus although cardiac mass is not fully excluded. Consider cardiac MRI. LV Wall Scoring: The apical inferior segment is hypokinetic. Left Atrium: The left atrium is moderately dilated. Right Ventricle: The right ventricle is normal in size. There is normal right ventricular global systolic function. Right Atrium: The right atrium is mildly dilated. Aortic Valve: The aortic valve appears structurally normal. There is no evidence of aortic valve regurgitation. The peak instantaneous gradient of the aortic valve is 5.5 mmHg. The mean gradient of the aortic valve is 3.3 mmHg. Mitral Valve: The mitral valve is mildly thickened. There is mild mitral valve regurgitation which is centrally directed. Tricuspid Valve: The tricuspid valve is structurally normal. There is mild tricuspid regurgitation. The tricuspid valve regurgitant jet flows toward the atrial septum. The Doppler estimated RVSP is within normal limits at 26.9 mmHg. Pulmonic Valve: The pulmonic valve is not well visualized. The pulmonic valve regurgitation was not well visualized. Pericardium: There is no pericardial effusion noted. Aorta: The aortic root is normal. There is no dilatation of the ascending aorta. There is no dilatation of the aortic root. Systemic Veins: The inferior vena cava appears to be of normal size. In  comparison to the previous echocardiogram(s): Compared with study from 9/26/2018,. Interval decline in the claculated LVEF and development of LV Thrombus.  CONCLUSIONS:  1. Left ventricular systolic function is low normal with a 50-55% estimated ejection fraction.  2. Apical inferior segment is abnormal.  3. The left atrium is moderately dilated.  4. RVSP within normal limits.  5. Left ventricular cavity size is decreased.  6. A speherical Echodensity measuring roughly 1.4 cm X 1 cm is seen attached to the inferior apical wall. There is mild hypokinesia in this region. This is felt to represent thrombus although cardiac mass is not fully excluded. Consider cardiac MRI.  7. Critical Findings of LV Thrombus communicated to Primary Service and Cardiology Consult Service at 09:38 via [employer]. QUANTITATIVE DATA SUMMARY: 2D MEASUREMENTS:                           Normal Ranges: LAs:           5.13 cm    (2.7-4.0cm) IVSd:          1.30 cm    (0.6-1.1cm) LVPWd:         1.34 cm    (0.6-1.1cm) LVIDd:         3.81 cm    (3.9-5.9cm) LVIDs:         2.95 cm LV Mass Index: 128.0 g/m2 LV % FS        22.4 % LA VOLUME:                               Normal Ranges: LA Vol A4C:        62.9 ml    (22+/-6mL/m2) LA Vol A2C:        55.2 ml LA Vol BP:         59.9 ml LA Vol Index A4C:  45.4 ml/m2 LA Vol Index A2C:  39.8 ml/m2 LA Vol Index BP:   43.3 ml/m2 LA Area A4C:       20.9 cm2 LA Area A2C:       19.9 cm2 LA Major Axis A4C: 5.9 cm LA Major Axis A2C: 6.1 cm LA Volume Index:   43.3 ml/m2 LA Vol A4C:        57.3 ml LA Vol A2C:        52.7 ml M-MODE MEASUREMENTS:                  Normal Ranges: Ao Root: 2.70 cm (2.0-3.7cm) LAs:     5.10 cm (2.7-4.0cm) AORTA MEASUREMENTS:                      Normal Ranges: Ao Sinus, d: 2.90 cm (2.1-3.5cm) Ao STJ, d:   3.20 cm (1.7-3.4cm) Asc Ao, d:   3.20 cm (2.1-3.4cm) LV SYSTOLIC FUNCTION BY 2D PLANIMETRY (MOD):                     Normal Ranges: EF-A4C View: 46.3 % (>=55%) EF-A2C View: 58.3 %  EF-Biplane:  49.8 % LV DIASTOLIC FUNCTION:                             Normal Ranges: MV Peak E:      0.88 m/s    (0.7-1.2 m/s) MV Peak A:      0.27 m/s    (0.42-0.7 m/s) E/A Ratio:      3.27        (1.0-2.2) MV A Dur:       129.18 msec PulmV Sys Mina:  33.71 cm/s PulmV Vazquez Mina: 44.39 cm/s PulmV S/D Mina:  0.76 MITRAL VALVE:                      Normal Ranges: MV Vmax:    0.95 m/s (<=1.3m/s) MV peak PG: 3.6 mmHg (<5mmHg) MV mean P.2 mmHg (<2mmHg) MV VTI:     21.45 cm (10-13cm) MV DT:      181 msec (150-240msec) AORTIC VALVE:                                   Normal Ranges: AoV Vmax:                1.18 m/s (<=1.7m/s) AoV Peak P.5 mmHg (<20mmHg) AoV Mean PG:             3.3 mmHg (1.7-11.5mmHg) LVOT Max Mina:            0.48 m/s (<=1.1m/s) AoV VTI:                 22.06 cm (18-25cm) LVOT VTI:                8.90 cm LVOT Diameter:           1.61 cm  (1.8-2.4cm) AoV Area, VTI:           0.83 cm2 (2.5-5.5cm2) AoV Area,Vmax:           0.84 cm2 (2.5-4.5cm2) AoV Dimensionless Index: 0.40  RIGHT VENTRICLE: RV Basal 3.70 cm RV Mid   2.70 cm RV Major 6.5 cm TAPSE:   18.0 mm TRICUSPID VALVE/RVSP:                             Normal Ranges: Peak TR Velocity: 2.44 m/s Est. RA Pressure: 3 mmHg RV Syst Pressure: 26.9 mmHg (< 30mmHg) IVC Diam:         1.70 cm PULMONIC VALVE:                         Normal Ranges: PV Accel Time: 97 msec  (>120ms) PV Max Mina:    0.9 m/s  (0.6-0.9m/s) PV Max PG:     3.0 mmHg Pulmonary Veins: PulmV Vazquez Mina: 44.39 cm/s PulmV S/D Mina:  0.76 PulmV Sys Mina:  33.71 cm/s  [id number] Mohamud Jimenez DO Electronically signed on 3/1/2024 at 9:42:38 AM  Wall Scoring  ** Final **     CT angio chest for pulmonary embolism    Result Date: 2024  Interpreted By:  Giselle Menon, STUDY: CT ANGIO CHEST FOR PULMONARY EMBOLISM;  2024 9:18 pm   INDICATION: Signs/Symptoms:r/o PE.   COMPARISON: CT scan of the chest 03/15/2023   ACCESSION NUMBER(S): [id number]   ORDERING CLINICIAN: ZAC TRIPATHI    TECHNIQUE: Helical data acquisition of the chest was obtained after intravenous administration of  75 mL of Omnipaque 350. Images were reformatted in axial, coronal, and sagittal planes. Axial and coronal MIPS were reconstructed and reviewed.   FINDINGS: HEART AND VESSELS: There is a small filling defect in a right lower lobe segmental branch extending distally consistent with acute pulmonary embolus. Calculated RV to LV ratio 1.0   Right pulmonary artery is dilated up to 3 cm which can be seen with pulmonary arterial hypertension.   Ectatic ascending thoracic aorta. There is scattered calcified plaque in the aorta and arch vessels.   Severe coronary artery calcifications are seen.The study is not optimized for evaluation of coronary arteries.   The cardiac chambers are enlarged.   Small pericardial effusion.   MEDIASTINUM AND HILLARY, LOWER NECK AND AXILLA: Heterogeneous enlarged thyroid gland.   No evidence of thoracic lymphadenopathy by CT criteria.   Small hiatal hernia.   LUNGS AND AIRWAYS: Note is made of debris in the mid trachea likely relate to retained mucus..   Advanced centrilobular and paraseptal emphysema. There is mild interlobular septal thickening and ground-glass opacities suggestive of developing interstitial edema. No effusion. Bibasilar atelectasis and or scarring. No pneumothorax.   UPPER ABDOMEN: The visualized subdiaphragmatic structures demonstrate no remarkable findings.   CHEST WALL AND OSSEOUS STRUCTURES: Bilateral shoulder osteoarthrosis. Multilevel degenerative changes are present.       1. Small filling defect in a right lower lobe segmental branch extending distally consistent with acute pulmonary embolus. Calculated RV to LV ratio of 1.0. 2. Right pulmonary artery is dilated up to 3 cm which can be seen with pulmonary arterial hypertension. 3. Cardiomegaly. Severe coronary artery calcifications noted. 4. Mild interlobular septal thickening suggestive of developing interstitial edema. 5.  Centrilobular and paraseptal emphysema noted. 6. Additional findings as described above.     MACRO: Giselle Menon discussed the significance and urgency of this critical finding by telephone with  Dr. Wilkerson on 2/29/2024 at 9:45 pm. (**-RCF-**) Findings:  See findings.   Signed by: Giselle Menon 2/29/2024 9:46 PM Dictation workstation:   [id number]    XR chest 2 views    Result Date: 2/29/2024  Interpreted By:  Giselle Menon, STUDY: XR CHEST 2 VIEWS;  2/29/2024 4:38 pm   INDICATION: Signs/Symptoms:New atrial flutter.   COMPARISON: Chest x-ray 09/26/2020   ACCESSION NUMBER(S): [id number]   ORDERING CLINICIAN: VIRGIL WILKERSON   FINDINGS: Multiple overlying leads are present.   CARDIOMEDIASTINAL SILHOUETTE: Cardiomediastinal silhouette is stable in size and configuration. Atherosclerotic calcification of the aorta.   LUNGS: No consolidation, pleural effusion or pneumothorax. Hyperinflation of the lungs with flattening of the hemidiaphragms and interstitial prominence suggestive of COPD..   ABDOMEN: No remarkable upper abdominal findings.   BONES: Multilevel degenerative changes of the spine.       No acute cardiopulmonary process.   Findings suggestive of COPD.   MACRO: None   Signed by: Giselle Menon 2/29/2024 4:46 PM Dictation workstation:   [id number]        Assessment/Plan     78 y.o. female with h/o acute on chronic systolic heart failure, probably precipitated by AF with RVR, recently diagnosed LV thrombus, noted in MRI, recent right lower lobe segmental PE, without cor pulmonale, hypertension, CKD, severe coronary artery calcification, hypothyroidism presenting with  progressively worsening shortness of breath, PND, orthopnea, elevated BNP, found in acute on chronic systolic heart failure secondary to A-fib with RVR.  Cardiology follows.  GI consulted due to abnormal imaging of the stomach, suspect likely under distention, can be gastritis, PUD.  Possible liver lesions and hepatic congestion on the CT  scan.  Liver enzymes are normal.    -Heart failure management per primary and cardiology  -Recommend daily PPI  -No plans for EGD, and likely will change the treatment  -Monitor LFTs  -Recommend MRI of the liver with and without contrast, as outpatient  -Diet as tolerated and recommended by cardiology    I spent 45 minutes in the professional and overall care of this patient.      Lata Burnett, TOLU-CNP

## 2024-03-19 ENCOUNTER — APPOINTMENT (OUTPATIENT)
Dept: CARDIOLOGY | Facility: HOSPITAL | Age: 79
DRG: 291 | End: 2024-03-19
Payer: MEDICARE

## 2024-03-19 LAB
ALBUMIN SERPL BCP-MCNC: 3.6 G/DL (ref 3.4–5)
ALP SERPL-CCNC: 78 U/L (ref 33–136)
ALT SERPL W P-5'-P-CCNC: 28 U/L (ref 7–45)
ANION GAP SERPL CALC-SCNC: 16 MMOL/L (ref 10–20)
AST SERPL W P-5'-P-CCNC: 19 U/L (ref 9–39)
ATRIAL RATE: 130 BPM
ATRIAL RATE: 300 BPM
BILIRUB SERPL-MCNC: 0.7 MG/DL (ref 0–1.2)
BUN SERPL-MCNC: 30 MG/DL (ref 6–23)
CALCIUM SERPL-MCNC: 9.2 MG/DL (ref 8.6–10.3)
CHLORIDE SERPL-SCNC: 99 MMOL/L (ref 98–107)
CO2 SERPL-SCNC: 27 MMOL/L (ref 21–32)
CREAT SERPL-MCNC: 1.4 MG/DL (ref 0.5–1.05)
EGFRCR SERPLBLD CKD-EPI 2021: 39 ML/MIN/1.73M*2
ERYTHROCYTE [DISTWIDTH] IN BLOOD BY AUTOMATED COUNT: 14.7 % (ref 11.5–14.5)
GLUCOSE SERPL-MCNC: 91 MG/DL (ref 74–99)
HCT VFR BLD AUTO: 39.4 % (ref 36–46)
HGB BLD-MCNC: 13.3 G/DL (ref 12–16)
MCH RBC QN AUTO: 27.2 PG (ref 26–34)
MCHC RBC AUTO-ENTMCNC: 33.8 G/DL (ref 32–36)
MCV RBC AUTO: 81 FL (ref 80–100)
NRBC BLD-RTO: 0 /100 WBCS (ref 0–0)
P AXIS: 103 DEGREES
PLATELET # BLD AUTO: 333 X10*3/UL (ref 150–450)
POTASSIUM SERPL-SCNC: 3.5 MMOL/L (ref 3.5–5.3)
PR INTERVAL: 112 MS
PROT SERPL-MCNC: 6.4 G/DL (ref 6.4–8.2)
Q ONSET: 223 MS
Q ONSET: 229 MS
QRS COUNT: 14 BEATS
QRS COUNT: 21 BEATS
QRS DURATION: 78 MS
QRS DURATION: 88 MS
QT INTERVAL: 330 MS
QT INTERVAL: 386 MS
QTC CALCULATION(BAZETT): 461 MS
QTC CALCULATION(BAZETT): 485 MS
QTC FREDERICIA: 427 MS
QTC FREDERICIA: 435 MS
R AXIS: 54 DEGREES
R AXIS: 61 DEGREES
RBC # BLD AUTO: 4.89 X10*6/UL (ref 4–5.2)
SODIUM SERPL-SCNC: 138 MMOL/L (ref 136–145)
T AXIS: 230 DEGREES
T AXIS: 249 DEGREES
T OFFSET: 394 MS
T OFFSET: 416 MS
VENTRICULAR RATE: 130 BPM
VENTRICULAR RATE: 86 BPM
WBC # BLD AUTO: 7 X10*3/UL (ref 4.4–11.3)

## 2024-03-19 PROCEDURE — 85027 COMPLETE CBC AUTOMATED: CPT | Performed by: INTERNAL MEDICINE

## 2024-03-19 PROCEDURE — 2500000001 HC RX 250 WO HCPCS SELF ADMINISTERED DRUGS (ALT 637 FOR MEDICARE OP): Performed by: INTERNAL MEDICINE

## 2024-03-19 PROCEDURE — 99233 SBSQ HOSP IP/OBS HIGH 50: CPT | Performed by: STUDENT IN AN ORGANIZED HEALTH CARE EDUCATION/TRAINING PROGRAM

## 2024-03-19 PROCEDURE — 97161 PT EVAL LOW COMPLEX 20 MIN: CPT | Mod: GP

## 2024-03-19 PROCEDURE — 1200000002 HC GENERAL ROOM WITH TELEMETRY DAILY

## 2024-03-19 PROCEDURE — 97165 OT EVAL LOW COMPLEX 30 MIN: CPT | Mod: GO | Performed by: OCCUPATIONAL THERAPIST

## 2024-03-19 PROCEDURE — 84075 ASSAY ALKALINE PHOSPHATASE: CPT | Performed by: INTERNAL MEDICINE

## 2024-03-19 PROCEDURE — 2500000004 HC RX 250 GENERAL PHARMACY W/ HCPCS (ALT 636 FOR OP/ED): Performed by: INTERNAL MEDICINE

## 2024-03-19 PROCEDURE — 93010 ELECTROCARDIOGRAM REPORT: CPT | Performed by: INTERNAL MEDICINE

## 2024-03-19 PROCEDURE — 2500000001 HC RX 250 WO HCPCS SELF ADMINISTERED DRUGS (ALT 637 FOR MEDICARE OP): Performed by: STUDENT IN AN ORGANIZED HEALTH CARE EDUCATION/TRAINING PROGRAM

## 2024-03-19 PROCEDURE — 36415 COLL VENOUS BLD VENIPUNCTURE: CPT | Performed by: INTERNAL MEDICINE

## 2024-03-19 PROCEDURE — 93005 ELECTROCARDIOGRAM TRACING: CPT

## 2024-03-19 RX ORDER — SODIUM CHLORIDE 9 MG/ML
100 INJECTION, SOLUTION INTRAVENOUS CONTINUOUS
Status: CANCELLED | OUTPATIENT
Start: 2024-03-19

## 2024-03-19 RX ORDER — LIDOCAINE HYDROCHLORIDE 20 MG/ML
15 SOLUTION OROPHARYNGEAL ONCE
Status: CANCELLED | OUTPATIENT
Start: 2024-03-19 | End: 2024-03-19

## 2024-03-19 RX ADMIN — POLYETHYLENE GLYCOL 3350 17 G: 17 POWDER, FOR SOLUTION ORAL at 12:28

## 2024-03-19 RX ADMIN — LEVOTHYROXINE SODIUM 50 MCG: 0.05 TABLET ORAL at 05:58

## 2024-03-19 RX ADMIN — BUPROPION HYDROCHLORIDE 150 MG: 150 TABLET, EXTENDED RELEASE ORAL at 12:28

## 2024-03-19 RX ADMIN — METOPROLOL TARTRATE 100 MG: 50 TABLET, FILM COATED ORAL at 12:28

## 2024-03-19 RX ADMIN — APIXABAN 5 MG: 5 TABLET, FILM COATED ORAL at 20:12

## 2024-03-19 RX ADMIN — METOPROLOL TARTRATE 100 MG: 50 TABLET, FILM COATED ORAL at 20:11

## 2024-03-19 RX ADMIN — ATORVASTATIN CALCIUM 40 MG: 40 TABLET, FILM COATED ORAL at 20:12

## 2024-03-19 RX ADMIN — APIXABAN 5 MG: 5 TABLET, FILM COATED ORAL at 12:28

## 2024-03-19 ASSESSMENT — COGNITIVE AND FUNCTIONAL STATUS - GENERAL
DRESSING REGULAR UPPER BODY CLOTHING: A LITTLE
WALKING IN HOSPITAL ROOM: A LITTLE
MOVING FROM LYING ON BACK TO SITTING ON SIDE OF FLAT BED WITH BEDRAILS: A LITTLE
WALKING IN HOSPITAL ROOM: A LITTLE
DRESSING REGULAR UPPER BODY CLOTHING: A LITTLE
CLIMB 3 TO 5 STEPS WITH RAILING: A LITTLE
TOILETING: A LITTLE
WALKING IN HOSPITAL ROOM: A LITTLE
CLIMB 3 TO 5 STEPS WITH RAILING: A LITTLE
STANDING UP FROM CHAIR USING ARMS: A LITTLE
DAILY ACTIVITIY SCORE: 19
PERSONAL GROOMING: A LITTLE
STANDING UP FROM CHAIR USING ARMS: A LITTLE
MOBILITY SCORE: 19
STANDING UP FROM CHAIR USING ARMS: A LITTLE
MOVING TO AND FROM BED TO CHAIR: A LITTLE
MOVING TO AND FROM BED TO CHAIR: A LITTLE
TURNING FROM BACK TO SIDE WHILE IN FLAT BAD: A LITTLE
TOILETING: A LITTLE
DRESSING REGULAR LOWER BODY CLOTHING: A LITTLE
CLIMB 3 TO 5 STEPS WITH RAILING: A LITTLE
HELP NEEDED FOR BATHING: A LITTLE
DRESSING REGULAR LOWER BODY CLOTHING: A LITTLE
TURNING FROM BACK TO SIDE WHILE IN FLAT BAD: A LITTLE
TOILETING: A LITTLE
DRESSING REGULAR UPPER BODY CLOTHING: A LITTLE
DAILY ACTIVITIY SCORE: 20
MOBILITY SCORE: 20
EATING MEALS: A LITTLE
DAILY ACTIVITIY SCORE: 18
HELP NEEDED FOR BATHING: A LITTLE
DRESSING REGULAR LOWER BODY CLOTHING: A LITTLE
MOBILITY SCORE: 18
PERSONAL GROOMING: A LITTLE
MOVING TO AND FROM BED TO CHAIR: A LITTLE
HELP NEEDED FOR BATHING: A LITTLE

## 2024-03-19 ASSESSMENT — ACTIVITIES OF DAILY LIVING (ADL)
ADL_ASSISTANCE: INDEPENDENT
ADL_ASSISTANCE: INDEPENDENT

## 2024-03-19 ASSESSMENT — PAIN - FUNCTIONAL ASSESSMENT
PAIN_FUNCTIONAL_ASSESSMENT: 0-10
PAIN_FUNCTIONAL_ASSESSMENT: 0-10

## 2024-03-19 ASSESSMENT — PAIN SCALES - GENERAL
PAINLEVEL_OUTOF10: 0 - NO PAIN

## 2024-03-19 NOTE — DISCHARGE INSTR - APPOINTMENTS
[facility]- [phone]- They will be calling you to see if you are eligible for mobile food delivery    [facility] Social Work Services- [phone]    [facility] for Utility Assistance- call [facility] [phone]

## 2024-03-19 NOTE — PROGRESS NOTES
Occupational Therapy    Evaluation    Patient Name: Sophie Mendosa  MRN: [id number]  Today's Date: 3/19/2024  Time Calculation  Start Time: 0919  Stop Time: 0936  Time Calculation (min): 17 min        Assessment:  OT Assessment: Pt demos decreased balance, strength, endurance and safety awareness resulting in decreased safety and independence with ADLs/IADLs. Pt requires skilled OT services to address above deficits to safely return to PLOF.  Prognosis: Good  Evaluation/Treatment Tolerance: Patient limited by fatigue  Medical Staff Made Aware: Yes  End of Session Communication: Bedside nurse  End of Session Patient Position: Up in chair, Alarm on  OT Assessment Results: Decreased ADL status, Decreased upper extremity strength, Decreased safe judgment during ADL, Decreased endurance, Decreased functional mobility, Decreased IADLs, Decreased trunk control for functional activities  Prognosis: Good  Evaluation/Treatment Tolerance: Patient limited by fatigue  Medical Staff Made Aware: Yes  Strengths: Premorbid level of function, Access to adaptive/assistive products  Barriers to Participation: Insight into problems, Comorbidities  Plan:  Treatment Interventions: ADL retraining, Functional transfer training, UE strengthening/ROM, Endurance training, Patient/family training, Equipment evaluation/education, Compensatory technique education  OT Frequency: 2 times per week  OT Discharge Recommendations: Low intensity level of continued care  OT - OK to Discharge: Yes (OT POC established this date)  Treatment Interventions: ADL retraining, Functional transfer training, UE strengthening/ROM, Endurance training, Patient/family training, Equipment evaluation/education, Compensatory technique education    Subjective     General:  General  Reason for Referral: Pt is a 77 y/o female SOB and orthopnea.  Referred By: Aakash ROYAL)  Past Medical History Relevant to Rehab: recently diagnosed PE, atrial flutter with RVR   Past Medical  History:   Diagnosis Date    Arthritis     Diabetes mellitus (CMS/HCC)     Disease of thyroid gland     Hypertension     Major depressive disorder, single episode, unspecified     Major depression, single episode    Personal history of other diseases of the circulatory system     History of hypertension    Stroke (CMS/HCC)       Family/Caregiver Present: No  Co-Treatment: PT  Co-Treatment Reason: to maximize safety and participation with skilled intervention  Prior to Session Communication: Bedside nurse  Patient Position Received:  (seated on toilet in bathroom)  General Comment: Pt in bathroom upon arrival and agreeable to OT eval. Pt fully participatory.  Precautions:  Medical Precautions: Fall precautions       Pain:  Pain Assessment  Pain Assessment: 0-10  Pain Score: 0 - No pain    Objective   Cognition:  Overall Cognitive Status: Within Functional Limits  Orientation Level: Oriented X4  Insight: Mild           Home Living:  Type of Home: House  Lives With: Alone  Home Adaptive Equipment: Cane  Home Layout: Multi-level, Laundry in basement, 1/2 bath on main level, Bed/bath upstairs, Stairs to alternate level with rails  Alternate Level Stairs-Number of Steps: 8  Home Access: Stairs to enter without rails  Entrance Stairs-Number of Steps: 1  Bathroom Shower/Tub: Tub/shower unit  Bathroom Toilet: Standard (pt reports owning BSC and recently purchasing toilet seat riser)  Bathroom Equipment: Grab bars in shower, Shower chair with back, Bedside commode  Prior Function:  Level of [place]: Independent with ADLs and functional transfers, Independent with homemaking with ambulation  ADL Assistance: Independent  Homemaking Assistance: Independent  Ambulatory Assistance: Independent (using cane)  Prior Function Comments: pt denies falls. - driving       ADL:  Grooming Assistance: Stand by  Grooming Deficit: Wash/dry hands, Verbal cueing (standing at sink)  LE Dressing Assistance: Stand by  LE Dressing Deficit:  Don/doff R shoe, Don/doff L shoe (extra time to complete)  Activity Tolerance:  Endurance: Tolerates 10 - 20 min exercise with multiple rests  Bed Mobility/Transfers: Bed Mobility  Bed Mobility: No    Transfers  Transfer: Yes  Transfer 1  Technique 1: Sit to stand, Stand to sit  Transfer Device 1: Cane  Transfer Level of Assistance 1: Close supervision, Minimal verbal cues  Trials/Comments 1: cues for sequencing and safe hand placement      Functional Mobility:  Functional Mobility  Functional Mobility Performed: Yes  Functional Mobility 1  Comments 1: Pt functionally navigated x mod household distance using SPC with CGA x1. Min cues for sequencing and SPC safety. Pt frequently reaching out for external surfaces in room, educated pt on using SPC in L hand d/t reported L sided weakness but pt declined. Mild unsteadiness, no LOB noted. Pt declining further distance d/t reported B LE weakness.  Sitting Balance:  Dynamic Sitting Balance  Dynamic Sitting-Comments: SBA  Standing Balance:  Static Standing Balance  Static Standing-Comment/Number of Minutes: SBA  Dynamic Standing Balance  Dynamic Standing-Comments: CGA     Sensation:  Light Touch: No apparent deficits  Strength:  Strength Comments: B UEs grossly 3/5 based on function       Coordination:  Movements are Fluid and Coordinated: Yes        Extremities: RUE   RUE : Within Functional Limits and LUE   LUE: Within Functional Limits        Outcome Measures:[facility] Daily Activity  Putting on and taking off regular lower body clothing: A little  Bathing (including washing, rinsing, drying): A little  Putting on and taking off regular upper body clothing: A little  Toileting, which includes using toilet, bedpan or urinal: A little  Taking care of personal grooming such as brushing teeth: A little  Eating Meals: None  Daily Activity - Total Score: 19        Education Documentation  Body Mechanics, taught by Martha Lua OT at 3/19/2024 10:35 AM.  Learner:  Patient  Readiness: Acceptance  Method: Explanation  Response: Verbalizes Understanding    Precautions, taught by Martha Lua OT at 3/19/2024 10:35 AM.  Learner: Patient  Readiness: Acceptance  Method: Explanation  Response: Verbalizes Understanding    ADL Training, taught by Martha Lua OT at 3/19/2024 10:35 AM.  Learner: Patient  Readiness: Acceptance  Method: Explanation  Response: Verbalizes Understanding    Education Comments  No comments found.        OP EDUCATION:       Goals:  Encounter Problems       Encounter Problems (Active)       ADLs       Patient will perform UB and LB bathing with modified independent level of assistance and grab bars, shower chair, and long-handled sponge.       Start:  03/19/24    Expected End:  04/02/24            Patient with complete lower body dressing with modified independent level of assistance donning and doffing all LE clothes  with PRN adaptive equipment.       Start:  03/19/24    Expected End:  04/02/24            Patient will complete daily grooming tasks with independent level of assistance and PRN adaptive equipment while standing.       Start:  03/19/24    Expected End:  04/02/24            Patient will complete toileting including hygiene clothing management/hygiene with modified independent level of assistance and raised toilet seat and grab bars.       Start:  03/19/24    Expected End:  04/02/24               MOBILITY       Patient will perform Functional mobility x Household distances/Community Distances with modified independent level of assistance and least restrictive device in order to improve safety and functional mobility.       Start:  03/19/24    Expected End:  04/02/24               TRANSFERS       Patient will perform bed mobility independent level of assistance in order to improve safety and independence with mobility       Start:  03/19/24    Expected End:  04/02/24            Patient will complete functional transfers with least restrictive  device with modified independent level of assistance.       Start:  03/19/24    Expected End:  04/02/24

## 2024-03-19 NOTE — PROGRESS NOTES
03/19/24 1151   Discharge Planning   Patient expects to be discharged to: Recieved referral from [facility] re: financial concerns. SW met with pt at bedside to discuss. Pt states that she always has money concerns and that she has to carefully budget. Her monthly income is $1,142 from social security. She takes the paratransit to get groceries but is interested in mobile food pantry options. Discussed applying for food assistance. SW to provide pt with Medicaid application. Also discussed utility assistance through [employer] and follow up services though the [facility] social workers. Placed resource information on pt discharge paperwork.

## 2024-03-19 NOTE — PROGRESS NOTES
PROGRESS NOTE - INTERNAL MEDICINE     PATIENT NAME:  Sophie Mendosa    MRN:  [id number]  SERVICE DATE:  3/19/2024       ADMITTING PHYSICIAN:  Wallace Finn MD    ASSESSMENT AND PLAN    Principal Problem:    Pneumonia due to infectious organism, unspecified laterality, unspecified part of lung  Active Problems:    Atrial flutter (CMS/HCC)      SINTIA on CKD 3A. Creat stable though elev than baseline, off diuretics now.  Acute on chronic systolic heart failure, probably precipitated by AF with RVR.  New onset AF - RVR on adm; CVR today.  Recently diagnosed LV thrombus, noted in MRI  Recent right lower lobe segmental PE, without cor pulmonale  Hypertension  Severe coronary artery calcification  Hypothyroidism, with suppressed TSH on oral supplement therapy  Constipation  ?previously known liver spot (per pt) - was under follow up with Dr Sergio Soto     PLAN:  Resume metoprolol, Eliquis. Digoxin added.  Diuretics held based on labs.  Decrease dose of levothyroxine  Cardiology consult noted  Off Abx per ID recomm.  GI consult noted. OP MRI liver.   Defer DVT prophylaxis while on Eliquis.  Monitor for bleeding while on Eliquis  2 g sodium, cardiac diet with fluid restriction.  RAISA/DCCV planned for abel.        DISPOSITION PLAN:  Pending cardiol intervention.     INTERVAL HISTORY OF PRESENT ILLNESS:  HR better and lower today. No chest pain/sob. No n/v/d. Oral intake mod. Feeling better. No fever/chills. acute events from last 24 hrs reviewed.     Pertinent ROS:  No Bleeding / No rashes     Discussed with nursing and case management team and the specialists involved in this patient's care. Reviewed the EMR and documentation from other care-givers.        OBJECTIVE  PHYSICAL EXAM:      GENERAL: AAOx3, cooperative resting comfortably. thin  SKIN: Skin turgor normal.   HEENT: no epistaxis, Moist mucosa.  LUNGS: Vesicular breath sounds, with +bibasal fine crepitations.   CARDIAC: IRREGULAR. no rubs  ABDOMEN: Abdomen soft,  +RUQ/epig discomfort/tenderness. BS+  EXTREMITIES: No edema, Good capillary refill.   NEURO: Insight GOOD. No invol movements  MUSCULOSKELETAL: No acute inflammation          3/18/2024     7:49 AM 3/18/2024    11:39 AM 3/18/2024     4:00 PM 3/18/2024     8:19 PM 3/18/2024    11:46 PM 3/19/2024     4:07 AM 3/19/2024     7:41 AM   Vitals   Systolic 118 126 127 134 146 128 147   Diastolic 81 88 85 92 85 85 87   Heart Rate 129 128 129 75 55 59 70   Temp 36.8 °C (98.3 °F) 36.4 °C (97.5 °F) 37.3 °C (99.2 °F) 36.6 °C (97.8 °F) 36.4 °C (97.5 °F) 36.1 °C (97 °F) 36.7 °C (98.1 °F)   Resp   16 16 16 16      Body mass index is 19.73 kg/m².    Intake/Output Summary (Last 24 hours) at 3/19/2024 0925  Last data filed at 3/18/2024 2300  Gross per 24 hour   Intake 410 ml   Output 2 ml   Net 408 ml           Current Facility-Administered Medications:     acetaminophen (Tylenol) tablet 650 mg, 650 mg, oral, q4h PRN **OR** acetaminophen (Tylenol) oral liquid 650 mg, 650 mg, oral, q4h PRN **OR** acetaminophen (Tylenol) suppository 650 mg, 650 mg, rectal, q4h PRN, Wallace Finn MD    apixaban (Eliquis) tablet 5 mg, 5 mg, oral, BID, Wallace Finn MD, 5 mg at 03/18/24 2103    atorvastatin (Lipitor) tablet 40 mg, 40 mg, oral, Nightly, Wallace Finn MD, 40 mg at 03/18/24 2103    buPROPion XL (Wellbutrin XL) 24 hr tablet 150 mg, 150 mg, oral, q AM, Wallace Finn MD, 150 mg at 03/17/24 1313    cyanocobalamin (Vitamin B-12) tablet 500 mcg, 500 mcg, oral, Daily, Wallace Finn MD, 500 mcg at 03/17/24 1313    levothyroxine (Synthroid, Levoxyl) tablet 50 mcg, 50 mcg, oral, Daily, Wallace Finn MD, 50 mcg at 03/19/24 0558    metoprolol tartrate (Lopressor) tablet 100 mg, 100 mg, oral, BID, Naveed Pickett MD, 100 mg at 03/18/24 2103    ondansetron (Zofran) tablet 4 mg, 4 mg, oral, q8h PRN **OR** ondansetron (Zofran) injection 4 mg, 4 mg, intravenous, q8h PRN, Wallace Finn MD    polyethylene glycol  "(Glycolax, Miralax) packet 17 g, 17 g, oral, BID, Wallace Finn MD, 17 g at 03/18/24 8604    DATA:   Diagnostic tests reviewed for today's visit:    Most recent labs  Results from last 7 days   Lab Units 03/19/24  0551 03/18/24  0517 03/17/24  0224   WBC AUTO x10*3/uL 7.0 8.4 9.2   HEMOGLOBIN g/dL 13.3 13.3 11.6*   HEMATOCRIT % 39.4 39.5 35.7*   PLATELETS AUTO x10*3/uL 333 321 298     Results from last 7 days   Lab Units 03/19/24  0551 03/18/24  0517 03/17/24 0224   SODIUM mmol/L 138 136 135*   POTASSIUM mmol/L 3.5 4.0 4.6   CHLORIDE mmol/L 99 98 99   CO2 mmol/L 27 27 21   BUN mg/dL 30* 26* 26*   CREATININE mg/dL 1.40* 1.42* 1.21*   CALCIUM mg/dL 9.2 9.3 9.7   PROTEIN TOTAL g/dL 6.4 6.3* 6.9   BILIRUBIN TOTAL mg/dL 0.7 0.9 0.9   ALK PHOS U/L 78 81 93   ALT U/L 28 32 37   AST U/L 19 30 31   GLUCOSE mg/dL 91 82 168*             No results found for: \"TROPONINT\"         CT abdomen pelvis w IV contrast   Final Result   1. Heterogeneous enhancement of the liver, may be seen with hepatic   congestion or hepatitis. Please correlate with laboratory values.   2. Hepatic lesions, underlying neoplasm is not excludable.   Nonemergent contrast-enhanced MRI/MRCP can be obtained for further   evaluation.   3. Diffuse wall thickening at the stomach, may be secondary to   underdistention versus gastritis. Evaluation with upper endoscopy as   clinically warranted.   4. Stool ball distending the rectum.   5. Renal cortical scarring, may represent sequela of prior infection.   6. Enlarged fibroid uterus.   7. Small right pleural effusion. Mild bibasilar atelectasis.   8. Cardiomegaly with right atrial enlargement.   9. Severe degenerative changes at the right hip.             MACRO:   Critical Finding:  See findings. Notification was initiated on   3/17/2024 at 4:50 am by  Neena Diana.  (**-YCF-**) Instructions:        Signed by: Neena Diana 3/17/2024 4:56 AM   Dictation workstation:   [id number]      XR chest 2 views "   Final Result   New focal wedge-shaped consolidation at the peripheral right lung   base adjacent to the lateral costophrenic angle with a small new   right pleural effusion. Given these features, a pneumonia and   pulmonary infarct are differential considerations depending on the   clinical context. Note, patient had recent posterior right lower lobe   pulmonary embolism on 02/29/2024. CT pulmonary embolism protocol   would better evaluate as deemed clinically warranted.             MACRO:   None.        Signed by: Logan Akhtar 3/17/2024 3:22 AM   Dictation workstation:   [id number]      Transesophageal Echo (RAISA) With Possible Cardioversion    (Results Pending)         SIGNATURE: Wallace Finn MD PATIENT NAME: Sophie Mendosa   DATE: 3/19/2024 MRN: [id number]   TIME: 9:25 AM

## 2024-03-19 NOTE — PROGRESS NOTES
Physical Therapy    Physical Therapy Evaluation    Patient Name: Sophie Mendosa  MRN: [id number]  Today's Date: 3/19/2024   Time Calculation  Start Time: 0918  Stop Time: 0935  Time Calculation (min): 17 min    Assessment/Plan   PT Assessment  PT Assessment Results: Decreased strength, Decreased endurance, Impaired balance, Pain  Rehab Prognosis: Good  End of Session Communication: Bedside nurse  Assessment Comment: PT Evaluation Completed. The patient presented with decreased balance, endurance, safety awareness, and increased fatigue. These impairments are negatively impacting her ability to perform at baseline functional level, in home environment. This patient would benefit from skilled therapy intervention to address limitations and progress towards the PT goals.  Anticipate low frequency PT needs at discharge  End of Session Patient Position: Up in chair, Alarm on  IP OR SWING BED PT PLAN  Inpatient or Swing Bed: Inpatient  PT Plan  Treatment/Interventions: Bed mobility, Transfer training, Gait training, Stair training, Balance training, Strengthening, Endurance training, Range of motion, Therapeutic exercise, Therapeutic activity, Home exercise program  PT Plan: Skilled PT  PT Frequency: 2 times per week  PT Discharge Recommendations: Low intensity level of continued care  PT Recommended Transfer Status: Assist x1  PT - OK to Discharge: Yes (PT POC established.)      Subjective   General Visit Information:  General  Reason for Referral: Pt is a 77 y/o female SOB and orthopnea.  Referred By: Aakash ROYAL)  Past Medical History Relevant to Rehab: recently diagnosed PE, atrial flutter with RVR  Co-Treatment: OT  Co-Treatment Reason: to maximize safety and participation with skilled intervention  Prior to Session Communication: Bedside nurse  Patient Position Received:  (in restroom)  General Comment: Pt pleasant and agreeable to eval.  Home Living:  Home Living  Type of Home: House  Lives With: Alone  Home  Adaptive Equipment: Cane  Home Layout: Multi-level, Laundry in basement, 1/2 bath on main level, Bed/bath upstairs, Stairs to alternate level with rails  Alternate Level Stairs-Number of Steps: 8  Home Access: Stairs to enter without rails  Entrance Stairs-Number of Steps: 1  Bathroom Shower/Tub: Tub/shower unit  Bathroom Toilet: Standard (pt reports owning BSC and recently purchasing toilet seat riser)  Bathroom Equipment: Grab bars in shower, Shower chair with back, Bedside commode  Prior Level of Function:  Prior Function Per Pt/Caregiver Report  Level of [place]: Independent with ADLs and functional transfers, Independent with homemaking with ambulation  ADL Assistance: Independent  Homemaking Assistance: Independent  Ambulatory Assistance: Independent (cane used PRN in house, always used for community distances)  Prior Function Comments: Does not drive. Denies any recent falls.  Precautions:  Precautions  Medical Precautions: Fall precautions  Vital Signs:       Objective   Pain:  Pain Assessment  Pain Assessment: 0-10  Pain Score: 0 - No pain  Cognition:  Cognition  Overall Cognitive Status: Within Functional Limits  Orientation Level: Oriented X4    General Assessments:  Activity Tolerance  Endurance: Tolerates 10 - 20 min exercise with multiple rests    Sensation  Light Touch: No apparent deficits    Postural Control  Postural Control: Within Functional Limits    Static Sitting Balance  Static Sitting-Balance Support: Feet supported, Bilateral upper extremity supported  Static Sitting-Level of Assistance: Close supervision  Dynamic Sitting Balance  Dynamic Sitting-Balance Support: Feet supported, Bilateral upper extremity supported  Dynamic Sitting-Comments: Close supervision    Static Standing Balance  Static Standing-Balance Support: Bilateral upper extremity supported  Static Standing-Level of Assistance: Contact guard  Dynamic Standing Balance  Dynamic Standing-Balance Support: Bilateral upper  extremity supported  Dynamic Standing-Comments: Contact guard  Functional Assessments:  Bed Mobility  Bed Mobility: No    Transfers  Transfer: Yes  Transfer 1  Technique 1: Sit to stand, Stand to sit  Transfer Device 1: Cane  Transfer Level of Assistance 1: Close supervision  Trials/Comments 1: Cues for sequencing.    Ambulation/Gait Training  Ambulation/Gait Training Performed: Yes  Ambulation/Gait Training 1  Surface 1: Level tile  Device 1: Single point cane  Assistance 1: Contact guard  Comments/Distance (ft) 1: 50 ft + 10 ft. Pt ambulates with decreased patrica and step length. Moderate swaying with patient occasionally reaching for furniture. NBOS. Mildly antalgic gait with reported R knee and hip pain. Educated pt on using cane on the L side however pt not receptive.  Extremity/Trunk Assessments:  RUE   RUE : Within Functional Limits  LUE   LUE: Within Functional Limits  RLE   RLE :  (Strength >3/5 based on observation of functional mobility. ROM WFL.)  LLE   LLE :  (Strength >3/5 based on observation of functional mobility. ROM WFL.)  Outcome Measures:  [facility] Basic Mobility  Turning from your back to your side while in a flat bed without using bedrails: None  Moving from lying on your back to sitting on the side of a flat bed without using bedrails: A little  Moving to and from bed to chair (including a wheelchair): A little  Standing up from a chair using your arms (e.g. wheelchair or bedside chair): A little  To walk in hospital room: A little  Climbing 3-5 steps with railing: A little  Basic Mobility - Total Score: 19    Encounter Problems       Encounter Problems (Active)       Balance       complete all mobility with normal balance while dual tasking, negotiating in a dynamic environment, carrying items, etc., with proactive and reactive static and dynamic standing and sitting tasks, with mod I and LRAD, >15 minutes.       Start:  03/19/24    Expected End:  04/02/24               Mobility       STG -  Patient will ambulate 150 ft mod I using LRAD.        Start:  03/19/24    Expected End:  04/02/24            STG - Patient will ascend and descend 8 stairs using unilateral HR and LRAD mod I.        Start:  03/19/24    Expected End:  04/02/24               PT Transfers       STG - Patient will perform bed mobility independently.        Start:  03/19/24    Expected End:  04/02/24            STG - Patient will transfer sit to and from stand mod I using LRAD.        Start:  03/19/24    Expected End:  04/02/24                   Education Documentation  Body Mechanics, taught by Pauline Jimenez, PT at 3/19/2024 10:46 AM.  Learner: Patient  Readiness: Acceptance  Method: Explanation  Response: Verbalizes Understanding    Mobility Training, taught by Pauline Jimenez, PT at 3/19/2024 10:46 AM.  Learner: Patient  Readiness: Acceptance  Method: Explanation  Response: Verbalizes Understanding    Education Comments  No comments found.

## 2024-03-19 NOTE — PROGRESS NOTES
Pharmacy Medication History Review    Sophie Mendosa is a 78 y.o. female admitted for Pneumonia due to infectious organism, unspecified laterality, unspecified part of lung. Pharmacy reviewed the patient's [id number] medications and allergies for accuracy.    The list below reflectives the updated PTA list. Please review each medication in order reconciliation for additional clarification and justification.       The list below reflectives the updated allergy list. Please review each documented allergy for additional clarification and justification.  Allergies  Reviewed by Riya Montero RN on 3/17/2024        Severity Reactions Comments    Gabapentin Not Specified Other     Garlic Not Specified Unknown             Below are additional concerns with the patient's PTA list.  Prior to Admission Medications   Prescriptions Last Dose Informant   apixaban (Eliquis) 5 mg (74 tabs) tablet Past Week    Sig: Take 2 tablets (10 mg) by mouth 2 times a day for 7 days, then take 1 tablet (5 mg) by mouth 2 times a day.   atorvastatin (Lipitor) 40 mg tablet Past Week    Sig: Take 1 tablet (40 mg) by mouth once daily.   buPROPion XL (Wellbutrin XL) 150 mg 24 hr tablet Past Week    Sig: Take 1 tablet (150 mg) by mouth once daily in the morning. Do not crush, chew, or split.   cyanocobalamin (Vitamin B-12) 500 mcg tablet Past Week    Sig: Take 1 tablet (500 mcg) by mouth once daily.   levothyroxine (Synthroid, Levoxyl) 75 mcg tablet Past Week    Sig: Take 1 tablet (75 mcg) by mouth once daily. Do not start before March 5, 2024.   metoprolol tartrate (Lopressor) 25 mg tablet Past Week    Sig: Take 1 tablet (25 mg) by mouth 2 times a day.      Facility-Administered Medications: None      Per patient - no changes - verified all meds    No longer taking amlodipine 10 mg, lisinopril/hydrochlorothiazide 10-12.5 mg  Ellen Moss

## 2024-03-19 NOTE — PROGRESS NOTES
Subjective Data:  NAEO. Asymptomatic this AM. Tele reviewed showing likely conversion from 2:1 flutter to coarse afib around 16:20 yesterday. Rates now 80s.     Objective Data:  Last Recorded Vitals:  Vitals:    03/18/24 2019 03/18/24 2346 03/19/24 0407 03/19/24 0741   BP: (!) 134/92 146/85 128/85 147/87   BP Location:    Right arm   Patient Position: Lying Lying Lying Lying   Pulse: 75 55 59 70   Resp: 16 16 16    Temp: 36.6 °C (97.8 °F) 36.4 °C (97.5 °F) 36.1 °C (97 °F) 36.7 °C (98.1 °F)   TempSrc: Temporal Temporal Temporal Temporal   SpO2: 95% 96% 96% 94%   Weight:       Height:           Last Labs:  CBC - 3/19/2024:  5:51 AM  7.0 13.3 333    39.4      CMP - 3/19/2024:  5:51 AM  9.2 6.4 19 --- 0.7   _ 3.6 28 78      PTT - 2/29/2024:  7:26 PM  1.1   12.1 30     TROPHS   Date/Time Value Ref Range Status   03/17/2024 06:01 AM 53 0 - 13 ng/L Final   03/17/2024 02:24 AM 45 0 - 13 ng/L Final   02/29/2024 06:25 PM 12 0 - 13 ng/L Final     BNP   Date/Time Value Ref Range Status   03/17/2024 02:24 AM 3,535 0 - 99 pg/mL Final   02/29/2024 04:44 PM 83 0 - 99 pg/mL Final     HGBA1C   Date/Time Value Ref Range Status   02/29/2024 03:26 PM 6.1 see below % Final   12/16/2021 04:00 PM 6.4 % Final     Comment:          Diagnosis of Diabetes-Adults   Non-Diabetic: < or = 5.6%   Increased risk for developing diabetes: 5.7-6.4%   Diagnostic of diabetes: > or = 6.5%  .       Monitoring of Diabetes                Age (y)     Therapeutic Goal (%)   Adults:          >18           <7.0   Pediatrics:    13-18           <7.5                   7-12           <8.0                   0- 6            7.5-8.5   American Diabetes Association. Diabetes Care 33(S1), Jan 2010.     09/24/2018 12:00 AM 5.9 % Final     Comment:          Diagnosis of Diabetes-Adults   Non-Diabetic: < or = 5.6%   Increased risk for developing diabetes: 5.7-6.4%   Diagnostic of diabetes: > or = 6.5%  .       Monitoring of Diabetes                Age (y)     Therapeutic  Goal (%)   Adults:          >18           <7.0   Pediatrics:    13-18           <7.5                   7-12           <8.0                   0- 6            7.5-8.5   American Diabetes Association. Diabetes Care 33(S1), Jan 2010.       LDLCALC   Date/Time Value Ref Range Status   02/29/2024 03:26 PM 97 <=99 mg/dL Final     Comment:                                 Near   Borderline      AGE      Desirable  Optimal    High     High     Very High     0-19 Y     0 - 109     ---    110-129   >/= 130     ----    20-24 Y     0 - 119     ---    120-159   >/= 160     ----      >24 Y     0 -  99   100-129  130-159   160-189     >/=190       VLDL   Date/Time Value Ref Range Status   02/29/2024 03:26 PM 19 0 - 40 mg/dL Final   02/21/2023 12:00 AM 23 0 - 40 mg/dL Final   10/28/2020 03:02 PM 13 0 - 40 mg/dL Final   05/12/2020 01:29 PM 26 0 - 40 mg/dL Final      Last I/O:  I/O last 3 completed shifts:  In: 770 (16.8 mL/kg) [P.O.:570; IV Piggyback:200]  Out: 2 (0 mL/kg) [Urine:2 (0 mL/kg/hr)]  Weight: 45.8 kg     Past Cardiology Tests (Last 3 Years):  EKG:  ECG 12 Lead 03/17/2024 (Preliminary)      ECG 12 lead 03/17/2024      ECG 12 lead 02/29/2024      ECG 12 lead 02/29/2024    Echo:  Transthoracic Echo (TTE) Complete 03/01/2024    Ejection Fractions:  EF   Date/Time Value Ref Range Status   03/01/2024 09:20 AM 50 %      Cath:  No results found for this or any previous visit from the past 1095 days.    Stress Test:  No results found for this or any previous visit from the past 1095 days.    Cardiac Imaging:  MR cardiac angio chest w and wo IV contrast for morph FUNCT valve DZ and GREAT vessels 03/04/2024      Inpatient Medications:  Scheduled medications   Medication Dose Route Frequency    apixaban  5 mg oral BID    atorvastatin  40 mg oral Nightly    buPROPion XL  150 mg oral q AM    cyanocobalamin  500 mcg oral Daily    levothyroxine  50 mcg oral Daily    metoprolol tartrate  100 mg oral BID    polyethylene glycol  17 g oral  BID     PRN medications   Medication    acetaminophen    Or    acetaminophen    Or    acetaminophen    ondansetron    Or    ondansetron     Continuous Medications   Medication Dose Last Rate       Physical Exam:  GEN: NAD, pleasant  CV: irregularly irregular, S1/S2, no mrg, no significant JVD @ 30 deg  PULM: Lungs CTAB, no wrr, no increased wob on RA  EXT: no LE edema     Assessment/Plan   Sophie Mendosa is a 78 y.o. female with history of acute on chronic systolic heart failure, probably precipitated by AF with RVR, recently diagnosed LV thrombus, noted in MRI, recent right lower lobe segmental PE, without cor pulmonale, hypertension, CKD, severe coronary artery calcification, hypothyroidism presenting with  progressively worsening shortness of breath, PND, orthopnea.  BNP 2 weeks ago was 83 but is now up to 3535 consistent with acute on chronic systolic heart failure decompensation in setting of A-fib with RVR.  Recent hospitalization was reviewed as well as recent cardiac MRI.  No clear ACS or active ischemia.  High-sensitivity troponin was elevated to 45 and 53 most consistent with type II ischemia in the setting of heart failure and A-fib with RVR.     3/4/2024 cardiac MRI  1. Normal LV size (EDVi 68 ml/m2) with mildly reduced systolic  function (LVEF 48%).  2. Severe hypokinesis of the apical inferior segment and true apex.  3. There is evidence of a small LV thrombus measuring 1.1 x 0.58 cm  adherent to the apical inferior wall.  4. Subendocardial infarction of the apical inferior wall (50% wall  thickness) and transmural infarction of the true apex (% wall  thickness).  5. Moderate mitral regurgitation.  6. Normal RV size and systolic function (RVEF 59%).     3/18: NAEO. Largely asymptomatic this AM from cardiac perspective. Does feel her heart beating fast but only if she presses her chest. Having ongoing nausea and back pain. Per pt is constipated and working on getting bowels moving again. Denies  CP, SOB, and lightheadedness. ROS negative. Tele reviewed showing afib vs more likely 2:1 flutter at rate 120s-130s which was seen on EKG 3/17. Only input charted for past 24-48 hrs no output charted. Weighed only once at 101lbs on 3/16. Clinically appears to be approaching if not euvolemic.  3/19: NAEO. Asymptomatic this AM. Tele reviewed showing likely conversion from 2:1 flutter to coarse afib around 16:20 yesterday. Rates now 80s. Appears euvolemic.    Recommendations:  -Cont metop 100 BID  -Digoxin held given supra therapeutic., if rates remained controlled will not resume  -Continue to hold diuresis for now, Cr stable at 1.4, tomorrow may resume PO lasix 40 daily  -NPO @ [place] for RAISA/DCCV tomorrow (scheduled for 11:00am 3/20)  -Follow up with established cardiologist, Dr. CHELSEY Hackett within 3 weeks of hospital discharge.     I saw and evaluated the patient. I personally obtained the key and critical portions of the history and physical exam. I reviewed the fellow's documentation and discussed the patient with the fellow. I agree with the fellow's medical decision making as documented in the fellow's note and have edited it as necessary.  I personally reviewed the patient's recent labs, medications, orders, EKGs, and pertinent cardiac imaging/ echocardiography.     Thank you for allowing me to participate in their care.  Please feel free to call me with any further questions or concerns.        Delon Newsome MD, FACC, MATILDA DANG  Division of Cardiovascular Medicine  System Director, Nuclear Cardiology   Medical Director, [facility] [place], [facility]   Clinical , [employer]  [email or web address]   Office:  [phone]

## 2024-03-19 NOTE — PROGRESS NOTES
INFECTIOUS DISEASE DAILY PROGRESS NOTE    SUBJECTIVE:    No overnight events. No new complaints. Afebrile. No rash/itching/diarrhea.    OBJECTIVE:  VITALS (Last 24 Hours)  /85 (Patient Position: Lying)   Pulse 59   Temp 36.1 °C (97 °F) (Temporal)   Resp 16   Ht 1.524 m (5')   Wt 45.8 kg (101 lb)   SpO2 96%   BMI 19.73 kg/m²     PHYSICAL EXAM:  Gen - NAD  Abd - soft, no ttp, BS present  Ext - no LE edema  Skin - no rash    ABX: None    LABS:  Lab Results   Component Value Date    WBC 7.0 03/19/2024    HGB 13.3 03/19/2024    HCT 39.4 03/19/2024    MCV 81 03/19/2024     03/19/2024     Lab Results   Component Value Date    GLUCOSE 91 03/19/2024    CALCIUM 9.2 03/19/2024     03/19/2024    K 3.5 03/19/2024    CO2 27 03/19/2024    CL 99 03/19/2024    BUN 30 (H) 03/19/2024    CREATININE 1.40 (H) 03/19/2024     Results from last 72 hours   Lab Units 03/19/24  0551   ALK PHOS U/L 78   BILIRUBIN TOTAL mg/dL 0.7   PROTEIN TOTAL g/dL 6.4   ALT U/L 28   AST U/L 19   ALBUMIN g/dL 3.6     Estimated Creatinine Clearance: 24.6 mL/min (A) (by C-G formula based on SCr of 1.4 mg/dL (H)).      ASSESSMENT/PLAN:     Acute on Chronic Systolic HF Exacerbation  Congestive Hepatopathy  CKD  A. Fib with RVR     Afebrile and stable off abx.     Will sign off. Please call back with questions. Thanks!    Miguel Carbajal MD  [facility]  Office #[phone]

## 2024-03-20 ENCOUNTER — APPOINTMENT (OUTPATIENT)
Dept: CARDIOLOGY | Facility: HOSPITAL | Age: 79
DRG: 291 | End: 2024-03-20
Payer: MEDICARE

## 2024-03-20 ENCOUNTER — ANESTHESIA EVENT (OUTPATIENT)
Dept: CARDIOLOGY | Facility: HOSPITAL | Age: 79
DRG: 291 | End: 2024-03-20
Payer: MEDICARE

## 2024-03-20 ENCOUNTER — ANESTHESIA (OUTPATIENT)
Dept: CARDIOLOGY | Facility: HOSPITAL | Age: 79
DRG: 291 | End: 2024-03-20
Payer: MEDICARE

## 2024-03-20 LAB
ALBUMIN SERPL BCP-MCNC: 3.6 G/DL (ref 3.4–5)
ALP SERPL-CCNC: 77 U/L (ref 33–136)
ALT SERPL W P-5'-P-CCNC: 25 U/L (ref 7–45)
ANION GAP SERPL CALC-SCNC: 12 MMOL/L (ref 10–20)
AST SERPL W P-5'-P-CCNC: 16 U/L (ref 9–39)
ATRIAL RATE: 234 BPM
BILIRUB SERPL-MCNC: 0.6 MG/DL (ref 0–1.2)
BUN SERPL-MCNC: 30 MG/DL (ref 6–23)
CALCIUM SERPL-MCNC: 9.2 MG/DL (ref 8.6–10.3)
CHLORIDE SERPL-SCNC: 100 MMOL/L (ref 98–107)
CO2 SERPL-SCNC: 30 MMOL/L (ref 21–32)
CREAT SERPL-MCNC: 1.22 MG/DL (ref 0.5–1.05)
EGFRCR SERPLBLD CKD-EPI 2021: 46 ML/MIN/1.73M*2
ERYTHROCYTE [DISTWIDTH] IN BLOOD BY AUTOMATED COUNT: 15 % (ref 11.5–14.5)
GLUCOSE SERPL-MCNC: 104 MG/DL (ref 74–99)
HCT VFR BLD AUTO: 41.9 % (ref 36–46)
HGB BLD-MCNC: 13.6 G/DL (ref 12–16)
MCH RBC QN AUTO: 26.6 PG (ref 26–34)
MCHC RBC AUTO-ENTMCNC: 32.5 G/DL (ref 32–36)
MCV RBC AUTO: 82 FL (ref 80–100)
NRBC BLD-RTO: 0 /100 WBCS (ref 0–0)
PLATELET # BLD AUTO: 360 X10*3/UL (ref 150–450)
POTASSIUM SERPL-SCNC: 3.6 MMOL/L (ref 3.5–5.3)
PROT SERPL-MCNC: 6.4 G/DL (ref 6.4–8.2)
Q ONSET: 219 MS
QRS COUNT: 12 BEATS
QRS DURATION: 86 MS
QT INTERVAL: 424 MS
QTC CALCULATION(BAZETT): 467 MS
QTC FREDERICIA: 452 MS
R AXIS: 48 DEGREES
RBC # BLD AUTO: 5.12 X10*6/UL (ref 4–5.2)
SODIUM SERPL-SCNC: 138 MMOL/L (ref 136–145)
T AXIS: 236 DEGREES
T OFFSET: 431 MS
VENTRICULAR RATE: 73 BPM
WBC # BLD AUTO: 5.6 X10*3/UL (ref 4.4–11.3)

## 2024-03-20 PROCEDURE — 2500000004 HC RX 250 GENERAL PHARMACY W/ HCPCS (ALT 636 FOR OP/ED)

## 2024-03-20 PROCEDURE — 2500000001 HC RX 250 WO HCPCS SELF ADMINISTERED DRUGS (ALT 637 FOR MEDICARE OP): Performed by: STUDENT IN AN ORGANIZED HEALTH CARE EDUCATION/TRAINING PROGRAM

## 2024-03-20 PROCEDURE — 93320 DOPPLER ECHO COMPLETE: CPT

## 2024-03-20 PROCEDURE — 93312 ECHO TRANSESOPHAGEAL: CPT | Performed by: INTERNAL MEDICINE

## 2024-03-20 PROCEDURE — 2500000004 HC RX 250 GENERAL PHARMACY W/ HCPCS (ALT 636 FOR OP/ED): Performed by: NURSE PRACTITIONER

## 2024-03-20 PROCEDURE — 5A2204Z RESTORATION OF CARDIAC RHYTHM, SINGLE: ICD-10-PCS | Performed by: INTERNAL MEDICINE

## 2024-03-20 PROCEDURE — 36415 COLL VENOUS BLD VENIPUNCTURE: CPT | Performed by: INTERNAL MEDICINE

## 2024-03-20 PROCEDURE — 1200000002 HC GENERAL ROOM WITH TELEMETRY DAILY

## 2024-03-20 PROCEDURE — 93320 DOPPLER ECHO COMPLETE: CPT | Performed by: INTERNAL MEDICINE

## 2024-03-20 PROCEDURE — 3700000001 HC GENERAL ANESTHESIA TIME - INITIAL BASE CHARGE

## 2024-03-20 PROCEDURE — 2500000001 HC RX 250 WO HCPCS SELF ADMINISTERED DRUGS (ALT 637 FOR MEDICARE OP): Performed by: NURSE PRACTITIONER

## 2024-03-20 PROCEDURE — 99222 1ST HOSP IP/OBS MODERATE 55: CPT | Performed by: NURSE PRACTITIONER

## 2024-03-20 PROCEDURE — 92960 CARDIOVERSION ELECTRIC EXT: CPT | Performed by: INTERNAL MEDICINE

## 2024-03-20 PROCEDURE — 99233 SBSQ HOSP IP/OBS HIGH 50: CPT | Performed by: STUDENT IN AN ORGANIZED HEALTH CARE EDUCATION/TRAINING PROGRAM

## 2024-03-20 PROCEDURE — 93005 ELECTROCARDIOGRAM TRACING: CPT

## 2024-03-20 PROCEDURE — 93325 DOPPLER ECHO COLOR FLOW MAPG: CPT | Performed by: INTERNAL MEDICINE

## 2024-03-20 PROCEDURE — 80053 COMPREHEN METABOLIC PANEL: CPT | Performed by: INTERNAL MEDICINE

## 2024-03-20 PROCEDURE — 3700000002 HC GENERAL ANESTHESIA TIME - EACH INCREMENTAL 1 MINUTE

## 2024-03-20 PROCEDURE — 85027 COMPLETE CBC AUTOMATED: CPT | Performed by: INTERNAL MEDICINE

## 2024-03-20 PROCEDURE — 2500000001 HC RX 250 WO HCPCS SELF ADMINISTERED DRUGS (ALT 637 FOR MEDICARE OP): Performed by: ANESTHESIOLOGY

## 2024-03-20 PROCEDURE — 93010 ELECTROCARDIOGRAM REPORT: CPT | Performed by: STUDENT IN AN ORGANIZED HEALTH CARE EDUCATION/TRAINING PROGRAM

## 2024-03-20 RX ORDER — SODIUM CHLORIDE 9 MG/ML
INJECTION, SOLUTION INTRAVENOUS CONTINUOUS PRN
Status: DISCONTINUED | OUTPATIENT
Start: 2024-03-20 | End: 2024-03-20

## 2024-03-20 RX ORDER — LIDOCAINE HYDROCHLORIDE 20 MG/ML
SOLUTION OROPHARYNGEAL AS NEEDED
Status: DISCONTINUED | OUTPATIENT
Start: 2024-03-20 | End: 2024-03-22 | Stop reason: HOSPADM

## 2024-03-20 RX ORDER — LISINOPRIL 10 MG/1
10 TABLET ORAL DAILY
Status: DISCONTINUED | OUTPATIENT
Start: 2024-03-20 | End: 2024-03-21

## 2024-03-20 RX ORDER — METOPROLOL TARTRATE 50 MG/1
50 TABLET ORAL 2 TIMES DAILY
Status: DISCONTINUED | OUTPATIENT
Start: 2024-03-20 | End: 2024-03-22

## 2024-03-20 RX ORDER — FUROSEMIDE 40 MG/1
40 TABLET ORAL DAILY PRN
Status: DISCONTINUED | OUTPATIENT
Start: 2024-03-20 | End: 2024-03-22 | Stop reason: HOSPADM

## 2024-03-20 RX ORDER — PROPOFOL 10 MG/ML
INJECTION, EMULSION INTRAVENOUS AS NEEDED
Status: DISCONTINUED | OUTPATIENT
Start: 2024-03-20 | End: 2024-03-20

## 2024-03-20 RX ORDER — FENTANYL CITRATE 50 UG/ML
INJECTION, SOLUTION INTRAMUSCULAR; INTRAVENOUS AS NEEDED
Status: DISCONTINUED | OUTPATIENT
Start: 2024-03-20 | End: 2024-03-20

## 2024-03-20 RX ADMIN — ATORVASTATIN CALCIUM 40 MG: 40 TABLET, FILM COATED ORAL at 20:15

## 2024-03-20 RX ADMIN — SODIUM CHLORIDE 250 ML: 9 INJECTION, SOLUTION INTRAVENOUS at 23:47

## 2024-03-20 RX ADMIN — PROPOFOL 50 MG: 10 INJECTION, EMULSION INTRAVENOUS at 11:33

## 2024-03-20 RX ADMIN — POLYETHYLENE GLYCOL 3350 17 G: 17 POWDER, FOR SOLUTION ORAL at 20:16

## 2024-03-20 RX ADMIN — PROPOFOL 50 MG: 10 INJECTION, EMULSION INTRAVENOUS at 11:21

## 2024-03-20 RX ADMIN — APIXABAN 5 MG: 5 TABLET, FILM COATED ORAL at 20:15

## 2024-03-20 RX ADMIN — PROPOFOL 50 MG: 10 INJECTION, EMULSION INTRAVENOUS at 11:28

## 2024-03-20 RX ADMIN — FENTANYL CITRATE 50 MCG: 50 INJECTION, SOLUTION INTRAMUSCULAR; INTRAVENOUS at 11:19

## 2024-03-20 RX ADMIN — LIDOCAINE HYDROCHLORIDE 15 ML: 20 SOLUTION ORAL; TOPICAL at 11:19

## 2024-03-20 RX ADMIN — LISINOPRIL 10 MG: 10 TABLET ORAL at 18:23

## 2024-03-20 RX ADMIN — BUPROPION HYDROCHLORIDE 150 MG: 150 TABLET, EXTENDED RELEASE ORAL at 20:15

## 2024-03-20 RX ADMIN — BENZOCAINE 2 SPRAY: 200 SPRAY DENTAL; ORAL; PERIODONTAL at 11:18

## 2024-03-20 RX ADMIN — FENTANYL CITRATE 50 MCG: 50 INJECTION, SOLUTION INTRAMUSCULAR; INTRAVENOUS at 11:25

## 2024-03-20 RX ADMIN — PROPOFOL 50 MG: 10 INJECTION, EMULSION INTRAVENOUS at 11:38

## 2024-03-20 RX ADMIN — SODIUM CHLORIDE: 9 INJECTION, SOLUTION INTRAVENOUS at 11:12

## 2024-03-20 ASSESSMENT — ENCOUNTER SYMPTOMS
NEUROLOGICAL NEGATIVE: 1
EYES NEGATIVE: 1
ALLERGIC/IMMUNOLOGIC NEGATIVE: 1
GASTROINTESTINAL NEGATIVE: 1
HEMATOLOGIC/LYMPHATIC NEGATIVE: 1
MUSCULOSKELETAL NEGATIVE: 1
PSYCHIATRIC NEGATIVE: 1
ENDOCRINE NEGATIVE: 1
CARDIOVASCULAR NEGATIVE: 1
CONSTITUTIONAL NEGATIVE: 1
RESPIRATORY NEGATIVE: 1

## 2024-03-20 ASSESSMENT — PAIN SCALES - GENERAL
PAINLEVEL_OUTOF10: 0 - NO PAIN

## 2024-03-20 ASSESSMENT — COGNITIVE AND FUNCTIONAL STATUS - GENERAL
HELP NEEDED FOR BATHING: A LITTLE
MOVING TO AND FROM BED TO CHAIR: A LITTLE
STANDING UP FROM CHAIR USING ARMS: A LITTLE
DRESSING REGULAR UPPER BODY CLOTHING: A LITTLE
MOBILITY SCORE: 19
WALKING IN HOSPITAL ROOM: A LITTLE
DRESSING REGULAR LOWER BODY CLOTHING: A LITTLE
TURNING FROM BACK TO SIDE WHILE IN FLAT BAD: A LITTLE
PERSONAL GROOMING: A LITTLE
CLIMB 3 TO 5 STEPS WITH RAILING: A LITTLE
TOILETING: A LITTLE
DAILY ACTIVITIY SCORE: 19

## 2024-03-20 ASSESSMENT — PAIN - FUNCTIONAL ASSESSMENT
PAIN_FUNCTIONAL_ASSESSMENT: 0-10

## 2024-03-20 NOTE — ANESTHESIA PROCEDURE NOTES
Peripheral IV  Date/Time: 3/20/2024 11:28 AM      Placement  Needle size: 22 G  Laterality: right  Location: antecubital  Local anesthetic: none  Site prep: alcohol  Technique: anatomical landmarks  Attempts: 1

## 2024-03-20 NOTE — PROGRESS NOTES
PROGRESS NOTE - INTERNAL MEDICINE     PATIENT NAME:  Sophie Mendosa    MRN:  [id number]  SERVICE DATE:  3/20/2024       ADMITTING PHYSICIAN:  Wallace Finn MD    ASSESSMENT AND PLAN    Principal Problem:    Pneumonia due to infectious organism, unspecified laterality, unspecified part of lung  Active Problems:    Atrial flutter (CMS/HCC)    SINTIA on CKD 3A. Creat close to baseline off diuretics.  Acute on chronic systolic heart failure, probably precipitated by AF with RVR.  New onset AF - RVR on adm; CVR this AM  Recently diagnosed LV thrombus, noted in MRI  Recent right lower lobe segmental PE, without cor pulmonale  Hypertension  Severe coronary artery calcification  Hypothyroidism, with suppressed TSH on oral supplement therapy  Constipation  ?previously known liver spot (per pt) - was under follow up with Dr Sergio Soto     PLAN:  Resume metoprolol, Eliquis. Digoxin added.  Diuretics held based on labs.  Decrease dose of levothyroxine  Cardiology consult noted  Off Abx per ID recomm.  GI consult noted. OP MRI liver.   Defer DVT prophylaxis while on Eliquis.  Monitor for bleeding while on Eliquis  2 g sodium, cardiac diet with fluid restriction.  RAISA/DCCV planned for abel.        DISPOSITION PLAN:  DC when cleared by cardiol.     INTERVAL HISTORY OF PRESENT ILLNESS:  HR better and lower today, though irregular. No chest pain/sob. No n/v/d. Oral intake mod. Feeling better. No fever/chills. acute events from last 24 hrs reviewed.     Pertinent ROS:  No Bleeding / No rashes     Discussed with nursing and case management team and the specialists involved in this patient's care. Reviewed the EMR and documentation from other care-givers.        OBJECTIVE  PHYSICAL EXAM:      GENERAL: AAOx3, cooperative resting comfortably. thin  SKIN: Skin turgor normal.   HEENT: no epistaxis, Moist mucosa.  LUNGS: Vesicular breath sounds, with +bibasal fine crepitations.   CARDIAC: IRREGULAR. no rubs  ABDOMEN: Abdomen soft,  +RUQ/epig discomfort/tenderness. BS+  EXTREMITIES: No edema, Good capillary refill.   NEURO: Insight GOOD. No invol movements  MUSCULOSKELETAL: No acute inflammation            3/19/2024     7:41 AM 3/19/2024    12:18 PM 3/19/2024     3:44 PM 3/19/2024     8:04 PM 3/19/2024    11:56 PM 3/20/2024     4:28 AM 3/20/2024     7:50 AM   Vitals   Systolic 147 156 127 116 129 147 142   Diastolic 87 96 85 79 82 97 91   Heart Rate 70 76 79 69 68 76 63   Temp 36.7 °C (98.1 °F) 36.6 °C (97.8 °F) 36.6 °C (97.8 °F) 37 °C (98.6 °F) 36.8 °C (98.2 °F) 36.1 °C (97 °F) 36.7 °C (98.1 °F)   Resp   18 18 18 16 16     Body mass index is 19.73 kg/m².    Intake/Output Summary (Last 24 hours) at 3/20/2024 0801  Last data filed at 3/19/2024 1800  Gross per 24 hour   Intake 120 ml   Output --   Net 120 ml           Current Facility-Administered Medications:     acetaminophen (Tylenol) tablet 650 mg, 650 mg, oral, q4h PRN **OR** acetaminophen (Tylenol) oral liquid 650 mg, 650 mg, oral, q4h PRN **OR** acetaminophen (Tylenol) suppository 650 mg, 650 mg, rectal, q4h PRN, Wallace Finn MD    apixaban (Eliquis) tablet 5 mg, 5 mg, oral, BID, Wallace Finn MD, 5 mg at 03/19/24 2012    atorvastatin (Lipitor) tablet 40 mg, 40 mg, oral, Nightly, Wallace Finn MD, 40 mg at 03/19/24 2012    buPROPion XL (Wellbutrin XL) 24 hr tablet 150 mg, 150 mg, oral, q AM, Wallace Finn MD, 150 mg at 03/19/24 1228    cyanocobalamin (Vitamin B-12) tablet 500 mcg, 500 mcg, oral, Daily, Wallace Finn MD, 500 mcg at 03/17/24 1313    levothyroxine (Synthroid, Levoxyl) tablet 50 mcg, 50 mcg, oral, Daily, Wallace Finn MD, 50 mcg at 03/19/24 0558    metoprolol tartrate (Lopressor) tablet 100 mg, 100 mg, oral, BID, Naveed Pickett MD, 100 mg at 03/19/24 2011    ondansetron (Zofran) tablet 4 mg, 4 mg, oral, q8h PRN **OR** ondansetron (Zofran) injection 4 mg, 4 mg, intravenous, q8h PRN, Wallace Finn MD    polyethylene glycol  "(Glycolax, Miralax) packet 17 g, 17 g, oral, BID, Wallace Finn MD, 17 g at 03/19/24 1228    DATA:   Diagnostic tests reviewed for today's visit:    Most recent labs  Results from last 7 days   Lab Units 03/20/24  0517 03/19/24  0551 03/18/24  0517   WBC AUTO x10*3/uL 5.6 7.0 8.4   HEMOGLOBIN g/dL 13.6 13.3 13.3   HEMATOCRIT % 41.9 39.4 39.5   PLATELETS AUTO x10*3/uL 360 333 321     Results from last 7 days   Lab Units 03/20/24  0516 03/19/24  0551 03/18/24  0517   SODIUM mmol/L 138 138 136   POTASSIUM mmol/L 3.6 3.5 4.0   CHLORIDE mmol/L 100 99 98   CO2 mmol/L 30 27 27   BUN mg/dL 30* 30* 26*   CREATININE mg/dL 1.22* 1.40* 1.42*   CALCIUM mg/dL 9.2 9.2 9.3   PROTEIN TOTAL g/dL 6.4 6.4 6.3*   BILIRUBIN TOTAL mg/dL 0.6 0.7 0.9   ALK PHOS U/L 77 78 81   ALT U/L 25 28 32   AST U/L 16 19 30   GLUCOSE mg/dL 104* 91 82             No results found for: \"TROPONINT\"         CT abdomen pelvis w IV contrast   Final Result   1. Heterogeneous enhancement of the liver, may be seen with hepatic   congestion or hepatitis. Please correlate with laboratory values.   2. Hepatic lesions, underlying neoplasm is not excludable.   Nonemergent contrast-enhanced MRI/MRCP can be obtained for further   evaluation.   3. Diffuse wall thickening at the stomach, may be secondary to   underdistention versus gastritis. Evaluation with upper endoscopy as   clinically warranted.   4. Stool ball distending the rectum.   5. Renal cortical scarring, may represent sequela of prior infection.   6. Enlarged fibroid uterus.   7. Small right pleural effusion. Mild bibasilar atelectasis.   8. Cardiomegaly with right atrial enlargement.   9. Severe degenerative changes at the right hip.             MACRO:   Critical Finding:  See findings. Notification was initiated on   3/17/2024 at 4:50 am by  Neena Diana.  (**-YCF-**) Instructions:        Signed by: Neena Diana 3/17/2024 4:56 AM   Dictation workstation:   [id number]      XR chest 2 views "   Final Result   New focal wedge-shaped consolidation at the peripheral right lung   base adjacent to the lateral costophrenic angle with a small new   right pleural effusion. Given these features, a pneumonia and   pulmonary infarct are differential considerations depending on the   clinical context. Note, patient had recent posterior right lower lobe   pulmonary embolism on 02/29/2024. CT pulmonary embolism protocol   would better evaluate as deemed clinically warranted.             MACRO:   None.        Signed by: Logan Akhtar 3/17/2024 3:22 AM   Dictation workstation:   [id number]      Transesophageal Echo (RAISA) With Possible Cardioversion    (Results Pending)         SIGNATURE: Wallace Finn MD PATIENT NAME: Sophie Mendosa   DATE: 3/20/2024 MRN: [id number]   TIME: 8:01 AM

## 2024-03-20 NOTE — PROGRESS NOTES
03/20/24 1511   Discharge Planning   Home or Post Acute Services In home services   Type of Home Care Services Home PT;Home OT   Patient expects to be discharged to: Home   Does the patient need discharge transport arranged? No     3/20/24 1512  Patient to have RAISA with cardioversion today.  Will follow up with patient to see if she wants [facility] for PT/OT.  Harriett Be RN TCC

## 2024-03-20 NOTE — ANESTHESIA PREPROCEDURE EVALUATION
Patient: Sophie Mendosa    Procedure Information       Date/Time: 03/20/24 1100    Scheduled providers: Mohamud Jimenez DO; Kayden Alonso MD; REMBERTO Galvan    Procedure: TRANSESOPHAGEAL ECHO (RAISA) W/ POSSIBLE CARDIOVERSION    Location: [facility]; [facility]            Relevant Problems   Cardiovascular   (+) Atrial flutter (CMS/HCC)   (+) Benign essential hypertension   (+) Hyperlipidemia, unspecified   (+) Hypertension   (+) Peripheral artery disease (CMS/HCC)   (+) Pulmonary embolism on right (CMS/[facility])   (+) Pulmonary embolism, other, unspecified chronicity, unspecified whether acute cor pulmonale present (CMS/[facility])   (+) Sinus bradycardia      Endocrine   (+) Hyperplastic goiter   (+) Nontoxic (diffuse) goiter   (+) Primary hypothyroidism      GI   (+) Acid reflux      /Renal   (+) Radiographic contrast agent nephropathy   (+) Stage 3a chronic kidney disease (CMS/[facility])      Neuro/Psych   (+) Depression, major, single episode, mild (CMS/[facility])   (+) Left carpal tunnel syndrome   (+) Right carpal tunnel syndrome   (+) Severe carpal tunnel syndrome      Pulmonary   (+) Pneumonia due to infectious organism, unspecified laterality, unspecified part of lung   (+) Pulmonary embolism on right (CMS/[facility])   (+) Pulmonary embolism, other, unspecified chronicity, unspecified whether acute cor pulmonale present (CMS/[facility])   (+) Pulmonary nodules      Hematology   (+) Anemia, unspecified      Musculoskeletal   (+) Degeneration of lumbar intervertebral disc   (+) Left carpal tunnel syndrome   (+) Lumbar canal stenosis   (+) Rheumatoid arthritis (CMS/[facility])   (+) Right carpal tunnel syndrome   (+) Severe carpal tunnel syndrome      Infectious Disease   (+) Pneumonia due to infectious organism, unspecified laterality, unspecified part of lung      Other   (+) Arthritis of right hip   (+) Gout of right wrist   (+) Rheumatoid arthritis (CMS/[facility])       Clinical information reviewed:    Allergies  Meds                 Past Medical History:   Diagnosis Date    Arthritis     Atrial fibrillation (CMS/HCC)     CHF (congestive heart failure) (CMS/[facility])     CKD (chronic kidney disease)     Diabetes mellitus (CMS/[facility])     Hypertension     Hypothyroidism     LV (left ventricular) mural thrombus 03/01/2024    Apical    Major depressive disorder, single episode, unspecified     Pulmonary embolus (CMS/HCC) 02/29/2024    Stroke (CMS/[facility])       Past Surgical History:   Procedure Laterality Date    BREAST BIOPSY      CT GUIDED PERCUTANEOUS BIOPSY BONE DEEP  04/18/2023    CT GUIDED PERCUTANEOUS BIOPSY BONE DEEP [facility] CT    MR HEAD ANGIO WO IV CONTRAST  09/24/2018    MR HEAD ANGIO WO IV CONTRAST 9/24/2018 [facility]    MR NECK ANGIO WO IV CONTRAST  09/24/2018    MR NECK ANGIO WO IV CONTRAST 9/24/2018 [facility] CLINICAL [facility]GAC[facility]    TONSILLECTOMY      TUBAL LIGATION       Social History     Tobacco Use    Smoking status: Every Day     Types: Cigarettes    Smokeless tobacco: Never    Tobacco comments:     Working on quitting   Vaping Use    Vaping Use: Never used   Substance Use Topics    Alcohol use: Never    Drug use: Never      Current Outpatient Medications   Medication Instructions    apixaban (Eliquis) 5 mg (74 tabs) tablet Take 2 tablets (10 mg) by mouth 2 times a day for 7 days, then take 1 tablet (5 mg) by mouth 2 times a day.    atorvastatin (LIPITOR) 40 mg, oral, Daily    buPROPion XL (WELLBUTRIN XL) 150 mg, oral, Every morning, Do not crush, chew, or split.    cyanocobalamin (VITAMIN B-12) 500 mcg, oral, Daily    levothyroxine (SYNTHROID, LEVOXYL) 75 mcg, oral, Daily    metoprolol tartrate (LOPRESSOR) 25 mg, oral, 2 times daily      Allergies   Allergen Reactions    Gabapentin Other    Garlic Unknown        Chemistry    Lab Results   Component Value Date/Time     03/20/2024 0516    K 3.6 03/20/2024 0516     03/20/2024 0516    CO2 30 03/20/2024 0516    BUN 30 (H) 03/20/2024 0516    CREATININE 1.22 (H) 03/20/2024  "0516    Lab Results   Component Value Date/Time    CALCIUM 9.2 03/20/2024 0516    ALKPHOS 77 03/20/2024 0516    AST 16 03/20/2024 0516    ALT 25 03/20/2024 0516    BILITOT 0.6 03/20/2024 0516          Lab Results   Component Value Date    HGBA1C 6.1 (H) 02/29/2024     Lab Results   Component Value Date/Time    WBC 5.6 03/20/2024 0517    HGB 13.6 03/20/2024 0517    HCT 41.9 03/20/2024 0517     03/20/2024 0517     Lab Results   Component Value Date/Time    PROTIME 12.1 02/29/2024 1926    INR 1.1 02/29/2024 1926     No results found for: \"ABORH\"  Encounter Date: 03/17/24   Electrocardiogram, 12-lead PRN ACS symptoms   Result Value    Ventricular Rate 86    Atrial Rate 300    QRS Duration 88    QT Interval 386    QTC Calculation(Bazett) 461    R Axis 54    T Axis 249    QRS Count 14    Q Onset 223    T Offset 416    QTC Fredericia 435    Narrative    Atrial fibrillation  Septal infarct , age undetermined  ST & Marked T wave abnormality, consider anterolateral ischemia  Abnormal ECG  Confirmed by Naveed Chu ([id number]) on 3/19/2024 12:52:44 PM     No results found for this or any previous visit from the past 1095 days.     Echo 3/1/2024:  Left Ventricle: The left ventricular systolic function is low normal, with an estimated ejection fraction of 50-55%. Wall motion is abnormal. The left ventricular cavity size is decreased. The left ventricular septal wall thickness is mildly increased. There is mildly increased left ventricular posterior wall thickness. There is mild concentric left ventricular hypertrophy. Left ventricular diastolic filling was indeterminate. A speherical Echodensity measuring roughly 1.4 cm X 1 cm is seen attached to the inferior apical wall. There is mild hypokinesia in this region. This is felt to represent thrombus although cardiac mass is not fully excluded. Consider cardiac MRI.  LV Wall Scoring:  The apical inferior segment is hypokinetic.     Left Atrium: The left atrium is " moderately dilated.  Right Ventricle: The right ventricle is normal in size. There is normal right ventricular global systolic function.  Right Atrium: The right atrium is mildly dilated.  Aortic Valve: The aortic valve appears structurally normal. There is no evidence of aortic valve regurgitation. The peak instantaneous gradient of the aortic valve is 5.5 mmHg. The mean gradient of the aortic valve is 3.3 mmHg.  Mitral Valve: The mitral valve is mildly thickened. There is mild mitral valve regurgitation which is centrally directed.  Tricuspid Valve: The tricuspid valve is structurally normal. There is mild tricuspid regurgitation. The tricuspid valve regurgitant jet flows toward the atrial septum. The Doppler estimated RVSP is within normal limits at 26.9 mmHg.  Pulmonic Valve: The pulmonic valve is not well visualized. The pulmonic valve regurgitation was not well visualized.  Pericardium: There is no pericardial effusion noted.  Aorta: The aortic root is normal. There is no dilatation of the ascending aorta. There is no dilatation of the aortic root.  Systemic Veins: The inferior vena cava appears to be of normal size.  In comparison to the previous echocardiogram(s): Compared with study from 9/26/2018,. Interval decline in the claculated LVEF and development of LV Thrombus.        CONCLUSIONS:   1. Left ventricular systolic function is low normal with a 50-55% estimated ejection fraction.   2. Apical inferior segment is abnormal.   3. The left atrium is moderately dilated.   4. RVSP within normal limits.   5. Left ventricular cavity size is decreased.   6. A speherical Echodensity measuring roughly 1.4 cm X 1 cm is seen attached to the inferior apical wall. There is mild hypokinesia in this region. This is felt to represent thrombus although cardiac mass is not fully excluded. Consider cardiac MRI.   7. Critical Findings of LV Thrombus communicated to Primary Service and Cardiology Consult Service at 09:38 via  Epic Secure Chat.  Visit Vitals  /88   Pulse 74   Temp 36.7 °C (98.1 °F) (Temporal)   Resp 16   Ht 1.524 m (5')   Wt 45.8 kg (101 lb)   SpO2 95%   BMI 19.73 kg/m²   OB Status Menopausal   Smoking Status Every Day   BSA 1.39 m²     No data recorded     Physical Exam    Airway  Mallampati: II  TM distance: >3 FB  Neck ROM: full     Cardiovascular   Rhythm: regular  Rate: normal     Dental - normal exam     Pulmonary   Breath sounds clear to auscultation     Abdominal - normal exam              Anesthesia Plan    History of general anesthesia?: yes  History of complications of general anesthesia?: no    ASA 4     MAC     intravenous induction   Anesthetic plan and risks discussed with patient.    Plan discussed with CRNA and CAA.

## 2024-03-20 NOTE — H&P
History Of Present Illness  Sophie Mendosa is a 78 y.o. female presenting with atrial fib/flutter. PMH includes SINTIA on CKD, HFpEF, recently dx LV thrombus on MRI, recent RLL PE, HTN, hypothyroidism, constipation     Past Medical History  She has a past medical history of Arthritis, Atrial fibrillation (CMS/[facility]), CHF (congestive heart failure) (CMS/[facility]), CKD (chronic kidney disease), Diabetes mellitus (CMS/[facility]), Hypertension, Hypothyroidism, LV (left ventricular) mural thrombus (03/01/2024), Major depressive disorder, single episode, unspecified, Pulmonary embolus (CMS/[facility]) (02/29/2024), and Stroke (CMS/[facility]).    Surgical History  She has a past surgical history that includes Tonsillectomy; Tubal ligation; MR angio head wo IV contrast (09/24/2018); MR angio neck wo IV contrast (09/24/2018); CT guided percutaneous biopsy bone deep (04/18/2023); and Breast biopsy.     Social History  She reports that she has been smoking cigarettes. She has never used smokeless tobacco. She reports that she does not drink alcohol and does not use drugs.    Family History  Family History   Problem Relation Name Age of Onset    Other (cardiac disorder) Mother      Other (CVA) Mother      Heart failure Mother      Heart attack Mother      Hypertension Mother      Prostate cancer Father      Diabetes Sister      Hypertension Sister      Diabetes Brother      Other (cardiac disorder) Brother      Heart attack Brother      Hypertension Brother      Heart failure Other PATERNAL HALF SISTER         Allergies  Gabapentin and Garlic    Home Medications  No current facility-administered medications on file prior to encounter.     Current Outpatient Medications on File Prior to Encounter   Medication Sig Dispense Refill    apixaban (Eliquis) 5 mg (74 tabs) tablet Take 2 tablets (10 mg) by mouth 2 times a day for 7 days, then take 1 tablet (5 mg) by mouth 2 times a day. 74 tablet 0    atorvastatin (Lipitor) 40 mg tablet Take 1 tablet (40 mg) by  mouth once daily. 90 tablet 1    buPROPion XL (Wellbutrin XL) 150 mg 24 hr tablet Take 1 tablet (150 mg) by mouth once daily in the morning. Do not crush, chew, or split. 90 tablet 1    cyanocobalamin (Vitamin B-12) 500 mcg tablet Take 1 tablet (500 mcg) by mouth once daily.      levothyroxine (Synthroid, Levoxyl) 75 mcg tablet Take 1 tablet (75 mcg) by mouth once daily. Do not start before March 5, 2024. 30 tablet 0    metoprolol tartrate (Lopressor) 25 mg tablet Take 1 tablet (25 mg) by mouth 2 times a day. 60 tablet 0          Inpatient Medications:  Scheduled medications   Medication Dose Route Frequency    apixaban  5 mg oral BID    atorvastatin  40 mg oral Nightly    buPROPion XL  150 mg oral q AM    cyanocobalamin  500 mcg oral Daily    levothyroxine  50 mcg oral Daily    metoprolol tartrate  100 mg oral BID    polyethylene glycol  17 g oral BID     PRN medications   Medication    acetaminophen    Or    acetaminophen    Or    acetaminophen    ondansetron    Or    ondansetron     Continuous Medications   Medication Dose Last Rate         Review of Systems   Constitutional: Negative.    HENT: Negative.     Eyes: Negative.    Respiratory: Negative.     Cardiovascular: Negative.    Gastrointestinal: Negative.    Endocrine: Negative.    Genitourinary: Negative.    Musculoskeletal: Negative.    Skin: Negative.    Allergic/Immunologic: Negative.    Neurological: Negative.    Hematological: Negative.    Psychiatric/Behavioral: Negative.            Physical Exam  Constitutional:       General: She is awake. She is not in acute distress.     Appearance: She is not ill-appearing or toxic-appearing.   HENT:      Nose: Nose normal.      Mouth/Throat:      Mouth: Mucous membranes are moist.      Pharynx: Oropharynx is clear.   Eyes:      Extraocular Movements: Extraocular movements intact.      Conjunctiva/sclera: Conjunctivae normal.   Cardiovascular:      Rate and Rhythm: Normal rate. Rhythm irregular.      Pulses:  Normal pulses.           Radial pulses are 2+ on the right side and 2+ on the left side.        Dorsalis pedis pulses are 2+ on the right side and 2+ on the left side.      Heart sounds: Normal heart sounds. No murmur heard.  Pulmonary:      Effort: Pulmonary effort is normal.      Breath sounds: Normal breath sounds and air entry.   Abdominal:      General: Bowel sounds are normal. There is no distension.      Palpations: Abdomen is soft.      Tenderness: There is no abdominal tenderness.   Musculoskeletal:      Cervical back: Normal range of motion and neck supple.      Right lower leg: No edema.      Left lower leg: No edema.   Skin:     General: Skin is warm and dry.   Neurological:      General: No focal deficit present.      Mental Status: She is alert and oriented to person, place, and time. Mental status is at baseline.      GCS: GCS eye subscore is 4. GCS verbal subscore is 5. GCS motor subscore is 6.   Psychiatric:         Mood and Affect: Mood normal.         Behavior: Behavior normal. Behavior is cooperative.         Thought Content: Thought content normal.         Judgment: Judgment normal.        Sedation Plan    ASA 3     Mallampati class: II.         Last Recorded Vitals  Blood pressure 121/88, pulse 74, temperature 36.7 °C (98.1 °F), temperature source Temporal, resp. rate 16, height 1.524 m (5'), weight 45.8 kg (101 lb), SpO2 95 %.         Vitals from the Past 24 Hours  Heart Rate:  [63-83]   Temp:  [36.1 °C (97 °F)-37 °C (98.6 °F)]   Resp:  [16-18]   BP: (116-156)/(73-97)   SpO2:  [93 %-98 %]          Relevant Results    Labs    CBC:   Recent Labs     03/20/24  0517 03/19/24  0551 03/18/24  0517 03/17/24  0224 03/05/24  0602 03/04/24  0659   WBC 5.6 7.0 8.4 9.2 6.0 5.8   HGB 13.6 13.3 13.3 11.6* 11.0* 11.4*   HCT 41.9 39.4 39.5 35.7* 34.4* 35.1*    333 321 298 232 238   MCV 82 81 81 82 82 82     BMP/CMP:   Recent Labs     03/20/24  0516 03/19/24  0551 03/18/24  0517 03/17/24  0224  "03/05/24  0602 03/04/24  0659 03/01/24  0459 02/29/24  1926 02/29/24  1825    138 136 135* 136 136   < > 136 133*   K 3.6 3.5 4.0 4.6 4.6 4.5   < > 4.2 6.3*    99 98 99 103 102   < > 101 100   BUN 30* 30* 26* 26* 20 24*   < > 24* 25*   CREATININE 1.22* 1.40* 1.42* 1.21* 1.17* 1.21*   < > 1.10* 1.15*   CO2 30 27 27 21 28 29   < > 25 24   CALCIUM 9.2 9.2 9.3 9.7 8.9 9.0   < > 10.2 10.6*   PROT 6.4 6.4 6.3* 6.9  --   --   --  6.9 7.7   BILITOT 0.6 0.7 0.9 0.9  --   --   --  0.7 0.7   ALKPHOS 77 78 81 93  --   --   --  90 93   ALT 25 28 32 37  --   --   --  8 8   AST 16 19 30 31  --   --   --  12 23   GLUCOSE 104* 91 82 168* 94 101*   < > 93 87    < > = values in this interval not displayed.      Lipid Panel:   Recent Labs     02/29/24  1526 02/21/23  0000 10/28/20  1502 05/12/20  1329 09/24/18  1628   CHOL 210* 217* 160 209* 142   HDL 94.1 104.1 92.5 91.3 64.5   CHHDL 2.2 2.1 1.7 2.3 2.2   VLDL 19 23 13 26 14   TRIG 93 114 67 128 72   NHDL 116  --   --   --   --      Cardiac   Results from last 7 days   Lab Units 03/17/24  0601 03/17/24  0224   TROPHS ng/L 53* 45*   BNP pg/mL  --  3,535*      Hemoglobin A1C:   Recent Labs     02/29/24  1526 12/16/21  1600 09/24/18  0000   HGBA1C 6.1* 6.4* 5.9     TSH/ Free T4:   Recent Labs     02/29/24  1943 02/29/24  1825 02/29/24  1526 01/21/23  1020 12/16/21  1600 10/05/20  1400 05/12/20  1329 10/11/18  0600 09/24/18  1628   TSH  --  0.27* 0.30* 1.79 0.44 0.51 4.31*  --  19.59*   FREET4 1.21*  --  1.59*  --   --   --   --  1.22  --      Iron:   Recent Labs     03/17/24  0224 02/29/24  1644 09/25/18  0557   BNP 3,535* 83 553*     Coag:     ABO: No results found for: \"ABO\"    Past Cardiology Tests (Last 3 Years):  EKG:  Encounter Date: 03/17/24   Electrocardiogram, 12-lead PRN ACS symptoms   Result Value    Ventricular Rate 86    Atrial Rate 300    QRS Duration 88    QT Interval 386    QTC Calculation(Bazett) 461    R Axis 54    T Axis 249    QRS Count 14    Q Onset 223 "    T Offset 416    QTC Fredericia 435    Narrative    Atrial fibrillation  Septal infarct , age undetermined  ST & Marked T wave abnormality, consider anterolateral ischemia  Abnormal ECG  Confirmed by Naveed Chu ([id number]) on 3/19/2024 12:52:44 PM     Echo:  Results for orders placed during the hospital encounter of 02/29/24    Transthoracic Echo (TTE) Complete    Narrative  [facility], [address] [place] [address]  Tel [phone] and Fax [phone]    TRANSTHORACIC ECHOCARDIOGRAM REPORT      Patient Name:      AMY NO    Reading Physician:    [id number] Mohamud Jimenez DO  Study Date:        3/1/2024            Ordering Provider:    [id number] CHEL BLAIR  MRN/PID:           [id number]            Fellow:  Accession#:        [id number]        Nurse:  Date of Birth/Age: 1945 / 78      Sonographer:          Natalya levine RDCS  Gender:            F                   Additional Staff:  Height:            150.00 cm           Admit Date:           2/29/2024  Weight:            46.20 kg            Admission Status:     Observation -  Priority discharge  BSA / BMI:         1.39 m2 / 20.53     Encounter#:           [id number]  kg/m2  Department Location:  [facility] Non  Invasive  Blood Pressure: 115 /69 mmHg    Study Type:    TRANSTHORACIC ECHO (TTE) COMPLETE  Diagnosis/ICD: Encounter for other specified special examinations-Z01.89  CPT Code:      Echo Complete w Full Doppler-91390    Patient History:  Pertinent History: HTN.    Study Detail: The following Echo studies were performed: 2D, M-Mode, Doppler,  color flow and 3D. Technically challenging study due to body  habitus and prominent lung artifact.      Critical Event  Critical Event: Test was completed as per department protocol.  Critical Finding: Echo dencity seen in LV.  Time Test was Completed: 9:00:10 AM  Notified: Garyu.  Attending notification time: 9:13:18 AM    PHYSICIAN  INTERPRETATION:  Left Ventricle: The left ventricular systolic function is low normal, with an estimated ejection fraction of 50-55%. Wall motion is abnormal. The left ventricular cavity size is decreased. The left ventricular septal wall thickness is mildly increased. There is mildly increased left ventricular posterior wall thickness. There is mild concentric left ventricular hypertrophy. Left ventricular diastolic filling was indeterminate. A speherical Echodensity measuring roughly 1.4 cm X 1 cm is seen attached to the inferior apical wall. There is mild hypokinesia in this region. This is felt to represent thrombus although cardiac mass is not fully excluded. Consider cardiac MRI.  LV Wall Scoring:  The apical inferior segment is hypokinetic.    Left Atrium: The left atrium is moderately dilated.  Right Ventricle: The right ventricle is normal in size. There is normal right ventricular global systolic function.  Right Atrium: The right atrium is mildly dilated.  Aortic Valve: The aortic valve appears structurally normal. There is no evidence of aortic valve regurgitation. The peak instantaneous gradient of the aortic valve is 5.5 mmHg. The mean gradient of the aortic valve is 3.3 mmHg.  Mitral Valve: The mitral valve is mildly thickened. There is mild mitral valve regurgitation which is centrally directed.  Tricuspid Valve: The tricuspid valve is structurally normal. There is mild tricuspid regurgitation. The tricuspid valve regurgitant jet flows toward the atrial septum. The Doppler estimated RVSP is within normal limits at 26.9 mmHg.  Pulmonic Valve: The pulmonic valve is not well visualized. The pulmonic valve regurgitation was not well visualized.  Pericardium: There is no pericardial effusion noted.  Aorta: The aortic root is normal. There is no dilatation of the ascending aorta. There is no dilatation of the aortic root.  Systemic Veins: The inferior vena cava appears to be of normal size.  In comparison  to the previous echocardiogram(s): Compared with study from 9/26/2018,. Interval decline in the claculated LVEF and development of LV Thrombus.      CONCLUSIONS:  1. Left ventricular systolic function is low normal with a 50-55% estimated ejection fraction.  2. Apical inferior segment is abnormal.  3. The left atrium is moderately dilated.  4. RVSP within normal limits.  5. Left ventricular cavity size is decreased.  6. A speherical Echodensity measuring roughly 1.4 cm X 1 cm is seen attached to the inferior apical wall. There is mild hypokinesia in this region. This is felt to represent thrombus although cardiac mass is not fully excluded. Consider cardiac MRI.  7. Critical Findings of LV Thrombus communicated to Primary Service and Cardiology Consult Service at 09:38 via [place].    QUANTITATIVE DATA SUMMARY:  2D MEASUREMENTS:  Normal Ranges:  LAs:           5.13 cm    (2.7-4.0cm)  IVSd:          1.30 cm    (0.6-1.1cm)  LVPWd:         1.34 cm    (0.6-1.1cm)  LVIDd:         3.81 cm    (3.9-5.9cm)  LVIDs:         2.95 cm  LV Mass Index: 128.0 g/m2  LV % FS        22.4 %    LA VOLUME:  Normal Ranges:  LA Vol A4C:        62.9 ml    (22+/-6mL/m2)  LA Vol A2C:        55.2 ml  LA Vol BP:         59.9 ml  LA Vol Index A4C:  45.4 ml/m2  LA Vol Index A2C:  39.8 ml/m2  LA Vol Index BP:   43.3 ml/m2  LA Area A4C:       20.9 cm2  LA Area A2C:       19.9 cm2  LA Major Axis A4C: 5.9 cm  LA Major Axis A2C: 6.1 cm  LA Volume Index:   43.3 ml/m2  LA Vol A4C:        57.3 ml  LA Vol A2C:        52.7 ml    M-MODE MEASUREMENTS:  Normal Ranges:  Ao Root: 2.70 cm (2.0-3.7cm)  LAs:     5.10 cm (2.7-4.0cm)    AORTA MEASUREMENTS:  Normal Ranges:  Ao Sinus, d: 2.90 cm (2.1-3.5cm)  Ao STJ, d:   3.20 cm (1.7-3.4cm)  Asc Ao, d:   3.20 cm (2.1-3.4cm)    LV SYSTOLIC FUNCTION BY 2D PLANIMETRY (MOD):  Normal Ranges:  EF-A4C View: 46.3 % (>=55%)  EF-A2C View: 58.3 %  EF-Biplane:  49.8 %    LV DIASTOLIC FUNCTION:  Normal Ranges:  MV Peak E:       0.88 m/s    (0.7-1.2 m/s)  MV Peak A:      0.27 m/s    (0.42-0.7 m/s)  E/A Ratio:      3.27        (1.0-2.2)  MV A Dur:       129.18 msec  PulmV Sys Mina:  33.71 cm/s  PulmV Vazquez Mina: 44.39 cm/s  PulmV S/D Mina:  0.76    MITRAL VALVE:  Normal Ranges:  MV Vmax:    0.95 m/s (<=1.3m/s)  MV peak PG: 3.6 mmHg (<5mmHg)  MV mean P.2 mmHg (<2mmHg)  MV VTI:     21.45 cm (10-13cm)  MV DT:      181 msec (150-240msec)    AORTIC VALVE:  Normal Ranges:  AoV Vmax:                1.18 m/s (<=1.7m/s)  AoV Peak P.5 mmHg (<20mmHg)  AoV Mean PG:             3.3 mmHg (1.7-11.5mmHg)  LVOT Max Mina:            0.48 m/s (<=1.1m/s)  AoV VTI:                 22.06 cm (18-25cm)  LVOT VTI:                8.90 cm  LVOT Diameter:           1.61 cm  (1.8-2.4cm)  AoV Area, VTI:           0.83 cm2 (2.5-5.5cm2)  AoV Area,Vmax:           0.84 cm2 (2.5-4.5cm2)  AoV Dimensionless Index: 0.40      RIGHT VENTRICLE:  RV Basal 3.70 cm  RV Mid   2.70 cm  RV Major 6.5 cm  TAPSE:   18.0 mm    TRICUSPID VALVE/RVSP:  Normal Ranges:  Peak TR Velocity: 2.44 m/s  Est. RA Pressure: 3 mmHg  RV Syst Pressure: 26.9 mmHg (< 30mmHg)  IVC Diam:         1.70 cm    PULMONIC VALVE:  Normal Ranges:  PV Accel Time: 97 msec  (>120ms)  PV Max Mina:    0.9 m/s  (0.6-0.9m/s)  PV Max PG:     3.0 mmHg    Pulmonary Veins:  PulmV Vazquez Mina: 44.39 cm/s  PulmV S/D Mina:  0.76  PulmV Sys Mina:  33.71 cm/s      [id number] Mohamud Jimenez DO  Electronically signed on 3/1/2024 at 9:42:38 AM      Wall Scoring        ** Final **    Ejection Fractions:  LV biplane EF   Date/Time Value Ref Range Status   2024 09:20 AM 50 %      Cath:  No results found for this or any previous visit.    Stress Test:  No results found for this or any previous visit.    Cardiac Imaging:  Results for orders placed during the hospital encounter of 24    MR cardiac angio chest w and wo IV contrast for morph FUNCT valve DZ and GREAT vessels    Narrative  Interpreted By:  Tenisha Mendez,  and  Virginie Perla  STUDY:  MR CARDIAC ANGIO CHEST W AND WO IV CONTRAST FOR MORPH FUNCT VALVE DZ  AND GREAT VESSELS;  3/4/2024 3:45 pm    INDICATION:  Signs/Symptoms:Evaluate for LV thrombus.    COMPARISON:  None.    ACCESSION NUMBER(S):  [id number]    ORDERING CLINICIAN:  NIKITA VELAZQUEZ    TECHNIQUE:  Gonzalez 3.0 Katerine MRI scanner.  Turbo spin echo and balanced steady state free precession (bSSFP)  imaging for anatomic definition. Dynamic cine bSSFP for cardiac  chamber and wall-motion analysis, and valvular analysis. Flow  quantification sequences for hemodynamics. Delayed gadolinium  enhancement analysis after injection of gadolinium-chelate (mL of  Dotarem, 0.2 mmol/kg).    HT-150 cm; WT-45 kg; BSA-1.35 m2    FINDINGS:  CARDIAC CHAMBERS  Normal atrioventricular and ventriculoarterial concordance    LEFT ATRIUM  Severely dilated (DAVE-103.9 ml/m2).    RIGHT ATRIUM  Normal size (RA max 4ch-25.12 cm2)    INTERATRIAL SEPTUM  Intact.    LEFT VENTRICLE:  1. Normal LV size (EDVi 68 ml/m2) with mildly reduced systolic  function (LVEF 48%).  2. Severe hypokinesis of the apical inferior segment and true apex.  3. Normal LV wall thickness and normal LV indexed mass.  4. Normal native T2 values (<55ms)/ and normal T2 STIR imaging.  5. Evidence of small LV thrombus measuring 1.1 x 0.58 cm (no early or  late contrast enhancement) adherent to the apical inferior wall.  6. Following administration of gadolinium, in the late phase, the  subendocardial enhancement of the apical septum (50% wall thickness)  and transmural enhancement of the apical inferior wall/true apex.    Quantitative left ventricular functional values are as follows:  EDV = 94 cc; EDVi = 68 cc/m2  ESV = 49 cc; ESVi = 85 cc/m2  Stroke volume = 45 cc; SVi = 33 cc/m2  LVEF = 48 %  Absolute Cardiac Output = 2.79 l/min.; COi = 2.04 l/min/m2  LV mass = 84 gm; LVMi = 61 gm/m2    LVEDD: 4.6 cm  LVESD: 3.8 cm  LV septal wall thickness (anterobasal): 0.9 cm  LV  postero-inferior wall thickness: 0.8 cm    RIGHT VENTRICLE  The right ventricle appears normal in size(EDVi-58 ml/m2), shape, and  has normal qualitative systolic function. Quantitative RVEF59 % no  segmental wall motion abnormalities. No abnormal delayed enhancement  in the myocardium.    INTERVENTRICULAR SEPTUM  Intact.    AORTIC VALVE  The aortic valve is trileaflet  There is  trivial aortic regurgitation.  Flow quantification through the ascending aorta:  Forward volume =26 cc/beat  Reverse volume = 1 cc/beat  Net forward volume = 25 cc/beat  Aortic regurgitant fraction = 4 %    MITRAL VALVE  There is  moderate mitral regurgitation.  Integrating LV volumetric and aortic flow quantification data reveals:  Quantitative mitral regurgitant volume = 19 cc/beat  Quantitative mitral regurgitant fraction = 42 %    TRICUSPID VALVE  There is qualitative moderate tricuspid regurgitation.    PULMONARY VALVE:  Not assessed.    PERICARDIUM:  The pericardium is normal. There is no pericardial effussion.    THORACIC AORTA  The thoracic aorta appears normal in course, caliber, and contour.  There is no evidence for acute aortic pathology. The arch vessel  branching pattern is  normal.   All the arch branch vessels appear  widely patent in their proximal portions.    AORTIC ROOT DIMENSIONS:  Aortic root(sinus of valsalva): 2.8 cm  Annulus: 1.9 cm  Sinotubular junction:2.4 cm    PULMONARY ARTERIES  The central pulmonary arteries appear  normal (MPA-2.47 cm, RPA-2.2  cm, LPA-2.4 cm).    SYSTEMIC AND PULMONARY VEINS  Normal systemic venous and pulmonary venous return.  The SVC is of normal caliber. IVC appears normal  Normal pulmonary venous anatomy.    CHEST  The chest wall is normal.  Limited imaging through the lungs reveals no gross abnormalities. No  pleural effusion.    UPPER ABDOMEN  Limited imaging through the upper abdomen reveals no abnormalities of  the visualized organs.    Impression  1. Normal LV size (EDVi 68 ml/m2)  with mildly reduced systolic  function (LVEF 48%).  2. Severe hypokinesis of the apical inferior segment and true apex.  3. There is evidence of a small LV thrombus measuring 1.1 x 0.58 cm  adherent to the apical inferior wall.  4. Subendocardial infarction of the apical inferior wall (50% wall  thickness) and transmural infarction of the true apex (% wall  thickness).  5. Moderate mitral regurgitation.  6. Normal RV size and systolic function (RVEF 59%).        Signed by: Tenisha Mendez 3/4/2024 7:14 PM  Dictation workstation:   [id number]      === 02/29/24 ===    MR CARDIAC /CHEST MRA W AND WO CONTRAST FOR MORPH/FUNCT, VALVE DZ AND GREAT VESSELS    - Impression -  1. Normal LV size (EDVi 68 ml/m2) with mildly reduced systolic  function (LVEF 48%).  2. Severe hypokinesis of the apical inferior segment and true apex.  3. There is evidence of a small LV thrombus measuring 1.1 x 0.58 cm  adherent to the apical inferior wall.  4. Subendocardial infarction of the apical inferior wall (50% wall  thickness) and transmural infarction of the true apex (% wall  thickness).  5. Moderate mitral regurgitation.  6. Normal RV size and systolic function (RVEF 59%).        Signed by: Tenisha Mendez 3/4/2024 7:14 PM  Dictation workstation:   [id number]  === 03/17/24 ===    CT ABDOMEN PELVIS W IV CONTRAST    - Impression -  1. Heterogeneous enhancement of the liver, may be seen with hepatic  congestion or hepatitis. Please correlate with laboratory values.  2. Hepatic lesions, underlying neoplasm is not excludable.  Nonemergent contrast-enhanced MRI/MRCP can be obtained for further  evaluation.  3. Diffuse wall thickening at the stomach, may be secondary to  underdistention versus gastritis. Evaluation with upper endoscopy as  clinically warranted.  4. Stool ball distending the rectum.  5. Renal cortical scarring, may represent sequela of prior infection.  6. Enlarged fibroid uterus.  7. Small right pleural effusion. Mild  bibasilar atelectasis.  8. Cardiomegaly with right atrial enlargement.  9. Severe degenerative changes at the right hip.      MACRO:  Critical Finding:  See findings. Notification was initiated on  3/17/2024 at 4:50 am by  Neena Diana.  (**-YCF-**) Instructions:    Signed by: Neena Diana 3/17/2024 4:56 AM  Dictation workstation:   [id number]     Assessment/Plan    Atrial fib/flutter  -RAISA with possible DCCV with Dr. Jimenez on 3/20/24       I spent 30 minutes in the professional and overall care of this patient.      Julius Rodríguez, APRN-CNP, DNP

## 2024-03-20 NOTE — ANESTHESIA POSTPROCEDURE EVALUATION
Patient: Sophie Mendosa    Procedure Summary       Date: 03/20/24 Room / Location: [facility]; [facility]    Anesthesia Start: 1112 Anesthesia Stop: 1146    Procedure: TRANSESOPHAGEAL ECHO (RAISA) W/ POSSIBLE CARDIOVERSION Diagnosis:       Atrial flutter, unspecified type (CMS/HCC)      Atypical atrial flutter (CMS/HCC)      (atrial flutter)    Scheduled Providers: Mohamud Jimenez DO; Kayden Alonso MD; REMBERTO Galvan Responsible Provider: Kayden Alonso MD    Anesthesia Type: MAC ASA Status: 4            Anesthesia Type: MAC    Vitals Value Taken Time   /64 03/20/24 1213        Pulse 52 03/20/24 1213   Resp 16 03/20/24 1213   SpO2 95 % 03/20/24 1213       Anesthesia Post Evaluation    Patient location during evaluation: PACU  Patient participation: complete - patient participated  Level of consciousness: awake and alert  Pain management: adequate  Airway patency: patent  Cardiovascular status: acceptable and hemodynamically stable  Respiratory status: acceptable, spontaneous ventilation and nonlabored ventilation  Hydration status: acceptable  Postoperative Nausea and Vomiting: none        There were no known notable events for this encounter.

## 2024-03-20 NOTE — DISCHARGE INSTRUCTIONS
Transesophageal Echocardiogram:  Post-Procedure and Homegoing Instructions    Please follow the instructions below after your transesophageal echocardiogram:  1) Do not drive or operate machinery for 24 hours after your procedure.  2) Do not eat or drink anything for two hours after your procedure.  Start with a little bit of water to be sure you can swallow without coughing.  3) Avoid alcohol for at least 24 hours after the test.  5) You may have a mild sore throat for a day or two. Cough drops may help after the  two hour wait. Tylenol may also be taken once you can swallow.  6) Although the symptoms below are rare, call your doctor at once, or seek emergency  care if you have:  - Bright red blood in your sputum - Sustained chest pain  - Severe shortness of breath - Severe abdominal pain  - Allergy symptoms (hives, rash, nausea, vomiting, difficulty breathing  7) If you are an out-patient, a nurse will have placed an IV during the study for sedation  and contrast injection. The nurse will remove the IV before she sends you home. Leave  the band-aid on for 24 hours and then check the IV site for signs of infection, such as:  ? Increasing soreness  ? Redness  ? Drainage from the puncture site    If you notice any of these conditions, you should contact your doctor immediately.

## 2024-03-20 NOTE — PROGRESS NOTES
Subjective Data:  NAEO. Asymptomatic this AM. Tele reviewed showing persistent afib rates 70s-80s. RAISA showed no WHIT thrombus and so pt successfully cardioverted to sinus with rates 40s.     Objective Data:  Last Recorded Vitals:  Vitals:    03/20/24 0950 03/20/24 1025 03/20/24 1052 03/20/24 1148   BP: 116/73 121/88 121/88 100/56   BP Location:       Patient Position:       Pulse: 83 74 83 (!) 47   Resp:  16 12 16   Temp:   36.6 °C (97.9 °F)    TempSrc:   Temporal    SpO2:  95% 99% 100%   Weight:   45.4 kg (100 lb)    Height:   1.524 m (5')        Last Labs:  CBC - 3/20/2024:  5:17 AM  5.6 13.6 360    41.9      CMP - 3/20/2024:  5:16 AM  9.2 6.4 16 --- 0.6   _ 3.6 25 77      PTT - 2/29/2024:  7:26 PM  1.1   12.1 30     TROPHS   Date/Time Value Ref Range Status   03/17/2024 06:01 AM 53 0 - 13 ng/L Final   03/17/2024 02:24 AM 45 0 - 13 ng/L Final   02/29/2024 06:25 PM 12 0 - 13 ng/L Final     BNP   Date/Time Value Ref Range Status   03/17/2024 02:24 AM 3,535 0 - 99 pg/mL Final   02/29/2024 04:44 PM 83 0 - 99 pg/mL Final     HGBA1C   Date/Time Value Ref Range Status   02/29/2024 03:26 PM 6.1 see below % Final   12/16/2021 04:00 PM 6.4 % Final     Comment:          Diagnosis of Diabetes-Adults   Non-Diabetic: < or = 5.6%   Increased risk for developing diabetes: 5.7-6.4%   Diagnostic of diabetes: > or = 6.5%  .       Monitoring of Diabetes                Age (y)     Therapeutic Goal (%)   Adults:          >18           <7.0   Pediatrics:    13-18           <7.5                   7-12           <8.0                   0- 6            7.5-8.5   American Diabetes Association. Diabetes Care 33(S1), Jan 2010.     09/24/2018 12:00 AM 5.9 % Final     Comment:          Diagnosis of Diabetes-Adults   Non-Diabetic: < or = 5.6%   Increased risk for developing diabetes: 5.7-6.4%   Diagnostic of diabetes: > or = 6.5%  .       Monitoring of Diabetes                Age (y)     Therapeutic Goal (%)   Adults:          >18            <7.0   Pediatrics:    13-18           <7.5                   7-12           <8.0                   0- 6            7.5-8.5   American Diabetes Association. Diabetes Care 33(S1), Jan 2010.       LDLCALC   Date/Time Value Ref Range Status   02/29/2024 03:26 PM 97 <=99 mg/dL Final     Comment:                                 Near   Borderline      AGE      Desirable  Optimal    High     High     Very High     0-19 Y     0 - 109     ---    110-129   >/= 130     ----    20-24 Y     0 - 119     ---    120-159   >/= 160     ----      >24 Y     0 -  99   100-129  130-159   160-189     >/=190       VLDL   Date/Time Value Ref Range Status   02/29/2024 03:26 PM 19 0 - 40 mg/dL Final   02/21/2023 12:00 AM 23 0 - 40 mg/dL Final   10/28/2020 03:02 PM 13 0 - 40 mg/dL Final   05/12/2020 01:29 PM 26 0 - 40 mg/dL Final      Last I/O:  I/O last 3 completed shifts:  In: 120 (2.6 mL/kg) [P.O.:120]  Out: 1 (0 mL/kg) [Urine:1 (0 mL/kg/hr)]  Weight: 45.8 kg     Past Cardiology Tests (Last 3 Years):  EKG:  Electrocardiogram, 12-lead PRN ACS symptoms 03/20/2024      Electrocardiogram, 12-lead PRN ACS symptoms 03/19/2024      ECG 12 Lead 03/17/2024      ECG 12 lead 03/17/2024      ECG 12 lead 02/29/2024      ECG 12 lead 02/29/2024    Echo:  Transthoracic Echo (TTE) Complete 03/01/2024    Ejection Fractions:  EF   Date/Time Value Ref Range Status   03/01/2024 09:20 AM 50 %      Cath:  No results found for this or any previous visit from the past 1095 days.    Stress Test:  No results found for this or any previous visit from the past 1095 days.    Cardiac Imaging:  MR cardiac angio chest w and wo IV contrast for morph FUNCT valve DZ and GREAT vessels 03/04/2024      Inpatient Medications:  Scheduled medications   Medication Dose Route Frequency    apixaban  5 mg oral BID    atorvastatin  40 mg oral Nightly    buPROPion XL  150 mg oral q AM    cyanocobalamin  500 mcg oral Daily    levothyroxine  50 mcg oral Daily    metoprolol tartrate  50 mg  oral BID    polyethylene glycol  17 g oral BID     PRN medications   Medication    acetaminophen    Or    acetaminophen    Or    acetaminophen    benzocaine    lidocaine    ondansetron    Or    ondansetron     Continuous Medications   Medication Dose Last Rate       Physical Exam:  GEN: NAD, pleasant  CV: irregularly irregular, S1/S2, no mrg, no significant JVD @ 30 deg  PULM: Lungs CTAB, no wrr, no increased wob on RA  EXT: no LE edema     Assessment/Plan   Sophie Mendosa is a 78 y.o. female with history of acute on chronic systolic heart failure, probably precipitated by AF with RVR, recently diagnosed LV thrombus, noted in MRI, recent right lower lobe segmental PE, without cor pulmonale, hypertension, CKD, severe coronary artery calcification, hypothyroidism presenting with  progressively worsening shortness of breath, PND, orthopnea.  BNP 2 weeks ago was 83 but is now up to 3535 consistent with acute on chronic systolic heart failure decompensation in setting of A-fib with RVR.  Recent hospitalization was reviewed as well as recent cardiac MRI.  No clear ACS or active ischemia.  High-sensitivity troponin was elevated to 45 and 53 most consistent with type II ischemia in the setting of heart failure and A-fib with RVR.     3/4/2024 cardiac MRI  1. Normal LV size (EDVi 68 ml/m2) with mildly reduced systolic  function (LVEF 48%).  2. Severe hypokinesis of the apical inferior segment and true apex.  3. There is evidence of a small LV thrombus measuring 1.1 x 0.58 cm  adherent to the apical inferior wall.  4. Subendocardial infarction of the apical inferior wall (50% wall  thickness) and transmural infarction of the true apex (% wall  thickness).  5. Moderate mitral regurgitation.  6. Normal RV size and systolic function (RVEF 59%).     3/18: NAEO. Largely asymptomatic this AM from cardiac perspective. Does feel her heart beating fast but only if she presses her chest. Having ongoing nausea and back pain. Per  pt is constipated and working on getting bowels moving again. Denies CP, SOB, and lightheadedness. ROS negative. Tele reviewed showing afib vs more likely 2:1 flutter at rate 120s-130s which was seen on EKG 3/17. Only input charted for past 24-48 hrs no output charted. Weighed only once at 101lbs on 3/16. Clinically appears to be approaching if not euvolemic.  3/19: NAEO. Asymptomatic this AM. Tele reviewed showing likely conversion from 2:1 flutter to coarse afib around 16:20 yesterday. Rates now 80s. Appears euvolemic.  3/20: NAEO. Asymptomatic this AM. Tele reviewed showing persistent afib rates 70s-80s. RAISA showed no WHIT thrombus and so pt successfully cardioverted to sinus with rates 40s.    Recommendations:  -RAISA today showing no WHIT thrombus, successful DCCV to sinus rola rates 40s  -Reduce metop tart to 50 BID given sinus rola after DCCV  -No intervention for sinus bradycardia as long as pt remains hemodynamically stable (SBPs 100s during cardiversion under anesthesia)  -If HRs improve to 50s-60s and stable, tomorrow can consolidate to metop succ 100 daily   -Can resume lasix 40 daily PO prn today for weight gain >3 lbs over 3 days, discharge pt on as needed lasix for weight gain  -Continue eliquis  -Follow up with established cardiologist, Dr. CHELSEY Hackett within 3 weeks of hospital discharge.    I saw and evaluated the patient. I personally obtained the key and critical portions of the history and physical exam. I reviewed the fellow's documentation and discussed the patient with the fellow. I agree with the fellow's medical decision making as documented in the fellow's note and have edited it as necessary.  I personally reviewed the patient's recent labs, medications, orders, EKGs, and pertinent cardiac imaging/ echocardiography.     Thank you for allowing me to participate in their care.  Please feel free to call me with any further questions or concerns.        Delon Newsome MD, [facility], [place],  [place]  Division of Cardiovascular Medicine  System Director, Nuclear Cardiology   Medical Director, [facility] [place], [facility]   Clinical , [employer]  [email or web address]   Office:  [phone]

## 2024-03-20 NOTE — CARE PLAN
The patient's goals for the shift include      The clinical goals for the shift include monitor tele

## 2024-03-21 ENCOUNTER — APPOINTMENT (OUTPATIENT)
Dept: CARDIOLOGY | Facility: HOSPITAL | Age: 79
DRG: 291 | End: 2024-03-21
Payer: MEDICARE

## 2024-03-21 LAB
ALBUMIN SERPL BCP-MCNC: 3.4 G/DL (ref 3.4–5)
ALP SERPL-CCNC: 70 U/L (ref 33–136)
ALT SERPL W P-5'-P-CCNC: 22 U/L (ref 7–45)
ANION GAP SERPL CALC-SCNC: 14 MMOL/L (ref 10–20)
AST SERPL W P-5'-P-CCNC: 13 U/L (ref 9–39)
BILIRUB SERPL-MCNC: 0.4 MG/DL (ref 0–1.2)
BUN SERPL-MCNC: 32 MG/DL (ref 6–23)
CALCIUM SERPL-MCNC: 8.6 MG/DL (ref 8.6–10.3)
CHLORIDE SERPL-SCNC: 101 MMOL/L (ref 98–107)
CO2 SERPL-SCNC: 27 MMOL/L (ref 21–32)
CREAT SERPL-MCNC: 1.39 MG/DL (ref 0.5–1.05)
EGFRCR SERPLBLD CKD-EPI 2021: 39 ML/MIN/1.73M*2
ERYTHROCYTE [DISTWIDTH] IN BLOOD BY AUTOMATED COUNT: 15.2 % (ref 11.5–14.5)
GLUCOSE SERPL-MCNC: 100 MG/DL (ref 74–99)
HCT VFR BLD AUTO: 38.1 % (ref 36–46)
HGB BLD-MCNC: 12.1 G/DL (ref 12–16)
MCH RBC QN AUTO: 27 PG (ref 26–34)
MCHC RBC AUTO-ENTMCNC: 31.8 G/DL (ref 32–36)
MCV RBC AUTO: 85 FL (ref 80–100)
NRBC BLD-RTO: 0 /100 WBCS (ref 0–0)
PLATELET # BLD AUTO: 327 X10*3/UL (ref 150–450)
POTASSIUM SERPL-SCNC: 3.9 MMOL/L (ref 3.5–5.3)
PROT SERPL-MCNC: 5.5 G/DL (ref 6.4–8.2)
RBC # BLD AUTO: 4.48 X10*6/UL (ref 4–5.2)
SODIUM SERPL-SCNC: 138 MMOL/L (ref 136–145)
WBC # BLD AUTO: 5.8 X10*3/UL (ref 4.4–11.3)

## 2024-03-21 PROCEDURE — 2500000001 HC RX 250 WO HCPCS SELF ADMINISTERED DRUGS (ALT 637 FOR MEDICARE OP): Performed by: NURSE PRACTITIONER

## 2024-03-21 PROCEDURE — 93005 ELECTROCARDIOGRAM TRACING: CPT

## 2024-03-21 PROCEDURE — 84075 ASSAY ALKALINE PHOSPHATASE: CPT | Performed by: NURSE PRACTITIONER

## 2024-03-21 PROCEDURE — 36415 COLL VENOUS BLD VENIPUNCTURE: CPT | Performed by: NURSE PRACTITIONER

## 2024-03-21 PROCEDURE — 85027 COMPLETE CBC AUTOMATED: CPT | Performed by: NURSE PRACTITIONER

## 2024-03-21 PROCEDURE — 2500000004 HC RX 250 GENERAL PHARMACY W/ HCPCS (ALT 636 FOR OP/ED): Performed by: NURSE PRACTITIONER

## 2024-03-21 PROCEDURE — 1200000002 HC GENERAL ROOM WITH TELEMETRY DAILY

## 2024-03-21 PROCEDURE — 99233 SBSQ HOSP IP/OBS HIGH 50: CPT | Performed by: STUDENT IN AN ORGANIZED HEALTH CARE EDUCATION/TRAINING PROGRAM

## 2024-03-21 PROCEDURE — 2500000004 HC RX 250 GENERAL PHARMACY W/ HCPCS (ALT 636 FOR OP/ED): Performed by: PHYSICIAN ASSISTANT

## 2024-03-21 RX ORDER — LISINOPRIL 5 MG/1
5 TABLET ORAL DAILY
Status: DISCONTINUED | OUTPATIENT
Start: 2024-03-22 | End: 2024-03-22 | Stop reason: HOSPADM

## 2024-03-21 RX ADMIN — POLYETHYLENE GLYCOL 3350 17 G: 17 POWDER, FOR SOLUTION ORAL at 10:04

## 2024-03-21 RX ADMIN — ATORVASTATIN CALCIUM 40 MG: 40 TABLET, FILM COATED ORAL at 20:05

## 2024-03-21 RX ADMIN — APIXABAN 5 MG: 5 TABLET, FILM COATED ORAL at 10:03

## 2024-03-21 RX ADMIN — BUPROPION HYDROCHLORIDE 150 MG: 150 TABLET, EXTENDED RELEASE ORAL at 10:03

## 2024-03-21 RX ADMIN — POLYETHYLENE GLYCOL 3350 17 G: 17 POWDER, FOR SOLUTION ORAL at 20:05

## 2024-03-21 RX ADMIN — LEVOTHYROXINE SODIUM 50 MCG: 0.05 TABLET ORAL at 05:41

## 2024-03-21 RX ADMIN — APIXABAN 5 MG: 5 TABLET, FILM COATED ORAL at 20:05

## 2024-03-21 RX ADMIN — CYANOCOBALAMIN TAB 500 MCG 500 MCG: 500 TAB at 10:03

## 2024-03-21 ASSESSMENT — PAIN SCALES - GENERAL
PAINLEVEL_OUTOF10: 0 - NO PAIN
PAINLEVEL_OUTOF10: 0 - NO PAIN

## 2024-03-21 ASSESSMENT — COGNITIVE AND FUNCTIONAL STATUS - GENERAL
MOVING TO AND FROM BED TO CHAIR: A LITTLE
MOBILITY SCORE: 18
DAILY ACTIVITIY SCORE: 18
TURNING FROM BACK TO SIDE WHILE IN FLAT BAD: A LITTLE
HELP NEEDED FOR BATHING: A LITTLE
PERSONAL GROOMING: A LITTLE
MOVING FROM LYING ON BACK TO SITTING ON SIDE OF FLAT BED WITH BEDRAILS: A LITTLE
TOILETING: A LITTLE
CLIMB 3 TO 5 STEPS WITH RAILING: A LITTLE
EATING MEALS: A LITTLE
DRESSING REGULAR LOWER BODY CLOTHING: A LITTLE
WALKING IN HOSPITAL ROOM: A LITTLE
DRESSING REGULAR UPPER BODY CLOTHING: A LITTLE
STANDING UP FROM CHAIR USING ARMS: A LITTLE

## 2024-03-21 ASSESSMENT — PAIN - FUNCTIONAL ASSESSMENT: PAIN_FUNCTIONAL_ASSESSMENT: 0-10

## 2024-03-21 NOTE — PROGRESS NOTES
Occupational Therapy                 Therapy Communication Note    Patient Name: Sophie Mendosa  MRN: [id number]  Today's Date: 3/21/2024     Discipline: Occupational Therapy    Missed Visit Reason: Missed Visit Reason: Patient refused (Pt supine in bed politely declining therapy at this time d/t fatigue. RN aware.)    Missed Time: Attempt

## 2024-03-21 NOTE — PROGRESS NOTES
03/21/24 1434   Discharge Planning   Home or Post Acute Services None   Patient expects to be discharged to: Home   Does the patient need discharge transport arranged? No     3/21/24 1434  Spoke with patient about HHC. Explained that PT/OT rec low.  She declined HHC at this time.  She will notify the T[facility] should she change her mind prior to discharge.  Harriett Be RN TCC

## 2024-03-21 NOTE — PROGRESS NOTES
PROGRESS NOTE - INTERNAL MEDICINE     PATIENT NAME:  Sophie Mendosa    MRN:  [id number]  SERVICE DATE:  3/21/2024       ADMITTING PHYSICIAN:  Wallace Finn MD    ASSESSMENT AND PLAN    Principal Problem:    Pneumonia due to infectious organism, unspecified laterality, unspecified part of lung  Active Problems:    Atrial flutter (CMS/HCC)    Hypertension   SINTIA on CKD 3A. Creat close to baseline off diuretics.  Acute on chronic systolic heart failure, probably precipitated by AF with RVR.  New onset AF - RVR on adm; S Bradycardia post cardioversion.  Recently diagnosed LV thrombus, noted in MRI  Recent right lower lobe segmental PE, without cor pulmonale  Severe coronary artery calcification  Hypothyroidism, with suppressed TSH on oral supplement therapy  Constipation  ?previously known liver spot (per pt) - was under follow up with Dr Sergio Soto     PLAN:  Resume metoprolol, Eliquis. Digoxin added.  Diuretics held based on labs.  Decrease dose of levothyroxine  Cardiology consult noted  Off Abx per ID recomm.  GI consult noted. OP MRI liver.   Defer DVT prophylaxis while on Eliquis.  Monitor for bleeding while on Eliquis  2 g sodium, cardiac diet with fluid restriction.  RAISA/DCCV completed 3/20.        DISPOSITION PLAN:  DC when cleared by cardiol.     INTERVAL HISTORY OF PRESENT ILLNESS:  HR better and lower today, though irregular. No chest pain/sob. No n/v/d. Oral intake mod. Feeling better. No fever/chills. acute events from last 24 hrs reviewed.     Pertinent ROS:  No Bleeding / No rashes     Discussed with nursing and case management team and the specialists involved in this patient's care. Reviewed the EMR and documentation from other care-givers.        OBJECTIVE  PHYSICAL EXAM:      GENERAL: AAOx3, cooperative resting comfortably. thin  SKIN: Skin turgor normal.   HEENT: no epistaxis, Moist mucosa.  LUNGS: Vesicular breath sounds, with no added sounds.  CARDIAC: REGULAR. bradycardic no rubs  ABDOMEN:  Abdomen soft, no tenderness. BS+  EXTREMITIES: No edema, Good capillary refill.   NEURO: Insight GOOD. No invol movements  MUSCULOSKELETAL: No acute inflammation          3/20/2024     1:40 PM 3/20/2024     3:34 PM 3/20/2024     6:21 PM 3/20/2024     7:31 PM 3/20/2024    11:14 PM 3/21/2024     4:11 AM 3/21/2024     7:15 AM   Vitals   Systolic 161 135 147 105 111 110 114   Diastolic 76 56 83 58 67 64 71   Heart Rate 38 59 71 47 45 46 45   Temp 36.5 °C (97.7 °F) 36.5 °C (97.7 °F)  35.9 °C (96.7 °F) 36.2 °C (97.1 °F) 36.3 °C (97.3 °F) 36.2 °C (97.2 °F)   Resp  16  18 18 18 18     Body mass index is 19.53 kg/m².    Intake/Output Summary (Last 24 hours) at 3/21/2024 0942  Last data filed at 3/21/2024 0900  Gross per 24 hour   Intake 890 ml   Output --   Net 890 ml           Current Facility-Administered Medications:     acetaminophen (Tylenol) tablet 650 mg, 650 mg, oral, q4h PRN **OR** acetaminophen (Tylenol) oral liquid 650 mg, 650 mg, oral, q4h PRN **OR** acetaminophen (Tylenol) suppository 650 mg, 650 mg, rectal, q4h PRN, SHANNON Hassan, DNP    apixaban (Eliquis) tablet 5 mg, 5 mg, oral, BID, SHANNON Hassan, DNP, 5 mg at 03/20/24 2015    atorvastatin (Lipitor) tablet 40 mg, 40 mg, oral, Nightly, SHANNON Hassan, DNP, 40 mg at 03/20/24 2015    benzocaine (Hurricaine) 20 % mouth spray, , , PRN, Kayden Alonso MD, 2 spray at 03/20/24 1118    buPROPion XL (Wellbutrin XL) 24 hr tablet 150 mg, 150 mg, oral, q AM, SHANNON Hassan, OLINDA, 150 mg at 03/20/24 2015    cyanocobalamin (Vitamin B-12) tablet 500 mcg, 500 mcg, oral, Daily, SHANNON Hassan, OLINDA, 500 mcg at 03/17/24 1313    furosemide (Lasix) tablet 40 mg, 40 mg, oral, Daily PRN, SHANNON Hassan, OLINDA    levothyroxine (Synthroid, Levoxyl) tablet 50 mcg, 50 mcg, oral, Daily, SHANNON Hassan, OLINDA, 50 mcg at 03/21/24 0541    lidocaine (Xylocaine) 2 % mouth solution, , , PRN, Kayden NEELY  "MD Celeste, 15 mL at 03/20/24 1119    lisinopril tablet 10 mg, 10 mg, oral, Daily, Naveed Pickett MD, 10 mg at 03/20/24 1823    [Held by provider] metoprolol tartrate (Lopressor) tablet 50 mg, 50 mg, oral, BID, SHANNON Hassan, DNP    ondansetron (Zofran) tablet 4 mg, 4 mg, oral, q8h PRN **OR** ondansetron (Zofran) injection 4 mg, 4 mg, intravenous, q8h PRN, TOLU Hassan-CNP, OLINDA    oxygen (O2) therapy, , inhalation, Continuous PRN - O2/gases, SHANNON Hassan, OLINDA    polyethylene glycol (Glycolax, Miralax) packet 17 g, 17 g, oral, BID, SHANNON Hassan, OLINDA, 17 g at 03/20/24 2016    DATA:   Diagnostic tests reviewed for today's visit:    Most recent labs  Results from last 7 days   Lab Units 03/21/24  0458 03/20/24  0517 03/19/24  0551   WBC AUTO x10*3/uL 5.8 5.6 7.0   HEMOGLOBIN g/dL 12.1 13.6 13.3   HEMATOCRIT % 38.1 41.9 39.4   PLATELETS AUTO x10*3/uL 327 360 333     Results from last 7 days   Lab Units 03/21/24  0458 03/20/24  0516 03/19/24  0551   SODIUM mmol/L 138 138 138   POTASSIUM mmol/L 3.9 3.6 3.5   CHLORIDE mmol/L 101 100 99   CO2 mmol/L 27 30 27   BUN mg/dL 32* 30* 30*   CREATININE mg/dL 1.39* 1.22* 1.40*   CALCIUM mg/dL 8.6 9.2 9.2   PROTEIN TOTAL g/dL 5.5* 6.4 6.4   BILIRUBIN TOTAL mg/dL 0.4 0.6 0.7   ALK PHOS U/L 70 77 78   ALT U/L 22 25 28   AST U/L 13 16 19   GLUCOSE mg/dL 100* 104* 91             No results found for: \"TROPONINT\"         Transesophageal Echo (RAISA) With Possible Cardioversion   Final Result      CT abdomen pelvis w IV contrast   Final Result   1. Heterogeneous enhancement of the liver, may be seen with hepatic   congestion or hepatitis. Please correlate with laboratory values.   2. Hepatic lesions, underlying neoplasm is not excludable.   Nonemergent contrast-enhanced MRI/MRCP can be obtained for further   evaluation.   3. Diffuse wall thickening at the stomach, may be secondary to   underdistention versus gastritis. Evaluation with " upper endoscopy as   clinically warranted.   4. Stool ball distending the rectum.   5. Renal cortical scarring, may represent sequela of prior infection.   6. Enlarged fibroid uterus.   7. Small right pleural effusion. Mild bibasilar atelectasis.   8. Cardiomegaly with right atrial enlargement.   9. Severe degenerative changes at the right hip.             MACRO:   Critical Finding:  See findings. Notification was initiated on   3/17/2024 at 4:50 am by  Neena Diana.  (**-YCF-**) Instructions:        Signed by: Neena Diana 3/17/2024 4:56 AM   Dictation workstation:   [id number]      XR chest 2 views   Final Result   New focal wedge-shaped consolidation at the peripheral right lung   base adjacent to the lateral costophrenic angle with a small new   right pleural effusion. Given these features, a pneumonia and   pulmonary infarct are differential considerations depending on the   clinical context. Note, patient had recent posterior right lower lobe   pulmonary embolism on 02/29/2024. CT pulmonary embolism protocol   would better evaluate as deemed clinically warranted.             MACRO:   None.        Signed by: Logan Akhtar 3/17/2024 3:22 AM   Dictation workstation:   [id number]            SIGNATURE: Wallace Finn MD PATIENT NAME: Sophie Mendosa   DATE: 3/21/2024 MRN: [id number]   TIME: 9:42 AM

## 2024-03-21 NOTE — NURSING NOTE
No urine output noted during the night, [facility] sccan done 125ml in the [facility], CNP notified ordered NS bolus

## 2024-03-21 NOTE — PROGRESS NOTES
Subjective Data:  Feels a little bit tired, no chest pain or dyspnea or syncope or presyncope. Walked to the bathroom and felt a little bit unsteady    Tele - sinus rola mostly high 40s - low 50s, occ high 30s. Last dose metoprolol was 100mg 3/19 PM    Objective Data:  Last Recorded Vitals:  Vitals:    03/20/24 2314 03/21/24 0411 03/21/24 0715 03/21/24 1209   BP: 111/67 110/64 114/71 114/70   BP Location:   Left arm Left arm   Patient Position:   Lying Lying   Pulse: (!) 45 (!) 46 (!) 45 (!) 39   Resp: 18 18 18 15   Temp: 36.2 °C (97.1 °F) 36.3 °C (97.3 °F) 36.2 °C (97.2 °F) 36.7 °C (98.1 °F)   TempSrc: Temporal Temporal Temporal Temporal   SpO2: 97% 97% 98% 98%   Weight:       Height:           Last Labs:  CBC - 3/21/2024:  4:58 AM  5.8 12.1 327    38.1      CMP - 3/21/2024:  4:58 AM  8.6 5.5 13 --- 0.4   _ 3.4 22 70      PTT - 2/29/2024:  7:26 PM  1.1   12.1 30     TROPHS   Date/Time Value Ref Range Status   03/17/2024 06:01 AM 53 0 - 13 ng/L Final   03/17/2024 02:24 AM 45 0 - 13 ng/L Final   02/29/2024 06:25 PM 12 0 - 13 ng/L Final     BNP   Date/Time Value Ref Range Status   03/17/2024 02:24 AM 3,535 0 - 99 pg/mL Final   02/29/2024 04:44 PM 83 0 - 99 pg/mL Final     HGBA1C   Date/Time Value Ref Range Status   02/29/2024 03:26 PM 6.1 see below % Final   12/16/2021 04:00 PM 6.4 % Final     Comment:          Diagnosis of Diabetes-Adults   Non-Diabetic: < or = 5.6%   Increased risk for developing diabetes: 5.7-6.4%   Diagnostic of diabetes: > or = 6.5%  .       Monitoring of Diabetes                Age (y)     Therapeutic Goal (%)   Adults:          >18           <7.0   Pediatrics:    13-18           <7.5                   7-12           <8.0                   0- 6            7.5-8.5   American Diabetes Association. Diabetes Care 33(S1), Jan 2010.     09/24/2018 12:00 AM 5.9 % Final     Comment:          Diagnosis of Diabetes-Adults   Non-Diabetic: < or = 5.6%   Increased risk for developing diabetes: 5.7-6.4%    Diagnostic of diabetes: > or = 6.5%  .       Monitoring of Diabetes                Age (y)     Therapeutic Goal (%)   Adults:          >18           <7.0   Pediatrics:    13-18           <7.5                   7-12           <8.0                   0- 6            7.5-8.5   American Diabetes Association. Diabetes Care 33(S1), Jan 2010.       LDLCALC   Date/Time Value Ref Range Status   02/29/2024 03:26 PM 97 <=99 mg/dL Final     Comment:                                 Near   Borderline      AGE      Desirable  Optimal    High     High     Very High     0-19 Y     0 - 109     ---    110-129   >/= 130     ----    20-24 Y     0 - 119     ---    120-159   >/= 160     ----      >24 Y     0 -  99   100-129  130-159   160-189     >/=190       VLDL   Date/Time Value Ref Range Status   02/29/2024 03:26 PM 19 0 - 40 mg/dL Final   02/21/2023 12:00 AM 23 0 - 40 mg/dL Final   10/28/2020 03:02 PM 13 0 - 40 mg/dL Final   05/12/2020 01:29 PM 26 0 - 40 mg/dL Final      Last I/O:  I/O last 3 completed shifts:  In: 650 (14.3 mL/kg) [I.V.:400 (8.8 mL/kg); IV Piggyback:250]  Out: - (0 mL/kg)   Weight: 45.4 kg     Past Cardiology Tests (Last 3 Years):  EKG:  Electrocardiogram, 12-lead PRN ACS symptoms 03/20/2024      Electrocardiogram, 12-lead PRN ACS symptoms 03/19/2024      ECG 12 Lead 03/17/2024      ECG 12 lead 03/17/2024      ECG 12 lead 02/29/2024      ECG 12 lead 02/29/2024    Echo:  Transesophageal Echo (RAISA) With Possible Cardioversion 03/20/2024      Transthoracic Echo (TTE) Complete 03/01/2024    Ejection Fractions:  EF   Date/Time Value Ref Range Status   03/01/2024 09:20 AM 50 %      Cath:  No results found for this or any previous visit from the past 1095 days.    Stress Test:  No results found for this or any previous visit from the past 1095 days.    Cardiac Imaging:  MR cardiac angio chest w and wo IV contrast for morph FUNCT valve DZ and GREAT vessels 03/04/2024      Inpatient Medications:  Scheduled medications    Medication Dose Route Frequency    apixaban  5 mg oral BID    atorvastatin  40 mg oral Nightly    buPROPion XL  150 mg oral q AM    cyanocobalamin  500 mcg oral Daily    levothyroxine  50 mcg oral Daily    [START ON 3/22/2024] lisinopril  5 mg oral Daily    [Held by provider] metoprolol tartrate  50 mg oral BID    polyethylene glycol  17 g oral BID     PRN medications   Medication    acetaminophen    Or    acetaminophen    Or    acetaminophen    benzocaine    furosemide    lidocaine    ondansetron    Or    ondansetron    oxygen     Continuous Medications   Medication Dose Last Rate       Physical Exam:      GEN: NAD, pleasant  CV: regular rate, slow rhythm, S1/S2 present, no mrg, no significant JVD @ 30 deg  PULM: Lungs CTAB, no wrr, no increased wob on RA  EXT: no LE edema     Assessment/Plan       78 y.o. female with history of acute on chronic systolic heart failure, probably precipitated by AF with RVR, recently diagnosed LV thrombus, noted in MRI, recent right lower lobe segmental PE, without cor pulmonale, hypertension, CKD, severe coronary artery calcification, hypothyroidism presenting with  progressively worsening shortness of breath, PND, orthopnea.  BNP 2 weeks ago was 83 but is now up to 3535 consistent with acute on chronic systolic heart failure decompensation in setting of A-fib with RVR.  Recent hospitalization was reviewed as well as recent cardiac MRI.  No clear ACS or active ischemia.  High-sensitivity troponin was elevated to 45 and 53 most consistent with type II ischemia in the setting of heart failure and A-fib with RVR.     3/4/2024 cardiac MRI  1. Normal LV size (EDVi 68 ml/m2) with mildly reduced systolic  function (LVEF 48%).  2. Severe hypokinesis of the apical inferior segment and true apex.  3. There is evidence of a small LV thrombus measuring 1.1 x 0.58 cm  adherent to the apical inferior wall.  4. Subendocardial infarction of the apical inferior wall (50% wall  thickness) and  transmural infarction of the true apex (% wall  thickness).  5. Moderate mitral regurgitation.  6. Normal RV size and systolic function (RVEF 59%).     3/18: NAEO. Largely asymptomatic this AM from cardiac perspective. Does feel her heart beating fast but only if she presses her chest. Having ongoing nausea and back pain. Per pt is constipated and working on getting bowels moving again. Denies CP, SOB, and lightheadedness. ROS negative. Tele reviewed showing afib vs more likely 2:1 flutter at rate 120s-130s which was seen on EKG 3/17. Only input charted for past 24-48 hrs no output charted. Weighed only once at 101lbs on 3/16. Clinically appears to be approaching if not euvolemic.  3/19: NAEO. Asymptomatic this AM. Tele reviewed showing likely conversion from 2:1 flutter to coarse afib around 16:20 yesterday. Rates now 80s. Appears euvolemic.  3/20: NAEO. Asymptomatic this AM. Tele reviewed showing persistent afib rates 70s-80s. RAISA showed no WHIT thrombus and so pt successfully cardioverted to sinus with rates 40s.       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 Would continue to monitor off AV laquita blocking agents to allow sinus rate to recover. In the long term given Hf would likely benefit from EP referral for PVI and AFL ablation.      Recommendations:  -continue to hold beta blocker, if sinus rate recovers to 50-60s anticipate restart low-dose betablocker given CMR with ischemic cardiomyopathy and recent AF RVR  -would use furosemide 40mg PRN for weight gain >3 lbs over 3 days, discharge pt on as needed lasix for weight gai  -RAISA today showing no WHIT thrombus, successful DCCV to sinus rola rates 40s  -Reduce metop  tart to 50 BID given sinus rola after DCCV  -No intervention for sinus bradycardia as long as pt remains hemodynamically stable (SBPs 100s during cardiversion under anesthesia)  -If HRs improve to 50s-60s and stable, tomorrow can consolidate to metop succ 100 daily   -Can resume lasix 40 daily PO prn today for weight gain >3 lbs over 3 days, discharge pt on as needed lasix for weight gain  -Continue apixaban  -Follow up with established cardiologist, Dr. CHELSEY Hackett within 3 weeks of hospital discharge.  -Please call with questions      Code Status:  Full Code    I saw and evaluated the patient. I personally obtained the key and critical portions of the history and physical exam. I reviewed the fellow's documentation and discussed the patient with the fellow. I agree with the fellow's medical decision making as documented in the fellow's note and have edited it as necessary.  I personally reviewed the patient's recent labs, medications, orders, EKGs, and pertinent cardiac imaging/ echocardiography.     Thank you for allowing me to participate in their care.  Please feel free to call me with any further questions or concerns.        Delon Newsome MD, FACC, MATILDA DANG  Division of Cardiovascular Medicine  System Director, Nuclear Cardiology   Medical Director, [facility] [place], [facility]   Clinical , [employer]  [email or web address]   Office:  [phone]

## 2024-03-22 ENCOUNTER — APPOINTMENT (OUTPATIENT)
Dept: CARDIOLOGY | Facility: HOSPITAL | Age: 79
DRG: 291 | End: 2024-03-22
Payer: MEDICARE

## 2024-03-22 VITALS
WEIGHT: 100 LBS | DIASTOLIC BLOOD PRESSURE: 76 MMHG | HEART RATE: 48 BPM | HEIGHT: 60 IN | RESPIRATION RATE: 18 BRPM | TEMPERATURE: 97.6 F | SYSTOLIC BLOOD PRESSURE: 137 MMHG | OXYGEN SATURATION: 98 % | BODY MASS INDEX: 19.63 KG/M2

## 2024-03-22 PROBLEM — I49.5 SINUS NODE DYSFUNCTION (MULTI): Chronic | Status: ACTIVE | Noted: 2024-03-22

## 2024-03-22 LAB
ALBUMIN SERPL BCP-MCNC: 3.3 G/DL (ref 3.4–5)
ALP SERPL-CCNC: 73 U/L (ref 33–136)
ALT SERPL W P-5'-P-CCNC: 23 U/L (ref 7–45)
ANION GAP SERPL CALC-SCNC: 13 MMOL/L (ref 10–20)
AST SERPL W P-5'-P-CCNC: 16 U/L (ref 9–39)
BILIRUB SERPL-MCNC: 0.4 MG/DL (ref 0–1.2)
BUN SERPL-MCNC: 28 MG/DL (ref 6–23)
CALCIUM SERPL-MCNC: 8.9 MG/DL (ref 8.6–10.3)
CHLORIDE SERPL-SCNC: 102 MMOL/L (ref 98–107)
CO2 SERPL-SCNC: 27 MMOL/L (ref 21–32)
CREAT SERPL-MCNC: 1.29 MG/DL (ref 0.5–1.05)
EGFRCR SERPLBLD CKD-EPI 2021: 43 ML/MIN/1.73M*2
ERYTHROCYTE [DISTWIDTH] IN BLOOD BY AUTOMATED COUNT: 15.2 % (ref 11.5–14.5)
GLUCOSE SERPL-MCNC: 95 MG/DL (ref 74–99)
HCT VFR BLD AUTO: 36.6 % (ref 36–46)
HGB BLD-MCNC: 11.7 G/DL (ref 12–16)
MCH RBC QN AUTO: 26.7 PG (ref 26–34)
MCHC RBC AUTO-ENTMCNC: 32 G/DL (ref 32–36)
MCV RBC AUTO: 84 FL (ref 80–100)
NRBC BLD-RTO: 0 /100 WBCS (ref 0–0)
PLATELET # BLD AUTO: 319 X10*3/UL (ref 150–450)
POTASSIUM SERPL-SCNC: 3.6 MMOL/L (ref 3.5–5.3)
PROT SERPL-MCNC: 5.5 G/DL (ref 6.4–8.2)
RBC # BLD AUTO: 4.38 X10*6/UL (ref 4–5.2)
SODIUM SERPL-SCNC: 138 MMOL/L (ref 136–145)
WBC # BLD AUTO: 4.7 X10*3/UL (ref 4.4–11.3)

## 2024-03-22 PROCEDURE — 93246 EXT ECG>7D<15D RECORDING: CPT

## 2024-03-22 PROCEDURE — 97530 THERAPEUTIC ACTIVITIES: CPT | Mod: GO

## 2024-03-22 PROCEDURE — 36415 COLL VENOUS BLD VENIPUNCTURE: CPT | Performed by: NURSE PRACTITIONER

## 2024-03-22 PROCEDURE — 2500000001 HC RX 250 WO HCPCS SELF ADMINISTERED DRUGS (ALT 637 FOR MEDICARE OP): Performed by: NURSE PRACTITIONER

## 2024-03-22 PROCEDURE — 93248 EXT ECG>7D<15D REV&INTERPJ: CPT | Performed by: INTERNAL MEDICINE

## 2024-03-22 PROCEDURE — 80053 COMPREHEN METABOLIC PANEL: CPT | Performed by: NURSE PRACTITIONER

## 2024-03-22 PROCEDURE — 97116 GAIT TRAINING THERAPY: CPT | Mod: GP,CQ

## 2024-03-22 PROCEDURE — 85027 COMPLETE CBC AUTOMATED: CPT | Performed by: NURSE PRACTITIONER

## 2024-03-22 PROCEDURE — 2500000001 HC RX 250 WO HCPCS SELF ADMINISTERED DRUGS (ALT 637 FOR MEDICARE OP): Performed by: INTERNAL MEDICINE

## 2024-03-22 RX ORDER — FUROSEMIDE 40 MG/1
40 TABLET ORAL DAILY PRN
Qty: 30 TABLET | Refills: 0 | Status: SHIPPED | OUTPATIENT
Start: 2024-03-22

## 2024-03-22 RX ORDER — LISINOPRIL 5 MG/1
5 TABLET ORAL DAILY
Qty: 30 TABLET | Refills: 1 | Status: SHIPPED | OUTPATIENT
Start: 2024-03-23 | End: 2024-04-09 | Stop reason: SDUPTHER

## 2024-03-22 RX ADMIN — APIXABAN 5 MG: 5 TABLET, FILM COATED ORAL at 08:09

## 2024-03-22 RX ADMIN — LISINOPRIL 5 MG: 5 TABLET ORAL at 08:09

## 2024-03-22 RX ADMIN — CYANOCOBALAMIN TAB 500 MCG 500 MCG: 500 TAB at 08:09

## 2024-03-22 RX ADMIN — BUPROPION HYDROCHLORIDE 150 MG: 150 TABLET, EXTENDED RELEASE ORAL at 08:09

## 2024-03-22 RX ADMIN — LEVOTHYROXINE SODIUM 50 MCG: 0.05 TABLET ORAL at 05:00

## 2024-03-22 ASSESSMENT — COGNITIVE AND FUNCTIONAL STATUS - GENERAL
DRESSING REGULAR LOWER BODY CLOTHING: A LITTLE
MOVING TO AND FROM BED TO CHAIR: A LITTLE
TURNING FROM BACK TO SIDE WHILE IN FLAT BAD: A LITTLE
MOBILITY SCORE: 19
MOBILITY SCORE: 18
TOILETING: A LITTLE
DRESSING REGULAR LOWER BODY CLOTHING: A LITTLE
STANDING UP FROM CHAIR USING ARMS: A LITTLE
CLIMB 3 TO 5 STEPS WITH RAILING: A LITTLE
DRESSING REGULAR UPPER BODY CLOTHING: A LITTLE
TURNING FROM BACK TO SIDE WHILE IN FLAT BAD: A LITTLE
WALKING IN HOSPITAL ROOM: A LITTLE
CLIMB 3 TO 5 STEPS WITH RAILING: A LITTLE
PERSONAL GROOMING: A LITTLE
TOILETING: A LITTLE
DAILY ACTIVITIY SCORE: 19
HELP NEEDED FOR BATHING: A LITTLE
DRESSING REGULAR UPPER BODY CLOTHING: A LITTLE
PERSONAL GROOMING: A LITTLE
MOVING TO AND FROM BED TO CHAIR: A LITTLE
STANDING UP FROM CHAIR USING ARMS: A LITTLE
DAILY ACTIVITIY SCORE: 19
MOVING FROM LYING ON BACK TO SITTING ON SIDE OF FLAT BED WITH BEDRAILS: A LITTLE
WALKING IN HOSPITAL ROOM: A LITTLE
HELP NEEDED FOR BATHING: A LITTLE

## 2024-03-22 ASSESSMENT — PAIN SCALES - GENERAL
PAINLEVEL_OUTOF10: 0 - NO PAIN

## 2024-03-22 ASSESSMENT — PAIN - FUNCTIONAL ASSESSMENT
PAIN_FUNCTIONAL_ASSESSMENT: 0-10

## 2024-03-22 NOTE — PROGRESS NOTES
Physical Therapy    Physical Therapy Treatment    Patient Name: Sophie Mendosa  MRN: [id number]  Today's Date: 3/22/2024          Assessment/Plan   PT Assessment  End of Session Communication: Bedside nurse  End of Session Patient Position: Bed, 3 rail up, Alarm on  PT Plan  Inpatient/Swing Bed or Outpatient: Inpatient  PT Plan  Treatment/Interventions: Bed mobility, Transfer training, Gait training, Stair training  PT Plan: Skilled PT  PT Frequency: 2 times per week  PT Discharge Recommendations: Low intensity level of continued care  PT Recommended Transfer Status: Assist x1  PT - OK to Discharge: Yes (per POC)      General Visit Information:   PT  Visit  PT Received On: 03/22/24  General  Reason for Referral: Pt is a 79 y/o female SOB and orthopnea.  Referred By: Aakash ROYAL)  Past Medical History Relevant to Rehab: recently diagnosed PE, atrial flutter with RVR  Prior to Session Communication: Bedside nurse  Patient Position Received: Bed, 3 rail up, Alarm off, not on at start of session    Subjective   Precautions:  Precautions  Medical Precautions: Fall precautions  Vital Signs:  Vital Signs  Heart Rate: 57 (with ambulation)  Heart Rate Source: Monitor  Patient Position: Standing    Objective   Pain:  Pain Assessment  Pain Assessment: 0-10  Pain Score: 0 - No pain  Cognition:     Postural Control:     Extremity/Trunk Assessments:    Activity Tolerance:     Treatments:  Bed Mobility  Bed Mobility: Yes  Bed Mobility 1  Bed Mobility 1: Supine to sitting, Sitting to supine  Level of Assistance 1: Modified independent    Ambulation/Gait Training  Ambulation/Gait Training Performed: Yes  Ambulation/Gait Training 1  Surface 1: Level tile  Device 1: Single point cane  Assistance 1: Close supervision  Quality of Gait 1: Antalgic, Diminished heel strike  Comments/Distance (ft) 1: 150 x 2 (Pt reports pain on the R hip and knee, education given of use of cane on L, pt not adhereing to education.)  Transfers  Transfer:  Yes  Transfer 1  Technique 1: Sit to stand, Stand to sit  Transfer Device 1: Cane  Transfer Level of Assistance 1: Close supervision  Trials/Comments 1: Education for proper hand and cane placement while performing sit<>stand.    Stairs  Stairs: Yes  Stairs  Rails 1: Right  Curb Step 1: No  Device 1: Railing, Single point cane  Assistance 1: Close supervision  Comment/Number of Steps 1: Pt ascended/descended 8 steps with step to gait pattern, R HR and cane in LUE. Education and VC for sequencing required. No LOB noted    Outcome Measures:  [facility] Basic Mobility  Turning from your back to your side while in a flat bed without using bedrails: None  Moving from lying on your back to sitting on the side of a flat bed without using bedrails: A little  Moving to and from bed to chair (including a wheelchair): A little  Standing up from a chair using your arms (e.g. wheelchair or bedside chair): A little  To walk in hospital room: A little  Climbing 3-5 steps with railing: A little  Basic Mobility - Total Score: 19    Education Documentation  No documentation found.  Education Comments  No comments found.        OP EDUCATION:  Outpatient Education  Individual(s) Educated: Patient  Education Provided: Body Mechanics, Fall Risk, Home Exercise Program    Encounter Problems       Encounter Problems (Active)       Balance       complete all mobility with normal balance while dual tasking, negotiating in a dynamic environment, carrying items, etc., with proactive and reactive static and dynamic standing and sitting tasks, with mod I and LRAD, >15 minutes.       Start:  03/19/24    Expected End:  04/02/24               Mobility       STG - Patient will ambulate 150 ft mod I using LRAD.        Start:  03/19/24    Expected End:  04/02/24            STG - Patient will ascend and descend 8 stairs using unilateral HR and LRAD mod I.        Start:  03/19/24    Expected End:  04/02/24               PT Transfers       STG - Patient will  perform bed mobility independently.        Start:  03/19/24    Expected End:  04/02/24            STG - Patient will transfer sit to and from stand mod I using LRAD.        Start:  03/19/24    Expected End:  04/02/24

## 2024-03-22 NOTE — PROGRESS NOTES
PROGRESS NOTE - INTERNAL MEDICINE     PATIENT NAME:  Sophie Mendosa    MRN:  [id number]  SERVICE DATE:  3/22/2024       ADMITTING PHYSICIAN:  Wallace Finn MD    ASSESSMENT AND PLAN    New onset AF - RVR on adm; S Bradycardia post cardioversion.  Hypertension   SINTIA on CKD 3A. Creat close to baseline off diuretics.  Acute on chronic systolic heart failure, probably precipitated by AF with RVR.  Recently diagnosed LV thrombus, noted in MRI  Recent right lower lobe segmental PE, without cor pulmonale  Severe coronary artery calcification  Hypothyroidism, with suppressed TSH on oral supplement therapy  Constipation  ?previously known liver spot (per pt) - was under follow up with Dr Sergio Soto  No evidence of pneumonia this admission. (Pneumonia suspected on admission but ruled out).     PLAN:  HOLD metoprolol, continue Eliquis.  Diuretics prn for wt gain post DC  Decrease dose of levothyroxine  Cardiology consult noted  Off Abx per ID recomm.  GI consult noted. OP MRI liver.   Defer DVT prophylaxis while on Eliquis.  Monitor for bleeding while on Eliquis  2 g sodium, cardiac diet with fluid restriction.  RAISA/DCCV completed 3/20.        Discharge is planned for today to home. Hospital course , medication changes and Investigation's conducted were reviewed with the patient / Family. Activity, Diet and Medications after discharge were reviewed with the patient / Family. Medication reconciliation done - pls refer to medication reconciliation sheet.Follow up with Primary Care Physician were reviewed with the patient / Family. Discharge planning was discussed with staff. Refer to discharge orders sheet. Total time to coordinate disch process incl counseling family, coordinating with other care givers,  and pharmacist was >35 min.        INTERVAL HISTORY OF PRESENT ILLNESS:  HR low but better. No chest pain/sob. No n/v/d. Oral intake mod. Feeling better. No fever/chills. acute events from last 24 hrs  reviewed.     Pertinent ROS:  No Bleeding / No rashes     Discussed with nursing and case management team and the specialists involved in this patient's care. Reviewed the EMR and documentation from other care-givers.        OBJECTIVE  PHYSICAL EXAM:      GENERAL: AAOx3, cooperative resting comfortably. thin  SKIN: Skin turgor normal.   HEENT: no epistaxis, Moist mucosa.  LUNGS: Vesicular breath sounds, with no added sounds.  CARDIAC: REGULAR. bradycardic no rubs  ABDOMEN: Abdomen soft, no tenderness. BS+  EXTREMITIES: No edema, Good capillary refill.   NEURO: Insight GOOD. No invol movements  MUSCULOSKELETAL: No acute inflammation          3/21/2024     7:15 AM 3/21/2024    12:09 PM 3/21/2024     3:16 PM 3/21/2024     7:54 PM 3/21/2024    11:55 PM 3/22/2024     3:03 AM 3/22/2024     7:39 AM   Vitals   Systolic 114 114 134 120 122 124 129   Diastolic 71 70 76 64 68 72 72   Heart Rate 45 39 42 56 43 45 59   Temp 36.2 °C (97.2 °F) 36.7 °C (98.1 °F) 36 °C (96.8 °F) 36.7 °C (98 °F) 36.5 °C (97.7 °F) 36.1 °C (97 °F) 36.5 °C (97.7 °F)   Resp 18 15 18 18 18 18 16     Body mass index is 19.53 kg/m².    Intake/Output Summary (Last 24 hours) at 3/22/2024 0813  Last data filed at 3/22/2024 0500  Gross per 24 hour   Intake 840 ml   Output --   Net 840 ml           Current Facility-Administered Medications:     acetaminophen (Tylenol) tablet 650 mg, 650 mg, oral, q4h PRN **OR** acetaminophen (Tylenol) oral liquid 650 mg, 650 mg, oral, q4h PRN **OR** acetaminophen (Tylenol) suppository 650 mg, 650 mg, rectal, q4h PRN, Julius Rodríguez, APRN-CNP, DNP    apixaban (Eliquis) tablet 5 mg, 5 mg, oral, BID, Julius Rodríguez APRN-CNP, DNP, 5 mg at 03/22/24 0809    atorvastatin (Lipitor) tablet 40 mg, 40 mg, oral, Nightly, Julius Rodríguez, APRN-CNP, DNP, 40 mg at 03/21/24 2005    benzocaine (Hurricaine) 20 % mouth spray, , , PRN, Kayden Alonso MD, 2 spray at 03/20/24 1118    buPROPion XL (Wellbutrin XL) 24 hr tablet 150 mg, 150 mg,  "oral, q AM, SHANNON Hassan, OLINDA, 150 mg at 03/22/24 0809    cyanocobalamin (Vitamin B-12) tablet 500 mcg, 500 mcg, oral, Daily, SHANNON Hassan, DNP, 500 mcg at 03/22/24 0809    furosemide (Lasix) tablet 40 mg, 40 mg, oral, Daily PRN, SHANNON Hassan, OLINDA    levothyroxine (Synthroid, Levoxyl) tablet 50 mcg, 50 mcg, oral, Daily, SHANNON Hassan, DNP, 50 mcg at 03/22/24 0500    lidocaine (Xylocaine) 2 % mouth solution, , , PRN, Kayden Alonso MD, 15 mL at 03/20/24 1119    lisinopril tablet 5 mg, 5 mg, oral, Daily, Wallace Finn MD, 5 mg at 03/22/24 0809    [Held by provider] metoprolol tartrate (Lopressor) tablet 50 mg, 50 mg, oral, BID, SHANNON Hassan DNP    ondansetron (Zofran) tablet 4 mg, 4 mg, oral, q8h PRN **OR** ondansetron (Zofran) injection 4 mg, 4 mg, intravenous, q8h PRN, SHANNON Hassan DNP    oxygen (O2) therapy, , inhalation, Continuous PRN - O2/gases, SHANNON Hassan DNP    polyethylene glycol (Glycolax, Miralax) packet 17 g, 17 g, oral, BID, SHANNON Hassan, OLINDA, 17 g at 03/21/24 2005    DATA:   Diagnostic tests reviewed for today's visit:    Most recent labs  Results from last 7 days   Lab Units 03/22/24  0501 03/21/24  0458 03/20/24  0517   WBC AUTO x10*3/uL 4.7 5.8 5.6   HEMOGLOBIN g/dL 11.7* 12.1 13.6   HEMATOCRIT % 36.6 38.1 41.9   PLATELETS AUTO x10*3/uL 319 327 360     Results from last 7 days   Lab Units 03/22/24  0501 03/21/24  0458 03/20/24  0516   SODIUM mmol/L 138 138 138   POTASSIUM mmol/L 3.6 3.9 3.6   CHLORIDE mmol/L 102 101 100   CO2 mmol/L 27 27 30   BUN mg/dL 28* 32* 30*   CREATININE mg/dL 1.29* 1.39* 1.22*   CALCIUM mg/dL 8.9 8.6 9.2   PROTEIN TOTAL g/dL 5.5* 5.5* 6.4   BILIRUBIN TOTAL mg/dL 0.4 0.4 0.6   ALK PHOS U/L 73 70 77   ALT U/L 23 22 25   AST U/L 16 13 16   GLUCOSE mg/dL 95 100* 104*             No results found for: \"TROPONINT\"         Transesophageal Echo (RAISA) " With Possible Cardioversion   Final Result      CT abdomen pelvis w IV contrast   Final Result   1. Heterogeneous enhancement of the liver, may be seen with hepatic   congestion or hepatitis. Please correlate with laboratory values.   2. Hepatic lesions, underlying neoplasm is not excludable.   Nonemergent contrast-enhanced MRI/MRCP can be obtained for further   evaluation.   3. Diffuse wall thickening at the stomach, may be secondary to   underdistention versus gastritis. Evaluation with upper endoscopy as   clinically warranted.   4. Stool ball distending the rectum.   5. Renal cortical scarring, may represent sequela of prior infection.   6. Enlarged fibroid uterus.   7. Small right pleural effusion. Mild bibasilar atelectasis.   8. Cardiomegaly with right atrial enlargement.   9. Severe degenerative changes at the right hip.             MACRO:   Critical Finding:  See findings. Notification was initiated on   3/17/2024 at 4:50 am by  Neena Diana.  (**-YCF-**) Instructions:        Signed by: Neena Diana 3/17/2024 4:56 AM   Dictation workstation:   [id number]      XR chest 2 views   Final Result   New focal wedge-shaped consolidation at the peripheral right lung   base adjacent to the lateral costophrenic angle with a small new   right pleural effusion. Given these features, a pneumonia and   pulmonary infarct are differential considerations depending on the   clinical context. Note, patient had recent posterior right lower lobe   pulmonary embolism on 02/29/2024. CT pulmonary embolism protocol   would better evaluate as deemed clinically warranted.             MACRO:   None.        Signed by: Logan Akhtar 3/17/2024 3:22 AM   Dictation workstation:   [id number]            SIGNATURE: Wallace Finn MD PATIENT NAME: Sophie Mendosa   DATE: 3/22/2024 MRN: [id number]   TIME: 8:13 AM

## 2024-03-22 NOTE — PROGRESS NOTES
Subjective Data:  Feels about the same, tele overnight with sinus 40s and isorhythmic dissociation with B low 40s, this AM went for a light walk and HR jayda to 70s. No syncope or presyncope     Objective Data:  Last Recorded Vitals:  Vitals:    03/21/24 1954 03/21/24 2355 03/22/24 0303 03/22/24 0739   BP: 120/64 122/68 124/72 129/72   BP Location:  Right arm Right arm Left arm   Patient Position: Lying Lying Lying Sitting   Pulse: 56 (!) 43 (!) 45 59   Resp: 18 18 18 16   Temp: 36.7 °C (98 °F) 36.5 °C (97.7 °F) 36.1 °C (97 °F) 36.5 °C (97.7 °F)   TempSrc: Temporal Temporal Temporal Temporal   SpO2: 97% 97% 97% 97%   Weight:       Height:           Last Labs:  CBC - 3/22/2024:  5:01 AM  4.7 11.7 319    36.6      CMP - 3/22/2024:  5:01 AM  8.9 5.5 16 --- 0.4   _ 3.3 23 73      PTT - 2/29/2024:  7:26 PM  1.1   12.1 30     TROPHS   Date/Time Value Ref Range Status   03/17/2024 06:01 AM 53 0 - 13 ng/L Final   03/17/2024 02:24 AM 45 0 - 13 ng/L Final   02/29/2024 06:25 PM 12 0 - 13 ng/L Final     BNP   Date/Time Value Ref Range Status   03/17/2024 02:24 AM 3,535 0 - 99 pg/mL Final   02/29/2024 04:44 PM 83 0 - 99 pg/mL Final     HGBA1C   Date/Time Value Ref Range Status   02/29/2024 03:26 PM 6.1 see below % Final   12/16/2021 04:00 PM 6.4 % Final     Comment:          Diagnosis of Diabetes-Adults   Non-Diabetic: < or = 5.6%   Increased risk for developing diabetes: 5.7-6.4%   Diagnostic of diabetes: > or = 6.5%  .       Monitoring of Diabetes                Age (y)     Therapeutic Goal (%)   Adults:          >18           <7.0   Pediatrics:    13-18           <7.5                   7-12           <8.0                   0- 6            7.5-8.5   American Diabetes Association. Diabetes Care 33(S1), Jan 2010.     09/24/2018 12:00 AM 5.9 % Final     Comment:          Diagnosis of Diabetes-Adults   Non-Diabetic: < or = 5.6%   Increased risk for developing diabetes: 5.7-6.4%   Diagnostic of diabetes: > or = 6.5%  .        Monitoring of Diabetes                Age (y)     Therapeutic Goal (%)   Adults:          >18           <7.0   Pediatrics:    13-18           <7.5                   7-12           <8.0                   0- 6            7.5-8.5   American Diabetes Association. Diabetes Care 33(S1), Jan 2010.       LDLCALC   Date/Time Value Ref Range Status   02/29/2024 03:26 PM 97 <=99 mg/dL Final     Comment:                                 Near   Borderline      AGE      Desirable  Optimal    High     High     Very High     0-19 Y     0 - 109     ---    110-129   >/= 130     ----    20-24 Y     0 - 119     ---    120-159   >/= 160     ----      >24 Y     0 -  99   100-129  130-159   160-189     >/=190       VLDL   Date/Time Value Ref Range Status   02/29/2024 03:26 PM 19 0 - 40 mg/dL Final   02/21/2023 12:00 AM 23 0 - 40 mg/dL Final   10/28/2020 03:02 PM 13 0 - 40 mg/dL Final   05/12/2020 01:29 PM 26 0 - 40 mg/dL Final      Last I/O:  I/O last 3 completed shifts:  In: 1090 (24 mL/kg) [P.O.:840; IV Piggyback:250]  Out: - (0 mL/kg)   Weight: 45.4 kg     Past Cardiology Tests (Last 3 Years):  EKG:  Electrocardiogram, 12-lead PRN ACS symptoms 03/20/2024      Electrocardiogram, 12-lead PRN ACS symptoms 03/19/2024      ECG 12 Lead 03/17/2024      ECG 12 lead 03/17/2024      ECG 12 lead 02/29/2024      ECG 12 lead 02/29/2024    Echo:  Transesophageal Echo (RAISA) With Possible Cardioversion 03/20/2024      Transthoracic Echo (TTE) Complete 03/01/2024    Ejection Fractions:  EF   Date/Time Value Ref Range Status   03/01/2024 09:20 AM 50 %      Cath:  No results found for this or any previous visit from the past 1095 days.    Stress Test:  No results found for this or any previous visit from the past 1095 days.    Cardiac Imaging:  MR cardiac angio chest w and wo IV contrast for morph FUNCT valve DZ and GREAT vessels 03/04/2024      Inpatient Medications:  Scheduled medications   Medication Dose Route Frequency    apixaban  5 mg oral BID     atorvastatin  40 mg oral Nightly    buPROPion XL  150 mg oral q AM    cyanocobalamin  500 mcg oral Daily    levothyroxine  50 mcg oral Daily    lisinopril  5 mg oral Daily    polyethylene glycol  17 g oral BID     PRN medications   Medication    acetaminophen    Or    acetaminophen    Or    acetaminophen    benzocaine    furosemide    lidocaine    ondansetron    Or    ondansetron    oxygen     Continuous Medications   Medication Dose Last Rate       Physical Exam:  GEN: NAD, pleasant  CV: regular rate, slow rhythm, S1/S2 present, no mrg, no significant JVD @ 30 deg  PULM: Lungs CTAB, no wrr, no increased wob on RA  EXT: no LE edema     Assessment/Plan       78 y.o. female with history of acute on chronic systolic heart failure, probably precipitated by AF with RVR, recently diagnosed LV thrombus, noted in MRI, recent right lower lobe segmental PE, without cor pulmonale, hypertension, CKD, severe coronary artery calcification, hypothyroidism presenting with  progressively worsening shortness of breath, PND, orthopnea.  BNP 2 weeks ago was 83 but is now up to 3535 consistent with acute on chronic systolic heart failure decompensation in setting of A-fib with RVR.  Recent hospitalization was reviewed as well as recent cardiac MRI.  No clear ACS or active ischemia.  High-sensitivity troponin was elevated to 45 and 53 most consistent with type II ischemia in the setting of heart failure and A-fib with RVR.     3/4/2024 cardiac MRI  1. Normal LV size (EDVi 68 ml/m2) with mildly reduced systolic  function (LVEF 48%).  2. Severe hypokinesis of the apical inferior segment and true apex.  3. There is evidence of a small LV thrombus measuring 1.1 x 0.58 cm  adherent to the apical inferior wall.  4. Subendocardial infarction of the apical inferior wall (50% wall  thickness) and transmural infarction of the true apex (% wall  thickness).  5. Moderate mitral regurgitation.  6. Normal RV size and systolic function (RVEF  59%).     3/18: NAEO. Largely asymptomatic this AM from cardiac perspective. Does feel her heart beating fast but only if she presses her chest. Having ongoing nausea and back pain. Per pt is constipated and working on getting bowels moving again. Denies CP, SOB, and lightheadedness. ROS negative. Tele reviewed showing afib vs more likely 2:1 flutter at rate 120s-130s which was seen on EKG 3/17. Only input charted for past 24-48 hrs no output charted. Weighed only once at 101lbs on 3/16. Clinically appears to be approaching if not euvolemic.  3/19: NAEO. Asymptomatic this AM. Tele reviewed showing likely conversion from 2:1 flutter to coarse afib around 16:20 yesterday. Rates now 80s. Appears euvolemic.  3/20: NAEO. Asymptomatic this AM. Tele reviewed showing persistent afib rates 70s-80s. RAISA showed no WHIT thrombus and so pt successfully cardioverted to sinus with rates 40s.  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 She has intact chronotropic competence with low-intesity ambulation raising sinus rate to 70s with 1:1 AV conduction. Would discontinue AV laquita blocking agents to allow sinus rate to recover. Will refer to EP as outpatient for discussion of rhythm control strategies and sinus node dysfunction treatment        Recommendations:  -no cardiac contraindication to discharge  -discontinue beta blocker, avoid AV-laquita agents at this time  -outpatient event monitor and EP referral (ordered)  -would use furosemide 40mg PRN for weight  gain >3 lbs over 3 days, discharge pt on as needed lasix for weight gain  -Follow up with established cardiologist, Dr. CHELSEY Hackett within 3 weeks of hospital discharge.  -Please call with questions  -will sign off, please call if questions         Thank you for the opportunity to contribute to the care of this patient. Above recommendations discussed with Dr. Newsome.      Code Status:  Full Code     I saw and evaluated the patient. I personally obtained the key and critical portions of the history and physical exam. I reviewed the fellow's documentation and discussed the patient with the fellow. I agree with the fellow's medical decision making as documented in the fellow's note and have edited it as necessary.  I personally reviewed the patient's recent labs, medications, orders, EKGs, and pertinent cardiac imaging/ echocardiography.      Thank you for allowing me to participate in their care.  Please feel free to call me with any further questions or concerns.  Peripheral IV 03/20/24 22 G Right Antecubital (Active)   Site Assessment Clean;Dry;Intact 03/22/24 0900   Dressing Type Transparent 03/22/24 0900   Line Status Flushed 03/22/24 0900   Dressing Status Clean;Dry 03/22/24 0900   Number of days: 2       Code Status:  Full Code    I spent 35 minutes in the professional and overall care of this patient.        Jignesh Calvillo MD

## 2024-03-22 NOTE — PROGRESS NOTES
Occupational Therapy    OT Treatment    Patient Name: Sophie Mendosa  MRN: [id number]  Today's Date: 3/22/2024  Time Calculation  Start Time: 1519  Stop Time: 1532  Time Calculation (min): 13 min         Assessment:  OT Assessment: Steady progress, participated in standing tolerance/balance therapeutic activities, CGA-SBA. Continues to demo decreased endurance and balance.  Prognosis: Good  Medical Staff Made Aware: Yes  End of Session Communication: Bedside nurse  End of Session Patient Position: Bed, 3 rail up, Alarm on  OT Assessment Results: Decreased ADL status, Decreased upper extremity strength, Decreased safe judgment during ADL, Decreased endurance, Decreased functional mobility, Decreased IADLs, Decreased trunk control for functional activities  Prognosis: Good  Medical Staff Made Aware: Yes  Plan:  Treatment Interventions: ADL retraining, Functional transfer training, UE strengthening/ROM, Endurance training, Equipment evaluation/education, Compensatory technique education  OT Frequency: 2 times per week  OT Discharge Recommendations: Low intensity level of continued care  OT - OK to Discharge: Yes  Treatment Interventions: ADL retraining, Functional transfer training, UE strengthening/ROM, Endurance training, Equipment evaluation/education, Compensatory technique education    Subjective   Previous Visit Info:  OT Last Visit  OT Received On: 03/22/24  General:  General  Reason for Referral: Pt is a 79 y/o female SOB and orthopnea.  Past Medical History Relevant to Rehab: recently diagnosed PE, atrial flutter with RVR  Prior to Session Communication: Bedside nurse  Patient Position Received: Bed, 3 rail up, Alarm on  General Comment: Pt agreeable and pleasant to OT tx this date.  Precautions:  Medical Precautions: Fall precautions     Pain:  Pain Assessment  Pain Assessment: 0-10  Pain Score: 0 - No pain    Objective       Coordination:  Movements are Fluid and Coordinated: Yes  Activities of Daily Living:  LE Dressing  LE Dressing: Yes  Shoe Level of Assistance: Modified independent  LE Dressing Where Assessed: Edge of bed  LE Dressing Comments: Don/doff bilateral shoes.     Bed Mobility/Transfers: Bed Mobility  Bed Mobility: Yes  Bed Mobility 1  Bed Mobility 1: Supine to sitting, Sitting to supine  Level of Assistance 1: Modified independent    Transfers  Transfer: Yes  Transfer 1  Technique 1: Sit to stand, Stand to sit  Transfer Device 1: Cane  Transfer Level of Assistance 1: Close supervision  Trials/Comments 1: Education for proper hand and cane placement while performing sit<>stand.      Functional Mobility:  Functional Mobility  Functional Mobility Performed: Yes  Functional Mobility 1  Surface 1: Level tile  Device 1: Single point cane  Assistance 1: Close supervision  Comments 1: Pt functionally navigated short distance household functional mobility using SPC and SBA.  Sitting Balance:  Static Sitting Balance  Static Sitting-Balance Support: Feet supported  Static Sitting-Level of Assistance: Close supervision  Static Sitting-Comment/Number of Minutes: SBA, EOB    Therapy/Activity: Therapeutic Activity  Therapeutic Activity Performed: Yes  Therapeutic Activity 1: Pt performed standing balance ~5 minutes total. Completed unilateral UE x5 reps each and BUE forward reaching x10 reps total, CGA.      Outcome Measures:[facility] Daily Activity  Putting on and taking off regular lower body clothing: A little  Bathing (including washing, rinsing, drying): A little  Putting on and taking off regular upper body clothing: A little  Toileting, which includes using toilet, bedpan or urinal: A little  Taking care of personal grooming such as brushing teeth: A little  Eating Meals: None  Daily Activity - Total Score: 19        Education Documentation  Body Mechanics, taught by Fay Jimenez OT at 3/22/2024  3:42 PM.  Learner: Patient  Readiness: Acceptance  Method: Explanation, Demonstration  Response: Verbalizes  Understanding    Mobility Training, taught by Fay Jimenez OT at 3/22/2024  3:42 PM.  Learner: Patient  Readiness: Acceptance  Method: Explanation, Demonstration  Response: Verbalizes Understanding    Education Comments  No comments found.        OP EDUCATION:       Goals:  Encounter Problems       Encounter Problems (Active)       ADLs       Patient will perform UB and LB bathing with modified independent level of assistance and grab bars, shower chair, and long-handled sponge. (Progressing)       Start:  03/19/24    Expected End:  04/02/24            Patient with complete lower body dressing with modified independent level of assistance donning and doffing all LE clothes  with PRN adaptive equipment. (Progressing)       Start:  03/19/24    Expected End:  04/02/24            Patient will complete daily grooming tasks with independent level of assistance and PRN adaptive equipment while standing. (Progressing)       Start:  03/19/24    Expected End:  04/02/24            Patient will complete toileting including hygiene clothing management/hygiene with modified independent level of assistance and raised toilet seat and grab bars. (Progressing)       Start:  03/19/24    Expected End:  04/02/24               MOBILITY       Patient will perform Functional mobility x Household distances/Community Distances with modified independent level of assistance and least restrictive device in order to improve safety and functional mobility. (Progressing)       Start:  03/19/24    Expected End:  04/02/24               TRANSFERS       Patient will perform bed mobility independent level of assistance in order to improve safety and independence with mobility (Progressing)       Start:  03/19/24    Expected End:  04/02/24            Patient will complete functional transfers with least restrictive device with modified independent level of assistance. (Progressing)       Start:  03/19/24    Expected End:  04/02/24

## 2024-03-23 NOTE — ED PROCEDURE NOTE
Procedure  Critical Care    Performed by: Marizol Wilkerson MD  Authorized by: Howard Villalpando MD    Critical care provider statement:     Critical care time (minutes):  45    Critical care time was exclusive of:  Teaching time and separately billable procedures and treating other patients    Critical care was necessary to treat or prevent imminent or life-threatening deterioration of the following conditions: pulmonary embolism, cardiac arrhythmia.    Critical care was time spent personally by me on the following activities:  Development of treatment plan with patient or surrogate, evaluation of patient's response to treatment, examination of patient, obtaining history from patient or surrogate, ordering and performing treatments and interventions, ordering and review of laboratory studies, ordering and review of radiographic studies, pulse oximetry, re-evaluation of patient's condition and review of old charts    Care discussed with: admitting provider                 Marizol Wilkerson MD  03/23/24 [id number]

## 2024-03-23 NOTE — DISCHARGE SUMMARY
Discharge Summary    Admit Date: 3/17/2024  Discharge Date: 3/22/2024      Discharge Diagnosis  New onset AF - RVR on adm; S Bradycardia post cardioversion.  Hypertension   SINTIA on CKD 3A. Creat close to baseline off diuretics.  Acute on chronic systolic heart failure, probably precipitated by AF with RVR.  Recently diagnosed LV thrombus, noted in MRI  Recent right lower lobe segmental PE, without cor pulmonale  Severe coronary artery calcification  Hypothyroidism, with suppressed TSH on oral supplement therapy  Constipation  ?previously known liver spot (per pt) - was under follow up with Dr Sergio Soto  No evidence of pneumonia this admission. (Pneumonia suspected on admission but ruled out).     PLAN:  HOLD metoprolol, continue Eliquis.  Diuretics prn for wt gain post DC  Lower dose of levothyroxine  Cardiology consult noted  Off Abx per ID recomm.  GI consult recomm. OP MRI liver.   Defer DVT prophylaxis while on Eliquis.  Monitor for bleeding while on Eliquis  2 g sodium, cardiac diet with fluid restriction.  RAISA/DCCV completed 3/20.      Issues Requiring Follow-Up  HR    Test Results Pending At Discharge  Pending Labs       No current pending labs.            Hospital Course   Adm for palp and SOB. Pneum suspected but ruled out. HF treated with iv diuresis. AF RVR treated with dig. RAISA/DCCV done and SR achieved but pt had low HR. HR improved off BB. DC home off BB and rate limiting meds and use lasix for wt gain 3# over 3d. OP EP cardiol follow up.    Pertinent Physical Exam At Time of Discharge  Physical Exam    Home Medications     Medication List      START taking these medications     furosemide 40 mg tablet; Commonly known as: Lasix; Take 1 tablet (40 mg)   by mouth once daily as needed (as needed for leg swelling or weight gain   >3lbs over 3 days).   lisinopril 5 mg tablet; Take 1 tablet (5 mg) by mouth once daily. Do not   start before March 23, 2024.; Start taking on: March 23, 2024     CONTINUE taking  these medications     atorvastatin 40 mg tablet; Commonly known as: Lipitor; Take 1 tablet (40   mg) by mouth once daily.   buPROPion  mg 24 hr tablet; Commonly known as: Wellbutrin XL; Take   1 tablet (150 mg) by mouth once daily in the morning. Do not crush, chew,   or split.   cyanocobalamin 500 mcg tablet; Commonly known as: Vitamin B-12   Eliquis DVT-PE Treat 30D Start 5 mg (74 tabs) tablet; Generic drug:   apixaban; Take 2 tablets (10 mg) by mouth 2 times a day for 7 days, then   take 1 tablet (5 mg) by mouth 2 times a day.   levothyroxine 75 mcg tablet; Commonly known as: Synthroid, Levoxyl; Take   1 tablet (75 mcg) by mouth once daily. Do not start before March 5, 2024.     STOP taking these medications     metoprolol tartrate 25 mg tablet; Commonly known as: Lopressor       Outpatient Follow-Up  Future Appointments   Date Time Provider Department Center   4/4/2024  3:00 PM Rosanne M Casal, APRN-CNP, DNP [id number] [facility]   5/30/2024 10:30 AM Madelaine Galvez MD [id number] [facility]       Wallace Finn MD

## 2024-03-25 ENCOUNTER — DOCUMENTATION (OUTPATIENT)
Dept: CARE COORDINATION | Facility: CLINIC | Age: 79
End: 2024-03-25
Payer: MEDICARE

## 2024-03-25 NOTE — PROGRESS NOTES
Discharge Facility: [facility]  Discharge Diagnosis:New onset AF - RVR on adm; S Bradycardia post cardioversion.  Hypertension   SINTIA on CKD 3A. Creat close to baseline off diuretics.  Acute on chronic systolic heart failure, probably precipitated by AF with RVR.  Recently diagnosed LV thrombus, noted in MRI  Recent right lower lobe segmental PE, without cor pulmonale  Severe coronary artery calcification    HOLD metoprolol, continue Eliquis.  Diuretics prn for wt gain post DC  Lower dose of levothyroxine  Cardiology consult noted  Off Abx per ID recomm.  GI consult recomm. OP MRI liver.   Defer DVT prophylaxis while on Eliquis.  Monitor for bleeding while on Eliquis  2 g sodium, cardiac diet with fluid restriction.  RAISA/DCCV completed 3/20.    Admission Date:3.14.24  Discharge Date: 3.22.24    PCP Appointment Date:4.2.24  Specialist Appointment Date:   Hospital Encounter and Summary: Linked   See discharge assessment below for further details     Engagement  Call Start Time: 1139 (3/25/2024 11:37 AM)    Medications  Medications reviewed with patient/caregiver?: Yes (3/25/2024 11:37 AM)  Is the patient having any side effects they believe may be caused by any medication additions or changes?: No (3/25/2024 11:37 AM)  Does the patient have all medications ordered at discharge?: Yes (3/25/2024 11:37 AM)  Care Management Interventions: No intervention needed (3/25/2024 11:37 AM)  Is the patient taking all medications as directed (includes completed medication regime)?: Yes (3/25/2024 11:37 AM)  Care Management Interventions: Provided patient education (3/25/2024 11:37 AM)    Appointments  Does the patient have a primary care provider?: Yes (3/25/2024 11:37 AM)  Care Management Interventions: Verified appointment date/time/provider; Advised patient to make appointment (3/25/2024 11:37 AM)  Has the patient kept scheduled appointments due by today?: Yes (3/25/2024 11:37 AM)  Care Management Interventions: Advised patient to  keep appointment (3/25/2024 11:37 AM)    Self Management  What is the home health agency?: n/a (3/25/2024 11:37 AM)  What Durable Medical Equipment (DME) was ordered?: n/a (3/25/2024 11:37 AM)    Patient Teaching  Does the patient have access to their discharge instructions?: Yes (3/25/2024 11:37 AM)  Care Management Interventions: Reviewed instructions with patient (3/25/2024 11:37 AM)  What is the patient's perception of their health status since discharge?: Improving (3/25/2024 11:37 AM)  Is the patient/caregiver able to teach back the hierarchy of who to call/visit for symptoms/problems? PCP, Specialist, Home Health nurse, Urgent Care, ED, 911: Yes (3/25/2024 11:37 AM)  Patient/Caregiver Education Comments: Spoke with patient. Not SOB, no complaints of nausea. Did take a Lasix tab as directed over the weekend for 3 lb weight gain. Has not weighed self today. Reports decreased appetite. Will be following with Gastro and Cardio. States came home on a cardiac monitor that she is to wear and mail in two weeks. (3/25/2024 11:37 AM)

## 2024-04-01 DIAGNOSIS — I63.321: Primary | ICD-10-CM

## 2024-04-02 ENCOUNTER — APPOINTMENT (OUTPATIENT)
Dept: PRIMARY CARE | Facility: CLINIC | Age: 79
End: 2024-04-02
Payer: MEDICARE

## 2024-04-03 DIAGNOSIS — D47.2 MONOCLONAL GAMMOPATHY OF UNKNOWN SIGNIFICANCE: Primary | ICD-10-CM

## 2024-04-03 DIAGNOSIS — D50.8 OTHER IRON DEFICIENCY ANEMIA: ICD-10-CM

## 2024-04-03 DIAGNOSIS — N18.31 STAGE 3A CHRONIC KIDNEY DISEASE (MULTI): ICD-10-CM

## 2024-04-04 ENCOUNTER — LAB (OUTPATIENT)
Dept: LAB | Facility: CLINIC | Age: 79
End: 2024-04-04
Payer: MEDICARE

## 2024-04-04 ENCOUNTER — OFFICE VISIT (OUTPATIENT)
Dept: HEMATOLOGY/ONCOLOGY | Facility: CLINIC | Age: 79
End: 2024-04-04
Payer: MEDICARE

## 2024-04-04 VITALS
SYSTOLIC BLOOD PRESSURE: 184 MMHG | OXYGEN SATURATION: 96 % | WEIGHT: 99.21 LBS | TEMPERATURE: 97.9 F | DIASTOLIC BLOOD PRESSURE: 82 MMHG | RESPIRATION RATE: 18 BRPM | BODY MASS INDEX: 19.38 KG/M2 | HEART RATE: 54 BPM

## 2024-04-04 DIAGNOSIS — D47.2 MONOCLONAL GAMMOPATHY OF UNKNOWN SIGNIFICANCE: ICD-10-CM

## 2024-04-04 DIAGNOSIS — D47.2 MGUS (MONOCLONAL GAMMOPATHY OF UNKNOWN SIGNIFICANCE): ICD-10-CM

## 2024-04-04 DIAGNOSIS — N18.31 STAGE 3A CHRONIC KIDNEY DISEASE (MULTI): ICD-10-CM

## 2024-04-04 DIAGNOSIS — D50.8 OTHER IRON DEFICIENCY ANEMIA: ICD-10-CM

## 2024-04-04 DIAGNOSIS — E53.8 B12 DEFICIENCY: ICD-10-CM

## 2024-04-04 LAB
ALBUMIN SERPL BCP-MCNC: 4.6 G/DL (ref 3.4–5)
ALP SERPL-CCNC: 116 U/L (ref 33–136)
ALT SERPL W P-5'-P-CCNC: 14 U/L (ref 7–45)
ANION GAP SERPL CALC-SCNC: 16 MMOL/L (ref 10–20)
AST SERPL W P-5'-P-CCNC: 16 U/L (ref 9–39)
BASOPHILS # BLD AUTO: 0.02 X10*3/UL (ref 0–0.1)
BASOPHILS NFR BLD AUTO: 0.4 %
BILIRUB SERPL-MCNC: 1 MG/DL (ref 0–1.2)
BUN SERPL-MCNC: 13 MG/DL (ref 6–23)
CALCIUM SERPL-MCNC: 10.8 MG/DL (ref 8.6–10.6)
CHLORIDE SERPL-SCNC: 101 MMOL/L (ref 98–107)
CO2 SERPL-SCNC: 26 MMOL/L (ref 21–32)
CREAT SERPL-MCNC: 1.16 MG/DL (ref 0.5–1.05)
EGFRCR SERPLBLD CKD-EPI 2021: 49 ML/MIN/1.73M*2
EOSINOPHIL # BLD AUTO: 0.09 X10*3/UL (ref 0–0.4)
EOSINOPHIL NFR BLD AUTO: 1.9 %
ERYTHROCYTE [DISTWIDTH] IN BLOOD BY AUTOMATED COUNT: 15.4 % (ref 11.5–14.5)
FERRITIN SERPL-MCNC: 211 NG/ML (ref 8–150)
FOLATE SERPL-MCNC: 18.2 NG/ML
GLUCOSE SERPL-MCNC: 84 MG/DL (ref 74–99)
HCT VFR BLD AUTO: 42.6 % (ref 36–46)
HGB BLD-MCNC: 13.7 G/DL (ref 12–16)
HGB RETIC QN: 31 PG (ref 28–38)
IMM GRANULOCYTES # BLD AUTO: 0 X10*3/UL (ref 0–0.5)
IMM GRANULOCYTES NFR BLD AUTO: 0 % (ref 0–0.9)
IMMATURE RETIC FRACTION: 3.9 %
IRON SATN MFR SERPL: 20 % (ref 25–45)
IRON SERPL-MCNC: 74 UG/DL (ref 35–150)
LDH SERPL L TO P-CCNC: 154 U/L (ref 84–246)
LYMPHOCYTES # BLD AUTO: 1.33 X10*3/UL (ref 0.8–3)
LYMPHOCYTES NFR BLD AUTO: 27.7 %
MCH RBC QN AUTO: 26.3 PG (ref 26–34)
MCHC RBC AUTO-ENTMCNC: 32.2 G/DL (ref 32–36)
MCV RBC AUTO: 82 FL (ref 80–100)
MONOCYTES # BLD AUTO: 0.49 X10*3/UL (ref 0.05–0.8)
MONOCYTES NFR BLD AUTO: 10.2 %
NEUTROPHILS # BLD AUTO: 2.88 X10*3/UL (ref 1.6–5.5)
NEUTROPHILS NFR BLD AUTO: 59.8 %
NRBC BLD-RTO: ABNORMAL /100{WBCS}
PLATELET # BLD AUTO: 302 X10*3/UL (ref 150–450)
POTASSIUM SERPL-SCNC: 4.3 MMOL/L (ref 3.5–5.3)
PROT SERPL-MCNC: 7.6 G/DL (ref 6.4–8.2)
PROT SERPL-MCNC: 7.6 G/DL (ref 6.4–8.2)
RBC # BLD AUTO: 5.2 X10*6/UL (ref 4–5.2)
RETICS #: 0.04 X10*6/UL (ref 0.02–0.11)
RETICS/RBC NFR AUTO: 0.8 % (ref 0.5–2)
SODIUM SERPL-SCNC: 139 MMOL/L (ref 136–145)
TIBC SERPL-MCNC: 378 UG/DL (ref 240–445)
UIBC SERPL-MCNC: 304 UG/DL (ref 110–370)
VIT B12 SERPL-MCNC: 1497 PG/ML (ref 211–911)
WBC # BLD AUTO: 4.8 X10*3/UL (ref 4.4–11.3)

## 2024-04-04 PROCEDURE — 84155 ASSAY OF PROTEIN SERUM: CPT

## 2024-04-04 PROCEDURE — 85025 COMPLETE CBC W/AUTO DIFF WBC: CPT

## 2024-04-04 PROCEDURE — 99214 OFFICE O/P EST MOD 30 MIN: CPT | Mod: 25 | Performed by: NURSE PRACTITIONER

## 2024-04-04 PROCEDURE — 36415 COLL VENOUS BLD VENIPUNCTURE: CPT

## 2024-04-04 PROCEDURE — 82607 VITAMIN B-12: CPT

## 2024-04-04 PROCEDURE — 82728 ASSAY OF FERRITIN: CPT

## 2024-04-04 PROCEDURE — 84165 PROTEIN E-PHORESIS SERUM: CPT | Performed by: STUDENT IN AN ORGANIZED HEALTH CARE EDUCATION/TRAINING PROGRAM

## 2024-04-04 PROCEDURE — 82746 ASSAY OF FOLIC ACID SERUM: CPT

## 2024-04-04 PROCEDURE — 83540 ASSAY OF IRON: CPT

## 2024-04-04 PROCEDURE — 99214 OFFICE O/P EST MOD 30 MIN: CPT | Performed by: NURSE PRACTITIONER

## 2024-04-04 PROCEDURE — 84075 ASSAY ALKALINE PHOSPHATASE: CPT

## 2024-04-04 PROCEDURE — 85045 AUTOMATED RETICULOCYTE COUNT: CPT

## 2024-04-04 PROCEDURE — 1111F DSCHRG MED/CURRENT MED MERGE: CPT | Performed by: NURSE PRACTITIONER

## 2024-04-04 PROCEDURE — 86320 SERUM IMMUNOELECTROPHORESIS: CPT | Performed by: STUDENT IN AN ORGANIZED HEALTH CARE EDUCATION/TRAINING PROGRAM

## 2024-04-04 PROCEDURE — 83615 LACTATE (LD) (LDH) ENZYME: CPT

## 2024-04-04 PROCEDURE — 86334 IMMUNOFIX E-PHORESIS SERUM: CPT

## 2024-04-04 PROCEDURE — 1160F RVW MEDS BY RX/DR IN RCRD: CPT | Performed by: NURSE PRACTITIONER

## 2024-04-04 PROCEDURE — 1159F MED LIST DOCD IN RCRD: CPT | Performed by: NURSE PRACTITIONER

## 2024-04-04 PROCEDURE — 3079F DIAST BP 80-89 MM HG: CPT | Performed by: NURSE PRACTITIONER

## 2024-04-04 PROCEDURE — 1125F AMNT PAIN NOTED PAIN PRSNT: CPT | Performed by: NURSE PRACTITIONER

## 2024-04-04 PROCEDURE — 3077F SYST BP >= 140 MM HG: CPT | Performed by: NURSE PRACTITIONER

## 2024-04-04 PROCEDURE — 83521 IG LIGHT CHAINS FREE EACH: CPT

## 2024-04-04 PROCEDURE — 4004F PT TOBACCO SCREEN RCVD TLK: CPT | Performed by: NURSE PRACTITIONER

## 2024-04-04 PROCEDURE — 82784 ASSAY IGA/IGD/IGG/IGM EACH: CPT

## 2024-04-04 ASSESSMENT — PAIN SCALES - GENERAL: PAINLEVEL: 8

## 2024-04-04 NOTE — PROGRESS NOTES
"Patient ID: Sophie Mendosa is a 78 y.o. female.  Referring Physician: No referring provider defined for this encounter.  Primary Care Provider: Madelaine Galvez MD  Visit Type: Follow Up transfer of care from Kevin Beach MD      Subjective    This patient is a 78 year old female presenting for follow up evaluation of MGUS and B12 deficiency. She is a transfer of carde from Dr Sidney Beach.     Current complaints include arthritis in her hip.  She is seeing orthopedic surgery soon.  She is also considering seeing rheumatology again for her rheumatoid arthritis.    Ms Mendosa was recently hospitalized in March 2024 and found to have PE and cardiac thrombus.    Copied  from discharge summary:  \"Discharge Diagnosis - Hypertension with CKD 3a.  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 Nil acute. EF 48%.  Severe coronary artery calcifications on CT scan  Hypothyroidism, with suppressed TSH on oral supplement therapy CKD3a POA.      Issues Requiring Follow-Up  LV thrombus     Hospital Course  Admitted for palpitations.  Noted to have right lower lower lobe segmental PE.  Echo suggestive of LV thrombus versus hypertrophic chordae.  MRI confirmed LV thrombus.  Patient was on IV heparin, that was transition to DOAC's prior to discharge.  She will needs ongoing follow-up with cardiology as OP.     Pertinent Physical Exam At Time of Discharge    START taking these medications    · Eliquis DVT-PE Treat 30D Start 5 mg (74 tabs) tablet; Generic drug:   apixaban; Take 2 tablets (10 mg) by mouth 2 times a day for 7 days, then   take 1 tablet (5 mg) by mouth 2 times a day.  · metoprolol tartrate 25 mg tablet; Commonly known as: Lopressor; Take 1   tablet (25 mg) by mouth 2 times a day.    Patient is currently taking apixiban 5 mg po bid and denies bruising or bleeding from any site, other than a few old bruises on her arms from IV's " "during hospitalization.    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 Patient had been smoking for over 50 years, 1 ppd and recently quit 1 month ago when she was admitted to the hospital. She is very proud that she has not had a cigarette in over a month. Nondrinker. No recreational drugs.     Family History:  Reviewed  Dad prostate cancer  Sister with sarcoidosis       Review of Systems   Constitutional:  Positive for fatigue.     Objective   BSA: 1.38 meters squared  BP (!) 184/82 Comment: pt to call PCP today for guidance.  Pulse 54   Temp 36.6 °C (97.9 °F)   Resp 18   Wt 45 kg (99 lb 3.3 oz)   SpO2 96%   BMI 19.38 kg/m²      has a past medical history of Arthritis, Atrial fibrillation (CMS/HCC), CHF (congestive heart failure) (CMS/HCC), CKD (chronic kidney disease), Diabetes mellitus (CMS/HCC), Hypertension, Hypothyroidism, LV (left ventricular) mural thrombus (03/01/2024), Major depressive disorder, single episode, unspecified, Pulmonary embolus (CMS/HCC) (02/29/2024), and Stroke (CMS/HCC).   has a past surgical history that includes Tonsillectomy; Tubal ligation; MR angio head wo IV contrast (09/24/2018); MR angio neck wo IV contrast (09/24/2018); CT guided percutaneous biopsy bone deep (04/18/2023); and Breast biopsy.  Family History   Problem Relation Name Age of Onset    Other (cardiac disorder) Mother      Other (CVA) Mother      Heart failure Mother      Heart attack Mother      Hypertension Mother      Prostate cancer Father      Diabetes Sister      Hypertension Sister      Diabetes Brother      Other (cardiac disorder) " Brother      Heart attack Brother      Hypertension Brother      Heart failure Other PATERNAL HALF SISTER        Sophie Mendosa  reports that she has been smoking cigarettes. She has never used smokeless tobacco.  She  reports no history of alcohol use.  She  reports no history of drug use.    Physical Exam  Vitals reviewed.   Constitutional:       Appearance: Normal appearance.   Cardiovascular:      Rate and Rhythm: Regular rhythm     Comments: BP elevated today. Taken several times   Pulmonary:      Effort: Pulmonary effort is normal.      Breath sounds: Normal breath sounds.   Musculoskeletal:      Right lower leg: No edema.      Left lower leg: No edema.   Neurological:      Mental Status: She is alert.     WBC   Date/Time Value Ref Range Status   04/04/2024 02:51 PM 4.8 4.4 - 11.3 x10*3/uL Final   03/22/2024 05:01 AM 4.7 4.4 - 11.3 x10*3/uL Final   03/21/2024 04:58 AM 5.8 4.4 - 11.3 x10*3/uL Final     nRBC   Date Value Ref Range Status   04/04/2024   Final     Comment:     Not Measured   03/22/2024 0.0 0.0 - 0.0 /100 WBCs Final   03/21/2024 0.0 0.0 - 0.0 /100 WBCs Final     RBC   Date Value Ref Range Status   04/04/2024 5.20 4.00 - 5.20 x10*6/uL Final   03/22/2024 4.38 4.00 - 5.20 x10*6/uL Final   03/21/2024 4.48 4.00 - 5.20 x10*6/uL Final     Hemoglobin   Date Value Ref Range Status   04/04/2024 13.7 12.0 - 16.0 g/dL Final   03/22/2024 11.7 (L) 12.0 - 16.0 g/dL Final   03/21/2024 12.1 12.0 - 16.0 g/dL Final     Hematocrit   Date Value Ref Range Status   04/04/2024 42.6 36.0 - 46.0 % Final   03/22/2024 36.6 36.0 - 46.0 % Final   03/21/2024 38.1 36.0 - 46.0 % Final     MCV   Date/Time Value Ref Range Status   04/04/2024 02:51 PM 82 80 - 100 fL Final   03/22/2024 05:01 AM 84 80 - 100 fL Final   03/21/2024 04:58 AM 85 80 - 100 fL Final     [facility]   Date/Time Value Ref Range Status   04/04/2024 02:51 PM 26.3 26.0 - 34.0 pg Final   03/22/2024 05:01 AM 26.7 26.0 - 34.0 pg Final   03/21/2024 04:58 AM 27.0 26.0 - 34.0  "pg Final     MCHC   Date/Time Value Ref Range Status   04/04/2024 02:51 PM 32.2 32.0 - 36.0 g/dL Final   03/22/2024 05:01 AM 32.0 32.0 - 36.0 g/dL Final   03/21/2024 04:58 AM 31.8 (L) 32.0 - 36.0 g/dL Final     RDW   Date/Time Value Ref Range Status   04/04/2024 02:51 PM 15.4 (H) 11.5 - 14.5 % Final   03/22/2024 05:01 AM 15.2 (H) 11.5 - 14.5 % Final   03/21/2024 04:58 AM 15.2 (H) 11.5 - 14.5 % Final     Platelets   Date/Time Value Ref Range Status   04/04/2024 02:51  150 - 450 x10*3/uL Final   03/22/2024 05:01  150 - 450 x10*3/uL Final   03/21/2024 04:58  150 - 450 x10*3/uL Final     No results found for: \"MPV\"  Neutrophils %   Date/Time Value Ref Range Status   04/04/2024 02:51 PM 59.8 40.0 - 80.0 % Final   03/17/2024 02:24 AM 80.2 40.0 - 80.0 % Final   03/01/2024 04:58 AM 47.0 40.0 - 80.0 % Final     Immature Granulocytes %, Automated   Date/Time Value Ref Range Status   04/04/2024 02:51 PM 0.0 0.0 - 0.9 % Final     Comment:     Immature Granulocyte Count (IG) includes promyelocytes, myelocytes and metamyelocytes but does not include bands. Percent differential counts (%) should be interpreted in the context of the absolute cell counts (cells/UL).   03/17/2024 02:24 AM 0.3 0.0 - 0.9 % Final     Comment:     Immature Granulocyte Count (IG) includes promyelocytes, myelocytes and metamyelocytes but does not include bands. Percent differential counts (%) should be interpreted in the context of the absolute cell counts (cells/UL).   03/01/2024 04:58 AM 0.2 0.0 - 0.9 % Final     Comment:     Immature Granulocyte Count (IG) includes promyelocytes, myelocytes and metamyelocytes but does not include bands. Percent differential counts (%) should be interpreted in the context of the absolute cell counts (cells/UL).     Lymphocytes %   Date/Time Value Ref Range Status   04/04/2024 02:51 PM 27.7 13.0 - 44.0 % Final   03/17/2024 02:24 AM 11.3 13.0 - 44.0 % Final   03/01/2024 04:58 AM 38.2 13.0 - 44.0 % Final "     Monocytes %   Date/Time Value Ref Range Status   04/04/2024 02:51 PM 10.2 2.0 - 10.0 % Final   03/17/2024 02:24 AM 8.0 2.0 - 10.0 % Final   03/01/2024 04:58 AM 10.0 2.0 - 10.0 % Final     Eosinophils %   Date/Time Value Ref Range Status   04/04/2024 02:51 PM 1.9 0.0 - 6.0 % Final   03/17/2024 02:24 AM 0.0 0.0 - 6.0 % Final   03/01/2024 04:58 AM 3.8 0.0 - 6.0 % Final     Basophils %   Date/Time Value Ref Range Status   04/04/2024 02:51 PM 0.4 0.0 - 2.0 % Final   03/17/2024 02:24 AM 0.2 0.0 - 2.0 % Final   03/01/2024 04:58 AM 0.8 0.0 - 2.0 % Final     Neutrophils Absolute   Date/Time Value Ref Range Status   04/04/2024 02:51 PM 2.88 1.60 - 5.50 x10*3/uL Final     Comment:     Percent differential counts (%) should be interpreted in the context of the absolute cell counts (cells/uL).   03/17/2024 02:24 AM 7.34 (H) 1.60 - 5.50 x10*3/uL Final     Comment:     Percent differential counts (%) should be interpreted in the context of the absolute cell counts (cells/uL).   03/01/2024 04:58 AM 2.50 1.60 - 5.50 x10*3/uL Final     Comment:     Percent differential counts (%) should be interpreted in the context of the absolute cell counts (cells/uL).     Immature Granulocytes Absolute, Automated   Date/Time Value Ref Range Status   04/04/2024 02:51 PM 0.00 0.00 - 0.50 x10*3/uL Final   03/17/2024 02:24 AM 0.03 0.00 - 0.50 x10*3/uL Final   03/01/2024 04:58 AM 0.01 0.00 - 0.50 x10*3/uL Final     Lymphocytes Absolute   Date/Time Value Ref Range Status   04/04/2024 02:51 PM 1.33 0.80 - 3.00 x10*3/uL Final   03/17/2024 02:24 AM 1.03 0.80 - 3.00 x10*3/uL Final   03/01/2024 04:58 AM 2.03 0.80 - 3.00 x10*3/uL Final     Monocytes Absolute   Date/Time Value Ref Range Status   04/04/2024 02:51 PM 0.49 0.05 - 0.80 x10*3/uL Final   03/17/2024 02:24 AM 0.73 0.05 - 0.80 x10*3/uL Final   03/01/2024 04:58 AM 0.53 0.05 - 0.80 x10*3/uL Final     Eosinophils Absolute   Date/Time Value Ref Range Status   04/04/2024 02:51 PM 0.09 0.00 - 0.40  x10*3/uL Final   03/17/2024 02:24 AM 0.00 0.00 - 0.40 x10*3/uL Final   03/01/2024 04:58 AM 0.20 0.00 - 0.40 x10*3/uL Final     Basophils Absolute   Date/Time Value Ref Range Status   04/04/2024 02:51 PM 0.02 0.00 - 0.10 x10*3/uL Final   03/17/2024 02:24 AM 0.02 0.00 - 0.10 x10*3/uL Final   03/01/2024 04:58 AM 0.04 0.00 - 0.10 x10*3/uL Final       aPTT   Date/Time Value Ref Range Status   02/29/2024 07:26 PM 30 27 - 38 seconds Final   02/29/2024 06:25 PM   Corrected     Comment:     Result unavailable. Specimen clotted.   Corrected result: Previously reported as 19 seconds (reference range: 27-38 seconds) on 2/29/2024 at 1859 EST.   04/05/2023 10:30 AM 29 26 - 39 sec Final     Comment:       THE APTT IS NO LONGER USED FOR MONITORING     UNFRACTIONATED HEPARIN THERAPY.    FOR MONITORING HEPARIN THERAPY,     USE THE HEPARIN ASSAY.         Assessment/Plan    Assessment:    78 year old woman here for follow up evaluation of IgG Kappa MGUS and B12 deficiency.  Doing well with oral B12, ok to continue 500 mcg a day. Bone marrow biopsy consistent with MGUS and not significantly changed from prior.  Therefore I do not think that this is the cause of her hypercalcemia, fatigue, or mildly worse renal function.  I encouraged hydration for now. Recently hospitalized and found to have PE and LV Thrombosis. She is now on Eliquis 5 mg po bid with no bleeding. She still lives alone, with her son in town. She is considering Meals on Wheels and home health aide assistance. MGUS has been stable and we have drawn those labs today. If stable,  we will continue monitoring her MGUS every 6 months.       # IgG- K Monoclonal gammopathy of undetermined significance  - Monitor every 6 months with labs     # B12 deficiency  - On 500 mcg oral B12  - Recheck on next labs.     # Weight loss  - Ensure up to date age appropriate cancer screening  - Follow with nutrition - patient declined nutritional consult today.     # Depression  - Encouraged to  seek counseling, patient currently agreeable.     # CKD  - Monitor for now.    # Hypertension - today BP was 184/82 and repeated several times with similar result. Patient encouraged to contact PCP today for further recommendations. She agreed and stated she has an appointment with her PCP on Tuesday. 4/9     Follow up in 6 months with labs and in person visit.     Rosanne M Casal, DNP, APN-BC, AOCNP

## 2024-04-05 LAB
IGA SERPL-MCNC: 189 MG/DL (ref 70–400)
IGG SERPL-MCNC: 1270 MG/DL (ref 700–1600)
IGM SERPL-MCNC: 24 MG/DL (ref 40–230)
KAPPA LC SERPL-MCNC: 2.38 MG/DL (ref 0.33–1.94)
KAPPA LC/LAMBDA SER: 1.13 {RATIO} (ref 0.26–1.65)
LAMBDA LC SERPL-MCNC: 2.11 MG/DL (ref 0.57–2.63)

## 2024-04-09 ENCOUNTER — OFFICE VISIT (OUTPATIENT)
Dept: PRIMARY CARE | Facility: CLINIC | Age: 79
End: 2024-04-09
Payer: MEDICARE

## 2024-04-09 VITALS
BODY MASS INDEX: 18.93 KG/M2 | OXYGEN SATURATION: 97 % | SYSTOLIC BLOOD PRESSURE: 160 MMHG | WEIGHT: 96.4 LBS | DIASTOLIC BLOOD PRESSURE: 70 MMHG | TEMPERATURE: 97.4 F | HEIGHT: 60 IN | HEART RATE: 62 BPM

## 2024-04-09 DIAGNOSIS — I10 BENIGN ESSENTIAL HYPERTENSION: Primary | ICD-10-CM

## 2024-04-09 DIAGNOSIS — E03.9 HYPOTHYROIDISM, UNSPECIFIED TYPE: ICD-10-CM

## 2024-04-09 DIAGNOSIS — K76.9 LIVER LESION: ICD-10-CM

## 2024-04-09 DIAGNOSIS — I26.99 PULMONARY EMBOLISM ON RIGHT (MULTI): ICD-10-CM

## 2024-04-09 DIAGNOSIS — I10 BENIGN ESSENTIAL HYPERTENSION: ICD-10-CM

## 2024-04-09 DIAGNOSIS — I48.91 ATRIAL FIBRILLATION, UNSPECIFIED TYPE (MULTI): ICD-10-CM

## 2024-04-09 LAB
ALBUMIN: 4.5 G/DL (ref 3.4–5)
ALPHA 1 GLOBULIN: 0.3 G/DL (ref 0.2–0.6)
ALPHA 2 GLOBULIN: 0.8 G/DL (ref 0.4–1.1)
BETA GLOBULIN: 0.8 G/DL (ref 0.5–1.2)
GAMMA GLOBULIN: 1.2 G/DL (ref 0.5–1.4)
IMMUNOFIXATION COMMENT: ABNORMAL
M-PROTEIN 1: 0.5 G/DL
PATH REVIEW - SERUM IMMUNOFIXATION: ABNORMAL
PATH REVIEW-SERUM PROTEIN ELECTROPHORESIS: ABNORMAL
PROTEIN ELECTROPHORESIS COMMENT: ABNORMAL

## 2024-04-09 PROCEDURE — 1123F ACP DISCUSS/DSCN MKR DOCD: CPT | Performed by: FAMILY MEDICINE

## 2024-04-09 PROCEDURE — 1111F DSCHRG MED/CURRENT MED MERGE: CPT | Performed by: FAMILY MEDICINE

## 2024-04-09 PROCEDURE — 1159F MED LIST DOCD IN RCRD: CPT | Performed by: FAMILY MEDICINE

## 2024-04-09 PROCEDURE — 99214 OFFICE O/P EST MOD 30 MIN: CPT | Performed by: FAMILY MEDICINE

## 2024-04-09 PROCEDURE — 4004F PT TOBACCO SCREEN RCVD TLK: CPT | Performed by: FAMILY MEDICINE

## 2024-04-09 PROCEDURE — 3078F DIAST BP <80 MM HG: CPT | Performed by: FAMILY MEDICINE

## 2024-04-09 PROCEDURE — 1160F RVW MEDS BY RX/DR IN RCRD: CPT | Performed by: FAMILY MEDICINE

## 2024-04-09 PROCEDURE — 1158F ADVNC CARE PLAN TLK DOCD: CPT | Performed by: FAMILY MEDICINE

## 2024-04-09 PROCEDURE — 3077F SYST BP >= 140 MM HG: CPT | Performed by: FAMILY MEDICINE

## 2024-04-09 RX ORDER — LISINOPRIL 10 MG/1
10 TABLET ORAL DAILY
Qty: 30 TABLET | Refills: 2 | Status: SHIPPED | OUTPATIENT
Start: 2024-04-09 | End: 2024-04-23 | Stop reason: SDUPTHER

## 2024-04-09 RX ORDER — LISINOPRIL 5 MG/1
5 TABLET ORAL DAILY
Qty: 30 TABLET | Refills: 1 | Status: CANCELLED | OUTPATIENT
Start: 2024-04-09

## 2024-04-09 ASSESSMENT — ENCOUNTER SYMPTOMS
LOSS OF SENSATION IN FEET: 0
OCCASIONAL FEELINGS OF UNSTEADINESS: 0
DEPRESSION: 0

## 2024-04-09 ASSESSMENT — ANXIETY QUESTIONNAIRES
4. TROUBLE RELAXING: SEVERAL DAYS
GAD7 TOTAL SCORE: 4
6. BECOMING EASILY ANNOYED OR IRRITABLE: NOT AT ALL
1. FEELING NERVOUS, ANXIOUS, OR ON EDGE: SEVERAL DAYS
2. NOT BEING ABLE TO STOP OR CONTROL WORRYING: NOT AT ALL
5. BEING SO RESTLESS THAT IT IS HARD TO SIT STILL: SEVERAL DAYS
IF YOU CHECKED OFF ANY PROBLEMS ON THIS QUESTIONNAIRE, HOW DIFFICULT HAVE THESE PROBLEMS MADE IT FOR YOU TO DO YOUR WORK, TAKE CARE OF THINGS AT HOME, OR GET ALONG WITH OTHER PEOPLE: NOT DIFFICULT AT ALL
3. WORRYING TOO MUCH ABOUT DIFFERENT THINGS: NOT AT ALL
7. FEELING AFRAID AS IF SOMETHING AWFUL MIGHT HAPPEN: SEVERAL DAYS

## 2024-04-09 ASSESSMENT — PATIENT HEALTH QUESTIONNAIRE - PHQ9
SUM OF ALL RESPONSES TO PHQ9 QUESTIONS 1 AND 2: 0
2. FEELING DOWN, DEPRESSED OR HOPELESS: NOT AT ALL
1. LITTLE INTEREST OR PLEASURE IN DOING THINGS: NOT AT ALL

## 2024-04-09 NOTE — PATIENT INSTRUCTIONS
Follow up of hospitalization for new onset a fib, status post cardioversion.  Heart rate sounds regular today on exam.  Taking eliquis 5 mg twice a  day.  Lasix  as needed for swelling or weight gain of more than lbs.  Hasn't been needed.  Currently on 5 mg  lisinopril but BP has been  high on more than 1  occasion.   Will increase  to 10 mg of  lisinopril.  Cardiology appointments scheduled.  Follow up here in 3 months.    Liver  lesions noted on CT scan.  Referral put in to gastroenterologist to  evaluate further.    For  thyroid, taking levothyroxine 75 mcg daily.  Will recheck level in 3 months at follow up.

## 2024-04-09 NOTE — PROGRESS NOTES
"Subjective   Patient ID: Sophie Mendosa is a 78 y.o. female who presents for Hospital Follow-up.  Follow up of hospitalization.  Copied from discharge note:  \"Discharge Diagnosis  New onset AF - RVR on adm; S Bradycardia post cardioversion.  Hypertension   SINTIA on CKD 3A. Creat close to baseline off diuretics.  Acute on chronic systolic heart failure, probably precipitated by AF with RVR.  Recently diagnosed LV thrombus, noted in MRI  Recent right lower lobe segmental PE, without cor pulmonale  Severe coronary artery calcification  Hypothyroidism, with suppressed TSH on oral supplement therapy  Constipation  ?previously known liver spot (per pt) - was under follow up with Dr Sergio Soto  No evidence of pneumonia this admission. (Pneumonia suspected on admission but ruled out).     PLAN:  HOLD metoprolol, continue Eliquis.  Diuretics prn for wt gain post DC  Lower dose of levothyroxine  Cardiology consult noted  Off Abx per ID recomm.  GI consult recomm. OP MRI liver.   Defer DVT prophylaxis while on Eliquis.  Monitor for bleeding while on Eliquis  2 g sodium, cardiac diet with fluid restriction.  RAISA/DCCV completed 3/20.\"      Also noted on CT was liver lesions and gastric thickening.  Recommended MRI/MRCP as outpatient.    Wore monitor for 14 days.  Not sure if it was recorded well, based on color of light.    Sleeping well.  Appetite not great.  Needs to get groceries.    Still constipated.  Has had some colace, wasn't sure if  she could take it with eliquis.        Review of Systems    Objective   /70   Pulse 62   Temp 36.3 °C (97.4 °F)   Ht 1.524 m (5')   Wt (!) 43.7 kg (96 lb 6.4 oz)   SpO2 97%   BMI 18.83 kg/m²     Physical Exam  Vitals reviewed.   Constitutional:       Appearance: Normal appearance.   Cardiovascular:      Rate and Rhythm: Normal rate and regular rhythm.      Heart sounds: No murmur heard.  Pulmonary:      Effort: Pulmonary effort is normal.      Breath sounds: Normal breath " sounds.   Musculoskeletal:      Right lower leg: No edema.      Left lower leg: No edema.   Neurological:      Mental Status: She is alert.           Assessment/Plan   Problem List Items Addressed This Visit       Benign essential hypertension - Primary    Relevant Medications    lisinopril 10 mg tablet     Other Visit Diagnoses       Liver lesion        Relevant Orders    Referral to Gastroenterology    Atrial fibrillation, unspecified type (CMS/HCC)

## 2024-04-10 LAB — BODY SURFACE AREA: 1.39 M2

## 2024-04-10 RX ORDER — LEVOTHYROXINE SODIUM 75 UG/1
75 TABLET ORAL DAILY
Qty: 30 TABLET | Refills: 1 | Status: SHIPPED | OUTPATIENT
Start: 2024-04-10 | End: 2024-06-10 | Stop reason: SDUPTHER

## 2024-04-22 ENCOUNTER — PATIENT OUTREACH (OUTPATIENT)
Dept: PRIMARY CARE | Facility: CLINIC | Age: 79
End: 2024-04-22
Payer: MEDICARE

## 2024-04-22 NOTE — PROGRESS NOTES
Unable to reach patient for monthly post discharge follow up. LVM with call back number for patient to call if needed.

## 2024-04-23 ENCOUNTER — OFFICE VISIT (OUTPATIENT)
Dept: CARDIOLOGY | Facility: CLINIC | Age: 79
End: 2024-04-23
Payer: MEDICARE

## 2024-04-23 ENCOUNTER — PHARMACY VISIT (OUTPATIENT)
Dept: PHARMACY | Facility: CLINIC | Age: 79
End: 2024-04-23
Payer: COMMERCIAL

## 2024-04-23 ENCOUNTER — HOSPITAL ENCOUNTER (OUTPATIENT)
Dept: CARDIOLOGY | Facility: CLINIC | Age: 79
Discharge: HOME | End: 2024-04-23
Payer: MEDICARE

## 2024-04-23 VITALS
DIASTOLIC BLOOD PRESSURE: 80 MMHG | WEIGHT: 98.31 LBS | HEART RATE: 60 BPM | BODY MASS INDEX: 18.09 KG/M2 | SYSTOLIC BLOOD PRESSURE: 200 MMHG | HEIGHT: 62 IN | OXYGEN SATURATION: 96 %

## 2024-04-23 DIAGNOSIS — I48.92 ATRIAL FLUTTER, UNSPECIFIED TYPE (MULTI): Primary | ICD-10-CM

## 2024-04-23 DIAGNOSIS — I48.3 TYPICAL ATRIAL FLUTTER (MULTI): ICD-10-CM

## 2024-04-23 DIAGNOSIS — I25.5 CARDIOMYOPATHY, ISCHEMIC: ICD-10-CM

## 2024-04-23 DIAGNOSIS — I10 BENIGN ESSENTIAL HYPERTENSION: ICD-10-CM

## 2024-04-23 DIAGNOSIS — I25.10 CORONARY ARTERY CALCIFICATION SEEN ON CT SCAN: Primary | ICD-10-CM

## 2024-04-23 PROCEDURE — 1160F RVW MEDS BY RX/DR IN RCRD: CPT | Performed by: INTERNAL MEDICINE

## 2024-04-23 PROCEDURE — 1159F MED LIST DOCD IN RCRD: CPT | Performed by: INTERNAL MEDICINE

## 2024-04-23 PROCEDURE — 93005 ELECTROCARDIOGRAM TRACING: CPT

## 2024-04-23 PROCEDURE — 3077F SYST BP >= 140 MM HG: CPT | Performed by: INTERNAL MEDICINE

## 2024-04-23 PROCEDURE — 99215 OFFICE O/P EST HI 40 MIN: CPT | Mod: GC | Performed by: INTERNAL MEDICINE

## 2024-04-23 PROCEDURE — RXMED WILLOW AMBULATORY MEDICATION CHARGE

## 2024-04-23 PROCEDURE — 99215 OFFICE O/P EST HI 40 MIN: CPT | Performed by: INTERNAL MEDICINE

## 2024-04-23 PROCEDURE — 4004F PT TOBACCO SCREEN RCVD TLK: CPT | Performed by: INTERNAL MEDICINE

## 2024-04-23 PROCEDURE — 1123F ACP DISCUSS/DSCN MKR DOCD: CPT | Performed by: INTERNAL MEDICINE

## 2024-04-23 PROCEDURE — 1125F AMNT PAIN NOTED PAIN PRSNT: CPT | Performed by: INTERNAL MEDICINE

## 2024-04-23 PROCEDURE — 3079F DIAST BP 80-89 MM HG: CPT | Performed by: INTERNAL MEDICINE

## 2024-04-23 RX ORDER — AMLODIPINE BESYLATE 10 MG/1
10 TABLET ORAL DAILY
Qty: 90 TABLET | Refills: 3 | Status: SHIPPED | OUTPATIENT
Start: 2024-04-23 | End: 2025-04-23

## 2024-04-23 RX ORDER — LISINOPRIL 40 MG/1
40 TABLET ORAL DAILY
Qty: 90 TABLET | Refills: 3 | Status: SHIPPED | OUTPATIENT
Start: 2024-04-23 | End: 2025-04-23

## 2024-04-23 RX ORDER — APIXABAN 5 MG/1
TABLET, FILM COATED ORAL
COMMUNITY
Start: 2024-04-12

## 2024-04-23 ASSESSMENT — ENCOUNTER SYMPTOMS
ALTERED MENTAL STATUS: 0
ABDOMINAL PAIN: 0
COUGH: 0
FALLS: 0
CONSTIPATION: 0
DEPRESSION: 0
LOSS OF SENSATION IN FEET: 1
HEMATURIA: 0
DIARRHEA: 0
MYALGIAS: 0
WHEEZING: 0
MEMORY LOSS: 0
FEVER: 0
OCCASIONAL FEELINGS OF UNSTEADINESS: 1
NAUSEA: 0
DEPRESSION: 1
BLOATING: 0
DYSURIA: 0
HEMOPTYSIS: 0
HEADACHES: 0
VOMITING: 0
CHILLS: 0

## 2024-04-23 ASSESSMENT — PATIENT HEALTH QUESTIONNAIRE - PHQ9
1. LITTLE INTEREST OR PLEASURE IN DOING THINGS: NOT AT ALL
SUM OF ALL RESPONSES TO PHQ9 QUESTIONS 1 AND 2: 0
2. FEELING DOWN, DEPRESSED OR HOPELESS: NOT AT ALL

## 2024-04-23 ASSESSMENT — COLUMBIA-SUICIDE SEVERITY RATING SCALE - C-SSRS

## 2024-04-23 ASSESSMENT — PAIN SCALES - GENERAL: PAINLEVEL: 7

## 2024-04-23 NOTE — PATIENT INSTRUCTIONS
-Increase Lisinopril to 40 mg once a day  -Start Amlodipine 10 mg once a day  -Please get blood work in 1 week  -if possible get a blood pressure cuff and check at home (goal BP <140/90) - if too high, let us know [phone]

## 2024-04-23 NOTE — PROGRESS NOTES
Chief Complaint   Patient presents with    Follow-up    Hypertension    Hyperlipidemia    Atrial Flutter       HPI  79 yo BF w/ h/o cor calc on CT, athero of Ao, CVA 9/18, HTN, HLD, GERD, TOB now here for cardiology f/u.   She was hospitalized a month ago due to severe palpitations, found to have A.flutter/fib and underwent RAISA/DCCV. She was also found to have an LV thrombus and a PE and was started on Apixaban. Since discharge, she has not had CP, palpitations, PND, syncope, or edema. However, she does endorse occasional SOB and orthopnea. She is severely hypertensive in clinic today to 200/90 but does not endorse any headache, chest pain, or vision changes out of the baseline.   ECG 9/18: SB (45), LVH  ECG 12/20: SB (50), ?SMI, nonsp T-wave changes  ECG 5/22: SB (54), ?SMI, nonsp T-wave changes  ECG 4/24: SB (51), SMI, nonsp T-wave changes  HM 3/24: SR, HR  (avg 54), SVT x15 (long 11b)  Echo 9/18: EF 60-65%, AsAo plaque, mild-mod TR, PASP 37-42  Echo 3/24: EF 50-55%, apical inferior HK w/ ?thrombus, mod LAE  RAISA 3/24: EF 50-55%, no PFO  JOSE 1/21: no ischemia, EF 55-60% -> 65-70%  cMRI 3/24: EF: 48%, infarction of the apical/inferior wall, small LV apical thrombus, nl RV, mod MR  CXR 2/24:  no acute abnl  CT chest 10/20: mod-sev cor calc, mod athero of Ao, no aneurysm, CAMI, no peric eff  CT chest 2/22: sev cor calc, LAE, tr PE, mod athero of Ao, no aneurysm, nl PA  CT lung 3/23: sev CAC, slight CM, tr PE, sev AA  CTA chest 2/24: small PE, R PA 3.0cm, sev CAC, nl heart size, sm PE, AA, emphysema  Carotid US 9/18; plaque, no HDSS  MRI brain 9/18; LACA CVA  MRA head/neck 9/18: carotid athero, no HDSS, no anuerysm   LE US 3/24: no DVT    Review of Systems   Constitutional: Negative for chills, fever and malaise/fatigue.   HENT:  Negative for hearing loss.    Eyes:  Negative for visual disturbance.   Respiratory:  Negative for cough, hemoptysis and wheezing.    Skin:  Negative for rash.   Musculoskeletal:   "Negative for falls and myalgias.   Gastrointestinal:  Negative for bloating, abdominal pain, constipation, diarrhea, dysphagia, nausea and vomiting.   Genitourinary:  Negative for dysuria and hematuria.   Neurological:  Negative for headaches.   Psychiatric/Behavioral:  Negative for altered mental status, depression and memory loss.       Social History     Tobacco Use    Smoking status: Every Day     Types: Cigarettes    Smokeless tobacco: Never    Tobacco comments:     Working on quitting   Substance Use Topics    Alcohol use: Never      Family History   Problem Relation Name Age of Onset    Other (cardiac disorder) Mother      Other (CVA) Mother      Heart failure Mother      Heart attack Mother      Hypertension Mother      Prostate cancer Father      Diabetes Sister      Hypertension Sister      Diabetes Brother      Other (cardiac disorder) Brother      Heart attack Brother      Hypertension Brother      Heart failure Other PATERNAL HALF SISTER       Allergies   Allergen Reactions    Gabapentin Other    Garlic Unknown and Swelling      Current Outpatient Medications   Medication Instructions    apixaban (ELIQUIS) 5 mg, oral, 2 times daily    atorvastatin (LIPITOR) 40 mg, oral, Daily    buPROPion XL (WELLBUTRIN XL) 150 mg, oral, Every morning, Do not crush, chew, or split.    cyanocobalamin (VITAMIN B-12) 500 mcg, oral, Daily    furosemide (LASIX) 40 mg, oral, Daily PRN    levothyroxine (SYNTHROID, LEVOXYL) 75 mcg, oral, Daily    lisinopril 10 mg, oral, Daily      BP (!) 215/110 (BP Location: Left arm, Patient Position: Sitting, BP Cuff Size: Small adult)   Pulse 60   Ht 1.575 m (5' 2\")   Wt (!) 44.6 kg (98 lb 5 oz)   SpO2 96%   BMI 17.98 kg/m²      Vitals:    04/23/24 1117   BP: (!) 200/80   Pulse:    SpO2:       Physical Exam  Constitutional:       Appearance: Normal appearance.   HENT:      Head: Normocephalic and atraumatic.      Nose: Nose normal.   Neck:      Vascular: No carotid bruit. "   Cardiovascular:      Rate and Rhythm: Normal rate and regular rhythm.      Heart sounds: No murmur heard.  Pulmonary:      Effort: Pulmonary effort is normal.      Breath sounds: Normal breath sounds.   Abdominal:      Palpations: Abdomen is soft.      Tenderness: There is no abdominal tenderness.   Musculoskeletal:      Right lower leg: No edema.      Left lower leg: No edema.   Skin:     General: Skin is warm and dry.   Neurological:      General: No focal deficit present.      Mental Status: She is alert.   Psychiatric:         Mood and Affect: Mood normal.         Judgment: Judgment normal.        Results/Data  3/24 Cr 1.17, K 4.6, Mg 1.7, HGB 11,   2/24 hgba1c 6.1, TSH 0.27, FT4 1.21, BNP 83  2/22 Cr 0.96, K 4.6, LFT nl, HGB 12.4,   12/21 Cr 1.07, K 4.9, LFT nl, hgba1c 6.4, TSH 0.44  10/20 Cr 0.85, K 4.7, LDL 54, HDL 92, TG 67, Chol 160, TSH 0.51   2/24: LDL: 97, A1c: 6.1  4/24 Cr: 1.16, K 4.3,     Assessment/Plan   79 yo BF w/ h/o cor calc on CT, athero of Ao, CVA 9/18, HTN, HLD, GERD, TOB now here for cardiology f/u.   She was hospitalized a month ago due to severe palpitations, found to have A.flutter/fib and underwent RAISA/DCCV. She was also found to have an LV thrombus and a PE and was started on Apixaban. Recent hospitalization was reviewed as well as recent cardiac MRI.    It appears that she has typical flutter and has been bradycardic since her DCCV but without symptoms and appropriate chronotropic competence. Given that she is in NSR today, albeit with HR in the 50s she may not require any electrophysiologic intervention and the A.flutter may have been precipitated by the PE.     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 At this time, with a largely preserved EF and no anginal symptoms, ischemic eval can be deferred.     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     Ean Romo MD    I saw and evaluated the patient. I personally obtained the key and critical portions of the history and physical exam or was physically present for key and critical portions performed by the resident/fellow. I reviewed the resident/fellow's documentation and discussed the patient with the resident/fellow. I agree with the resident/fellow's medical decision making as documented in the note.    Familia Hackett MD

## 2024-04-24 ENCOUNTER — PATIENT OUTREACH (OUTPATIENT)
Dept: PRIMARY CARE | Facility: CLINIC | Age: 79
End: 2024-04-24
Payer: MEDICARE

## 2024-04-24 ENCOUNTER — TELEPHONE (OUTPATIENT)
Dept: PRIMARY CARE | Facility: CLINIC | Age: 79
End: 2024-04-24
Payer: MEDICARE

## 2024-04-24 NOTE — PROGRESS NOTES
Outreach made to return patient's phone message. Patient LVM stating she had a cardiology appt yesterday and her medications were changed. The patient has been restarted on lisinopril 40 mg and amlodipine 10 mg due to her BP being high at her appt, 200/80. Verified medications changes updated in chart per patient request. No answer to patient phone. LVM advising of medication reconciliation and instructing patient to call back for any further concerns.

## 2024-04-24 NOTE — TELEPHONE ENCOUNTER
Patient wanted you to know that Dr. Hackett has upped her Lisinopril to 40mg and put her back on Amlodipine 10 mg.

## 2024-04-26 LAB
ATRIAL RATE: 51 BPM
P AXIS: 89 DEGREES
P OFFSET: 200 MS
P ONSET: 157 MS
PR INTERVAL: 132 MS
Q ONSET: 223 MS
QRS COUNT: 8 BEATS
QRS DURATION: 82 MS
QT INTERVAL: 434 MS
QTC CALCULATION(BAZETT): 400 MS
QTC FREDERICIA: 411 MS
R AXIS: 68 DEGREES
T AXIS: 10 DEGREES
T OFFSET: 440 MS
VENTRICULAR RATE: 51 BPM

## 2024-04-30 ENCOUNTER — LAB (OUTPATIENT)
Dept: LAB | Facility: LAB | Age: 79
End: 2024-04-30
Payer: MEDICARE

## 2024-04-30 ENCOUNTER — DOCUMENTATION (OUTPATIENT)
Dept: CARDIOLOGY | Facility: CLINIC | Age: 79
End: 2024-04-30

## 2024-04-30 DIAGNOSIS — E53.8 B12 DEFICIENCY: ICD-10-CM

## 2024-04-30 DIAGNOSIS — I10 BENIGN ESSENTIAL HYPERTENSION: ICD-10-CM

## 2024-04-30 DIAGNOSIS — D47.2 MGUS (MONOCLONAL GAMMOPATHY OF UNKNOWN SIGNIFICANCE): ICD-10-CM

## 2024-04-30 LAB
ALBUMIN SERPL BCP-MCNC: 4.3 G/DL (ref 3.4–5)
ALP SERPL-CCNC: 109 U/L (ref 33–136)
ALT SERPL W P-5'-P-CCNC: 15 U/L (ref 7–45)
ANION GAP SERPL CALC-SCNC: 13 MMOL/L (ref 10–20)
AST SERPL W P-5'-P-CCNC: 16 U/L (ref 9–39)
B2 MICROGLOB SERPL-MCNC: 3.1 MG/L (ref 0.7–2.2)
BASOPHILS # BLD AUTO: 0.03 X10*3/UL (ref 0–0.1)
BASOPHILS NFR BLD AUTO: 0.6 %
BILIRUB SERPL-MCNC: 0.6 MG/DL (ref 0–1.2)
BUN SERPL-MCNC: 19 MG/DL (ref 6–23)
CALCIUM SERPL-MCNC: 10.2 MG/DL (ref 8.6–10.6)
CHLORIDE SERPL-SCNC: 101 MMOL/L (ref 98–107)
CO2 SERPL-SCNC: 27 MMOL/L (ref 21–32)
CREAT SERPL-MCNC: 1.09 MG/DL (ref 0.5–1.05)
EGFRCR SERPLBLD CKD-EPI 2021: 53 ML/MIN/1.73M*2
EOSINOPHIL # BLD AUTO: 0.13 X10*3/UL (ref 0–0.4)
EOSINOPHIL NFR BLD AUTO: 2.5 %
ERYTHROCYTE [DISTWIDTH] IN BLOOD BY AUTOMATED COUNT: 15.6 % (ref 11.5–14.5)
GLUCOSE SERPL-MCNC: 86 MG/DL (ref 74–99)
HCT VFR BLD AUTO: 41.3 % (ref 36–46)
HGB BLD-MCNC: 13 G/DL (ref 12–16)
HGB RETIC QN: 30 PG (ref 28–38)
IGA SERPL-MCNC: 206 MG/DL (ref 70–400)
IGG SERPL-MCNC: 1330 MG/DL (ref 700–1600)
IGM SERPL-MCNC: 22 MG/DL (ref 40–230)
IMM GRANULOCYTES # BLD AUTO: 0.01 X10*3/UL (ref 0–0.5)
IMM GRANULOCYTES NFR BLD AUTO: 0.2 % (ref 0–0.9)
IMMATURE RETIC FRACTION: 3 %
LDH SERPL L TO P-CCNC: 180 U/L (ref 84–246)
LYMPHOCYTES # BLD AUTO: 1.16 X10*3/UL (ref 0.8–3)
LYMPHOCYTES NFR BLD AUTO: 22.4 %
MCH RBC QN AUTO: 26.5 PG (ref 26–34)
MCHC RBC AUTO-ENTMCNC: 31.5 G/DL (ref 32–36)
MCV RBC AUTO: 84 FL (ref 80–100)
MONOCYTES # BLD AUTO: 0.54 X10*3/UL (ref 0.05–0.8)
MONOCYTES NFR BLD AUTO: 10.4 %
NEUTROPHILS # BLD AUTO: 3.31 X10*3/UL (ref 1.6–5.5)
NEUTROPHILS NFR BLD AUTO: 63.9 %
NRBC BLD-RTO: 0 /100 WBCS (ref 0–0)
PLATELET # BLD AUTO: 292 X10*3/UL (ref 150–450)
POTASSIUM SERPL-SCNC: 4.1 MMOL/L (ref 3.5–5.3)
PROT SERPL-MCNC: 7.6 G/DL (ref 6.4–8.2)
PROT SERPL-MCNC: 7.6 G/DL (ref 6.4–8.2)
RBC # BLD AUTO: 4.9 X10*6/UL (ref 4–5.2)
RETICS #: 0.03 X10*6/UL (ref 0.02–0.11)
RETICS/RBC NFR AUTO: 0.5 % (ref 0.5–2)
SODIUM SERPL-SCNC: 137 MMOL/L (ref 136–145)
VIT B12 SERPL-MCNC: 914 PG/ML (ref 211–911)
WBC # BLD AUTO: 5.2 X10*3/UL (ref 4.4–11.3)

## 2024-04-30 PROCEDURE — 86320 SERUM IMMUNOELECTROPHORESIS: CPT | Performed by: NURSE PRACTITIONER

## 2024-04-30 PROCEDURE — 83615 LACTATE (LD) (LDH) ENZYME: CPT

## 2024-04-30 PROCEDURE — 82784 ASSAY IGA/IGD/IGG/IGM EACH: CPT

## 2024-04-30 PROCEDURE — 85045 AUTOMATED RETICULOCYTE COUNT: CPT

## 2024-04-30 PROCEDURE — 82607 VITAMIN B-12: CPT

## 2024-04-30 PROCEDURE — 82232 ASSAY OF BETA-2 PROTEIN: CPT

## 2024-04-30 PROCEDURE — 84165 PROTEIN E-PHORESIS SERUM: CPT

## 2024-04-30 PROCEDURE — 36415 COLL VENOUS BLD VENIPUNCTURE: CPT

## 2024-04-30 PROCEDURE — 80053 COMPREHEN METABOLIC PANEL: CPT

## 2024-04-30 PROCEDURE — 85025 COMPLETE CBC W/AUTO DIFF WBC: CPT

## 2024-04-30 PROCEDURE — 86334 IMMUNOFIX E-PHORESIS SERUM: CPT

## 2024-04-30 PROCEDURE — 83521 IG LIGHT CHAINS FREE EACH: CPT

## 2024-04-30 PROCEDURE — 84165 PROTEIN E-PHORESIS SERUM: CPT | Performed by: NURSE PRACTITIONER

## 2024-04-30 PROCEDURE — 84155 ASSAY OF PROTEIN SERUM: CPT

## 2024-04-30 NOTE — PROGRESS NOTES
Pt presented to [facility] today for BP check. Pts BP result 142/82 on LUE in sitting position. BP compared and confirmed with pts newly purchased home BP cuff. Pt states she will begin monitoring BPs at home and will bring results with her to upcoming office visit scheduled in July with Dr. Crisostomo.

## 2024-05-01 LAB
KAPPA LC SERPL-MCNC: 2.24 MG/DL (ref 0.33–1.94)
KAPPA LC/LAMBDA SER: 1.01 {RATIO} (ref 0.26–1.65)
LAMBDA LC SERPL-MCNC: 2.21 MG/DL (ref 0.57–2.63)

## 2024-05-02 ENCOUNTER — TELEPHONE (OUTPATIENT)
Dept: CARDIOLOGY | Facility: CLINIC | Age: 79
End: 2024-05-02
Payer: MEDICARE

## 2024-05-02 ENCOUNTER — APPOINTMENT (OUTPATIENT)
Dept: HEMATOLOGY/ONCOLOGY | Facility: CLINIC | Age: 79
End: 2024-05-02
Payer: MEDICARE

## 2024-05-02 NOTE — TELEPHONE ENCOUNTER
----- Message from Familia Hackett MD sent at 5/2/2024 12:24 PM EDT -----  Notify pt lab looked good. Continue current meds.

## 2024-05-02 NOTE — TELEPHONE ENCOUNTER
Result Communication    Resulted Orders   Comprehensive Metabolic Panel   Result Value Ref Range    Glucose 86 74 - 99 mg/dL    Sodium 137 136 - 145 mmol/L    Potassium 4.1 3.5 - 5.3 mmol/L    Chloride 101 98 - 107 mmol/L    Bicarbonate 27 21 - 32 mmol/L    Anion Gap 13 10 - 20 mmol/L    Urea Nitrogen 19 6 - 23 mg/dL    Creatinine 1.09 (H) 0.50 - 1.05 mg/dL    eGFR 53 (L) >60 mL/min/1.73m*2      Comment:      Calculations of estimated GFR are performed using the 2021 CKD-EPI Study Refit equation without the race variable for the IDMS-Traceable creatinine methods.  https://jasn.asnjournals.org/content/early/2021/09/22/ASN.8975141638    Calcium 10.2 8.6 - 10.6 mg/dL    Albumin 4.3 3.4 - 5.0 g/dL    Alkaline Phosphatase 109 33 - 136 U/L    Total Protein 7.6 6.4 - 8.2 g/dL    AST 16 9 - 39 U/L    Bilirubin, Total 0.6 0.0 - 1.2 mg/dL    ALT 15 7 - 45 U/L      Comment:      Patients treated with Sulfasalazine may generate falsely decreased results for ALT.       1:06 PM    Phoned patient and spoke to patient.  Provided patient results and plan of care per Dr. Hackett notation and patient verbalized understanding.

## 2024-05-04 LAB
ALBUMIN: 4.3 G/DL (ref 3.4–5)
ALPHA 1 GLOBULIN: 0.4 G/DL (ref 0.2–0.6)
ALPHA 2 GLOBULIN: 0.9 G/DL (ref 0.4–1.1)
BETA GLOBULIN: 0.8 G/DL (ref 0.5–1.2)
GAMMA GLOBULIN: 1.2 G/DL (ref 0.5–1.4)
IMMUNOFIXATION COMMENT: ABNORMAL
M-PROTEIN 1: 0.6 G/DL
PATH REVIEW - SERUM IMMUNOFIXATION: ABNORMAL
PATH REVIEW-SERUM PROTEIN ELECTROPHORESIS: ABNORMAL
PROTEIN ELECTROPHORESIS COMMENT: ABNORMAL

## 2024-05-16 ENCOUNTER — PATIENT OUTREACH (OUTPATIENT)
Dept: PRIMARY CARE | Facility: CLINIC | Age: 79
End: 2024-05-16
Payer: MEDICARE

## 2024-05-17 ENCOUNTER — HOSPITAL ENCOUNTER (OUTPATIENT)
Dept: RADIOLOGY | Facility: CLINIC | Age: 79
Discharge: HOME | End: 2024-05-17
Payer: MEDICARE

## 2024-05-17 VITALS — WEIGHT: 98.33 LBS | HEIGHT: 62 IN | BODY MASS INDEX: 18.09 KG/M2

## 2024-05-17 DIAGNOSIS — Z12.31 ENCOUNTER FOR SCREENING MAMMOGRAM FOR BREAST CANCER: ICD-10-CM

## 2024-05-17 PROCEDURE — 77063 BREAST TOMOSYNTHESIS BI: CPT | Performed by: STUDENT IN AN ORGANIZED HEALTH CARE EDUCATION/TRAINING PROGRAM

## 2024-05-17 PROCEDURE — 77067 SCR MAMMO BI INCL CAD: CPT | Performed by: STUDENT IN AN ORGANIZED HEALTH CARE EDUCATION/TRAINING PROGRAM

## 2024-05-17 PROCEDURE — 77067 SCR MAMMO BI INCL CAD: CPT

## 2024-05-22 ENCOUNTER — APPOINTMENT (OUTPATIENT)
Dept: CARDIOLOGY | Facility: CLINIC | Age: 79
End: 2024-05-22
Payer: MEDICARE

## 2024-05-29 ENCOUNTER — HOSPITAL ENCOUNTER (OUTPATIENT)
Dept: RADIOLOGY | Facility: CLINIC | Age: 79
Discharge: HOME | End: 2024-05-29
Payer: MEDICARE

## 2024-05-29 DIAGNOSIS — Z87.891 PERSONAL HISTORY OF TOBACCO USE, PRESENTING HAZARDS TO HEALTH: ICD-10-CM

## 2024-05-29 PROCEDURE — 71271 CT THORAX LUNG CANCER SCR C-: CPT

## 2024-05-30 ENCOUNTER — APPOINTMENT (OUTPATIENT)
Dept: PRIMARY CARE | Facility: CLINIC | Age: 79
End: 2024-05-30
Payer: MEDICARE

## 2024-06-10 DIAGNOSIS — I67.9 CVD (CEREBROVASCULAR DISEASE): ICD-10-CM

## 2024-06-10 DIAGNOSIS — E03.9 HYPOTHYROIDISM, UNSPECIFIED TYPE: ICD-10-CM

## 2024-06-10 RX ORDER — ATORVASTATIN CALCIUM 40 MG/1
40 TABLET, FILM COATED ORAL DAILY
Qty: 90 TABLET | Refills: 1 | Status: SHIPPED | OUTPATIENT
Start: 2024-06-10

## 2024-06-10 RX ORDER — LEVOTHYROXINE SODIUM 75 UG/1
75 TABLET ORAL DAILY
Qty: 30 TABLET | Refills: 0 | Status: SHIPPED | OUTPATIENT
Start: 2024-06-10 | End: 2024-07-10

## 2024-06-14 ENCOUNTER — HOSPITAL ENCOUNTER (OUTPATIENT)
Dept: RADIOLOGY | Facility: CLINIC | Age: 79
Discharge: HOME | End: 2024-06-14
Payer: MEDICARE

## 2024-06-14 ENCOUNTER — LAB (OUTPATIENT)
Dept: LAB | Facility: LAB | Age: 79
End: 2024-06-14
Payer: MEDICARE

## 2024-06-14 DIAGNOSIS — Z78.0 ASYMPTOMATIC MENOPAUSAL STATE: ICD-10-CM

## 2024-06-14 DIAGNOSIS — E03.9 HYPOTHYROIDISM, UNSPECIFIED TYPE: ICD-10-CM

## 2024-06-14 LAB — TSH SERPL-ACNC: 3.45 MIU/L (ref 0.44–3.98)

## 2024-06-14 PROCEDURE — 84443 ASSAY THYROID STIM HORMONE: CPT

## 2024-06-14 PROCEDURE — 77080 DXA BONE DENSITY AXIAL: CPT

## 2024-06-14 PROCEDURE — 77080 DXA BONE DENSITY AXIAL: CPT | Performed by: RADIOLOGY

## 2024-06-14 PROCEDURE — 36415 COLL VENOUS BLD VENIPUNCTURE: CPT

## 2024-06-14 NOTE — RESULT ENCOUNTER NOTE
The bone density test showed early osteoporosis.  Medication could be started to help treat that.  If she'd like we can talk more about it at her next appointment.

## 2024-06-19 ENCOUNTER — TELEPHONE (OUTPATIENT)
Dept: PRIMARY CARE | Facility: CLINIC | Age: 79
End: 2024-06-19
Payer: MEDICARE

## 2024-06-19 NOTE — TELEPHONE ENCOUNTER
Patient called states that she will be out of Eliquis pills on Monday. She wanted to know if possible she could get some samples enough to get her until she gets paid. She says she can not afford them at this time and she gets paid in 2 weeks.

## 2024-06-20 ENCOUNTER — PATIENT OUTREACH (OUTPATIENT)
Dept: PRIMARY CARE | Facility: CLINIC | Age: 79
End: 2024-06-20
Payer: MEDICARE

## 2024-06-20 NOTE — PROGRESS NOTES
Outreach made to wrap up Transitional Care Management (TCM) program. At the time of call, the patient stated she is doing okay except she is having some difficulty walking. She stated her leg is stiff. The patient stated the issue is tolerable and she will discuss it with Dr. Galvez at her next follow up appt.     With patient permission, made a referral to [facility] pharmacy for assistance with Eliquis. Also referred patient to Chronic Care Management as she may benefit from ongoing support. The patient has met the 90 day no rehospitalization goal and is graduated from the TCM program.

## 2024-07-15 DIAGNOSIS — I10 BENIGN ESSENTIAL HYPERTENSION: ICD-10-CM

## 2024-07-15 DIAGNOSIS — E03.9 HYPOTHYROIDISM, UNSPECIFIED TYPE: ICD-10-CM

## 2024-07-15 RX ORDER — LISINOPRIL AND HYDROCHLOROTHIAZIDE 10; 12.5 MG/1; MG/1
1 TABLET ORAL DAILY
Qty: 90 TABLET | Refills: 1 | Status: CANCELLED | OUTPATIENT
Start: 2024-07-15

## 2024-07-15 RX ORDER — AMLODIPINE BESYLATE 10 MG/1
10 TABLET ORAL DAILY
Qty: 90 TABLET | Refills: 1 | Status: SHIPPED | OUTPATIENT
Start: 2024-07-15 | End: 2024-07-17 | Stop reason: SDUPTHER

## 2024-07-15 RX ORDER — LEVOTHYROXINE SODIUM 75 UG/1
75 TABLET ORAL DAILY
Qty: 90 TABLET | Refills: 1 | Status: SHIPPED | OUTPATIENT
Start: 2024-07-15 | End: 2024-07-17 | Stop reason: SDUPTHER

## 2024-07-17 ENCOUNTER — APPOINTMENT (OUTPATIENT)
Dept: PRIMARY CARE | Facility: CLINIC | Age: 79
End: 2024-07-17
Payer: MEDICARE

## 2024-07-17 VITALS
HEART RATE: 119 BPM | OXYGEN SATURATION: 98 % | SYSTOLIC BLOOD PRESSURE: 120 MMHG | WEIGHT: 101.2 LBS | HEIGHT: 60 IN | DIASTOLIC BLOOD PRESSURE: 70 MMHG | BODY MASS INDEX: 19.87 KG/M2 | TEMPERATURE: 97.7 F

## 2024-07-17 DIAGNOSIS — E03.9 PRIMARY HYPOTHYROIDISM: ICD-10-CM

## 2024-07-17 DIAGNOSIS — I10 PRIMARY HYPERTENSION: ICD-10-CM

## 2024-07-17 DIAGNOSIS — E03.9 HYPOTHYROIDISM, UNSPECIFIED TYPE: ICD-10-CM

## 2024-07-17 DIAGNOSIS — I10 BENIGN ESSENTIAL HYPERTENSION: Primary | ICD-10-CM

## 2024-07-17 DIAGNOSIS — K76.9 LIVER LESION: ICD-10-CM

## 2024-07-17 DIAGNOSIS — I26.99 PULMONARY EMBOLISM ON RIGHT (MULTI): ICD-10-CM

## 2024-07-17 PROCEDURE — 1123F ACP DISCUSS/DSCN MKR DOCD: CPT | Performed by: FAMILY MEDICINE

## 2024-07-17 PROCEDURE — 1159F MED LIST DOCD IN RCRD: CPT | Performed by: FAMILY MEDICINE

## 2024-07-17 PROCEDURE — 3078F DIAST BP <80 MM HG: CPT | Performed by: FAMILY MEDICINE

## 2024-07-17 PROCEDURE — 1160F RVW MEDS BY RX/DR IN RCRD: CPT | Performed by: FAMILY MEDICINE

## 2024-07-17 PROCEDURE — 3074F SYST BP LT 130 MM HG: CPT | Performed by: FAMILY MEDICINE

## 2024-07-17 PROCEDURE — 99214 OFFICE O/P EST MOD 30 MIN: CPT | Performed by: FAMILY MEDICINE

## 2024-07-17 RX ORDER — AMLODIPINE BESYLATE 10 MG/1
10 TABLET ORAL DAILY
Qty: 90 TABLET | Refills: 1 | Status: SHIPPED | OUTPATIENT
Start: 2024-07-17 | End: 2025-01-13

## 2024-07-17 RX ORDER — LISINOPRIL 40 MG/1
40 TABLET ORAL DAILY
Qty: 90 TABLET | Refills: 1 | Status: SHIPPED | OUTPATIENT
Start: 2024-07-17 | End: 2025-01-13

## 2024-07-17 RX ORDER — LEVOTHYROXINE SODIUM 75 UG/1
75 TABLET ORAL DAILY
Qty: 90 TABLET | Refills: 1 | Status: SHIPPED | OUTPATIENT
Start: 2024-07-17 | End: 2025-01-13

## 2024-07-17 ASSESSMENT — ANXIETY QUESTIONNAIRES
3. WORRYING TOO MUCH ABOUT DIFFERENT THINGS: NOT AT ALL
7. FEELING AFRAID AS IF SOMETHING AWFUL MIGHT HAPPEN: NOT AT ALL
4. TROUBLE RELAXING: NOT AT ALL
1. FEELING NERVOUS, ANXIOUS, OR ON EDGE: SEVERAL DAYS
GAD7 TOTAL SCORE: 1
IF YOU CHECKED OFF ANY PROBLEMS ON THIS QUESTIONNAIRE, HOW DIFFICULT HAVE THESE PROBLEMS MADE IT FOR YOU TO DO YOUR WORK, TAKE CARE OF THINGS AT HOME, OR GET ALONG WITH OTHER PEOPLE: NOT DIFFICULT AT ALL
1. FEELING NERVOUS, ANXIOUS, OR ON EDGE: NOT AT ALL
6. BECOMING EASILY ANNOYED OR IRRITABLE: NOT AT ALL
IF YOU CHECKED OFF ANY PROBLEMS ON THIS QUESTIONNAIRE, HOW DIFFICULT HAVE THESE PROBLEMS MADE IT FOR YOU TO DO YOUR WORK, TAKE CARE OF THINGS AT HOME, OR GET ALONG WITH OTHER PEOPLE: NOT DIFFICULT AT ALL
5. BEING SO RESTLESS THAT IT IS HARD TO SIT STILL: NOT AT ALL
2. NOT BEING ABLE TO STOP OR CONTROL WORRYING: NOT AT ALL
5. BEING SO RESTLESS THAT IT IS HARD TO SIT STILL: NOT AT ALL
7. FEELING AFRAID AS IF SOMETHING AWFUL MIGHT HAPPEN: NOT AT ALL
GAD7 TOTAL SCORE: 0
3. WORRYING TOO MUCH ABOUT DIFFERENT THINGS: NOT AT ALL
4. TROUBLE RELAXING: NOT AT ALL
2. NOT BEING ABLE TO STOP OR CONTROL WORRYING: NOT AT ALL
6. BECOMING EASILY ANNOYED OR IRRITABLE: NOT AT ALL

## 2024-07-17 ASSESSMENT — ENCOUNTER SYMPTOMS
DEPRESSION: 1
OCCASIONAL FEELINGS OF UNSTEADINESS: 1
LOSS OF SENSATION IN FEET: 1

## 2024-07-17 ASSESSMENT — PATIENT HEALTH QUESTIONNAIRE - PHQ9
2. FEELING DOWN, DEPRESSED OR HOPELESS: SEVERAL DAYS
1. LITTLE INTEREST OR PLEASURE IN DOING THINGS: NOT AT ALL
10. IF YOU CHECKED OFF ANY PROBLEMS, HOW DIFFICULT HAVE THESE PROBLEMS MADE IT FOR YOU TO DO YOUR WORK, TAKE CARE OF THINGS AT HOME, OR GET ALONG WITH OTHER PEOPLE: NOT DIFFICULT AT ALL
SUM OF ALL RESPONSES TO PHQ9 QUESTIONS 1 AND 2: 1

## 2024-07-17 NOTE — PATIENT INSTRUCTIONS
BP is controlled.  Taking amlodipine 10 mg, lisinopril 40 mg.    For atrial fibrillation, taking eliquis 5 mg twice a  day.  Samples given, and will be speaking with  pharmacist to see if able to get at reduced cost.     Liver  lesions noted on CT scan.  Referral put in to gastroenterologist to  evaluate further.     For  thyroid, taking levothyroxine 75 mcg daily.  Level checked recently was good.  Continue current dose.

## 2024-07-17 NOTE — PROGRESS NOTES
"Subjective   Patient ID: Sophie Mendosa is a 78 y.o. female who presents for Follow-up (3 Month follow up, Medication refill).  Patient here for follow up after last visit.  Copied from last visit, in April:  \"Follow up of hospitalization for new onset a fib, status post cardioversion.  Heart rate sounds regular today on exam.  Taking eliquis 5 mg twice a  day.  Lasix  as needed for swelling or weight gain of more than lbs.  Hasn't been needed.  Currently on 5 mg  lisinopril but BP has been  high on more than 1  occasion.   Will increase  to 10 mg of  lisinopril.  Cardiology appointments scheduled.  Follow up here in 3 months.     Liver  lesions noted on CT scan.  Referral put in to gastroenterologist to  evaluate further.     For  thyroid, taking levothyroxine 75 mcg daily.  Will recheck level in 3 months at follow up.\"      Will be seeing Dr. Hackett and will do ECHO first.  No heart racing or palpitations noted.  Gets some indigestion occasionally, but no other chest pain.  Drinking water and relaxing helps.  No swelling, so hasn't used the lasix at all, but is available if needed.  No SOB.  Feeling pretty well.    Did not see gastroenterologist about the liver lesion.  Was not aware she was supposed to.  Does recall having a liver lesion noted years ago when seeing Dr. Sergio Soto.  At the time, was evaluated and there was not a concern.  Still would like to follow up on it.    Thyroid level check in June and was in range.  Taking 75 mcg now daily.    Right hip still a problem.  Had injection, but didn't last.  Thinks she will follow up with ortho regarding possible surgery.        Review of Systems    Objective   /70   Pulse (!) 119   Temp 36.5 °C (97.7 °F)   Ht 1.524 m (5')   Wt 45.9 kg (101 lb 3.2 oz)   SpO2 98%   BMI 19.76 kg/m²     Physical Exam  Vitals reviewed.   Constitutional:       Appearance: Normal appearance.   Cardiovascular:      Rate and Rhythm: Regular rhythm. Tachycardia present.    "   Heart sounds: No murmur heard.  Pulmonary:      Effort: Pulmonary effort is normal.      Breath sounds: Normal breath sounds.   Musculoskeletal:      Right lower leg: No edema.      Left lower leg: No edema.   Neurological:      Mental Status: She is alert.   Psychiatric:         Mood and Affect: Mood normal.         Behavior: Behavior normal.         Thought Content: Thought content normal.           Assessment/Plan   Problem List Items Addressed This Visit       Benign essential hypertension - Primary    Relevant Medications    amLODIPine (Norvasc) 10 mg tablet    lisinopril 40 mg tablet    Hypertension    Primary hypothyroidism    Pulmonary embolism on right (Multi)     Other Visit Diagnoses       Hypothyroidism, unspecified type        Relevant Medications    levothyroxine (Synthroid, Levoxyl) 75 mcg tablet    Liver lesion        Relevant Orders    Referral to Gastroenterology

## 2024-07-18 RX ORDER — FUROSEMIDE 40 MG/1
40 TABLET ORAL DAILY PRN
COMMUNITY

## 2024-07-22 ENCOUNTER — DOCUMENTATION (OUTPATIENT)
Dept: PRIMARY CARE | Facility: CLINIC | Age: 79
End: 2024-07-22
Payer: MEDICARE

## 2024-07-22 ENCOUNTER — PATIENT OUTREACH (OUTPATIENT)
Dept: PRIMARY CARE | Facility: CLINIC | Age: 79
End: 2024-07-22
Payer: MEDICARE

## 2024-07-23 ENCOUNTER — APPOINTMENT (OUTPATIENT)
Dept: CARDIOLOGY | Facility: CLINIC | Age: 79
End: 2024-07-23
Payer: MEDICARE

## 2024-07-23 ENCOUNTER — DOCUMENTATION (OUTPATIENT)
Dept: PRIMARY CARE | Facility: CLINIC | Age: 79
End: 2024-07-23
Payer: MEDICARE

## 2024-07-24 ENCOUNTER — APPOINTMENT (OUTPATIENT)
Dept: RADIOLOGY | Facility: CLINIC | Age: 79
End: 2024-07-24
Payer: MEDICARE

## 2024-07-25 ENCOUNTER — OFFICE VISIT (OUTPATIENT)
Dept: GASTROENTEROLOGY | Facility: CLINIC | Age: 79
End: 2024-07-25
Payer: MEDICARE

## 2024-07-25 VITALS
SYSTOLIC BLOOD PRESSURE: 109 MMHG | BODY MASS INDEX: 19.67 KG/M2 | HEIGHT: 60 IN | DIASTOLIC BLOOD PRESSURE: 74 MMHG | WEIGHT: 100.2 LBS | HEART RATE: 118 BPM | TEMPERATURE: 97.8 F

## 2024-07-25 DIAGNOSIS — K76.9 CHRONIC LIVER DISEASE: ICD-10-CM

## 2024-07-25 DIAGNOSIS — K76.1 CHRONIC PASSIVE HEPATIC CONGESTION: ICD-10-CM

## 2024-07-25 DIAGNOSIS — R16.0 HEPATOMEGALY: ICD-10-CM

## 2024-07-25 DIAGNOSIS — K76.9 LIVER LESION: Primary | ICD-10-CM

## 2024-07-25 PROCEDURE — 99213 OFFICE O/P EST LOW 20 MIN: CPT | Performed by: INTERNAL MEDICINE

## 2024-07-25 PROCEDURE — 1159F MED LIST DOCD IN RCRD: CPT | Performed by: INTERNAL MEDICINE

## 2024-07-25 PROCEDURE — 3078F DIAST BP <80 MM HG: CPT | Performed by: INTERNAL MEDICINE

## 2024-07-25 PROCEDURE — 99203 OFFICE O/P NEW LOW 30 MIN: CPT | Performed by: INTERNAL MEDICINE

## 2024-07-25 PROCEDURE — 1126F AMNT PAIN NOTED NONE PRSNT: CPT | Performed by: INTERNAL MEDICINE

## 2024-07-25 PROCEDURE — 3074F SYST BP LT 130 MM HG: CPT | Performed by: INTERNAL MEDICINE

## 2024-07-25 PROCEDURE — 1123F ACP DISCUSS/DSCN MKR DOCD: CPT | Performed by: INTERNAL MEDICINE

## 2024-07-25 ASSESSMENT — PAIN SCALES - GENERAL: PAINLEVEL: 0-NO PAIN

## 2024-07-25 NOTE — LETTER
July 30, 2024     Madelaine Galvez MD  [address]  [address]  [facility] [id number]    Patient: Sophie Mendosa   YOB: 1945   Date of Visit: 7/25/2024       Dear Dr. Madelaine Galvez MD:    Thank you for referring Sophie Mendosa to me for evaluation. Below are my notes for this consultation.  If you have questions, please do not hesitate to call me. I look forward to following your patient along with you.       Sincerely,     Carlos Vallecillo MD      CC: No Recipients  ______________________________________________________________________________________    Subjective     Evaluation of a liver lesion.    History of Present Illness:   Sophie Mendosa is a 78 y.o. female who presents to the [facility] for an initial evaluation.    Briefly, 77 y/o F with PMHx of recent PE and atrial flutter with RVR on Eliquis, systolic HF, CKD 3, hypothyroidism, and MGUS. She was hospitalized in March (3/17 - 3/4/24)  and prior to that from 2/29/24 - 3/5/24. She had a RLL segmental PE. She also developed new onset A flutter/Afib with RVR.    During these hospitalizations she was evaluated by Gastroenterology. She reported symptoms of constipation, abdominal fullness, and excess bowel gas. There were incidental imaging findings: gastric wall thickening (vs. underdistention) and a liver lesion on CT scan.     Patient reports that she was aware of a liver lesion and has discussed with her prior GI doctor (Dr. Soto) years ago. It is unclear to me if the lesion was ever fully worked up.    This outpatient referral (to Hepatology) was made during her March hospitalizations.    Review of Systems  Review of Systems  Refer to the new patient questionnaire.     Past Medical History   has a past medical history of Arthritis, Atrial fibrillation (Multi), CHF (congestive heart failure) (Multi), CKD (chronic kidney disease), Diabetes mellitus (Multi), Hypertension, Hypothyroidism, LV (left ventricular) mural thrombus  "(03/01/2024), Major depressive disorder, single episode, unspecified, Pulmonary embolus (Multi) (02/29/2024), and Stroke (Multi).     Social History   reports that she has been smoking cigarettes. She has never used smokeless tobacco. She reports that she does not drink alcohol and does not use drugs.     Family History  family history includes CVA in her mother; Diabetes in her brother and sister; Heart attack in her brother and mother; Heart failure in her mother and another family member; Hypertension in her brother, mother, and sister; Prostate cancer in her father; cardiac disorder in her brother and mother.     Allergies  Allergies   Allergen Reactions   • Gabapentin Other   • Garlic Unknown and Swelling       Medications  Current Outpatient Medications   Medication Instructions   • amLODIPine (NORVASC) 10 mg, oral, Daily   • apixaban (ELIQUIS) 5 mg, oral, 2 times daily   • atorvastatin (LIPITOR) 40 mg, oral, Daily   • buPROPion XL (WELLBUTRIN XL) 150 mg, oral, Every morning, Do not crush, chew, or split.   • cyanocobalamin (VITAMIN B-12) 500 mcg, oral, Daily   • Eliquis 5 mg tablet    • furosemide (LASIX) 40 mg, oral, Daily PRN   • levothyroxine (SYNTHROID, LEVOXYL) 75 mcg, oral, Daily   • lisinopril 40 mg, oral, Daily        Objective   Visit Vitals  /74   Pulse (!) 118 Comment: high hr   Temp 36.6 °C (97.8 °F) (Temporal)          4/9/2024    10:54 AM 4/9/2024    11:37 AM 4/23/2024    10:43 AM 4/23/2024    11:17 AM 5/17/2024     1:44 PM 7/17/2024    11:25 AM 7/25/2024    11:28 AM   Vitals   Systolic 140 160 215 200  120 109   Diastolic 85 70 110 80  70 74   Heart Rate 54 62 60   119 118   Temp 36.3 °C (97.4 °F)     36.5 °C (97.7 °F) 36.6 °C (97.8 °F)   Height (in) 1.524 m (5')  1.575 m (5' 2\")  1.575 m (5' 2.01\") 1.524 m (5') 1.524 m (5')   Weight (lb) 96.4  98.31  98.33 101.2 100.2   BMI 18.83 kg/m2  17.98 kg/m2  17.98 kg/m2 19.76 kg/m2 19.57 kg/m2   BSA (m2) 1.36 m2  1.4 m2  1.4 m2 1.39 m2 1.39 m2 "   Visit Report Report Report Report Report  Report Report     Physical Exam  Vitals and nursing note reviewed.   Constitutional:       General: She is awake.      Appearance: Normal appearance.   HENT:      Head: Normocephalic and atraumatic.      Nose: Nose normal.      Mouth/Throat:      Mouth: Mucous membranes are moist.   Eyes:      Pupils: Pupils are equal, round, and reactive to light.   Neck:      Thyroid: No thyroid mass.      Trachea: Phonation normal.   Cardiovascular:      Rate and Rhythm: Normal rate and regular rhythm.      Heart sounds: Normal heart sounds. No murmur heard.     No gallop.   Pulmonary:      Effort: Pulmonary effort is normal. No respiratory distress.      Breath sounds: Normal air entry. No decreased breath sounds, wheezing, rhonchi or rales.   Abdominal:      General: Bowel sounds are normal. There is no distension.      Palpations: Abdomen is soft. There is hepatomegaly. There is no shifting dullness, fluid wave or splenomegaly.      Tenderness: There is no abdominal tenderness.   Musculoskeletal:      Cervical back: Neck supple.      Right lower leg: No edema.      Left lower leg: No edema.   Skin:     General: Skin is warm.      Capillary Refill: Capillary refill takes less than 2 seconds.   Neurological:      General: No focal deficit present.      Mental Status: She is alert and oriented to person, place, and time. Mental status is at baseline.      Cranial Nerves: Cranial nerves 2-12 are intact.      Motor: Motor function is intact.   Psychiatric:         Attention and Perception: Attention and perception normal.         Mood and Affect: Mood normal.         Speech: Speech normal.         Behavior: Behavior normal.     Labs    WBC   Date/Time Value Ref Range Status   04/30/2024 10:44 AM 5.2 4.4 - 11.3 x10*3/uL Final     Hemoglobin   Date/Time Value Ref Range Status   04/30/2024 10:44 AM 13.0 12.0 - 16.0 g/dL Final     Hematocrit   Date/Time Value Ref Range Status   04/30/2024  10:44 AM 41.3 36.0 - 46.0 % Final     MCV   Date/Time Value Ref Range Status   04/30/2024 10:44 AM 84 80 - 100 fL Final     Platelets   Date/Time Value Ref Range Status   04/30/2024 10:44  150 - 450 x10*3/uL Final        Total Protein   Date/Time Value Ref Range Status   04/30/2024 10:44 AM 7.6 6.4 - 8.2 g/dL Final   04/30/2024 10:44 AM 7.6 6.4 - 8.2 g/dL Final     Albumin   Date/Time Value Ref Range Status   04/30/2024 10:44 AM 4.3 3.4 - 5.0 g/dL Final     AST   Date/Time Value Ref Range Status   04/30/2024 10:44 AM 16 9 - 39 U/L Final     ALT   Date/Time Value Ref Range Status   04/30/2024 10:44 AM 15 7 - 45 U/L Final     Comment:     Patients treated with Sulfasalazine may generate falsely decreased results for ALT.     Alkaline Phosphatase   Date/Time Value Ref Range Status   04/30/2024 10:44  33 - 136 U/L Final     Bilirubin, Total   Date/Time Value Ref Range Status   04/30/2024 10:44 AM 0.6 0.0 - 1.2 mg/dL Final        Vitamin D, 25-Hydroxy   Date/Time Value Ref Range Status   09/25/2018 05:57 AM 9 (A) ng/mL Final     Comment:     .  DEFICIENCY:         < 20  NG/ML  INSUFFICIENCY:      20-29 NG/ML  OPTIMUM LEVEL:      30-80 NG/ML  POSSIBLE TOXICITY:  > 80  NG/ML    THIS ASSAY ACCURATELY QUANTIFIES THE SUM OF  VITAMIN D3, 25-HYDROXY AND VIT D2,25-HYDROXY.          AST   Date/Time Value Ref Range Status   04/30/2024 10:44 AM 16 9 - 39 U/L Final     ALT   Date/Time Value Ref Range Status   04/30/2024 10:44 AM 15 7 - 45 U/L Final     Comment:     Patients treated with Sulfasalazine may generate falsely decreased results for ALT.     Alkaline Phosphatase   Date/Time Value Ref Range Status   04/30/2024 10:44  33 - 136 U/L Final     Bilirubin, Total   Date/Time Value Ref Range Status   04/30/2024 10:44 AM 0.6 0.0 - 1.2 mg/dL Final     Albumin   Date/Time Value Ref Range Status   04/30/2024 10:44 AM 4.3 3.4 - 5.0 g/dL Final     Total Protein   Date/Time Value Ref Range Status   04/30/2024 10:44 AM  7.6 6.4 - 8.2 g/dL Final   04/30/2024 10:44 AM 7.6 6.4 - 8.2 g/dL Final        Protime   Date/Time Value Ref Range Status   02/29/2024 07:26 PM 12.1 9.8 - 12.8 seconds Final     INR   Date/Time Value Ref Range Status   02/29/2024 07:26 PM 1.1 0.9 - 1.1 Final     CT Scan  3/17/24    LIVER:  There is heterogeneous enhancement of the liver.  Heterogeneous hypodense lesions are noted at the liver. A representative lesion at the anterior left hepatic lobe measures 1.9 cm.    PERITONEUM/RETROPERITONEUM/LYMPH NODES:  No free fluid or free air.  No retroperitoneal hemorrhage. No pathologically enlarged lymph nodes are identified.    IMPRESSION:  1. Heterogeneous enhancement of the liver, may be seen with hepatic congestion or hepatitis. Please correlate with laboratory values.  2. Hepatic lesions, underlying neoplasm is not excludable. Nonemergent contrast-enhanced MRI/MRCP can be obtained for further evaluation.  3. Diffuse wall thickening at the stomach, may be secondary to underdistention versus gastritis. Evaluation with upper endoscopy as clinically warranted.  4. Stool ball distending the rectum.  5. Renal cortical scarring, may represent sequela of prior infection.  6. Enlarged fibroid uterus.  7. Small right pleural effusion. Mild bibasilar atelectasis.  8. Cardiomegaly with right atrial enlargement.  9. Severe degenerative changes at the right hip.      Assessment/Plan   Sophie Mendosa is a 78 y.o. female who presents to Liver Clinic for evaluation of a liver mass.    I suspect she has chronic passive congestion of the liver based on her cardiac history and appearance of the liver on CT. Her liver is enlarged on physical exam and appears enlarged and congested on CT.    There are hepatic lesion described on this CT.    I have recommended further characterization/evaluation with a contrast enhanced MRI.    I have recommended Hepatitis A/B/C screening serologies. Tumor markers were ordered, but not flagged by Epic  (as not covered by insurance). So will first order imaging, tumor markers cancelled.     Carlos Vallecillo MD    Instructions      Carlos Vallecillo MD

## 2024-07-25 NOTE — PROGRESS NOTES
Subjective     Evaluation of a liver lesion.    History of Present Illness:   Sophie Mendosa is a 78 y.o. female who presents to the [facility] for an initial evaluation.    Briefly, 79 y/o F with PMHx of recent PE and atrial flutter with RVR on Eliquis, systolic HF, CKD 3, hypothyroidism, and MGUS. She was hospitalized in March (3/17 - 3/4/24)  and prior to that from 2/29/24 - 3/5/24. She had a RLL segmental PE. She also developed new onset A flutter/Afib with RVR.    During these hospitalizations she was evaluated by Gastroenterology. She reported symptoms of constipation, abdominal fullness, and excess bowel gas. There were incidental imaging findings: gastric wall thickening (vs. underdistention) and a liver lesion on CT scan.     Patient reports that she was aware of a liver lesion and has discussed with her prior GI doctor (Dr. Soto) years ago. It is unclear to me if the lesion was ever fully worked up.    This outpatient referral (to Hepatology) was made during her March hospitalizations.    Review of Systems  Review of Systems  Refer to the new patient questionnaire.     Past Medical History   has a past medical history of Arthritis, Atrial fibrillation (Multi), CHF (congestive heart failure) (Multi), CKD (chronic kidney disease), Diabetes mellitus (Multi), Hypertension, Hypothyroidism, LV (left ventricular) mural thrombus (03/01/2024), Major depressive disorder, single episode, unspecified, Pulmonary embolus (Multi) (02/29/2024), and Stroke (Multi).     Social History   reports that she has been smoking cigarettes. She has never used smokeless tobacco. She reports that she does not drink alcohol and does not use drugs.     Family History  family history includes CVA in her mother; Diabetes in her brother and sister; Heart attack in her brother and mother; Heart failure in her mother and another family member; Hypertension in her brother, mother, and sister; Prostate cancer in her father; cardiac  "disorder in her brother and mother.     Allergies  Allergies   Allergen Reactions    Gabapentin Other    Garlic Unknown and Swelling       Medications  Current Outpatient Medications   Medication Instructions    amLODIPine (NORVASC) 10 mg, oral, Daily    apixaban (ELIQUIS) 5 mg, oral, 2 times daily    atorvastatin (LIPITOR) 40 mg, oral, Daily    buPROPion XL (WELLBUTRIN XL) 150 mg, oral, Every morning, Do not crush, chew, or split.    cyanocobalamin (VITAMIN B-12) 500 mcg, oral, Daily    Eliquis 5 mg tablet     furosemide (LASIX) 40 mg, oral, Daily PRN    levothyroxine (SYNTHROID, LEVOXYL) 75 mcg, oral, Daily    lisinopril 40 mg, oral, Daily        Objective   Visit Vitals  /74   Pulse (!) 118 Comment: high hr   Temp 36.6 °C (97.8 °F) (Temporal)          4/9/2024    10:54 AM 4/9/2024    11:37 AM 4/23/2024    10:43 AM 4/23/2024    11:17 AM 5/17/2024     1:44 PM 7/17/2024    11:25 AM 7/25/2024    11:28 AM   Vitals   Systolic 140 160 215 200  120 109   Diastolic 85 70 110 80  70 74   Heart Rate 54 62 60   119 118   Temp 36.3 °C (97.4 °F)     36.5 °C (97.7 °F) 36.6 °C (97.8 °F)   Height (in) 1.524 m (5')  1.575 m (5' 2\")  1.575 m (5' 2.01\") 1.524 m (5') 1.524 m (5')   Weight (lb) 96.4  98.31  98.33 101.2 100.2   BMI 18.83 kg/m2  17.98 kg/m2  17.98 kg/m2 19.76 kg/m2 19.57 kg/m2   BSA (m2) 1.36 m2  1.4 m2  1.4 m2 1.39 m2 1.39 m2   Visit Report Report Report Report Report  Report Report     Physical Exam  Vitals and nursing note reviewed.   Constitutional:       General: She is awake.      Appearance: Normal appearance.   HENT:      Head: Normocephalic and atraumatic.      Nose: Nose normal.      Mouth/Throat:      Mouth: Mucous membranes are moist.   Eyes:      Pupils: Pupils are equal, round, and reactive to light.   Neck:      Thyroid: No thyroid mass.      Trachea: Phonation normal.   Cardiovascular:      Rate and Rhythm: Normal rate and regular rhythm.      Heart sounds: Normal heart sounds. No murmur heard.     " No gallop.   Pulmonary:      Effort: Pulmonary effort is normal. No respiratory distress.      Breath sounds: Normal air entry. No decreased breath sounds, wheezing, rhonchi or rales.   Abdominal:      General: Bowel sounds are normal. There is no distension.      Palpations: Abdomen is soft. There is hepatomegaly. There is no shifting dullness, fluid wave or splenomegaly.      Tenderness: There is no abdominal tenderness.   Musculoskeletal:      Cervical back: Neck supple.      Right lower leg: No edema.      Left lower leg: No edema.   Skin:     General: Skin is warm.      Capillary Refill: Capillary refill takes less than 2 seconds.   Neurological:      General: No focal deficit present.      Mental Status: She is alert and oriented to person, place, and time. Mental status is at baseline.      Cranial Nerves: Cranial nerves 2-12 are intact.      Motor: Motor function is intact.   Psychiatric:         Attention and Perception: Attention and perception normal.         Mood and Affect: Mood normal.         Speech: Speech normal.         Behavior: Behavior normal.     Labs    WBC   Date/Time Value Ref Range Status   04/30/2024 10:44 AM 5.2 4.4 - 11.3 x10*3/uL Final     Hemoglobin   Date/Time Value Ref Range Status   04/30/2024 10:44 AM 13.0 12.0 - 16.0 g/dL Final     Hematocrit   Date/Time Value Ref Range Status   04/30/2024 10:44 AM 41.3 36.0 - 46.0 % Final     MCV   Date/Time Value Ref Range Status   04/30/2024 10:44 AM 84 80 - 100 fL Final     Platelets   Date/Time Value Ref Range Status   04/30/2024 10:44  150 - 450 x10*3/uL Final        Total Protein   Date/Time Value Ref Range Status   04/30/2024 10:44 AM 7.6 6.4 - 8.2 g/dL Final   04/30/2024 10:44 AM 7.6 6.4 - 8.2 g/dL Final     Albumin   Date/Time Value Ref Range Status   04/30/2024 10:44 AM 4.3 3.4 - 5.0 g/dL Final     AST   Date/Time Value Ref Range Status   04/30/2024 10:44 AM 16 9 - 39 U/L Final     ALT   Date/Time Value Ref Range Status    04/30/2024 10:44 AM 15 7 - 45 U/L Final     Comment:     Patients treated with Sulfasalazine may generate falsely decreased results for ALT.     Alkaline Phosphatase   Date/Time Value Ref Range Status   04/30/2024 10:44  33 - 136 U/L Final     Bilirubin, Total   Date/Time Value Ref Range Status   04/30/2024 10:44 AM 0.6 0.0 - 1.2 mg/dL Final        Vitamin D, 25-Hydroxy   Date/Time Value Ref Range Status   09/25/2018 05:57 AM 9 (A) ng/mL Final     Comment:     .  DEFICIENCY:         < 20  NG/ML  INSUFFICIENCY:      20-29 NG/ML  OPTIMUM LEVEL:      30-80 NG/ML  POSSIBLE TOXICITY:  > 80  NG/ML    THIS ASSAY ACCURATELY QUANTIFIES THE SUM OF  VITAMIN D3, 25-HYDROXY AND VIT D2,25-HYDROXY.          AST   Date/Time Value Ref Range Status   04/30/2024 10:44 AM 16 9 - 39 U/L Final     ALT   Date/Time Value Ref Range Status   04/30/2024 10:44 AM 15 7 - 45 U/L Final     Comment:     Patients treated with Sulfasalazine may generate falsely decreased results for ALT.     Alkaline Phosphatase   Date/Time Value Ref Range Status   04/30/2024 10:44  33 - 136 U/L Final     Bilirubin, Total   Date/Time Value Ref Range Status   04/30/2024 10:44 AM 0.6 0.0 - 1.2 mg/dL Final     Albumin   Date/Time Value Ref Range Status   04/30/2024 10:44 AM 4.3 3.4 - 5.0 g/dL Final     Total Protein   Date/Time Value Ref Range Status   04/30/2024 10:44 AM 7.6 6.4 - 8.2 g/dL Final   04/30/2024 10:44 AM 7.6 6.4 - 8.2 g/dL Final        Protime   Date/Time Value Ref Range Status   02/29/2024 07:26 PM 12.1 9.8 - 12.8 seconds Final     INR   Date/Time Value Ref Range Status   02/29/2024 07:26 PM 1.1 0.9 - 1.1 Final     CT Scan  3/17/24    LIVER:  There is heterogeneous enhancement of the liver.  Heterogeneous hypodense lesions are noted at the liver. A representative lesion at the anterior left hepatic lobe measures 1.9 cm.    PERITONEUM/RETROPERITONEUM/LYMPH NODES:  No free fluid or free air.  No retroperitoneal hemorrhage. No pathologically  enlarged lymph nodes are identified.    IMPRESSION:  1. Heterogeneous enhancement of the liver, may be seen with hepatic congestion or hepatitis. Please correlate with laboratory values.  2. Hepatic lesions, underlying neoplasm is not excludable. Nonemergent contrast-enhanced MRI/MRCP can be obtained for further evaluation.  3. Diffuse wall thickening at the stomach, may be secondary to underdistention versus gastritis. Evaluation with upper endoscopy as clinically warranted.  4. Stool ball distending the rectum.  5. Renal cortical scarring, may represent sequela of prior infection.  6. Enlarged fibroid uterus.  7. Small right pleural effusion. Mild bibasilar atelectasis.  8. Cardiomegaly with right atrial enlargement.  9. Severe degenerative changes at the right hip.      Assessment/Plan   Sophie Mendosa is a 78 y.o. female who presents to Liver Clinic for evaluation of a liver mass.    I suspect she has chronic passive congestion of the liver based on her cardiac history and appearance of the liver on CT. Her liver is enlarged on physical exam and appears enlarged and congested on CT.    There are hepatic lesion described on this CT.    I have recommended further characterization/evaluation with a contrast enhanced MRI.    I have recommended Hepatitis A/B/C screening serologies. Tumor markers were ordered, but not flagged by Epic (as not covered by insurance). So will first order imaging, tumor markers cancelled.     Carlos Vallecillo MD    Instructions      Carlos Vallecillo MD

## 2024-07-25 NOTE — PATIENT INSTRUCTIONS
Welcome to Dr. [facility].  Dr. Vallecillo sees patients at the following sites:  [place] [address] [facility] at [facility] [address], [address] at [facility] at [facility], [facility] [address]    Dr. Vallecillo's hepatology care coordinator, Maureen FRAIRE, can be reached at [phone].  Dr. Vallecillo's , Raquel Yun, can be reached at [phone].    Get a MRI to further evaluate the liver lesion seen on your CT scan.  Call [phone] to schedule.    Get blood work to screen for hepatitis and tumor markers.     Your heart rate was elevated in clinic today. Please follow up with your cardiologist and PCP to further evaluate.     My office will call you with recommendations after MRI is completed.

## 2024-08-01 ENCOUNTER — LAB (OUTPATIENT)
Dept: LAB | Facility: LAB | Age: 79
End: 2024-08-01
Payer: MEDICARE

## 2024-08-01 DIAGNOSIS — K76.9 LIVER LESION: ICD-10-CM

## 2024-08-01 DIAGNOSIS — R16.0 HEPATOMEGALY: ICD-10-CM

## 2024-08-01 DIAGNOSIS — K76.9 CHRONIC LIVER DISEASE: ICD-10-CM

## 2024-08-01 LAB
ALBUMIN SERPL BCP-MCNC: 4.5 G/DL (ref 3.4–5)
ALP SERPL-CCNC: 121 U/L (ref 33–136)
ALT SERPL W P-5'-P-CCNC: 14 U/L (ref 7–45)
ANION GAP SERPL CALC-SCNC: 16 MMOL/L (ref 10–20)
AST SERPL W P-5'-P-CCNC: 13 U/L (ref 9–39)
BILIRUB SERPL-MCNC: 0.5 MG/DL (ref 0–1.2)
BUN SERPL-MCNC: 21 MG/DL (ref 6–23)
CALCIUM SERPL-MCNC: 10.4 MG/DL (ref 8.6–10.6)
CHLORIDE SERPL-SCNC: 101 MMOL/L (ref 98–107)
CO2 SERPL-SCNC: 25 MMOL/L (ref 21–32)
CREAT SERPL-MCNC: 1.42 MG/DL (ref 0.5–1.05)
EGFRCR SERPLBLD CKD-EPI 2021: 38 ML/MIN/1.73M*2
GLUCOSE SERPL-MCNC: 91 MG/DL (ref 74–99)
HAV AB SER QL IA: REACTIVE
HBV CORE AB SER QL: NONREACTIVE
HBV SURFACE AB SER-ACNC: <3.1 MIU/ML
HBV SURFACE AG SERPL QL IA: NONREACTIVE
HCV AB SER QL: NONREACTIVE
POTASSIUM SERPL-SCNC: 5.2 MMOL/L (ref 3.5–5.3)
PROT SERPL-MCNC: 7.1 G/DL (ref 6.4–8.2)
SODIUM SERPL-SCNC: 137 MMOL/L (ref 136–145)

## 2024-08-01 PROCEDURE — 86706 HEP B SURFACE ANTIBODY: CPT

## 2024-08-01 PROCEDURE — 86803 HEPATITIS C AB TEST: CPT

## 2024-08-01 PROCEDURE — 80053 COMPREHEN METABOLIC PANEL: CPT

## 2024-08-01 PROCEDURE — 36415 COLL VENOUS BLD VENIPUNCTURE: CPT

## 2024-08-01 PROCEDURE — 86708 HEPATITIS A ANTIBODY: CPT

## 2024-08-01 PROCEDURE — 86704 HEP B CORE ANTIBODY TOTAL: CPT

## 2024-08-01 PROCEDURE — 87340 HEPATITIS B SURFACE AG IA: CPT

## 2024-08-09 ENCOUNTER — HOSPITAL ENCOUNTER (OUTPATIENT)
Dept: RADIOLOGY | Facility: HOSPITAL | Age: 79
Discharge: HOME | End: 2024-08-09
Payer: MEDICARE

## 2024-08-09 DIAGNOSIS — R16.0 HEPATOMEGALY: ICD-10-CM

## 2024-08-09 DIAGNOSIS — K76.9 LIVER LESION: ICD-10-CM

## 2024-08-09 DIAGNOSIS — K76.9 CHRONIC LIVER DISEASE: ICD-10-CM

## 2024-08-09 PROCEDURE — 74183 MRI ABD W/O CNTR FLWD CNTR: CPT

## 2024-08-09 PROCEDURE — A9575 INJ GADOTERATE MEGLUMI 0.1ML: HCPCS | Performed by: FAMILY MEDICINE

## 2024-08-09 PROCEDURE — 2550000001 HC RX 255 CONTRASTS: Performed by: FAMILY MEDICINE

## 2024-08-09 RX ORDER — GADOTERATE MEGLUMINE 376.9 MG/ML
9 INJECTION INTRAVENOUS
Status: COMPLETED | OUTPATIENT
Start: 2024-08-09 | End: 2024-08-09

## 2024-08-14 ENCOUNTER — APPOINTMENT (OUTPATIENT)
Dept: OPHTHALMOLOGY | Facility: CLINIC | Age: 79
End: 2024-08-14
Payer: MEDICARE

## 2024-08-14 ENCOUNTER — TELEPHONE (OUTPATIENT)
Dept: GASTROENTEROLOGY | Facility: HOSPITAL | Age: 79
End: 2024-08-14

## 2024-08-14 DIAGNOSIS — K76.9 LIVER LESION: ICD-10-CM

## 2024-08-14 NOTE — TELEPHONE ENCOUNTER
Hepatology Nurse Coordinator Note  Patient left voicemail asking for Liver MRI results. Will forward to Dr. Vallecillo.

## 2024-08-15 NOTE — TELEPHONE ENCOUNTER
Hepatology Nurse Coordinator Note   Returned call to patient regarding her Liver MRI results. She's aware, per Dr. Vallecillo, that her MRI will need to be reviewed with our radiologists in conference, as report/imaging is unclear. She's aware, once reviewed, the office will call back with results and recommendations. Patient is aware her LFTs are stable. She's aware her creatinine is 1.42 and she should follow up with PCP for any recommendations.     Patient verbalized understanding of results and recommendations.  Patient has RN coordinators contact information should they have any additional questions, or concerns.

## 2024-08-16 ENCOUNTER — HOSPITAL ENCOUNTER (OUTPATIENT)
Dept: RADIOLOGY | Facility: CLINIC | Age: 79
Discharge: HOME | End: 2024-08-16
Payer: MEDICARE

## 2024-08-16 ENCOUNTER — OFFICE VISIT (OUTPATIENT)
Dept: CARDIOLOGY | Facility: CLINIC | Age: 79
End: 2024-08-16
Payer: MEDICARE

## 2024-08-16 ENCOUNTER — HOSPITAL ENCOUNTER (OUTPATIENT)
Dept: CARDIOLOGY | Facility: CLINIC | Age: 79
Discharge: HOME | End: 2024-08-16
Payer: MEDICARE

## 2024-08-16 ENCOUNTER — APPOINTMENT (OUTPATIENT)
Dept: CARDIOLOGY | Facility: CLINIC | Age: 79
End: 2024-08-16
Payer: MEDICARE

## 2024-08-16 VITALS
SYSTOLIC BLOOD PRESSURE: 120 MMHG | BODY MASS INDEX: 20.46 KG/M2 | DIASTOLIC BLOOD PRESSURE: 81 MMHG | WEIGHT: 104.19 LBS | HEART RATE: 124 BPM | HEIGHT: 60 IN | OXYGEN SATURATION: 97 %

## 2024-08-16 DIAGNOSIS — I25.10 CORONARY ARTERY CALCIFICATION SEEN ON CT SCAN: ICD-10-CM

## 2024-08-16 DIAGNOSIS — I25.5 CARDIOMYOPATHY, ISCHEMIC: ICD-10-CM

## 2024-08-16 DIAGNOSIS — R05.9 COUGH, UNSPECIFIED TYPE: ICD-10-CM

## 2024-08-16 DIAGNOSIS — R05.9 COUGH, UNSPECIFIED TYPE: Primary | ICD-10-CM

## 2024-08-16 DIAGNOSIS — I10 BENIGN ESSENTIAL HYPERTENSION: ICD-10-CM

## 2024-08-16 DIAGNOSIS — I48.3 TYPICAL ATRIAL FLUTTER (MULTI): ICD-10-CM

## 2024-08-16 DIAGNOSIS — I42.8 OTHER CARDIOMYOPATHIES (MULTI): ICD-10-CM

## 2024-08-16 LAB
AORTIC VALVE PEAK VELOCITY: 0.93 M/S
AV PEAK GRADIENT: 3.5 MMHG
AVA (PEAK VEL): 1.62 CM2
EJECTION FRACTION APICAL 4 CHAMBER: 32
EJECTION FRACTION: 45 %
LEFT ATRIUM VOLUME AREA LENGTH INDEX BSA: 59.3 ML/M2
LEFT VENTRICLE INTERNAL DIMENSION DIASTOLE: 4.03 CM (ref 3.5–6)
LEFT VENTRICULAR OUTFLOW TRACT DIAMETER: 1.55 CM
RIGHT VENTRICLE PEAK SYSTOLIC PRESSURE: 30 MMHG

## 2024-08-16 PROCEDURE — 3074F SYST BP LT 130 MM HG: CPT | Performed by: INTERNAL MEDICINE

## 2024-08-16 PROCEDURE — 1159F MED LIST DOCD IN RCRD: CPT | Performed by: INTERNAL MEDICINE

## 2024-08-16 PROCEDURE — 99214 OFFICE O/P EST MOD 30 MIN: CPT | Performed by: INTERNAL MEDICINE

## 2024-08-16 PROCEDURE — 1125F AMNT PAIN NOTED PAIN PRSNT: CPT | Performed by: INTERNAL MEDICINE

## 2024-08-16 PROCEDURE — 3079F DIAST BP 80-89 MM HG: CPT | Performed by: INTERNAL MEDICINE

## 2024-08-16 PROCEDURE — 93306 TTE W/DOPPLER COMPLETE: CPT

## 2024-08-16 PROCEDURE — 1123F ACP DISCUSS/DSCN MKR DOCD: CPT | Performed by: INTERNAL MEDICINE

## 2024-08-16 PROCEDURE — 93306 TTE W/DOPPLER COMPLETE: CPT | Performed by: INTERNAL MEDICINE

## 2024-08-16 PROCEDURE — 4004F PT TOBACCO SCREEN RCVD TLK: CPT | Performed by: INTERNAL MEDICINE

## 2024-08-16 PROCEDURE — 71046 X-RAY EXAM CHEST 2 VIEWS: CPT

## 2024-08-16 PROCEDURE — 93005 ELECTROCARDIOGRAM TRACING: CPT | Performed by: INTERNAL MEDICINE

## 2024-08-16 ASSESSMENT — ENCOUNTER SYMPTOMS
NAUSEA: 0
WHEEZING: 0
FEVER: 0
VOMITING: 0
LOSS OF SENSATION IN FEET: 1
DYSURIA: 0
MYALGIAS: 0
COUGH: 0
CHILLS: 0
ABDOMINAL PAIN: 0
HEMOPTYSIS: 0
HEADACHES: 0
OCCASIONAL FEELINGS OF UNSTEADINESS: 1
ALTERED MENTAL STATUS: 0
HEMATURIA: 0
DEPRESSION: 1
CONSTIPATION: 0
DIARRHEA: 0
BLOATING: 0
MEMORY LOSS: 0
FALLS: 0
DEPRESSION: 0

## 2024-08-16 ASSESSMENT — PATIENT HEALTH QUESTIONNAIRE - PHQ9
8. MOVING OR SPEAKING SO SLOWLY THAT OTHER PEOPLE COULD HAVE NOTICED. OR THE OPPOSITE, BEING SO FIGETY OR RESTLESS THAT YOU HAVE BEEN MOVING AROUND A LOT MORE THAN USUAL: NOT AT ALL
4. FEELING TIRED OR HAVING LITTLE ENERGY: NEARLY EVERY DAY
2. FEELING DOWN, DEPRESSED OR HOPELESS: NEARLY EVERY DAY
7. TROUBLE CONCENTRATING ON THINGS, SUCH AS READING THE NEWSPAPER OR WATCHING TELEVISION: SEVERAL DAYS
SUM OF ALL RESPONSES TO PHQ QUESTIONS 1-9: 12
5. POOR APPETITE OR OVEREATING: NOT AT ALL
3. TROUBLE FALLING OR STAYING ASLEEP OR SLEEPING TOO MUCH: SEVERAL DAYS
SUM OF ALL RESPONSES TO PHQ9 QUESTIONS 1 AND 2: 6
1. LITTLE INTEREST OR PLEASURE IN DOING THINGS: NEARLY EVERY DAY
6. FEELING BAD ABOUT YOURSELF - OR THAT YOU ARE A FAILURE OR HAVE LET YOURSELF OR YOUR FAMILY DOWN: NOT AT ALL
10. IF YOU CHECKED OFF ANY PROBLEMS, HOW DIFFICULT HAVE THESE PROBLEMS MADE IT FOR YOU TO DO YOUR WORK, TAKE CARE OF THINGS AT HOME, OR GET ALONG WITH OTHER PEOPLE: SOMEWHAT DIFFICULT
9. THOUGHTS THAT YOU WOULD BE BETTER OFF DEAD, OR OF HURTING YOURSELF: SEVERAL DAYS

## 2024-08-16 ASSESSMENT — COLUMBIA-SUICIDE SEVERITY RATING SCALE - C-SSRS
2. HAVE YOU ACTUALLY HAD ANY THOUGHTS OF KILLING YOURSELF?: NO
1. IN THE PAST MONTH, HAVE YOU WISHED YOU WERE DEAD OR WISHED YOU COULD GO TO SLEEP AND NOT WAKE UP?: NO
6. HAVE YOU EVER DONE ANYTHING, STARTED TO DO ANYTHING, OR PREPARED TO DO ANYTHING TO END YOUR LIFE?: NO

## 2024-08-16 ASSESSMENT — PAIN SCALES - GENERAL: PAINLEVEL: 6

## 2024-08-16 NOTE — PROGRESS NOTES
Chief Complaint   Patient presents with    Follow-up    Hypertension    Coronary Artery Disease    Hyperlipidemia    Cardiomyopathy    Atrial Fibrillation      HPI  79 yo BF w/ h/o parox AF/FL, cor calc on CT, athero of Ao, CVA 9/18, HTN, HLD, GERD, TOB (quit) now here for cardiology f/u. She is feeling good. No chest pain. No dyspnea at rest. +occ DEL VALLE. No orthopnea/PND. No palps. No LH/dizzy/syncope. No edema. No claudication. +dry cough x 1wk.   3/24 hospitalized due to palpitations, found to have A.flutter/fib and underwent RAISA/DCCV. She was also found to have an LV thrombus and a PE and was started on Apixaban.   ECG 9/18: SB (45), LVH  ECG 12/20: SB (50), ?SMI, nonsp T-wave changes  ECG 5/22: SB (54), ?SMI, nonsp T-wave changes  ECG 4/24: SB (51), SMI, nonsp T-wave changes  ECG 8/24: AFL (121), nonsp ST-T changes  HM 3/24: SR, HR  (avg 54), SVT x15 (long 11b)  Echo 9/18: EF 60-65%, AsAo plaque, mild-mod TR, PASP 37-42  Echo 3/24: EF 50-55%, apical inferior HK w/ ?thrombus, mod LAE  Echo 8/24: AF/FL (HR 120s), EF 45%, apical inf AK, mild-mod MR, mod TR, PASP 30  RAISA 3/24: EF 50-55%, no PFO  JOSE 1/21: no ischemia, EF 55-60% -> 65-70%  cMRI 3/24: EF: 48%, infarction of the apical/inferior wall, small LV apical thrombus, nl RV, mod MR  CXR 2/24:  no acute abnl  CT chest 10/20: mod-sev cor calc, mod athero of Ao, no aneurysm, CAMI, no peric eff  CT chest 2/22: sev cor calc, LAE, tr PE, mod athero of Ao, no aneurysm, nl PA  CT lung 3/23: sev CAC, slight CM, tr PE, sev AA  CTA chest 2/24: small PE, R PA 3.0cm, sev CAC, nl heart size, sm PE, AA, emphysema  Carotid US 9/18; plaque, no HDSS  MRI brain 9/18; LACA CVA  MRA head/neck 9/18: carotid athero, no HDSS, no anuerysm   LE US 3/24: no DVT    Review of Systems   Constitutional: Negative for chills, fever and malaise/fatigue.   HENT:  Negative for hearing loss.    Eyes:  Negative for visual disturbance.   Respiratory:  Negative for cough, hemoptysis and wheezing.     Skin:  Negative for rash.   Musculoskeletal:  Negative for falls and myalgias.   Gastrointestinal:  Negative for bloating, abdominal pain, constipation, diarrhea, dysphagia, nausea and vomiting.   Genitourinary:  Negative for dysuria and hematuria.   Neurological:  Negative for headaches.   Psychiatric/Behavioral:  Negative for altered mental status, depression and memory loss.       Social History     Tobacco Use    Smoking status: Every Day     Types: Cigarettes    Smokeless tobacco: Never    Tobacco comments:     Working on quitting   Substance Use Topics    Alcohol use: Never      Family History   Problem Relation Name Age of Onset    Other (cardiac disorder) Mother      Other (CVA) Mother      Heart failure Mother      Heart attack Mother      Hypertension Mother      Prostate cancer Father      Diabetes Sister      Hypertension Sister      Diabetes Brother      Other (cardiac disorder) Brother      Heart attack Brother      Hypertension Brother      Heart failure Other PATERNAL HALF SISTER       Allergies   Allergen Reactions    Gabapentin Other    Garlic Unknown and Swelling      Current Outpatient Medications   Medication Instructions    amLODIPine (NORVASC) 10 mg, oral, Daily    atorvastatin (LIPITOR) 40 mg, oral, Daily    buPROPion XL (WELLBUTRIN XL) 150 mg, oral, Every morning, Do not crush, chew, or split.    cyanocobalamin (VITAMIN B-12) 500 mcg, oral, Daily    Eliquis 5 mg tablet     furosemide (LASIX) 40 mg, oral, Daily PRN    levothyroxine (SYNTHROID, LEVOXYL) 75 mcg, oral, Daily    lisinopril 40 mg, oral, Daily      /81 (BP Location: Right arm, Patient Position: Sitting, BP Cuff Size: Small adult)   Pulse (!) 124   Ht 1.524 m (5')   Wt 47.3 kg (104 lb 3 oz)   SpO2 97%   BMI 20.35 kg/m²      Vitals:    08/16/24 1428   BP: 120/81   Pulse: (!) 124   SpO2: 97%      Physical Exam  Constitutional:       Appearance: Normal appearance.   HENT:      Head: Normocephalic and atraumatic.       Nose: Nose normal.   Neck:      Vascular: No carotid bruit.   Cardiovascular:      Rate and Rhythm: Regular rhythm. Tachycardia present.      Heart sounds: No murmur heard.  Pulmonary:      Effort: Pulmonary effort is normal.      Breath sounds: Normal breath sounds.   Abdominal:      Palpations: Abdomen is soft.      Tenderness: There is no abdominal tenderness.   Musculoskeletal:      Right lower leg: No edema.      Left lower leg: No edema.   Skin:     General: Skin is warm and dry.   Neurological:      General: No focal deficit present.      Mental Status: She is alert.   Psychiatric:         Mood and Affect: Mood normal.         Judgment: Judgment normal.        Results/Data  8/24 Cr 1.42, K 5.2, LFT nl  6/24 TSH 3.45  4/24 Cr 1.09, K 4.1, HGB 13,   3/24 Cr 1.17, K 4.6, Mg 1.7, HGB 11,   2/24 hgba1c 6.1, TSH 0.27, FT4 1.21, BNP 83  2/22 Cr 0.96, K 4.6, LFT nl, HGB 12.4,   12/21 Cr 1.07, K 4.9, LFT nl, hgba1c 6.4, TSH 0.44  10/20 Cr 0.85, K 4.7, LDL 54, HDL 92, TG 67, Chol 160, TSH 0.51   2/24: LDL: 97, A1c: 6.1  4/24 Cr: 1.16, K 4.3,     Assessment/Plan   79 yo BF w/ h/o parox AF/FL, cor calc on CT, athero of Ao, CVA 9/18, HTN, HLD, GERD, TOB (quit) now back in [facility] with no clear assoc symptoms; tachy-rola. Problem is HR is 120s now with 50s in SR. Refer to EP to discuss options (?RFA, ?AA ie Amio - careful with rola, ?PM w/ BB).  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 At this time, with a largely preserved EF and no anginal symptoms, ischemic eval can be deferred.  -continue Eliquis 5 bid  -continue Lisinopril 40 every day  -continue Amlodipine 10 every day  -continue Lasix 40 every day  -continue Atorva 40 every day  -maintain euthyroid  -FU 3-4 months (earlier if needed); since also referring to EILEEN Hackett MD

## 2024-08-19 ENCOUNTER — TUMOR BOARD CONFERENCE (OUTPATIENT)
Dept: HEMATOLOGY/ONCOLOGY | Facility: HOSPITAL | Age: 79
End: 2024-08-19
Payer: MEDICARE

## 2024-08-19 DIAGNOSIS — K76.9 LIVER LESION, RIGHT LOBE: Primary | ICD-10-CM

## 2024-08-19 LAB
ATRIAL RATE: 242 BPM
P AXIS: 90 DEGREES
P OFFSET: 201 MS
P ONSET: 147 MS
Q ONSET: 223 MS
QRS COUNT: 20 BEATS
QRS DURATION: 76 MS
QT INTERVAL: 304 MS
QTC CALCULATION(BAZETT): 431 MS
QTC FREDERICIA: 384 MS
R AXIS: 71 DEGREES
T AXIS: -43 DEGREES
T OFFSET: 375 MS
VENTRICULAR RATE: 121 BPM

## 2024-08-22 ENCOUNTER — TELEPHONE (OUTPATIENT)
Dept: CARDIOLOGY | Facility: CLINIC | Age: 79
End: 2024-08-22
Payer: MEDICARE

## 2024-08-22 NOTE — TELEPHONE ENCOUNTER
----- Message from Familia Hackett sent at 8/22/2024  7:33 AM EDT -----  Notify pt CXR did not show any acute abnormality - no infection, no fluid

## 2024-08-23 ENCOUNTER — APPOINTMENT (OUTPATIENT)
Dept: PHARMACY | Facility: HOSPITAL | Age: 79
End: 2024-08-23
Payer: MEDICARE

## 2024-08-23 DIAGNOSIS — I48.3 TYPICAL ATRIAL FLUTTER (MULTI): ICD-10-CM

## 2024-08-23 NOTE — ASSESSMENT & PLAN NOTE
Patient is doing well on Eliquis pharmacotherapy, no abnormal bruising or bleeding to report. Does not currently meet requirements for decreased dose, will continue to monitor renal function. Will be applying for [facility] for cost assistance and will follow up in 1 month.

## 2024-08-23 NOTE — PROGRESS NOTES
Pharmacist Clinic: Cardiology Management    Sophie Mendosa is a 78 y.o. female was referred to Clinical Pharmacy Team for anticoagulation management.     Referring Provider: Familia Hackett MD    THIS IS A NEW PATIENT APPOINTMENT. PATIENT WILL BE ESTABLISHING CARE WITH CLINICAL PHARMACY.    Appointment was completed by the patient who was reached at .    REVIEW OF PAST APPNT (IF APPLICABLE):   N/A    Allergies Reviewed? Yes    Allergies   Allergen Reactions    Gabapentin Other     Patient unsure of allergy    Garlic Unknown and Swelling       Past Medical History:   Diagnosis Date    Arthritis     Atrial fibrillation (Multi)     CHF (congestive heart failure) (Multi)     CKD (chronic kidney disease)     Diabetes mellitus (Multi)     Hypertension     Hypothyroidism     LV (left ventricular) mural thrombus 03/01/2024    Apical    Major depressive disorder, single episode, unspecified     Pulmonary embolus (Multi) 02/29/2024    Stroke (Multi)        Current Outpatient Medications on File Prior to Visit   Medication Sig Dispense Refill    amLODIPine (Norvasc) 10 mg tablet Take 1 tablet (10 mg) by mouth once daily. 90 tablet 1    atorvastatin (Lipitor) 40 mg tablet Take 1 tablet (40 mg) by mouth once daily. 90 tablet 1    buPROPion XL (Wellbutrin XL) 150 mg 24 hr tablet Take 1 tablet (150 mg) by mouth once daily in the morning. Do not crush, chew, or split. 90 tablet 1    cyanocobalamin (Vitamin B-12) 500 mcg tablet Take 1 tablet (500 mcg) by mouth once daily.      Eliquis 5 mg tablet Take 1 tablet (5 mg) by mouth 2 times a day.      furosemide (Lasix) 40 mg tablet Take 1 tablet (40 mg) by mouth once daily as needed (fluid retention).      levothyroxine (Synthroid, Levoxyl) 75 mcg tablet Take 1 tablet (75 mcg) by mouth once daily. 90 tablet 1    lisinopril 40 mg tablet Take 1 tablet (40 mg) by mouth once daily. 90 tablet 1     No current facility-administered medications on file prior to visit.  "        RELEVANT LAB RESULTS  Lab Results   Component Value Date    BILITOT 0.5 08/01/2024    CALCIUM 10.4 08/01/2024    CO2 25 08/01/2024     08/01/2024    CREATININE 1.42 (H) 08/01/2024    GLUCOSE 91 08/01/2024    ALKPHOS 121 08/01/2024    K 5.2 08/01/2024    PROT 7.1 08/01/2024     08/01/2024    AST 13 08/01/2024    ALT 14 08/01/2024    BUN 21 08/01/2024    ANIONGAP 16 08/01/2024    MG 1.70 03/05/2024    PHOS 3.7 10/11/2018     04/30/2024    ALBUMIN 4.5 08/01/2024    GFRF 57 (A) 09/22/2023     Lab Results   Component Value Date    TRIG 93 02/29/2024    CHOL 210 (H) 02/29/2024    LDLCALC 97 02/29/2024    HDL 94.1 02/29/2024     No results found for: \"BMCBC\", \"CBCDIF\"     PHARMACEUTICAL ASSESSMENT    MEDICATION RECONCILIATION    Home Pharmacy Reviewed? Yes, describe: [facility]    Changed:  - Eliquis 5 mg: patient taking 1 tablet by mouth twice daily    Drug Interactions? No clinically significant drug interactions requiring change in therapy found at the time of this visit.     Medication Documentation Review Audit       Reviewed by Jackie Castillo PharmD (Pharmacist) on 08/23/24 at 1338      Medication Order Taking? Sig Documenting Provider Last Dose Status   amLODIPine (Norvasc) 10 mg tablet [id number] Yes Take 1 tablet (10 mg) by mouth once daily. Madelaine Galvez MD Taking Active   atorvastatin (Lipitor) 40 mg tablet [id number] Yes Take 1 tablet (40 mg) by mouth once daily. Madelaine Galvez MD Taking Active   buPROPion XL (Wellbutrin XL) 150 mg 24 hr tablet [id number] Yes Take 1 tablet (150 mg) by mouth once daily in the morning. Do not crush, chew, or split. Madelaine Galvez MD Taking Active   cyanocobalamin (Vitamin B-12) 500 mcg tablet [id number] Yes Take 1 tablet (500 mcg) by mouth once daily. Historical Provider, MD Taking Active   Eliquis 5 mg tablet [id number] Yes Take 1 tablet (5 mg) by mouth 2 times a day. Historical Provider, MD Taking Active   furosemide (Lasix) 40 mg " tablet [id number] Yes Take 1 tablet (40 mg) by mouth once daily as needed (fluid retention). Historical Provider, MD Taking Active   levothyroxine (Synthroid, Levoxyl) 75 mcg tablet [id number] Yes Take 1 tablet (75 mcg) by mouth once daily. Madelaine Galvez MD Taking Active   lisinopril 40 mg tablet [id number] Yes Take 1 tablet (40 mg) by mouth once daily. Madelaine Galvez MD Taking Active                    DISEASE MANAGEMENT ASSESSMENT:     ANTICOAGULATION ASSESSMENT    The ASCVD Risk score (Edwar DK, et al., 2019) failed to calculate for the following reasons:    The patient has a prior MI or stroke diagnosis    DIAGNOSIS: prevention of nonvalvular atrial fibrilliation stroke and systemic embolism, Hx of LV thrombus and PE (3/2024)  - Patient is projected to be on anticoagulation long term  - [id number] Score: [8] (only included if diagnosis is atrial fibrillation)   Age: [<65 (0)] [65-74 (+1)] [> 75 (+2)]: 2  Sex: [Male/Female (+1)]: 1  CHF history: [No/Yes(+1)]: 1  Hypertension history: [No/Yes(+1)]: 1  Stroke/TIA/thromboembolism history: [No/Yes(+2)]: 2   Vascular disease history (prior MI, peripheral artery disease, aortic plaque): [No/Yes(+1)]: 1  Diabetes history: [No/Yes(+1)]: 0    CURRENT PHARMACOTHERAPY:   Eliquis 5 mg BID  78 years old  47.3 kg  Scr 1.42 mg/dL (8/1/2024)    RELEVANT PAST MEDICAL HISTORY:   HTN, HLD, PAD, A-flutter/Hx of A-fib, Hx of PE, Hx of LV thrombus, Stage 3a CKD, Hx of cerebral infarction    Affordability/Accessibility: Eliquis is $47 per 30 days, medication may be cost prohibitive for patient. Has about 2 weeks of Eliquis at home.  Adherence/Organization: confirms taking Eliquis twice daily; reports she misses her evening medications maybe 1-2 times per month.  Adverse Reactions: none; patient reports no abnormal bruising or bleeding.  Recent Hospitalizations: none  Recent Falls/Trauma: none, patient reports she moves around her home very carefully, and uses caution when taking  stairs.  Changes in Tobacco or Alcohol Intake:   Tobacco: no, stopped smoking after hospital admission in February   Alcohol: will have a small glass of wine occasionally, but this is rare for her.     EDUCATION/COUNSELING:   - Counseled patient on MOA, expectations, duration of therapy, contraindications, administration, and monitoring parameters  - Counseled patient of side effects that are indicative of bleeding such as dark tarry stool, unexplainable bruising, or vomiting up a coffee ground like substance      DISCUSSION/NOTES:      Patient Assistance Program (PAP)    Application for program to be submitted for the following medications: [facility] Permanent Address: [facility]   Prescription Insurance:   Yes   Members of Household: 1   Files Taxes: No -SSB is only source of income       Patient will be faxing financial information to pharmacist directly at .    Patient verbally reports monthly or yearly income which is less than 400% federal poverty level    Patient aware this process may take up to 6 weeks.     If approved medication must be filled through [facility] and MEDICATION WILL BE MAILED TO PATIENT.       ASSESSMENT AND PLAN:    Assessment/Plan   Problem List Items Addressed This Visit       Atrial flutter (Multi)     Patient is doing well on Eliquis pharmacotherapy, no abnormal bruising or bleeding to report. Does not currently meet requirements for decreased dose, will continue to monitor renal function. Will be applying for  PAP for cost assistance and will follow up in 1 month.         Relevant Orders    Follow Up In Clinical Pharmacy       RECOMMENDATIONS/PLAN    Continue: Eliquis 5 mg BID  Follow up: 1 month    Next Cardiology Appointment: 11/25/2024  Clinical Pharmacist follow up: 10/3/2024  VAF/Application Expiration: Application pending  Type of Encounter: Sofia Castillo PharmD    Verbal consent to manage patient's drug therapy was obtained  from the patient . They were informed they may decline to participate or withdraw from participation in pharmacy services at any time.    Continue all meds under the continuation of care with the referring provider and clinical pharmacy team.

## 2024-08-23 NOTE — Clinical Note
Luiz Hackett, I spoke with Sophie today regarding her Eliquis. She is doing well, with no abnormal bruising or bleeding to report. We will be applying for  PAP for cost assistance. I will keep you updated on the status of this, and we will follow up with her in 1 month.

## 2024-08-26 ENCOUNTER — TELEPHONE (OUTPATIENT)
Dept: GASTROENTEROLOGY | Facility: HOSPITAL | Age: 79
End: 2024-08-26
Payer: MEDICARE

## 2024-08-26 NOTE — TELEPHONE ENCOUNTER
She's aware of theMRI needing schedule Provided Number and to give us a call marci when it is scheduled so we can Make FUV

## 2024-09-15 ASSESSMENT — CHA2DS2 SCORE
CHA2D2S VASC SCORE: 8
VASCULAR DISEASE: NO
AGE IN YEARS: 75+
PRIOR STROKE OR TIA OR THROMBOEMBOLISM: YES
HYPERTENSION: YES
SEX: FEMALE
DIABETES: YES
CHF OR LEFT VENTRICULAR DYSFUNCTION: YES

## 2024-09-16 ENCOUNTER — OFFICE VISIT (OUTPATIENT)
Dept: CARDIOLOGY | Facility: CLINIC | Age: 79
End: 2024-09-16
Payer: MEDICARE

## 2024-09-16 VITALS
OXYGEN SATURATION: 97 % | WEIGHT: 104 LBS | HEART RATE: 117 BPM | HEIGHT: 60 IN | DIASTOLIC BLOOD PRESSURE: 74 MMHG | SYSTOLIC BLOOD PRESSURE: 112 MMHG | BODY MASS INDEX: 20.42 KG/M2

## 2024-09-16 DIAGNOSIS — I48.19 ATRIAL FIBRILLATION, PERSISTENT (MULTI): ICD-10-CM

## 2024-09-16 DIAGNOSIS — I48.0 PAROXYSMAL ATRIAL FIBRILLATION (MULTI): ICD-10-CM

## 2024-09-16 DIAGNOSIS — I48.3 TYPICAL ATRIAL FLUTTER (MULTI): Primary | ICD-10-CM

## 2024-09-16 DIAGNOSIS — Z01.818 PREOP TESTING: ICD-10-CM

## 2024-09-16 PROCEDURE — 99214 OFFICE O/P EST MOD 30 MIN: CPT | Performed by: INTERNAL MEDICINE

## 2024-09-16 PROCEDURE — 3078F DIAST BP <80 MM HG: CPT | Performed by: INTERNAL MEDICINE

## 2024-09-16 PROCEDURE — 4004F PT TOBACCO SCREEN RCVD TLK: CPT | Performed by: INTERNAL MEDICINE

## 2024-09-16 PROCEDURE — 1125F AMNT PAIN NOTED PAIN PRSNT: CPT | Performed by: INTERNAL MEDICINE

## 2024-09-16 PROCEDURE — 93005 ELECTROCARDIOGRAM TRACING: CPT | Performed by: INTERNAL MEDICINE

## 2024-09-16 PROCEDURE — 1123F ACP DISCUSS/DSCN MKR DOCD: CPT | Performed by: INTERNAL MEDICINE

## 2024-09-16 PROCEDURE — 1160F RVW MEDS BY RX/DR IN RCRD: CPT | Performed by: INTERNAL MEDICINE

## 2024-09-16 PROCEDURE — 3074F SYST BP LT 130 MM HG: CPT | Performed by: INTERNAL MEDICINE

## 2024-09-16 PROCEDURE — 1159F MED LIST DOCD IN RCRD: CPT | Performed by: INTERNAL MEDICINE

## 2024-09-16 ASSESSMENT — ENCOUNTER SYMPTOMS
SHORTNESS OF BREATH: 1
IRREGULAR HEARTBEAT: 1
PSYCHIATRIC NEGATIVE: 1
WEAKNESS: 1
DYSPNEA ON EXERTION: 1
PALPITATIONS: 1
OCCASIONAL FEELINGS OF UNSTEADINESS: 1
HEMATOLOGIC/LYMPHATIC NEGATIVE: 1
EYES NEGATIVE: 1
JOINT SWELLING: 1
GASTROINTESTINAL NEGATIVE: 1
ENDOCRINE NEGATIVE: 1
LOSS OF SENSATION IN FEET: 0
DEPRESSION: 0

## 2024-09-16 ASSESSMENT — COLUMBIA-SUICIDE SEVERITY RATING SCALE - C-SSRS
2. HAVE YOU ACTUALLY HAD ANY THOUGHTS OF KILLING YOURSELF?: NO
6. HAVE YOU EVER DONE ANYTHING, STARTED TO DO ANYTHING, OR PREPARED TO DO ANYTHING TO END YOUR LIFE?: NO
1. IN THE PAST MONTH, HAVE YOU WISHED YOU WERE DEAD OR WISHED YOU COULD GO TO SLEEP AND NOT WAKE UP?: NO

## 2024-09-16 ASSESSMENT — PAIN SCALES - GENERAL: PAINLEVEL: 6

## 2024-09-16 NOTE — PROGRESS NOTES
Current Outpatient Medications   Medication Instructions    amLODIPine (NORVASC) 10 mg, oral, Daily    atorvastatin (LIPITOR) 40 mg, oral, Daily    buPROPion XL (WELLBUTRIN XL) 150 mg, oral, Every morning, Do not crush, chew, or split.    cyanocobalamin (VITAMIN B-12) 500 mcg, oral, Daily    Eliquis 5 mg tablet Take 1 tablet (5 mg) by mouth 2 times a day.    furosemide (LASIX) 40 mg, oral, Daily PRN    levothyroxine (SYNTHROID, LEVOXYL) 75 mcg, oral, Daily    lisinopril 40 mg, oral, Daily      Subjective   Sopihe OKSANA Mendosa is a 78 y.o. female.    Chief Complaint:  Atrial Fibrillation  [id number]-[id number] Score: 8     HPI    Patient is referred by Dr. Hackett to discuss an A-fib ablation.     past medical history of Arthritis, Atrial fibrillation, CHF (congestive heart failure, CKD (chronic kidney disease), Diabetes mellitus, Hypertension, Hypothyroidism, HLD, GERD, LV (left ventricular) mural thrombus (03/01/2024), Major depressive disorder,Pulmonary embolus(02/29/2024), and Stroke 09/2018     AF index DX 03/2024    The patient has a history of paroxysmal AF/atrial flutter since 03/2024. She has a history of a CVA in 09/2018, with a CHADSVASc score of 8. In March 2024, she was hospitalized for palpitations and was diagnosed with new-onset atrial flutter/AF. She underwent a RAISA and DCCV on 03/20/2024. During this time, she was also found to have an LV thrombus and a PE, for which she was started on apixaban.    Her CMR revealed an apical transmural infarct, likely due to obstructive CAD (no prior cath), leading to thrombus formation. With a largely preserved EF and no anginal symptoms, ischemic evaluation was deferred by cardiology. Antiarrhythmic drugs, such as amiodarone, were discussed but she has a tendency toward bradycardia in the 50s when in sinus rhythm.    She presents today to discuss treatment options, including AF RFA vs. AADs.    TESTING    RAISA/CARDIOVERSION 03/20/24:  CONCLUSIONS:   1. Left ventricular systolic  function is low normal with a 50-55% estimated ejection fraction.   2. Successful cardioversion for atrial fibrillation resulting in sinus bradycardia.   3. There is plaque visualized in the ascending aorta.   4. There is plaque visualized in the descending aorta.     EF   Date/Time Value Ref Range Status   08/16/2024 02:03 PM 45 %    CONCLUSIONS:   1. The patient is in atrial fibrillation/flutter which may influence the estimate of left ventricular function and transvalvular flows.   2. Left ventricular ejection fraction is mildly decreased, by visual estimate at 45%.   3. Apical inferior segment and apex are abnormal.   4. There is low normal right ventricular systolic function.   5. The left atrium is severely dilated.   6. Mild to moderate mitral valve regurgitation.   7. Moderate tricuspid regurgitation visualized.     EVENT MONITOR 03/22/24 14 days:  ?The predominant rhythm was Sinus.  ?The Maximum Heart Rate recorded was 149 bpm, 03/26 22:47:42, the Minimum Heart Rate recorded was 36 bpm, 03/22 23:37:21, and the Average Heart Rate was 54 bpm.  ?There were 1,320 VE beats with a burden of <1 %.  ?There were 1,130 SVE beats with a burden of <1%. There were 15 occurrences of Supraventricular Tachycardia with the Fastest episode 149 bpm, 03/26 22:47:41, and the Longest episode 11 beats  Cardiac Imaging:  MR cardiac angio chest w and wo IV contrast for morph FUNCT valve DZ and GREAT vessels 03/04/2024  IMPRESSION:  1. Normal LV size (EDVi 68 ml/m2) with mildly reduced systolic  function (LVEF 48%).  2. Severe hypokinesis of the apical inferior segment and true apex.  3. There is evidence of a small LV thrombus measuring 1.1 x 0.58 cm  adherent to the apical inferior wall.  4. Subendocardial infarction of the apical inferior wall (50% wall  thickness) and transmural infarction of the true apex (% wall  thickness).  5. Moderate mitral regurgitation.  6. Normal RV size and systolic function (RVEF 59%).    Lab  Review:   Lab Results   Component Value Date    WBC 5.2 04/30/2024    HGB 13.0 04/30/2024    HCT 41.3 04/30/2024    MCV 84 04/30/2024     04/30/2024     Lab Results   Component Value Date    GLUCOSE 91 08/01/2024     08/01/2024    K 5.2 08/01/2024     08/01/2024    CO2 25 08/01/2024    ANIONGAP 16 08/01/2024    BUN 21 08/01/2024    CREATININE 1.42 (H) 08/01/2024    CALCIUM 10.4 08/01/2024    PHOS 3.7 10/11/2018    ALBUMIN 4.5 08/01/2024     Lab Results   Component Value Date    BNP 3,535 (H) 03/17/2024    HGBA1C 6.1 (H) 02/29/2024     Review of Systems   Constitutional: Positive for malaise/fatigue.   HENT: Negative.     Eyes: Negative.    Cardiovascular:  Positive for dyspnea on exertion, irregular heartbeat and palpitations.   Respiratory:  Positive for shortness of breath.    Endocrine: Negative.    Hematologic/Lymphatic: Negative.    Skin: Negative.    Musculoskeletal:  Positive for arthritis, joint pain, joint swelling and muscle weakness.   Gastrointestinal: Negative.    Genitourinary: Negative.    Neurological:  Positive for weakness.   Psychiatric/Behavioral: Negative.         Objective   Constitutional:       Appearance: Healthy appearance. Not in distress.   Eyes:      Pupils: Pupils are equal, round, and reactive to light.   Neck:      Thyroid: Thyroid normal.      Vascular: JVD normal.   Pulmonary:      Effort: Pulmonary effort is normal.      Breath sounds: Normal breath sounds.   Cardiovascular:      Tachycardia present. Regular rhythm. S1 with normal intensity. S2 with normal intensity.       Murmurs: There is no murmur.      No gallop.  No click. No rub.   Edema:     Ankle: bilateral 1+ edema of the ankle.  Musculoskeletal: Normal range of motion.      Cervical back: Normal range of motion and neck supple. Skin:     General: Skin is warm and moist.   Neurological:      General: No focal deficit present.      Mental Status: Alert and oriented to person, place and time.      Motor:  Motor function is intact.      Gait: Gait is intact.         Assessment/Plan   The primary encounter diagnosis was Typical atrial flutter (Multi). A diagnosis of Paroxysmal atrial fibrillation (Multi) was also pertinent to this visit.    WE DISCUSSED THE RISKS AND BENEFITS OF AADs vs. CATHETER ABLATION PERSISTENT AF / AFL.  SHE UNDERSTANDS AND WANTS TO PROCEED WITH ABLATION.  Hx OF SINUS BRADYCARDIA    SCHEDULE AF / AFL ABLATION UNDER GENERAL ANESTHESIA  - ASAP  HOLD ALL MEDS IN THE MORNING OF ABLATION INCLUDING ELIQUIS  CARTO SYSTEM - ANY EP LAB      MD Terrell Terrazas Master Clinician of Cardiovascular [place].   [facility] [place].   Director of Electrophysiology Center  Professor of Medicine.   [facility] [employer] [facility] [employer].

## 2024-09-16 NOTE — H&P (VIEW-ONLY)
Current Outpatient Medications   Medication Instructions    amLODIPine (NORVASC) 10 mg, oral, Daily    atorvastatin (LIPITOR) 40 mg, oral, Daily    buPROPion XL (WELLBUTRIN XL) 150 mg, oral, Every morning, Do not crush, chew, or split.    cyanocobalamin (VITAMIN B-12) 500 mcg, oral, Daily    Eliquis 5 mg tablet Take 1 tablet (5 mg) by mouth 2 times a day.    furosemide (LASIX) 40 mg, oral, Daily PRN    levothyroxine (SYNTHROID, LEVOXYL) 75 mcg, oral, Daily    lisinopril 40 mg, oral, Daily      Subjective   Sophie OKSANA Mendosa is a 78 y.o. female.    Chief Complaint:  Atrial Fibrillation  [id number] Score: 8     HPI    Patient is referred by Dr. Hackett to discuss an A-fib ablation.     past medical history of Arthritis, Atrial fibrillation, CHF (congestive heart failure, CKD (chronic kidney disease), Diabetes mellitus, Hypertension, Hypothyroidism, HLD, GERD, LV (left ventricular) mural thrombus (03/01/2024), Major depressive disorder,Pulmonary embolus(02/29/2024), and Stroke 09/2018     AF index DX 03/2024    The patient has a history of paroxysmal AF/atrial flutter since 03/2024. She has a history of a CVA in 09/2018, with a CHADSVASc score of 8. In March 2024, she was hospitalized for palpitations and was diagnosed with new-onset atrial flutter/AF. She underwent a RAISA and DCCV on 03/20/2024. During this time, she was also found to have an LV thrombus and a PE, for which she was started on apixaban.    Her CMR revealed an apical transmural infarct, likely due to obstructive CAD (no prior cath), leading to thrombus formation. With a largely preserved EF and no anginal symptoms, ischemic evaluation was deferred by cardiology. Antiarrhythmic drugs, such as amiodarone, were discussed but she has a tendency toward bradycardia in the 50s when in sinus rhythm.    She presents today to discuss treatment options, including AF RFA vs. AADs.    TESTING    RAISA/CARDIOVERSION 03/20/24:  CONCLUSIONS:   1. Left ventricular systolic  function is low normal with a 50-55% estimated ejection fraction.   2. Successful cardioversion for atrial fibrillation resulting in sinus bradycardia.   3. There is plaque visualized in the ascending aorta.   4. There is plaque visualized in the descending aorta.     EF   Date/Time Value Ref Range Status   08/16/2024 02:03 PM 45 %    CONCLUSIONS:   1. The patient is in atrial fibrillation/flutter which may influence the estimate of left ventricular function and transvalvular flows.   2. Left ventricular ejection fraction is mildly decreased, by visual estimate at 45%.   3. Apical inferior segment and apex are abnormal.   4. There is low normal right ventricular systolic function.   5. The left atrium is severely dilated.   6. Mild to moderate mitral valve regurgitation.   7. Moderate tricuspid regurgitation visualized.     EVENT MONITOR 03/22/24 14 days:  ?The predominant rhythm was Sinus.  ?The Maximum Heart Rate recorded was 149 bpm, 03/26 22:47:42, the Minimum Heart Rate recorded was 36 bpm, 03/22 23:37:21, and the Average Heart Rate was 54 bpm.  ?There were 1,320 VE beats with a burden of <1 %.  ?There were 1,130 SVE beats with a burden of <1%. There were 15 occurrences of Supraventricular Tachycardia with the Fastest episode 149 bpm, 03/26 22:47:41, and the Longest episode 11 beats  Cardiac Imaging:  MR cardiac angio chest w and wo IV contrast for morph FUNCT valve DZ and GREAT vessels 03/04/2024  IMPRESSION:  1. Normal LV size (EDVi 68 ml/m2) with mildly reduced systolic  function (LVEF 48%).  2. Severe hypokinesis of the apical inferior segment and true apex.  3. There is evidence of a small LV thrombus measuring 1.1 x 0.58 cm  adherent to the apical inferior wall.  4. Subendocardial infarction of the apical inferior wall (50% wall  thickness) and transmural infarction of the true apex (% wall  thickness).  5. Moderate mitral regurgitation.  6. Normal RV size and systolic function (RVEF 59%).    Lab  Review:   Lab Results   Component Value Date    WBC 5.2 04/30/2024    HGB 13.0 04/30/2024    HCT 41.3 04/30/2024    MCV 84 04/30/2024     04/30/2024     Lab Results   Component Value Date    GLUCOSE 91 08/01/2024     08/01/2024    K 5.2 08/01/2024     08/01/2024    CO2 25 08/01/2024    ANIONGAP 16 08/01/2024    BUN 21 08/01/2024    CREATININE 1.42 (H) 08/01/2024    CALCIUM 10.4 08/01/2024    PHOS 3.7 10/11/2018    ALBUMIN 4.5 08/01/2024     Lab Results   Component Value Date    BNP 3,535 (H) 03/17/2024    HGBA1C 6.1 (H) 02/29/2024     Review of Systems   Constitutional: Positive for malaise/fatigue.   HENT: Negative.     Eyes: Negative.    Cardiovascular:  Positive for dyspnea on exertion, irregular heartbeat and palpitations.   Respiratory:  Positive for shortness of breath.    Endocrine: Negative.    Hematologic/Lymphatic: Negative.    Skin: Negative.    Musculoskeletal:  Positive for arthritis, joint pain, joint swelling and muscle weakness.   Gastrointestinal: Negative.    Genitourinary: Negative.    Neurological:  Positive for weakness.   Psychiatric/Behavioral: Negative.         Objective   Constitutional:       Appearance: Healthy appearance. Not in distress.   Eyes:      Pupils: Pupils are equal, round, and reactive to light.   Neck:      Thyroid: Thyroid normal.      Vascular: JVD normal.   Pulmonary:      Effort: Pulmonary effort is normal.      Breath sounds: Normal breath sounds.   Cardiovascular:      Tachycardia present. Regular rhythm. S1 with normal intensity. S2 with normal intensity.       Murmurs: There is no murmur.      No gallop.  No click. No rub.   Edema:     Ankle: bilateral 1+ edema of the ankle.  Musculoskeletal: Normal range of motion.      Cervical back: Normal range of motion and neck supple. Skin:     General: Skin is warm and moist.   Neurological:      General: No focal deficit present.      Mental Status: Alert and oriented to person, place and time.      Motor:  Motor function is intact.      Gait: Gait is intact.         Assessment/Plan   The primary encounter diagnosis was Typical atrial flutter (Multi). A diagnosis of Paroxysmal atrial fibrillation (Multi) was also pertinent to this visit.    WE DISCUSSED THE RISKS AND BENEFITS OF AADs vs. CATHETER ABLATION PERSISTENT AF / AFL.  SHE UNDERSTANDS AND WANTS TO PROCEED WITH ABLATION.  Hx OF SINUS BRADYCARDIA    SCHEDULE AF / AFL ABLATION UNDER GENERAL ANESTHESIA  - ASAP  HOLD ALL MEDS IN THE MORNING OF ABLATION INCLUDING ELIQUIS  CARTO SYSTEM - ANY EP LAB      MD Terrell Terrazas Master Clinician of Cardiovascular [place].   [facility] [place].   Director of Electrophysiology Center  Professor of Medicine.   [employer] [facility] [employer].

## 2024-09-17 LAB
ATRIAL RATE: 232 BPM
P AXIS: 75 DEGREES
P OFFSET: 202 MS
P ONSET: 152 MS
Q ONSET: 222 MS
QRS COUNT: 19 BEATS
QRS DURATION: 76 MS
QT INTERVAL: 292 MS
QTC CALCULATION(BAZETT): 405 MS
QTC FREDERICIA: 364 MS
R AXIS: 52 DEGREES
T AXIS: 61 DEGREES
T OFFSET: 368 MS
VENTRICULAR RATE: 116 BPM

## 2024-09-18 PROBLEM — I48.19 ATRIAL FIBRILLATION, PERSISTENT (MULTI): Status: ACTIVE | Noted: 2024-09-16

## 2024-09-18 PROBLEM — I48.0 PAROXYSMAL ATRIAL FIBRILLATION (MULTI): Status: ACTIVE | Noted: 2024-09-16

## 2024-09-30 DIAGNOSIS — F32.0 DEPRESSION, MAJOR, SINGLE EPISODE, MILD (CMS-HCC): ICD-10-CM

## 2024-09-30 RX ORDER — BUPROPION HYDROCHLORIDE 150 MG/1
150 TABLET ORAL EVERY MORNING
Qty: 90 TABLET | Refills: 1 | Status: SHIPPED | OUTPATIENT
Start: 2024-09-30 | End: 2025-03-29

## 2024-10-03 ENCOUNTER — APPOINTMENT (OUTPATIENT)
Dept: PHARMACY | Facility: HOSPITAL | Age: 79
End: 2024-10-03
Payer: MEDICARE

## 2024-10-08 DIAGNOSIS — I48.19 ATRIAL FIBRILLATION, PERSISTENT (MULTI): Primary | ICD-10-CM

## 2024-10-08 DIAGNOSIS — I48.3 TYPICAL ATRIAL FLUTTER (MULTI): Primary | ICD-10-CM

## 2024-10-09 ENCOUNTER — LAB (OUTPATIENT)
Dept: LAB | Facility: LAB | Age: 79
End: 2024-10-09
Payer: MEDICARE

## 2024-10-09 DIAGNOSIS — I48.3 TYPICAL ATRIAL FLUTTER (MULTI): ICD-10-CM

## 2024-10-09 DIAGNOSIS — I48.0 PAROXYSMAL ATRIAL FIBRILLATION (MULTI): ICD-10-CM

## 2024-10-09 DIAGNOSIS — I48.19 ATRIAL FIBRILLATION, PERSISTENT (MULTI): ICD-10-CM

## 2024-10-09 DIAGNOSIS — Z01.818 PREOP TESTING: ICD-10-CM

## 2024-10-09 PROCEDURE — 85027 COMPLETE CBC AUTOMATED: CPT

## 2024-10-09 PROCEDURE — 80048 BASIC METABOLIC PNL TOTAL CA: CPT

## 2024-10-09 PROCEDURE — 36415 COLL VENOUS BLD VENIPUNCTURE: CPT

## 2024-10-10 ENCOUNTER — APPOINTMENT (OUTPATIENT)
Dept: HEMATOLOGY/ONCOLOGY | Facility: CLINIC | Age: 79
End: 2024-10-10
Payer: MEDICARE

## 2024-10-10 LAB
ANION GAP SERPL CALC-SCNC: 16 MMOL/L (ref 10–20)
BUN SERPL-MCNC: 26 MG/DL (ref 6–23)
CALCIUM SERPL-MCNC: 10.1 MG/DL (ref 8.6–10.6)
CHLORIDE SERPL-SCNC: 103 MMOL/L (ref 98–107)
CO2 SERPL-SCNC: 25 MMOL/L (ref 21–32)
CREAT SERPL-MCNC: 1.43 MG/DL (ref 0.5–1.05)
EGFRCR SERPLBLD CKD-EPI 2021: 38 ML/MIN/1.73M*2
ERYTHROCYTE [DISTWIDTH] IN BLOOD BY AUTOMATED COUNT: 15.7 % (ref 11.5–14.5)
GLUCOSE SERPL-MCNC: 93 MG/DL (ref 74–99)
HCT VFR BLD AUTO: 36.4 % (ref 36–46)
HGB BLD-MCNC: 11.9 G/DL (ref 12–16)
MCH RBC QN AUTO: 27.2 PG (ref 26–34)
MCHC RBC AUTO-ENTMCNC: 32.7 G/DL (ref 32–36)
MCV RBC AUTO: 83 FL (ref 80–100)
NRBC BLD-RTO: 0 /100 WBCS (ref 0–0)
PLATELET # BLD AUTO: 275 X10*3/UL (ref 150–450)
POTASSIUM SERPL-SCNC: 5.4 MMOL/L (ref 3.5–5.3)
RBC # BLD AUTO: 4.37 X10*6/UL (ref 4–5.2)
SODIUM SERPL-SCNC: 139 MMOL/L (ref 136–145)
WBC # BLD AUTO: 5.7 X10*3/UL (ref 4.4–11.3)

## 2024-10-15 ENCOUNTER — ANESTHESIA EVENT (OUTPATIENT)
Dept: CARDIOLOGY | Facility: HOSPITAL | Age: 79
End: 2024-10-15

## 2024-10-16 ENCOUNTER — ANESTHESIA (OUTPATIENT)
Dept: CARDIOLOGY | Facility: HOSPITAL | Age: 79
End: 2024-10-16

## 2024-10-16 ENCOUNTER — HOSPITAL ENCOUNTER (OUTPATIENT)
Facility: HOSPITAL | Age: 79
Setting detail: OUTPATIENT SURGERY
Discharge: HOME | End: 2024-10-16
Attending: INTERNAL MEDICINE | Admitting: INTERNAL MEDICINE

## 2024-10-16 DIAGNOSIS — I48.19 ATRIAL FIBRILLATION, PERSISTENT (MULTI): ICD-10-CM

## 2024-10-16 DIAGNOSIS — I48.0 PAROXYSMAL ATRIAL FIBRILLATION (MULTI): ICD-10-CM

## 2024-10-16 DIAGNOSIS — I48.92 ATRIAL FLUTTER (MULTI): Primary | ICD-10-CM

## 2024-10-16 DIAGNOSIS — I48.3 TYPICAL ATRIAL FLUTTER (MULTI): ICD-10-CM

## 2024-10-16 LAB
ACT BLD: 383 SEC (ref 82–174)
ACT BLD: 391 SEC (ref 82–174)
ACT BLD: 400 SEC (ref 82–174)
ACT BLD: 408 SEC (ref 82–174)
ACT BLD: 417 SEC (ref 82–174)

## 2024-10-16 PROCEDURE — 3700000002 HC GENERAL ANESTHESIA TIME - EACH INCREMENTAL 1 MINUTE: Performed by: INTERNAL MEDICINE

## 2024-10-16 PROCEDURE — 85347 COAGULATION TIME ACTIVATED: CPT

## 2024-10-16 PROCEDURE — 2780000003 HC OR 278 NO HCPCS: Performed by: INTERNAL MEDICINE

## 2024-10-16 PROCEDURE — 76937 US GUIDE VASCULAR ACCESS: CPT | Performed by: INTERNAL MEDICINE

## 2024-10-16 PROCEDURE — 93653 COMPRE EP EVAL TX SVT: CPT | Performed by: INTERNAL MEDICINE

## 2024-10-16 PROCEDURE — 7100000009 HC PHASE TWO TIME - INITIAL BASE CHARGE: Performed by: INTERNAL MEDICINE

## 2024-10-16 PROCEDURE — C1760 CLOSURE DEV, VASC: HCPCS | Performed by: INTERNAL MEDICINE

## 2024-10-16 PROCEDURE — 93656 COMPRE EP EVAL ABLTJ ATR FIB: CPT | Performed by: INTERNAL MEDICINE

## 2024-10-16 PROCEDURE — C1759 CATH, INTRA ECHOCARDIOGRAPHY: HCPCS | Performed by: INTERNAL MEDICINE

## 2024-10-16 PROCEDURE — 2500000004 HC RX 250 GENERAL PHARMACY W/ HCPCS (ALT 636 FOR OP/ED): Performed by: INTERNAL MEDICINE

## 2024-10-16 PROCEDURE — 93655 ICAR CATH ABLTJ DSCRT ARRHYT: CPT | Performed by: INTERNAL MEDICINE

## 2024-10-16 PROCEDURE — 93657 TX L/R ATRIAL FIB ADDL: CPT | Performed by: INTERNAL MEDICINE

## 2024-10-16 PROCEDURE — G0269 OCCLUSIVE DEVICE IN VEIN ART: HCPCS | Performed by: INTERNAL MEDICINE

## 2024-10-16 PROCEDURE — 7100000010 HC PHASE TWO TIME - EACH INCREMENTAL 1 MINUTE: Performed by: INTERNAL MEDICINE

## 2024-10-16 PROCEDURE — 3700000001 HC GENERAL ANESTHESIA TIME - INITIAL BASE CHARGE: Performed by: INTERNAL MEDICINE

## 2024-10-16 PROCEDURE — C1766 INTRO/SHEATH,STRBLE,NON-PEEL: HCPCS | Performed by: INTERNAL MEDICINE

## 2024-10-16 PROCEDURE — 2720000007 HC OR 272 NO HCPCS: Performed by: INTERNAL MEDICINE

## 2024-10-16 PROCEDURE — 2500000004 HC RX 250 GENERAL PHARMACY W/ HCPCS (ALT 636 FOR OP/ED): Performed by: ANESTHESIOLOGIST ASSISTANT

## 2024-10-16 PROCEDURE — 85347 COAGULATION TIME ACTIVATED: CPT | Performed by: INTERNAL MEDICINE

## 2024-10-16 PROCEDURE — C1732 CATH, EP, DIAG/ABL, 3D/VECT: HCPCS | Performed by: INTERNAL MEDICINE

## 2024-10-16 PROCEDURE — C1731 CATH, EP, 20 OR MORE ELEC: HCPCS | Performed by: INTERNAL MEDICINE

## 2024-10-16 RX ORDER — ONDANSETRON HYDROCHLORIDE 2 MG/ML
INJECTION, SOLUTION INTRAVENOUS AS NEEDED
Status: DISCONTINUED | OUTPATIENT
Start: 2024-10-16 | End: 2024-10-16

## 2024-10-16 RX ORDER — HEPARIN SODIUM 1000 [USP'U]/ML
INJECTION, SOLUTION INTRAVENOUS; SUBCUTANEOUS AS NEEDED
Status: DISCONTINUED | OUTPATIENT
Start: 2024-10-16 | End: 2024-10-16 | Stop reason: HOSPADM

## 2024-10-16 RX ORDER — PHENYLEPHRINE 10 MG/250 ML(40 MCG/ML)IN 0.9 % SOD.CHLORIDE INTRAVENOUS
CONTINUOUS PRN
Status: DISCONTINUED | OUTPATIENT
Start: 2024-10-16 | End: 2024-10-16

## 2024-10-16 RX ORDER — ROCURONIUM BROMIDE 10 MG/ML
INJECTION, SOLUTION INTRAVENOUS AS NEEDED
Status: DISCONTINUED | OUTPATIENT
Start: 2024-10-16 | End: 2024-10-16

## 2024-10-16 RX ORDER — FENTANYL CITRATE 50 UG/ML
INJECTION, SOLUTION INTRAMUSCULAR; INTRAVENOUS AS NEEDED
Status: DISCONTINUED | OUTPATIENT
Start: 2024-10-16 | End: 2024-10-16

## 2024-10-16 RX ORDER — LIDOCAINE HYDROCHLORIDE 20 MG/ML
INJECTION, SOLUTION INFILTRATION; PERINEURAL AS NEEDED
Status: DISCONTINUED | OUTPATIENT
Start: 2024-10-16 | End: 2024-10-16

## 2024-10-16 RX ORDER — PROTAMINE SULFATE 10 MG/ML
INJECTION, SOLUTION INTRAVENOUS AS NEEDED
Status: DISCONTINUED | OUTPATIENT
Start: 2024-10-16 | End: 2024-10-16

## 2024-10-16 RX ORDER — FENTANYL CITRATE 50 UG/ML
25 INJECTION, SOLUTION INTRAMUSCULAR; INTRAVENOUS EVERY 5 MIN PRN
Status: CANCELLED | OUTPATIENT
Start: 2024-10-16

## 2024-10-16 RX ORDER — HYDRALAZINE HYDROCHLORIDE 20 MG/ML
5 INJECTION INTRAMUSCULAR; INTRAVENOUS EVERY 30 MIN PRN
Status: CANCELLED | OUTPATIENT
Start: 2024-10-16

## 2024-10-16 RX ORDER — ONDANSETRON HYDROCHLORIDE 2 MG/ML
4 INJECTION, SOLUTION INTRAVENOUS ONCE AS NEEDED
Status: CANCELLED | OUTPATIENT
Start: 2024-10-16

## 2024-10-16 RX ORDER — PANTOPRAZOLE SODIUM 40 MG/1
40 TABLET, DELAYED RELEASE ORAL
Qty: 30 TABLET | Refills: 0 | Status: SHIPPED | OUTPATIENT
Start: 2024-10-16 | End: 2024-11-15

## 2024-10-16 RX ORDER — PROPOFOL 10 MG/ML
INJECTION, EMULSION INTRAVENOUS AS NEEDED
Status: DISCONTINUED | OUTPATIENT
Start: 2024-10-16 | End: 2024-10-16

## 2024-10-16 RX ORDER — LABETALOL HYDROCHLORIDE 5 MG/ML
5 INJECTION, SOLUTION INTRAVENOUS ONCE AS NEEDED
Status: CANCELLED | OUTPATIENT
Start: 2024-10-16

## 2024-10-16 RX ORDER — PHENYLEPHRINE HCL IN 0.9% NACL 0.4MG/10ML
SYRINGE (ML) INTRAVENOUS AS NEEDED
Status: DISCONTINUED | OUTPATIENT
Start: 2024-10-16 | End: 2024-10-16

## 2024-10-16 RX ORDER — OXYCODONE HYDROCHLORIDE 5 MG/1
5 TABLET ORAL EVERY 4 HOURS PRN
Status: CANCELLED | OUTPATIENT
Start: 2024-10-16

## 2024-10-16 RX ORDER — FENTANYL CITRATE 50 UG/ML
50 INJECTION, SOLUTION INTRAMUSCULAR; INTRAVENOUS EVERY 5 MIN PRN
Status: CANCELLED | OUTPATIENT
Start: 2024-10-16

## 2024-10-16 RX ORDER — HYDROMORPHONE HYDROCHLORIDE 1 MG/ML
0.5 INJECTION, SOLUTION INTRAMUSCULAR; INTRAVENOUS; SUBCUTANEOUS EVERY 5 MIN PRN
Status: CANCELLED | OUTPATIENT
Start: 2024-10-16

## 2024-10-16 RX ORDER — LIDOCAINE IN NACL,ISO-OSMOT/PF 30 MG/3 ML
0.1 SYRINGE (ML) INJECTION ONCE
Status: CANCELLED | OUTPATIENT
Start: 2024-10-16 | End: 2024-10-16

## 2024-10-16 RX ORDER — HEPARIN SODIUM 10000 [USP'U]/100ML
INJECTION, SOLUTION INTRAVENOUS CONTINUOUS PRN
Status: DISCONTINUED | OUTPATIENT
Start: 2024-10-16 | End: 2024-10-16 | Stop reason: HOSPADM

## 2024-10-16 RX ORDER — DROPERIDOL 2.5 MG/ML
0.62 INJECTION, SOLUTION INTRAMUSCULAR; INTRAVENOUS ONCE AS NEEDED
Status: CANCELLED | OUTPATIENT
Start: 2024-10-16

## 2024-10-16 NOTE — DISCHARGE INSTRUCTIONS
INSTRUCTIONS AFTER ABLATION PROCEDURE:    * You will need to continue blood thinner (Eliquis every 12 hours) until instructed otherwise. It is important not to interrupt blood thinner for any reason (other than an emergency) during the first 30 days after ablation.    * You will be on Pantoprazole (a heartburn medicine) for 4 weeks to protect the esophagus as it can become irritated with ablation. It is very important that you take this medication.    * All other medications will generally remain the same unless you are told otherwise.  Resume taking your home medications today (including blood thinner) as listed on the discharge instructions.    * In the first week post-ablation you should take it easy. No heavy lifting or heavy exercise, no treadmill. You can use the stairs if needed but go slowly and minimize the number of times up and down.    * Some minor bruising is common at each groin access site with minor soreness as if you had banged the area. Bruising may occasionally be seen to extend down the leg. This is normal as is an occasional small quarter sized bump in the area. If larger swelling or more significant pain occurs at the area, please contact the office or go the nearest Emergency Room.    * You may have some minor chest pain for the next week or so. The pain will often worsen with a deep breath and be better when leaning forward. This is pericardial chest pain from the ablation and is generally not of concern. It should resolve within a week although it might increase for a day or so after the ablation.    * If you develop unexplained fevers exceeding 100 degrees anytime within the first 3 weeks post-ablation, you need to contact the office. Low grade fevers of around 99 degrees are common in the first day or so post-ablation.    * Atrial fibrillation (AFib) can recur in all patients who undergo this ablation for up to 4-8 weeks post-ablation. The ablation itself can cause inflammation  (pericarditis) in the atria and this can cause AFib. Some patients will actually experience an increased amount of atrial arrhythmia early after ablation. Approximately 1/3 of patients will have this early recurrence of AFib. Medications should be continued and your heart rate controlled. Nothing else needs be done initially except waiting as in many cases these episodes of AFib will prove self limited.    * Continue to follow up with your primary care physician, primary cardiologist, and any other specialists you normally see.    * No driving for 2 days post procedure (IF you were driving prior to procedure)    *Diet: Heart healthy    Call Provider If:  Breathing faster than normal.     Fever of 100.4 F (38 C) or higher.     Chills.     Any new concerning symptoms.     Passing out.     Patient Instructions, Next 24 hours:  DO NOT drive a car, operate machinery or power tools.  It is recommended that a responsible adult be with you for the first 24 hours.     DO NOT drink any alcoholic drinks or take any non-prescriptive medications that contain alcohol for the first 24 hours.     DO NOT make any important decisions for the first 24 hours.    Activity:  You are advised to go directly home from the hospital.     DO NOT lift anything heavier than 10 pounds for one week, this allows for proper healing of the groin.     No excessive exercise or treadmill use for one week. You may walk and do stairs, slowly.     No sexual activities for 24 hours after you arrive home.    Wound Care:  If slight bleeding should occur at groin site, lie down and have someone apply firm pressure just above the puncture site for 5 minutes.  If it continues or is profuse, call 911. Always notify your doctor if bleeding occurs.     Keep site clean and dry. Let air dry or you may use a simple bandaid.     Gently cleanse the puncture site in your groin with soap and water only.     You may experience some tenderness, bruising or minimal  inflammation.  If you have any concerns, you may contact the EP Lab or if any of these symptoms become excessive, contact your electrophysiologist or go to the emergency room.     No tub baths, soaking, hot tubs, or swimming for one week.     May shower the next day after your procedure.    Other Instructions:  If you have any questions about the effects of the sedative drugs or groin care, please call the physician who performed your procedure.    FOLLOW UP:  1) Primary care physician 2 weeks--call to schedule    2) Germaine Costello CNP ( Electrophysiology) 1 month after ablation   will notify you of appointment. If you haven't heard in 1 week, please call [phone]

## 2024-10-16 NOTE — ANESTHESIA POSTPROCEDURE EVALUATION
Patient: Sophie Mendosa    Procedure Summary       Date: 10/16/24 [place] [facility] [place] [facility] [place] [facility] Card[place] Cath Lab    Anesthesia Start: 0844 Anesthesia Stop: 1344    Procedures:       Ablation A-Fib Persistant      Ablation Atrial Flutter Diagnosis:       Typical atrial flutter (Multi)      (Typical atrial flutter (Multi) [I48.3])      (Paroxysmal atrial fibrillation (Multi) [I48.0])      (Atrial fibrillation, persistent (Multi) [I48.19])    Providers: Ignacio Aguila MD Responsible Provider: Nilesh Aranda MD    Anesthesia Type: general ASA Status: 3            Anesthesia Type: general    Vitals Value Taken Time   /64 10/16/24 1344   Temp 36.8 10/16/24 1344   Pulse 74    Resp 16 10/16/24 1344   SpO2 99 10/16/24 1344       Anesthesia Post Evaluation    Patient location during evaluation: PACU  Patient participation: complete - patient participated  Level of consciousness: awake and alert  Pain management: adequate  Airway patency: patent  Cardiovascular status: acceptable and hemodynamically stable  Respiratory status: acceptable and room air  Hydration status: acceptable  Postoperative Nausea and Vomiting: none        There were no known notable events for this encounter.

## 2024-10-16 NOTE — ANESTHESIA PREPROCEDURE EVALUATION
Patient: Sophie Mendosa    Procedure Information       Date/Time: 10/16/24 6537    Procedures:       Ablation A-Fib Persistant - CARTO/ANY EP LAB  GENERAL ANESTHESIA  2ND CASE      Ablation Atrial Flutter    [place] [facility] [place] [facility] [place] [facility]29 Cardiac Cath Lab    Providers: Ignacio Aguila MD          78F w/ PMH of pAF w/ LV thrombus and PE on Eliquis, CAD, CVA 2018 (L VICK), HTN, HLD, GERD, former tobacco use, HoThyroidism undergoing procedure listed above    Relevant Problems   Anesthesia (within normal limits)      Cardiac   (+) Atrial fibrillation, persistent (Multi)   (+) Atrial flutter (Multi)   (+) Benign essential hypertension   (+) Hyperlipidemia, unspecified   (+) Hypertension   (+) Paroxysmal atrial fibrillation (Multi)   (+) Peripheral artery disease (CMS-HCC)   (+) Sinus bradycardia   (+) Sinus node dysfunction (Multi)      Pulmonary   (+) Pneumonia due to infectious organism, unspecified laterality, unspecified part of lung   (+) Pulmonary embolism on right (Multi)   (+) Pulmonary embolism, other, unspecified chronicity, unspecified whether acute cor pulmonale present (Multi)   (+) Pulmonary nodules      Neuro   (+) Depression, major, single episode, mild (CMS-HCC)   (+) Left carpal tunnel syndrome   (+) Right carpal tunnel syndrome   (+) Severe carpal tunnel syndrome      GI   (+) Acid reflux      /Renal (within normal limits)      Liver (within normal limits)      Endocrine   (+) Hyperplastic goiter   (+) Nontoxic (diffuse) goiter   (+) Primary hypothyroidism      Hematology   (+) Anemia, unspecified      Musculoskeletal   (+) Degeneration of lumbar intervertebral disc   (+) Left carpal tunnel syndrome   (+) Lumbar canal stenosis   (+) Rheumatoid arthritis   (+) Right carpal tunnel syndrome   (+) Severe carpal tunnel syndrome      ID   (+) Pneumonia due to infectious organism, unspecified laterality, unspecified part of lung      Skin (within normal limits)      GYN   (+) Fibroid  uterus     Visit Vitals  OB Status Postmenopausal   Smoking Status Every Day        Lab Results   Component Value Date    ALBUMIN 4.5 08/01/2024    ALT 14 08/01/2024    AST 13 08/01/2024    BUN 26 (H) 10/09/2024    CALCIUM 10.1 10/09/2024     10/09/2024    CHOL 210 (H) 02/29/2024    CO2 25 10/09/2024    CREATININE 1.43 (H) 10/09/2024    HDL 94.1 02/29/2024    HCT 36.4 10/09/2024    HGB 11.9 (L) 10/09/2024    HGBA1C 6.1 (H) 02/29/2024    MG 1.70 03/05/2024    PHOS 3.7 10/11/2018     10/09/2024    K 5.4 (H) 10/09/2024     10/09/2024    TRIG 93 02/29/2024    WBC 5.7 10/09/2024       Scheduled medications    Continuous medications    PRN medications      No current facility-administered medications on file prior to encounter.     Current Outpatient Medications on File Prior to Encounter   Medication Sig Dispense Refill    amLODIPine (Norvasc) 10 mg tablet Take 1 tablet (10 mg) by mouth once daily. 90 tablet 1    atorvastatin (Lipitor) 40 mg tablet Take 1 tablet (40 mg) by mouth once daily. 90 tablet 1    cyanocobalamin (Vitamin B-12) 500 mcg tablet Take 1 tablet (500 mcg) by mouth once daily.      Eliquis 5 mg tablet Take 1 tablet (5 mg) by mouth 2 times a day.      furosemide (Lasix) 40 mg tablet Take 1 tablet (40 mg) by mouth once daily as needed (fluid retention).      levothyroxine (Synthroid, Levoxyl) 75 mcg tablet Take 1 tablet (75 mcg) by mouth once daily. 90 tablet 1    lisinopril 40 mg tablet Take 1 tablet (40 mg) by mouth once daily. 90 tablet 1        Clinical information reviewed:                   NPO Detail:  No data recorded     Physical Exam    Airway  Mallampati: II  TM distance: >3 FB  Neck ROM: full     Cardiovascular - normal exam     Dental   (+) upper dentures     Pulmonary - normal exam     Abdominal - normal exam             Anesthesia Plan    History of general anesthesia?: yes  History of complications of general anesthesia?: no    ASA 3     general   (GETA. PIVx2. Arterial  line.    Relevant risks of the anesthetic plan discussed with the patient and/or family at bedside. All questions answered to patients satisfaction, and patient wishes to proceed with anesthetic plan.)  intravenous induction   Postoperative administration of opioids is intended.  Trial extubation is planned.  Anesthetic plan and risks discussed with patient.  Use of blood products discussed with patient who consented to blood products.    Plan discussed with CAA and attending.

## 2024-10-16 NOTE — ANESTHESIA PROCEDURE NOTES
Arterial Line:    Date/Time: 10/16/2024 9:21 AM    Staffing  Performed: CAA   Authorized by: Nilesh Aranda MD    Performed by: REMBERTO Jacobson    An arterial line was placed. Procedure performed using ultrasound guidance.in the OR for the following indication(s): continuous blood pressure monitoring and blood sampling needed.    A 20 gauge (size), 1 and 3/4 inch (length), Angiocath (type) catheter was placed into the Left radial artery, secured by Tegadetramaine   Seldinger technique used.  Events:  patient tolerated procedure well with no complications.      Additional notes:  Attempt #1 by ARELY in L radial artery  Attempt #2 as documented

## 2024-10-16 NOTE — ANESTHESIA PROCEDURE NOTES
Peripheral IV  Date/Time: 10/16/2024 9:22 AM      Placement  Needle size: 18 G  Laterality: right  Location: hand  Local anesthetic: none  Site prep: chlorhexidine  Technique: anatomical landmarks  Attempts: 1

## 2024-10-16 NOTE — ANESTHESIA PROCEDURE NOTES
Airway  Date/Time: 10/16/2024 9:09 AM  Urgency: elective      Staffing  Performed: ARELY   Authorized by: Nilesh Aranda MD    Performed by: REMBERTO Jacobson  Patient location during procedure: OR    Indications and Patient Condition  Indications for airway management: anesthesia  Sedation level: deep  Mask difficulty assessment: 1 - vent by mask    Final Airway Details  Final airway type: endotracheal airway      Successful airway: ETT     Successful intubation technique: direct laryngoscopy  Blade: Amelia  Blade size: #3  ETT size (mm): 7.0  Cormack-Lehane Classification: grade I - full view of glottis  Placement verified by: chest auscultation   Measured from: lips  ETT to lips (cm): 20  Number of attempts at approach: 1

## 2024-10-16 NOTE — Clinical Note
- due to transplant and chemotherapy  - platelet count stable at 74,000   Sheath was exchanged in the right femoral vein. Short 8.5 removed

## 2024-10-16 NOTE — Clinical Note
Exchanged long baylis sheath for short sheath.    ddx: bppv vs bells palsy, will r/o intracranial mass. pt likely has acute on chronic chf. will r/o acs.  -cxr -cth -ekg unchanged from prior -labs -meclizine -consider lasix if cr wnl -reassess -consider adm if pt not able to walk & still ataxic ddx: bppv vs bells palsy, will r/o intracranial mass. pt likely has acute on chronic chf. will r/o acs.  -cxr -cth -ekg unchanged from prior -labs -meclizine -consider lasix if cr wnl -reassess -consider adm if pt not able to walk & still ataxic  Fink att: Patient presenting with history of CAD and chest pain with neurologic symptoms.  No e/o aortic dissection.  No evidence of STEMI, pericarditis, myocarditis, pulmonary embolism,  pneumothorax, pneumonia, Zoster, or esophageal perforation.  ADmit, MRI, stress.

## 2024-10-23 NOTE — PROGRESS NOTES
Pharmacist Clinic: Cardiology Management    Sophie Mendosa is a 79 y.o. female was referred to Clinical Pharmacy Team for anticoagulation management.     Referring Provider: Familia Hackett MD    THIS IS A NEW PATIENT APPOINTMENT. PATIENT WILL BE ESTABLISHING CARE WITH CLINICAL PHARMACY.    Appointment was completed by the patient who was reached at .    REVIEW OF PAST APPNT (IF APPLICABLE):   Patient is doing well on Eliquis pharmacotherapy, no abnormal bruising or bleeding to report. Does not currently meet requirements for decreased dose, will continue to monitor renal function. Will be applying for  PAP for cost assistance and will follow up in 1 month.   Of note, income documents were never received after previous appointment. Will attempt to re-screen for  PAP at today's visit.    Allergies   Allergen Reactions    Gabapentin Other     Patient unsure of allergy    Garlic Unknown and Swelling       Past Medical History:   Diagnosis Date    Arthritis     Atrial fibrillation (Multi)     CHF (congestive heart failure)     CKD (chronic kidney disease)     Diabetes mellitus (Multi)     Hypertension     Hypothyroidism     LV (left ventricular) mural thrombus 03/01/2024    Apical    Major depressive disorder, single episode, unspecified     Pulmonary embolus 02/29/2024    Stroke (Multi)        Current Outpatient Medications on File Prior to Visit   Medication Sig Dispense Refill    amLODIPine (Norvasc) 10 mg tablet Take 1 tablet (10 mg) by mouth once daily. 90 tablet 1    atorvastatin (Lipitor) 40 mg tablet Take 1 tablet (40 mg) by mouth once daily. 90 tablet 1    buPROPion XL (Wellbutrin XL) 150 mg 24 hr tablet Take 1 tablet (150 mg) by mouth once daily in the morning. Do not crush, chew, or split. 90 tablet 1    cyanocobalamin (Vitamin B-12) 500 mcg tablet Take 1 tablet (500 mcg) by mouth once daily.      Eliquis 5 mg tablet Take 1 tablet (5 mg) by mouth 2 times a day.      furosemide (Lasix) 40  "mg tablet Take 1 tablet (40 mg) by mouth once daily as needed (fluid retention). (Patient not taking: Reported on 10/16/2024)      levothyroxine (Synthroid, Levoxyl) 75 mcg tablet Take 1 tablet (75 mcg) by mouth once daily. 90 tablet 1    lisinopril 40 mg tablet Take 1 tablet (40 mg) by mouth once daily. 90 tablet 1    pantoprazole (ProtoNix) 40 mg EC tablet Take 1 tablet (40 mg) by mouth once daily in the morning. Take before meals. Do not crush, chew, or split. 30 tablet 0     No current facility-administered medications on file prior to visit.         RELEVANT LAB RESULTS  Lab Results   Component Value Date    BILITOT 0.5 08/01/2024    CALCIUM 10.1 10/09/2024    CO2 25 10/09/2024     10/09/2024    CREATININE 1.43 (H) 10/09/2024    GLUCOSE 93 10/09/2024    ALKPHOS 121 08/01/2024    K 5.4 (H) 10/09/2024    PROT 7.1 08/01/2024     10/09/2024    AST 13 08/01/2024    ALT 14 08/01/2024    BUN 26 (H) 10/09/2024    ANIONGAP 16 10/09/2024    MG 1.70 03/05/2024    PHOS 3.7 10/11/2018     04/30/2024    ALBUMIN 4.5 08/01/2024    GFRF 57 (A) 09/22/2023     Lab Results   Component Value Date    TRIG 93 02/29/2024    CHOL 210 (H) 02/29/2024    LDLCALC 97 02/29/2024    HDL 94.1 02/29/2024     No results found for: \"BMCBC\", \"CBCDIF\"     PHARMACEUTICAL ASSESSMENT    Drug Interactions? No clinically significant drug interactions requiring change in therapy found at the time of this visit.     Medication Documentation Review Audit       Reviewed by Belen Allen RN (Registered Nurse) on 10/16/24 at 0808      Medication Order Taking? Sig Documenting Provider Last Dose Status   amLODIPine (Norvasc) 10 mg tablet [id number] Yes Take 1 tablet (10 mg) by mouth once daily. Madelaine Galvez MD 10/15/2024 Active   atorvastatin (Lipitor) 40 mg tablet [id number] Yes Take 1 tablet (40 mg) by mouth once daily. Madelaine Galvez MD 10/15/2024 Active   buPROPion XL (Wellbutrin XL) 150 mg 24 hr tablet [id number] Yes Take 1 tablet " (150 mg) by mouth once daily in the morning. Do not crush, chew, or split. Madelaine Galvez MD 10/15/2024 Active   cyanocobalamin (Vitamin B-12) 500 mcg tablet [id number] Yes Take 1 tablet (500 mcg) by mouth once daily. Historical Provider, MD 10/15/2024 Active   Eliquis 5 mg tablet [id number] Yes Take 1 tablet (5 mg) by mouth 2 times a day. Historical Provider, MD 10/15/2024 Active   furosemide (Lasix) 40 mg tablet [id number] No Take 1 tablet (40 mg) by mouth once daily as needed (fluid retention).   Patient not taking: Reported on 10/16/2024    Historical Provider, MD More than a month Active   levothyroxine (Synthroid, Levoxyl) 75 mcg tablet [id number] Yes Take 1 tablet (75 mcg) by mouth once daily. Madelaine Galvez MD 10/15/2024 Active   lisinopril 40 mg tablet [id number] Yes Take 1 tablet (40 mg) by mouth once daily. Madelaine Galvez MD 10/15/2024 Active                    DISEASE MANAGEMENT ASSESSMENT:     ANTICOAGULATION ASSESSMENT    The ASCVD Risk score (Edwar PATEL, et al., 2019) failed to calculate for the following reasons:    Risk score cannot be calculated because patient has a medical history suggesting prior/existing ASCVD    DIAGNOSIS: prevention of nonvalvular atrial fibrilliation stroke and systemic embolism, Hx of LV thrombus and PE (3/2024)  - Patient is projected to be on anticoagulation long term  - [id number] Score: [8] (only included if diagnosis is atrial fibrillation)   Age: [<65 (0)] [65-74 (+1)] [> 75 (+2)]: 2  Sex: [Male/Female (+1)]: 1  CHF history: [No/Yes(+1)]: 1  Hypertension history: [No/Yes(+1)]: 1  Stroke/TIA/thromboembolism history: [No/Yes(+2)]: 2   Vascular disease history (prior MI, peripheral artery disease, aortic plaque): [No/Yes(+1)]: 1  Diabetes history: [No/Yes(+1)]: 0    CURRENT PHARMACOTHERAPY:   Eliquis 5 mg BID  79 years old  47.2 kg  Scr 1.43 mg/dL (10/9/2024)    RELEVANT PAST MEDICAL HISTORY:   HTN, HLD, PAD, A-flutter/Hx of A-fib, Hx of PE, Hx of LV thrombus, Stage 3a  CKD, Hx of cerebral infarction    Affordability/Accessibility: Eliquis is $47 per 30 days, medication may be cost prohibitive for patient.  PAP application in progress, income documents have not yet been received.  Adherence/Organization: confirms taking Eliquis twice daily; reports she misses her evening medications maybe 1-2 times per month if she falls asleep early. States no missed dose since procedure 10/16/24.  Adverse Reactions: none; patient reports no abnormal bruising or bleeding.   Recent Hospitalizations: none   Recent Falls/Trauma: none  Changes in Tobacco or Alcohol Intake:   Tobacco: no, stopped smoking after hospital admission in February    Alcohol: will have a small glass of wine occasionally, but this is rare for her.      EDUCATION/COUNSELING:   - Counseled patient on MOA, expectations, duration of therapy, contraindications, administration, and monitoring parameters  - Counseled patient of side effects that are indicative of bleeding such as dark tarry stool, unexplainable bruising, or vomiting up a coffee ground like substance      DISCUSSION/NOTES:   Today's appointment was 1 month follow up regarding anticoagulation pharmacotherapy. Sophie is doing well on Eliquis, reports no recent missed doses. No abnormal bruising or bleeding reported.   aKthe reports she has enough Eliquis through about Tuesday of next week. She will have her son send income documents on Monday for  PAP application, and call pharmacist to ensure they are received. Pharmacist to submit once received. States if she is unable to send income documents at that time, will have Eliquis filled through insurance copay to ensure she does not run out. Of note, she has already utilized the free trial of Eliquis. Patient to call pharmacist with any issues sending income documents on Monday.  Will follow up in 1 month to ensure  PAP approval.      Patient Assistance Program (PAP)    Application for program to be submitted for  the [facility]    [facility] Permanent Address: [facility]   Prescription Insurance:   Yes   Members of Household: 1   Files Taxes: No -SSB is only source of income        Patient will be faxing financial information to pharmacist directly at .    Patient verbally reports monthly or yearly income which is less than 400% federal poverty level    Patient aware this process may take up to 6 weeks.     If approved medication must be filled through [facility] and MEDICATION WILL BE MAILED TO PATIENT.       ASSESSMENT AND PLAN:    Assessment/Plan   Problem List Items Addressed This Visit       Atrial flutter (Multi)    Relevant Orders    Referral to Clinical Pharmacy    Atrial fibrillation, persistent (Multi) - Primary     Sophie is doing well on anticoagulation with Eliquis. Updated labs show decreased dose is not needed at this time (80 y/o, 47.2 kg, Scr 1.43 mg/dL), will continue to monitor closely. Will continue current dose and follow up in 1 month.              RECOMMENDATIONS/PLAN    Continue: Eliquis 5 mg BID  Follow up: 1 month    Last Appointment with Referring Provider: 8/16/2024  Next Appointment with Referring Provider: 11/25/2024  Clinical Pharmacist follow up: 11/25/2024  VAF/Application Expiration: Application pending  Type of Encounter: Sofia Castillo PharmD    Verbal consent to manage patient's drug therapy was obtained from the patient . They were informed they may decline to participate or withdraw from participation in pharmacy services at any time.    Continue all meds under the continuation of care with the referring provider and clinical pharmacy team.

## 2024-10-25 ENCOUNTER — TELEMEDICINE (OUTPATIENT)
Dept: PHARMACY | Facility: HOSPITAL | Age: 79
End: 2024-10-25
Payer: MEDICARE

## 2024-10-25 DIAGNOSIS — I48.19 ATRIAL FIBRILLATION, PERSISTENT (MULTI): Primary | ICD-10-CM

## 2024-10-25 DIAGNOSIS — I48.3 TYPICAL ATRIAL FLUTTER (MULTI): ICD-10-CM

## 2024-10-25 NOTE — Clinical Note
Luiz Hackett, Today's appointment was 1 month follow up regarding anticoagulation pharmacotherapy. Sophie is doing well on Eliquis, reports no recent missed doses. No abnormal bruising or bleeding reported. Kathe reports she has enough Eliquis through about Tuesday of next week. She will send income documents on Monday for  PAP application. Pharmacist to submit once received. States if she is unable to send income documents at that time, will have Eliquis filled through insurance copay to ensure she does not run out. Of note, she has already utilized the free trial of Eliquis. Patient to call pharmacist with any issues sending income documents on Monday. Will follow up in 1 month to ensure  PAP approval.

## 2024-10-25 NOTE — ASSESSMENT & PLAN NOTE
Sophie is doing well on anticoagulation with Eliquis. Updated labs show decreased dose is not needed at this time (80 y/o, 47.2 kg, Scr 1.43 mg/dL), will continue to monitor closely. Will continue current dose and follow up in 1 month.

## 2024-10-28 ENCOUNTER — TELEPHONE (OUTPATIENT)
Dept: PHARMACY | Facility: HOSPITAL | Age: 79
End: 2024-10-28
Payer: MEDICARE

## 2024-10-28 PROCEDURE — RXMED WILLOW AMBULATORY MEDICATION CHARGE

## 2024-10-29 ENCOUNTER — PHARMACY VISIT (OUTPATIENT)
Dept: PHARMACY | Facility: CLINIC | Age: 79
End: 2024-10-29
Payer: COMMERCIAL

## 2024-11-18 ENCOUNTER — OFFICE VISIT (OUTPATIENT)
Dept: CARDIOLOGY | Facility: CLINIC | Age: 79
End: 2024-11-18
Payer: MEDICARE

## 2024-11-18 VITALS
WEIGHT: 105.1 LBS | OXYGEN SATURATION: 97 % | HEART RATE: 61 BPM | DIASTOLIC BLOOD PRESSURE: 72 MMHG | SYSTOLIC BLOOD PRESSURE: 124 MMHG | HEIGHT: 60 IN | BODY MASS INDEX: 20.63 KG/M2

## 2024-11-18 DIAGNOSIS — I48.0 PAROXYSMAL ATRIAL FIBRILLATION (MULTI): Primary | ICD-10-CM

## 2024-11-18 PROCEDURE — 1123F ACP DISCUSS/DSCN MKR DOCD: CPT | Performed by: NURSE PRACTITIONER

## 2024-11-18 PROCEDURE — 1160F RVW MEDS BY RX/DR IN RCRD: CPT | Performed by: NURSE PRACTITIONER

## 2024-11-18 PROCEDURE — 99214 OFFICE O/P EST MOD 30 MIN: CPT | Performed by: NURSE PRACTITIONER

## 2024-11-18 PROCEDURE — 3074F SYST BP LT 130 MM HG: CPT | Performed by: NURSE PRACTITIONER

## 2024-11-18 PROCEDURE — 3078F DIAST BP <80 MM HG: CPT | Performed by: NURSE PRACTITIONER

## 2024-11-18 PROCEDURE — 93005 ELECTROCARDIOGRAM TRACING: CPT | Performed by: NURSE PRACTITIONER

## 2024-11-18 PROCEDURE — 4004F PT TOBACCO SCREEN RCVD TLK: CPT | Performed by: NURSE PRACTITIONER

## 2024-11-18 PROCEDURE — 1126F AMNT PAIN NOTED NONE PRSNT: CPT | Performed by: NURSE PRACTITIONER

## 2024-11-18 PROCEDURE — 1159F MED LIST DOCD IN RCRD: CPT | Performed by: NURSE PRACTITIONER

## 2024-11-18 ASSESSMENT — ENCOUNTER SYMPTOMS
OCCASIONAL FEELINGS OF UNSTEADINESS: 1
LOSS OF SENSATION IN FEET: 0
DEPRESSION: 0

## 2024-11-18 ASSESSMENT — COLUMBIA-SUICIDE SEVERITY RATING SCALE - C-SSRS
1. IN THE PAST MONTH, HAVE YOU WISHED YOU WERE DEAD OR WISHED YOU COULD GO TO SLEEP AND NOT WAKE UP?: NO
2. HAVE YOU ACTUALLY HAD ANY THOUGHTS OF KILLING YOURSELF?: NO
6. HAVE YOU EVER DONE ANYTHING, STARTED TO DO ANYTHING, OR PREPARED TO DO ANYTHING TO END YOUR LIFE?: NO

## 2024-11-18 ASSESSMENT — PAIN SCALES - GENERAL: PAINLEVEL_OUTOF10: 0-NO PAIN

## 2024-11-18 NOTE — PROGRESS NOTES
Subjective   Sophie Mendosa is a 79 y.o. female.    Chief Complaint:  Follow-up    79 year old female presents today for 1 month follow up post Afib, atypical and typical Atrial flutter Ablation  PMH includes Arthritis, Atrial fibrillation, CHF (congestive heart failure, CKD (chronic kidney disease), Diabetes mellitus, Hypertension, Hypothyroidism, HLD, GERD, LV (left ventricular) mural thrombus (03/01/2024), Major depressive disorder,Pulmonary embolus(02/29/2024), and Stroke 09/2018      AF index DX 03/2024     The patient has a history of paroxysmal AF/atrial flutter since 03/2024. She has a history of a CVA in 09/2018, with a CHADSVASc score of 8. In March 2024, she was hospitalized for palpitations and was diagnosed with new-onset atrial flutter/AF. She underwent a RAISA and DCCV on 03/20/2024. During this time, she was also found to have an LV thrombus and a PE, for which she was started on apixaban.    Her CMR revealed an apical transmural infarct, likely due to obstructive CAD (no prior cath), leading to thrombus formation. With a largely preserved EF and no anginal symptoms, ischemic evaluation was deferred by cardiology. Antiarrhythmic drugs, such as amiodarone, were discussed but she has a tendency toward bradycardia in the 50s when in sinus rhythm.                                                                            Echo 08/16/2024 1. The patient is in atrial fibrillation/flutter which may influence the estimate of left ventricular function and transvalvular flows. 2. Left ventricular ejection fraction is mildly decreased, by visual estimate at 45%. 3. Apical inferior segment and apex are abnormal. 4. There is low normal right ventricular systolic function. 5. The left atrium is severely dilated. 6. Mild to moderate mitral valve regurgitation. 7. Moderate tricuspid regurgitation visualized.    Cardiac Imaging:  MR cardiac angio chest w and wo IV contrast for morph FUNCT valve DZ and GREAT vessels  03/04/2024  1. Normal LV size (EDVi 68 ml/m2) with mildly reduced systolic function (LVEF 48%). 2. Severe hypokinesis of the apical inferior segment and true apex. 3. There is evidence of a small LV thrombus measuring 1.1 x 0.58 cm adherent to the apical inferior wall. 4. Subendocardial infarction of the apical inferior wall (50% wall thickness) and transmural infarction of the true apex (% wall thickness). 5. Moderate mitral regurgitation. 6. Normal RV size and systolic function (RVEF 59%).    Now s/p PVI, CTI, posterior wall isolation, and left atypical atrial flutter RFA with Dr. Aguila 10/16/2024  ECG 11/18/2024 NSR HR 61 bpm, non specific T wave abnormality    TODAY patient presents today for 1 month follow-up post atrial fibrillation and atrial flutter ablation.  She is in normal rhythm today.  She still is having episodes where she feels lightheaded and dizzy.  Since the procedure, its may have occurred 3-4 times.  Her dizziness is usually associated with position changes, but not always.  She denies any chest pain, shortness of breath, syncope or right groin puncture site drainage/pain.    /72 (BP Location: Left arm, Patient Position: Sitting, BP Cuff Size: Small adult)   Pulse 61   Ht 1.524 m (5')   Wt 47.7 kg (105 lb 1.6 oz)   SpO2 97%   BMI 20.53 kg/m²   Current Outpatient Medications on File Prior to Visit   Medication Sig Dispense Refill    amLODIPine (Norvasc) 10 mg tablet Take 1 tablet (10 mg) by mouth once daily. 90 tablet 1    apixaban (Eliquis) 5 mg tablet Take 1 tablet (5 mg) by mouth 2 times a day. 180 tablet 3    atorvastatin (Lipitor) 40 mg tablet Take 1 tablet (40 mg) by mouth once daily. 90 tablet 1    buPROPion XL (Wellbutrin XL) 150 mg 24 hr tablet Take 1 tablet (150 mg) by mouth once daily in the morning. Do not crush, chew, or split. 90 tablet 1    cyanocobalamin (Vitamin B-12) 500 mcg tablet Take 1 tablet (500 mcg) by mouth once daily.      furosemide (Lasix) 40 mg  tablet Take 1 tablet (40 mg) by mouth once daily as needed (fluid retention).      levothyroxine (Synthroid, Levoxyl) 75 mcg tablet Take 1 tablet (75 mcg) by mouth once daily. 90 tablet 1    lisinopril 40 mg tablet Take 1 tablet (40 mg) by mouth once daily. 90 tablet 1    [DISCONTINUED] pantoprazole (ProtoNix) 40 mg EC tablet Take 1 tablet (40 mg) by mouth once daily in the morning. Take before meals. Do not crush, chew, or split. 30 tablet 0     No current facility-administered medications on file prior to visit.         Review of Systems   Constitutional: Negative for diaphoresis, fever and malaise/fatigue.   HENT:  Negative for congestion and sore throat.    Eyes:  Negative for blurred vision and double vision.   Cardiovascular:  Negative for chest pain, dyspnea on exertion, irregular heartbeat, leg swelling, near-syncope, orthopnea, palpitations, paroxysmal nocturnal dyspnea and syncope.   Respiratory:  Negative for cough, hemoptysis, shortness of breath, snoring and sputum production.    Hematologic/Lymphatic: Negative for bleeding problem.   Skin:  Negative for rash.   Musculoskeletal:  Negative for falls, joint pain and myalgias.   Gastrointestinal:  Negative for abdominal pain, diarrhea, nausea and vomiting.   Neurological:  Positive for dizziness and light-headedness. Negative for headaches and weakness.   All other systems reviewed and are negative.      Objective   Constitutional:       Appearance: Healthy appearance. Not in distress.   Eyes:      Conjunctiva/sclera: Conjunctivae normal.      Pupils: Pupils are equal, round, and reactive to light.   HENT:    Mouth/Throat:      Pharynx: Oropharynx is clear.   Pulmonary:      Effort: Pulmonary effort is normal.      Breath sounds: Normal breath sounds. No wheezing. No rhonchi. No rales.   Cardiovascular:      PMI at left midclavicular line. Normal rate. Regular rhythm.      Murmurs: There is no murmur.      No gallop.    Pulses:     Intact distal pulses.    Edema:     Peripheral edema absent.   Abdominal:      General: Bowel sounds are normal.      Palpations: Abdomen is soft.      Tenderness: There is no abdominal tenderness.   Musculoskeletal: Normal range of motion.         General: No tenderness. Skin:     General: Skin is warm and dry.      Comments: Right groin puncture site intact.   Neurological:      General: No focal deficit present.      Mental Status: Alert and oriented to person, place and time.       I personally reviewed her recent TSH, CBC, BMP, cardiac MRI, echocardiogram and monitor results  Lab Review:   Admission on 10/16/2024, Discharged on 10/16/2024   Component Date Value    POCT Activated Clotting * 10/16/2024 417 (H)     POCT Activated Clotting * 10/16/2024 408 (H)     POCT Activated Clotting * 10/16/2024 400 (H)     POCT Activated Clotting * 10/16/2024 391 (H)     POCT Activated Clotting * 10/16/2024 383 (H)    Lab on 10/09/2024   Component Date Value    Glucose 10/09/2024 93     Sodium 10/09/2024 139     Potassium 10/09/2024 5.4 (H)     Chloride 10/09/2024 103     Bicarbonate 10/09/2024 25     Anion Gap 10/09/2024 16     Urea Nitrogen 10/09/2024 26 (H)     Creatinine 10/09/2024 1.43 (H)     eGFR 10/09/2024 38 (L)     Calcium 10/09/2024 10.1     WBC 10/09/2024 5.7     nRBC 10/09/2024 0.0     RBC 10/09/2024 4.37     Hemoglobin 10/09/2024 11.9 (L)     Hematocrit 10/09/2024 36.4     MCV 10/09/2024 83     MCH 10/09/2024 27.2     MCHC 10/09/2024 32.7     RDW 10/09/2024 15.7 (H)     Platelets 10/09/2024 275        Assessment/Plan   The encounter diagnosis was Paroxysmal atrial fibrillation (Multi).  Patient had 1 month post A-fib and a flutter ablation.  She had multiple areas that were ablated and only complains of some intermittent dizziness/lightheadedness.  She denies any feeling that her heart is racing that she is about to pass out.    We discussed normal symptoms that can happen post ablation and when she should go to the ED or have an  office visit.  Patient expresses understanding, all questions asked and answered.    Continue Eliquis for stroke prevention  Continue lisinopril, amlodipine for blood pressure  Follow-up with me in 2 months or sooner as needed for recurrent arrhythmia symptoms.  If her dizziness continues, we will consider repeating a monitor

## 2024-11-19 LAB
ATRIAL RATE: 61 BPM
P AXIS: 89 DEGREES
P OFFSET: 195 MS
P ONSET: 152 MS
PR INTERVAL: 136 MS
Q ONSET: 220 MS
QRS COUNT: 10 BEATS
QRS DURATION: 82 MS
QT INTERVAL: 434 MS
QTC CALCULATION(BAZETT): 436 MS
QTC FREDERICIA: 436 MS
R AXIS: 44 DEGREES
T AXIS: 55 DEGREES
T OFFSET: 437 MS
VENTRICULAR RATE: 61 BPM

## 2024-11-21 ENCOUNTER — APPOINTMENT (OUTPATIENT)
Dept: PRIMARY CARE | Facility: CLINIC | Age: 79
End: 2024-11-21
Payer: MEDICARE

## 2024-11-21 VITALS
HEIGHT: 60 IN | DIASTOLIC BLOOD PRESSURE: 71 MMHG | BODY MASS INDEX: 20.46 KG/M2 | HEART RATE: 71 BPM | OXYGEN SATURATION: 98 % | WEIGHT: 104.2 LBS | SYSTOLIC BLOOD PRESSURE: 131 MMHG

## 2024-11-21 DIAGNOSIS — H91.93 BILATERAL HEARING LOSS, UNSPECIFIED HEARING LOSS TYPE: ICD-10-CM

## 2024-11-21 DIAGNOSIS — I10 PRIMARY HYPERTENSION: Primary | ICD-10-CM

## 2024-11-21 PROCEDURE — 4004F PT TOBACCO SCREEN RCVD TLK: CPT | Performed by: FAMILY MEDICINE

## 2024-11-21 PROCEDURE — 3078F DIAST BP <80 MM HG: CPT | Performed by: FAMILY MEDICINE

## 2024-11-21 PROCEDURE — 99214 OFFICE O/P EST MOD 30 MIN: CPT | Performed by: FAMILY MEDICINE

## 2024-11-21 PROCEDURE — 1123F ACP DISCUSS/DSCN MKR DOCD: CPT | Performed by: FAMILY MEDICINE

## 2024-11-21 PROCEDURE — 3075F SYST BP GE 130 - 139MM HG: CPT | Performed by: FAMILY MEDICINE

## 2024-11-21 PROCEDURE — 1160F RVW MEDS BY RX/DR IN RCRD: CPT | Performed by: FAMILY MEDICINE

## 2024-11-21 PROCEDURE — 1159F MED LIST DOCD IN RCRD: CPT | Performed by: FAMILY MEDICINE

## 2024-11-21 ASSESSMENT — ENCOUNTER SYMPTOMS
SPUTUM PRODUCTION: 0
ORTHOPNEA: 0
PALPITATIONS: 0
DIAPHORESIS: 0
IRREGULAR HEARTBEAT: 0
COUGH: 0
PND: 0
NEAR-SYNCOPE: 0
DIARRHEA: 0
NAUSEA: 0
SORE THROAT: 0
ABDOMINAL PAIN: 0
WEAKNESS: 0
SHORTNESS OF BREATH: 0
FALLS: 0
DYSPNEA ON EXERTION: 0
HEADACHES: 0
LIGHT-HEADEDNESS: 1
SYNCOPE: 0
DOUBLE VISION: 0
BLURRED VISION: 0
VOMITING: 0
FEVER: 0
DIZZINESS: 1
HEMOPTYSIS: 0
MYALGIAS: 0
SNORING: 0

## 2024-11-21 NOTE — PROGRESS NOTES
Subjective   Patient ID: Sophie Mendosa is a 79 y.o. female who presents for Follow-up.  Had ablation for atrial fib on 10/16/24  Had follow up and everything was good.  No pain.  Is overall feeling well.  Has had some dizzy spells, usually when laying back, and occasionally when she walks.  Hearing has been getting worse.  No swelling in ankles.  If gets it, it goes away.  Skin is itchy and dry.  Has tried some different lotions.  Could do better with water intake.    Had protonix for 4 weeks after ablation, is off now.    Gets full more quickly than used to. Has been for a while.  No choking when she eats.  No food sticking when eats.  Occasionally has nausea.  Weight has been stable.    Liver specialist has MRI scheduled for February, then will see him.              Review of Systems    Objective   /71   Pulse 71   Ht 1.524 m (5')   Wt 47.3 kg (104 lb 3.2 oz)   SpO2 98%   BMI 20.35 kg/m²     Physical Exam  Vitals reviewed.   Constitutional:       Appearance: Normal appearance.   Cardiovascular:      Rate and Rhythm: Regular rhythm. Tachycardia present.      Heart sounds: No murmur heard.     Comments: HR 71 initially, up to 156 while in the room.  Pulmonary:      Effort: Pulmonary effort is normal.      Breath sounds: Normal breath sounds.   Musculoskeletal:      Right lower leg: No edema.      Left lower leg: No edema.   Neurological:      Mental Status: She is alert.           Assessment/Plan   Problem List Items Addressed This Visit       Hypertension - Primary     Other Visit Diagnoses       Bilateral hearing loss, unspecified hearing loss type        Relevant Orders    Referral to Audiology

## 2024-11-21 NOTE — PATIENT INSTRUCTIONS
BP is controlled.  Taking amlodipine 10 mg, lisinopril 40 mg.    For atrial fibrillation, taking eliquis 5 mg twice a  day.  Had ablation done on 10/16/24.  Heart rate is going fast today.  Reaching out to cardiology for further direction.     Liver  lesions noted on CT scan.  Will be getting MRI of liver and follow up with specialist in February.     For  thyroid, taking levothyroxine 75 mcg daily.  Level checked in June was good.  Continue current dose.    Refer to audiology for hearing evaluation.      [place] back from Germaine Costello CNP.  There office will reach out to try and see her tomorrow, or at least get an EKG.  
NULL

## 2024-11-22 NOTE — PROGRESS NOTES
Pharmacist Clinic: Cardiology Management    Sophie Mendosa is a 79 y.o. female was referred to Clinical Pharmacy Team for anticoagulation management.     Referring Provider: Familia Hackett MD    THIS IS A NEW PATIENT APPOINTMENT. PATIENT WILL BE ESTABLISHING CARE WITH CLINICAL PHARMACY.    Appointment was completed by the patient who was reached at .    REVIEW OF PAST APPNT (IF APPLICABLE):   Today's appointment was 1 month follow up regarding anticoagulation pharmacotherapy. Sophie is doing well on Eliquis, reports no recent missed doses. No abnormal bruising or bleeding reported.    PAP approved through 10/28/2025.    Allergies   Allergen Reactions    Gabapentin Other     Patient unsure of allergy    Garlic Unknown and Swelling       Past Medical History:   Diagnosis Date    Arthritis     Atrial fibrillation (Multi)     CHF (congestive heart failure)     CKD (chronic kidney disease)     Diabetes mellitus (Multi)     Hypertension     Hypothyroidism     LV (left ventricular) mural thrombus 03/01/2024    Apical    Major depressive disorder, single episode, unspecified     Pulmonary embolus 02/29/2024    Stroke (Multi)        Current Outpatient Medications on File Prior to Visit   Medication Sig Dispense Refill    amLODIPine (Norvasc) 10 mg tablet Take 1 tablet (10 mg) by mouth once daily. 90 tablet 1    apixaban (Eliquis) 5 mg tablet Take 1 tablet (5 mg) by mouth 2 times a day. 180 tablet 3    atorvastatin (Lipitor) 40 mg tablet Take 1 tablet (40 mg) by mouth once daily. 90 tablet 1    buPROPion XL (Wellbutrin XL) 150 mg 24 hr tablet Take 1 tablet (150 mg) by mouth once daily in the morning. Do not crush, chew, or split. 90 tablet 1    cyanocobalamin (Vitamin B-12) 500 mcg tablet Take 1 tablet (500 mcg) by mouth once daily.      furosemide (Lasix) 40 mg tablet Take 1 tablet (40 mg) by mouth once daily as needed (fluid retention).      levothyroxine (Synthroid, Levoxyl) 75 mcg tablet Take 1 tablet  "(75 mcg) by mouth once daily. 90 tablet 1    lisinopril 40 mg tablet Take 1 tablet (40 mg) by mouth once daily. 90 tablet 1     No current facility-administered medications on file prior to visit.         RELEVANT LAB RESULTS  Lab Results   Component Value Date    BILITOT 0.5 08/01/2024    CALCIUM 10.1 10/09/2024    CO2 25 10/09/2024     10/09/2024    CREATININE 1.43 (H) 10/09/2024    GLUCOSE 93 10/09/2024    ALKPHOS 121 08/01/2024    K 5.4 (H) 10/09/2024    PROT 7.1 08/01/2024     10/09/2024    AST 13 08/01/2024    ALT 14 08/01/2024    BUN 26 (H) 10/09/2024    ANIONGAP 16 10/09/2024    MG 1.70 03/05/2024    PHOS 3.7 10/11/2018     04/30/2024    ALBUMIN 4.5 08/01/2024    GFRF 57 (A) 09/22/2023     Lab Results   Component Value Date    TRIG 93 02/29/2024    CHOL 210 (H) 02/29/2024    LDLCALC 97 02/29/2024    HDL 94.1 02/29/2024     No results found for: \"BMCBC\", \"CBCDIF\"     PHARMACEUTICAL ASSESSMENT    Drug Interactions? No clinically significant drug interactions requiring change in therapy found at the time of this visit.     Medication Documentation Review Audit       Reviewed by SHANNON Hernandez (Nurse Practitioner) on 11/21/24 at 1227      Medication Order Taking? Sig Documenting Provider Last Dose Status   amLODIPine (Norvasc) 10 mg tablet [id number] Yes Take 1 tablet (10 mg) by mouth once daily. Madelaine Galvez MD  Active   apixaban (Eliquis) 5 mg tablet [id number] Yes Take 1 tablet (5 mg) by mouth 2 times a day. Familia Hackett MD  Active   atorvastatin (Lipitor) 40 mg tablet [id number] Yes Take 1 tablet (40 mg) by mouth once daily. Madelaine Galvez MD  Active   buPROPion XL (Wellbutrin XL) 150 mg 24 hr tablet [id number] Yes Take 1 tablet (150 mg) by mouth once daily in the morning. Do not crush, chew, or split. Madelaine Galvez MD  Active   cyanocobalamin (Vitamin B-12) 500 mcg tablet [id number] Yes Take 1 tablet (500 mcg) by mouth once daily. Historical Provider, MD  Active   furosemide " (Lasix) 40 mg tablet [id number] Yes Take 1 tablet (40 mg) by mouth once daily as needed (fluid retention). Historical Provider, MD  Active   levothyroxine (Synthroid, Levoxyl) 75 mcg tablet [id number] Yes Take 1 tablet (75 mcg) by mouth once daily. Madelaine Galvez MD  Active   lisinopril 40 mg tablet [id number] Yes Take 1 tablet (40 mg) by mouth once daily. Madelaine Galvez MD  Active    Discontinued 11/18/24 1426                    DISEASE MANAGEMENT ASSESSMENT:     ANTICOAGULATION ASSESSMENT    The ASCVD Risk score (Edwar PATEL, et al., 2019) failed to calculate for the following reasons:    Risk score cannot be calculated because patient has a medical history suggesting prior/existing ASCVD    DIAGNOSIS: prevention of nonvalvular atrial fibrilliation stroke and systemic embolism, Hx of LV thrombus and PE (3/2024)  - Patient is projected to be on anticoagulation long term  - [id number] Score: [8] (only included if diagnosis is atrial fibrillation)   Age: [<65 (0)] [65-74 (+1)] [> 75 (+2)]: 2  Sex: [Male/Female (+1)]: 1  CHF history: [No/Yes(+1)]: 1  Hypertension history: [No/Yes(+1)]: 1  Stroke/TIA/thromboembolism history: [No/Yes(+2)]: 2   Vascular disease history (prior MI, peripheral artery disease, aortic plaque): [No/Yes(+1)]: 1  Diabetes history: [No/Yes(+1)]: 0    CURRENT PHARMACOTHERAPY:   Eliquis 5 mg BID  79 years old  47.3 kg  Scr 1.43 mg/dL (10/9/2024)    RELEVANT PAST MEDICAL HISTORY:   HTN, HLD, PAD, A-flutter/Hx of A-fib, Hx of PE, Hx of LV thrombus, Stage 3a CKD, Hx of cerebral infarction    Affordability/Accessibility:  PAP - active through 10/28/2025  Adherence/Organization: confirms taking Eliquis twice daily (morning and evening); denies any recent missed doses  Adverse Reactions: No abnormal bruising or bleeding   Recent Hospitalizations: none   Recent Falls/Trauma: none; almost fell in her kitchen recently as she was unable to turn her left foot and had to catch herself, but did not fall.  Reports she keeps her phone on her when she goes down the stairs to ensure she can reach someone if she falls.  Changes in Tobacco or Alcohol Intake:   Tobacco: no, stopped smoking after hospital admission in February    Alcohol: will have a small glass of wine occasionally, but this is rare for her.      EDUCATION/COUNSELING:   - Counseled patient on MOA, expectations, duration of therapy, contraindications, administration, and monitoring parameters  - Counseled patient of side effects that are indicative of bleeding such as dark tarry stool, unexplainable bruising, or vomiting up a coffee ground like substance      DISCUSSION/NOTES:   Today's appointment was 1 month follow up regarding anticoagulation with Eliquis. Sophie is doing well, with no abnormal bruising or bleeding reported. No recent falls, however reports she did almost fall in her kitchen recently as she was unable to turn her left foot and had to catch herself. Discussed seeking medical attention for falls as she is on Eliquis, especially if she hits her head. She also keeps her phone on her while going down the stairs to ensure she can reach someone if she falls.  Will follow up in 6 months.       ASSESSMENT AND PLAN:    Assessment/Plan   Problem List Items Addressed This Visit       Atrial flutter (Multi)    Atrial fibrillation, persistent (Multi) - Primary     Sophie is doing well on Eliquis, reports no abnormal bruising or bleeding. Dosage is appropriate for age, weight, and renal function. Will continue current dose and follow up in 6 months.         Relevant Orders    Referral to Clinical Pharmacy         RECOMMENDATIONS/PLAN    Continue: Eliquis 5 mg BID  Follow up: 6 months    Last Appointment with Referring Provider: 11/18/2024  Next Appointment with Referring Provider: 2/3/2025  Clinical Pharmacist follow up: 5/27/2025  VAF/Application Expiration: Yes; Date: 10/28/2025  Type of Encounter: Sofia Castillo PharmD    Verbal consent  to manage patient's drug therapy was obtained from the patient . They were informed they may decline to participate or withdraw from participation in pharmacy services at any time.    Continue all meds under the continuation of care with the referring provider and clinical pharmacy team.

## 2024-11-25 ENCOUNTER — APPOINTMENT (OUTPATIENT)
Dept: PHARMACY | Facility: HOSPITAL | Age: 79
End: 2024-11-25
Payer: MEDICARE

## 2024-11-25 ENCOUNTER — OFFICE VISIT (OUTPATIENT)
Dept: CARDIOLOGY | Facility: CLINIC | Age: 79
End: 2024-11-25
Payer: MEDICARE

## 2024-11-25 VITALS
HEART RATE: 60 BPM | WEIGHT: 105 LBS | BODY MASS INDEX: 20.62 KG/M2 | HEIGHT: 60 IN | DIASTOLIC BLOOD PRESSURE: 75 MMHG | OXYGEN SATURATION: 98 % | SYSTOLIC BLOOD PRESSURE: 119 MMHG

## 2024-11-25 DIAGNOSIS — I48.0 PAROXYSMAL ATRIAL FIBRILLATION (MULTI): Primary | ICD-10-CM

## 2024-11-25 DIAGNOSIS — I48.3 TYPICAL ATRIAL FLUTTER (MULTI): ICD-10-CM

## 2024-11-25 DIAGNOSIS — I48.19 ATRIAL FIBRILLATION, PERSISTENT (MULTI): Primary | ICD-10-CM

## 2024-11-25 PROCEDURE — 1126F AMNT PAIN NOTED NONE PRSNT: CPT | Performed by: INTERNAL MEDICINE

## 2024-11-25 PROCEDURE — 99214 OFFICE O/P EST MOD 30 MIN: CPT | Performed by: INTERNAL MEDICINE

## 2024-11-25 PROCEDURE — 3074F SYST BP LT 130 MM HG: CPT | Performed by: INTERNAL MEDICINE

## 2024-11-25 PROCEDURE — 99417 PROLNG OP E/M EACH 15 MIN: CPT | Performed by: INTERNAL MEDICINE

## 2024-11-25 PROCEDURE — 93005 ELECTROCARDIOGRAM TRACING: CPT | Performed by: INTERNAL MEDICINE

## 2024-11-25 PROCEDURE — 4004F PT TOBACCO SCREEN RCVD TLK: CPT | Performed by: INTERNAL MEDICINE

## 2024-11-25 PROCEDURE — 3078F DIAST BP <80 MM HG: CPT | Performed by: INTERNAL MEDICINE

## 2024-11-25 PROCEDURE — 1123F ACP DISCUSS/DSCN MKR DOCD: CPT | Performed by: INTERNAL MEDICINE

## 2024-11-25 PROCEDURE — 1159F MED LIST DOCD IN RCRD: CPT | Performed by: INTERNAL MEDICINE

## 2024-11-25 ASSESSMENT — ENCOUNTER SYMPTOMS
WHEEZING: 0
HEADACHES: 0
DIARRHEA: 0
NAUSEA: 0
OCCASIONAL FEELINGS OF UNSTEADINESS: 1
LOSS OF SENSATION IN FEET: 1
ALTERED MENTAL STATUS: 0
FALLS: 0
CONSTIPATION: 0
COUGH: 0
BLOATING: 0
HEMOPTYSIS: 0
MYALGIAS: 0
ABDOMINAL PAIN: 0
VOMITING: 0
HEMATURIA: 0
DYSURIA: 0
CHILLS: 0
FEVER: 0
DEPRESSION: 0
MEMORY LOSS: 0

## 2024-11-25 ASSESSMENT — PATIENT HEALTH QUESTIONNAIRE - PHQ9
2. FEELING DOWN, DEPRESSED OR HOPELESS: NOT AT ALL
1. LITTLE INTEREST OR PLEASURE IN DOING THINGS: NOT AT ALL
SUM OF ALL RESPONSES TO PHQ9 QUESTIONS 1 AND 2: 0

## 2024-11-25 ASSESSMENT — PAIN SCALES - GENERAL: PAINLEVEL_OUTOF10: 0-NO PAIN

## 2024-11-25 NOTE — Clinical Note
Luiz Hackett, Today's appointment was 1 month follow up regarding anticoagulation with Eliquis. Sophie is doing well, with no abnormal bruising or bleeding reported. No recent falls, however reports she did almost fall in her kitchen recently as she was unable to turn her left foot and had to catch herself. Discussed seeking medical attention for falls as she is on Eliquis, especially if she hits her head. She also keeps her phone on her while going down the stairs to ensure she can reach someone if she falls. Will follow up in 6 months.

## 2024-11-25 NOTE — PROGRESS NOTES
Chief Complaint   Patient presents with    Atrial Fibrillation       HPI  80 yo BF w/ h/o parox AF/FL s/p RFA 10/24, cor calc on CT, athero of Ao, CVA 9/18, HTN, HLD, GERD, TOB (quit) now here for cardiology f/u. +occ chest heavy in bed that resolves with sitting up. No other chest pain. No dyspnea at rest. +occ [place] VAL[place]. No orthopnea/PND. No palps. No LH/dizzy/syncope. No edema. No claudication.  +occ cough.  3/24 hospitalized due to palpitations, found to have AF/FL and underwent RAISA/DCCV. She was also found to have an LV thrombus and a PE and was started on Apixaban.   ECG 9/18: SB (45), LVH  ECG 12/20: SB (50), ?SMI, nonsp T-wave changes  ECG 5/22: SB (54), ?SMI, nonsp T-wave changes  ECG 4/24: SB (51), SMI, nonsp T-wave changes  ECG 8/24: AFL (121), nonsp ST-T changes  ECG 9/24: AFIB (116)  ECG 11/24: SR (61), nonsp T-wave changes  ECG 11/24: JXN with 1 Sinus (57), nonsp T-wave changes  HM 3/24: SR, HR  (avg 54), SVT x15 (long 11b)  Echo 9/18: EF 60-65%, AsAo plaque, mild-mod TR, PASP 37-42  Echo 3/24: EF 50-55%, apical inferior HK w/ ?thrombus, mod LAE  Echo 8/24: AF/FL (HR 120s), EF 45%, apical inf AK, mild-mod MR, mod TR, PASP 30  RAISA 3/24: EF 50-55%, no PFO  JOSE 1/21: no ischemia, EF 55-60% -> 65-70%  cMRI 3/24: EF: 48%, infarction of the apical/inferior wall, small LV apical thrombus, nl RV, mod MR  CXR 2/24:  no acute abnl  CXR 8/24: no acute abnl, CM, COPD  CT chest 10/20: mod-sev cor calc, mod athero of Ao, no aneurysm, CAMI, no peric eff  CT chest 2/22: sev cor calc, LAE, tr PE, mod athero of Ao, no aneurysm, nl PA  CT lung 3/23: sev CAC, slight CM, tr PE, sev AA  CTA chest 2/24: small PE, R PA 3.0cm, sev CAC, nl heart size, sm PE, AA, emphysema  Carotid US 9/18; plaque, no HDSS  MRI brain 9/18; LACA CVA  MRA head/neck 9/18: carotid athero, no HDSS, no anuerysm   LE US 3/24: no DVT    Review of Systems   Constitutional: Negative for chills, fever and malaise/fatigue.   HENT:  Negative for hearing loss.     Eyes:  Negative for visual disturbance.   Respiratory:  Negative for cough, hemoptysis and wheezing.    Skin:  Negative for rash.   Musculoskeletal:  Negative for falls and myalgias.   Gastrointestinal:  Negative for bloating, abdominal pain, constipation, diarrhea, dysphagia, nausea and vomiting.   Genitourinary:  Negative for dysuria and hematuria.   Neurological:  Negative for headaches.   Psychiatric/Behavioral:  Negative for altered mental status, depression and memory loss.       Social History     Tobacco Use    Smoking status: Every Day     Types: Cigarettes    Smokeless tobacco: Never    Tobacco comments:     Working on quitting   Substance Use Topics    Alcohol use: Never      Family History   Problem Relation Name Age of Onset    Other (cardiac disorder) Mother      Other (CVA) Mother      Heart failure Mother      Heart attack Mother      Hypertension Mother      Prostate cancer Father      Diabetes Sister      Hypertension Sister      Diabetes Brother      Other (cardiac disorder) Brother      Heart attack Brother      Hypertension Brother      Heart failure Other PATERNAL HALF SISTER       Allergies   Allergen Reactions    Gabapentin Other     Patient unsure of allergy    Garlic Unknown and Swelling      Current Outpatient Medications   Medication Instructions    amLODIPine (NORVASC) 10 mg, oral, Daily    atorvastatin (LIPITOR) 40 mg, oral, Daily    buPROPion XL (WELLBUTRIN XL) 150 mg, oral, Every morning, Do not crush, chew, or split.    cyanocobalamin (VITAMIN B-12) 500 mcg, Daily    Eliquis 5 mg, oral, 2 times daily    furosemide (LASIX) 40 mg, Daily PRN    levothyroxine (SYNTHROID, LEVOXYL) 75 mcg, oral, Daily    lisinopril 40 mg, oral, Daily      /75 (BP Location: Left arm, Patient Position: Sitting, BP Cuff Size: Adult)   Pulse 60   Ht 1.524 m (5')   Wt 47.6 kg (105 lb)   SpO2 98%   BMI 20.51 kg/m²      Vitals:    11/25/24 1330   BP: 119/75   Pulse: 60   SpO2: 98%      Physical  Exam  Constitutional:       Appearance: Normal appearance.   HENT:      Head: Normocephalic and atraumatic.      Nose: Nose normal.   Neck:      Vascular: No carotid bruit.   Cardiovascular:      Rate and Rhythm: Normal rate and regular rhythm.      Heart sounds: No murmur heard.  Pulmonary:      Effort: Pulmonary effort is normal.      Breath sounds: Normal breath sounds.   Abdominal:      Palpations: Abdomen is soft.      Tenderness: There is no abdominal tenderness.   Musculoskeletal:      Right lower leg: No edema.      Left lower leg: No edema.   Skin:     General: Skin is warm and dry.   Neurological:      General: No focal deficit present.      Mental Status: She is alert.   Psychiatric:         Mood and Affect: Mood normal.         Judgment: Judgment normal.        Results/Data  10/24 Cr 1.43, K 5.4, HGB 11.9,   8/24 Cr 1.42, K 5.2, LFT nl  6/24 TSH 3.45  4/24 Cr 1.09, K 4.1, HGB 13,   3/24 Cr 1.17, K 4.6, Mg 1.7, HGB 11,   2/24 hgba1c 6.1, TSH 0.27, FT4 1.21, BNP 83  2/22 Cr 0.96, K 4.6, LFT nl, HGB 12.4,   12/21 Cr 1.07, K 4.9, LFT nl, hgba1c 6.4, TSH 0.44  10/20 Cr 0.85, K 4.7, LDL 54, HDL 92, TG 67, Chol 160, TSH 0.51   2/24: LDL: 97, A1c: 6.1  4/24 Cr: 1.16, K 4.3,     Assessment/Plan   80 yo BF w/ h/o parox AF/FL s/p RFA 10/24, cor calc on CT, athero of Ao, CVA 9/18, HTN, HLD, GERD, TOB (quit). Doing well. Remains in SR.   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 At this time, with a largely preserved EF and no anginal symptoms, ischemic eval can be deferred.  -continue Eliquis 5 bid  -continue Lisinopril 40 every day  -continue Amlodipine 10 every day  -continue Lasix 40 every day  -continue Atorva 40 every day  -maintain euthyroid  -FU 6 months (earlier if needed)    Familia Hackett MD

## 2024-11-25 NOTE — ASSESSMENT & PLAN NOTE
Sophie is doing well on Eliquis, reports no abnormal bruising or bleeding. Dosage is appropriate for age, weight, and renal function. Will continue current dose and follow up in 6 months.

## 2024-11-26 LAB
ATRIAL RATE: 59 BPM
Q ONSET: 226 MS
QRS COUNT: 10 BEATS
QRS DURATION: 84 MS
QT INTERVAL: 442 MS
QTC CALCULATION(BAZETT): 442 MS
QTC FREDERICIA: 442 MS
R AXIS: 45 DEGREES
T AXIS: -15 DEGREES
T OFFSET: 447 MS
VENTRICULAR RATE: 60 BPM

## 2024-12-11 ENCOUNTER — APPOINTMENT (OUTPATIENT)
Dept: AUDIOLOGY | Facility: CLINIC | Age: 79
End: 2024-12-11
Payer: MEDICARE

## 2025-01-06 DIAGNOSIS — I67.9 CVD (CEREBROVASCULAR DISEASE): ICD-10-CM

## 2025-01-06 RX ORDER — ATORVASTATIN CALCIUM 40 MG/1
40 TABLET, FILM COATED ORAL DAILY
Qty: 90 TABLET | Refills: 1 | Status: SHIPPED | OUTPATIENT
Start: 2025-01-06

## 2025-01-06 NOTE — TELEPHONE ENCOUNTER
Covid Positive as of 1/6/25 symptomatic since 1/1/25. C/o SOB, Weakness and Diarrhea, wanting guidance and medication if possible.

## 2025-01-07 ENCOUNTER — TELEPHONE (OUTPATIENT)
Dept: PRIMARY CARE | Facility: CLINIC | Age: 80
End: 2025-01-07
Payer: MEDICARE

## 2025-01-28 PROCEDURE — RXMED WILLOW AMBULATORY MEDICATION CHARGE

## 2025-01-31 ENCOUNTER — PHARMACY VISIT (OUTPATIENT)
Dept: PHARMACY | Facility: CLINIC | Age: 80
End: 2025-01-31
Payer: COMMERCIAL

## 2025-02-03 ENCOUNTER — OFFICE VISIT (OUTPATIENT)
Dept: CARDIOLOGY | Facility: CLINIC | Age: 80
End: 2025-02-03
Payer: MEDICARE

## 2025-02-03 ENCOUNTER — HOSPITAL ENCOUNTER (OUTPATIENT)
Dept: CARDIOLOGY | Facility: CLINIC | Age: 80
Discharge: HOME | End: 2025-02-03
Payer: MEDICARE

## 2025-02-03 VITALS
DIASTOLIC BLOOD PRESSURE: 64 MMHG | BODY MASS INDEX: 21.03 KG/M2 | HEIGHT: 60 IN | SYSTOLIC BLOOD PRESSURE: 113 MMHG | WEIGHT: 107.1 LBS | HEART RATE: 57 BPM | OXYGEN SATURATION: 94 %

## 2025-02-03 DIAGNOSIS — R00.1 SYMPTOMATIC BRADYCARDIA: ICD-10-CM

## 2025-02-03 DIAGNOSIS — I48.3 TYPICAL ATRIAL FLUTTER (MULTI): Primary | ICD-10-CM

## 2025-02-03 LAB
ATRIAL RATE: 60 BPM
BODY SURFACE AREA: 1.43 M2
Q ONSET: 221 MS
QRS COUNT: 9 BEATS
QRS DURATION: 84 MS
QT INTERVAL: 454 MS
QTC CALCULATION(BAZETT): 441 MS
QTC FREDERICIA: 446 MS
R AXIS: 57 DEGREES
T AXIS: -28 DEGREES
T OFFSET: 448 MS
VENTRICULAR RATE: 57 BPM

## 2025-02-03 PROCEDURE — 1125F AMNT PAIN NOTED PAIN PRSNT: CPT | Performed by: NURSE PRACTITIONER

## 2025-02-03 PROCEDURE — 99214 OFFICE O/P EST MOD 30 MIN: CPT | Performed by: NURSE PRACTITIONER

## 2025-02-03 PROCEDURE — 93242 EXT ECG>48HR<7D RECORDING: CPT

## 2025-02-03 PROCEDURE — 3078F DIAST BP <80 MM HG: CPT | Performed by: NURSE PRACTITIONER

## 2025-02-03 PROCEDURE — 93005 ELECTROCARDIOGRAM TRACING: CPT | Mod: 59 | Performed by: NURSE PRACTITIONER

## 2025-02-03 PROCEDURE — 1160F RVW MEDS BY RX/DR IN RCRD: CPT | Performed by: NURSE PRACTITIONER

## 2025-02-03 PROCEDURE — 1123F ACP DISCUSS/DSCN MKR DOCD: CPT | Performed by: NURSE PRACTITIONER

## 2025-02-03 PROCEDURE — 3074F SYST BP LT 130 MM HG: CPT | Performed by: NURSE PRACTITIONER

## 2025-02-03 PROCEDURE — 1159F MED LIST DOCD IN RCRD: CPT | Performed by: NURSE PRACTITIONER

## 2025-02-03 ASSESSMENT — PATIENT HEALTH QUESTIONNAIRE - PHQ9
10. IF YOU CHECKED OFF ANY PROBLEMS, HOW DIFFICULT HAVE THESE PROBLEMS MADE IT FOR YOU TO DO YOUR WORK, TAKE CARE OF THINGS AT HOME, OR GET ALONG WITH OTHER PEOPLE: NOT DIFFICULT AT ALL
2. FEELING DOWN, DEPRESSED OR HOPELESS: SEVERAL DAYS
1. LITTLE INTEREST OR PLEASURE IN DOING THINGS: SEVERAL DAYS
SUM OF ALL RESPONSES TO PHQ9 QUESTIONS 1 AND 2: 2

## 2025-02-03 ASSESSMENT — PAIN SCALES - GENERAL: PAINLEVEL_OUTOF10: 8

## 2025-02-03 ASSESSMENT — COLUMBIA-SUICIDE SEVERITY RATING SCALE - C-SSRS
1. IN THE PAST MONTH, HAVE YOU WISHED YOU WERE DEAD OR WISHED YOU COULD GO TO SLEEP AND NOT WAKE UP?: NO
6. HAVE YOU EVER DONE ANYTHING, STARTED TO DO ANYTHING, OR PREPARED TO DO ANYTHING TO END YOUR LIFE?: NO
2. HAVE YOU ACTUALLY HAD ANY THOUGHTS OF KILLING YOURSELF?: NO

## 2025-02-03 ASSESSMENT — ENCOUNTER SYMPTOMS
DEPRESSION: 0
LOSS OF SENSATION IN FEET: 1
OCCASIONAL FEELINGS OF UNSTEADINESS: 1

## 2025-02-08 ASSESSMENT — ENCOUNTER SYMPTOMS
PALPITATIONS: 0
HEMOPTYSIS: 0
MYALGIAS: 0
NAUSEA: 0
ORTHOPNEA: 0
SYNCOPE: 0
ABDOMINAL PAIN: 0
DOUBLE VISION: 0
PND: 0
WEAKNESS: 0
FALLS: 0
DIAPHORESIS: 0
IRREGULAR HEARTBEAT: 0
NEAR-SYNCOPE: 0
SNORING: 0
BLURRED VISION: 0
COUGH: 0
FEVER: 0
DIARRHEA: 0
SHORTNESS OF BREATH: 0
VOMITING: 0
SPUTUM PRODUCTION: 0
LIGHT-HEADEDNESS: 1
SORE THROAT: 0
DYSPNEA ON EXERTION: 0
HEADACHES: 0
DIZZINESS: 1

## 2025-02-09 NOTE — PROGRESS NOTES
Subjective   Sophie Mendosa is a 79 y.o. female.    Chief Complaint:  Follow-up    79 year old female presents today for 4 month follow up post Afib, atypical and typical Atrial flutter Ablation  PMH includes Arthritis, Atrial fibrillation, CHF (congestive heart failure, CKD (chronic kidney disease), Diabetes mellitus, Hypertension, Hypothyroidism, HLD, GERD, LV (left ventricular) mural thrombus (03/01/2024), Major depressive disorder,Pulmonary embolus(02/29/2024), and Stroke 09/2018     The patient has a history of paroxysmal AF/atrial flutter since 03/2024. She has a history of a CVA in 09/2018, with a CHADSVASc score of 8. In March 2024, she was hospitalized for palpitations and was diagnosed with new-onset atrial flutter/AF. She underwent a RAISA and DCCV on 03/20/2024. During this time, she was also found to have an LV thrombus and a PE, for which she was started on apixaban.    Her CMR revealed an apical transmural infarct, likely due to obstructive CAD (no prior cath), leading to thrombus formation. With a largely preserved EF and no anginal symptoms, ischemic evaluation was deferred by cardiology. Antiarrhythmic drugs, such as amiodarone, were discussed but she has a tendency toward bradycardia in the 50s when in sinus rhythm.                                                                            Echo 08/16/2024 1. The patient is in atrial fibrillation/flutter which may influence the estimate of left ventricular function and transvalvular flows. 2. Left ventricular ejection fraction is mildly decreased, by visual estimate at 45%. 3. Apical inferior segment and apex are abnormal. 4. There is low normal right ventricular systolic function. 5. The left atrium is severely dilated. 6. Mild to moderate mitral valve regurgitation. 7. Moderate tricuspid regurgitation visualized.    MR cardiac angio chest w and wo IV contrast for morph FUNCT valve DZ and GREAT vessels 03/04/2024  1. Normal LV size (EDVi 68 ml/m2)  with mildly reduced systolic function (LVEF 48%). 2. Severe hypokinesis of the apical inferior segment and true apex. 3. There is evidence of a small LV thrombus measuring 1.1 x 0.58 cm adherent to the apical inferior wall. 4. Subendocardial infarction of the apical inferior wall (50% wall thickness) and transmural infarction of the true apex (% wall thickness). 5. Moderate mitral regurgitation. 6. Normal RV size and systolic function (RVEF 59%).    Now s/p PVI, CTI, posterior wall isolation, and left atypical atrial flutter RFA with Dr. Aguila 10/16/2024  ECG 11/18/2024 NSR HR 61 bpm, non specific T wave abnormality  ECG 2/3/2025 Junctional rhythm with some sinus beats with prolonged UT interval    TODAY Patient presents for 4 month follow up post Afib/Aflutter ablation. She complains of lower extremity edema and generally feeling low energy.    /64 (BP Location: Left arm, Patient Position: Sitting, BP Cuff Size: Adult)   Pulse 57   Ht 1.524 m (5')   Wt 48.6 kg (107 lb 1.6 oz)   SpO2 94%   BMI 20.92 kg/m²   Current Outpatient Medications on File Prior to Visit   Medication Sig Dispense Refill    amLODIPine (Norvasc) 10 mg tablet Take 1 tablet (10 mg) by mouth once daily. 90 tablet 1    apixaban (Eliquis) 5 mg tablet Take 1 tablet (5 mg) by mouth 2 times a day. 180 tablet 3    atorvastatin (Lipitor) 40 mg tablet Take 1 tablet (40 mg) by mouth once daily. 90 tablet 1    cyanocobalamin (Vitamin B-12) 500 mcg tablet Take 1 tablet (500 mcg) by mouth once daily.      furosemide (Lasix) 40 mg tablet Take 1 tablet (40 mg) by mouth once daily as needed (fluid retention).      levothyroxine (Synthroid, Levoxyl) 75 mcg tablet Take 1 tablet (75 mcg) by mouth once daily. 90 tablet 1    lisinopril 40 mg tablet Take 1 tablet (40 mg) by mouth once daily. 90 tablet 1    [DISCONTINUED] buPROPion XL (Wellbutrin XL) 150 mg 24 hr tablet Take 1 tablet (150 mg) by mouth once daily in the morning. Do not crush, chew, or  split. (Patient not taking: Reported on 2/3/2025) 90 tablet 1     No current facility-administered medications on file prior to visit.         Review of Systems   Constitutional: Positive for malaise/fatigue. Negative for diaphoresis and fever.   HENT:  Negative for congestion and sore throat.    Eyes:  Negative for blurred vision and double vision.   Cardiovascular:  Positive for leg swelling. Negative for chest pain, dyspnea on exertion, irregular heartbeat, near-syncope, orthopnea, palpitations, paroxysmal nocturnal dyspnea and syncope.   Respiratory:  Negative for cough, hemoptysis, shortness of breath, snoring and sputum production.    Hematologic/Lymphatic: Negative for bleeding problem.   Skin:  Negative for rash.   Musculoskeletal:  Negative for falls, joint pain and myalgias.   Gastrointestinal:  Negative for abdominal pain, diarrhea, nausea and vomiting.   Neurological:  Positive for dizziness and light-headedness. Negative for headaches and weakness.   All other systems reviewed and are negative.      Objective   Constitutional:       Appearance: Healthy appearance. Not in distress.   Eyes:      Conjunctiva/sclera: Conjunctivae normal.      Pupils: Pupils are equal, round, and reactive to light.   HENT:    Mouth/Throat:      Pharynx: Oropharynx is clear.   Pulmonary:      Effort: Pulmonary effort is normal.      Breath sounds: Normal breath sounds. No wheezing. No rhonchi. No rales.   Cardiovascular:      PMI at left midclavicular line. Normal rate. Regular rhythm.      Murmurs: There is no murmur.      No gallop.    Pulses:     Intact distal pulses.   Edema:     Peripheral edema present.     Pretibial: bilateral 1+ edema of the pretibial area.     Ankle: bilateral 1+ edema of the ankle.     Feet: bilateral 1+ edema of the feet.  Abdominal:      General: Bowel sounds are normal.      Palpations: Abdomen is soft.      Tenderness: There is no abdominal tenderness.   Musculoskeletal: Normal range of motion.          General: No tenderness. Skin:     General: Skin is warm and dry.   Neurological:      General: No focal deficit present.      Mental Status: Alert and oriented to person, place and time.       I personally reviewed her recent TSH, CBC, BMP, cardiac MRI, echocardiogram and monitor results  Lab Review:   No visits with results within 2 Month(s) from this visit.   Latest known visit with results is:   Office Visit on 11/25/2024   Component Date Value    Ventricular Rate 11/25/2024 60     Atrial Rate 11/25/2024 59     QRS Duration 11/25/2024 84     QT Interval 11/25/2024 442     QTC Calculation(Bazett) 11/25/2024 442     R Axis 11/25/2024 45     T [place] 11/25/2024 -15     QRS Count 11/25/2024 10     Q Onset 11/25/2024 226     T Offset 11/25/2024 447     QTC [place] 11/25/2024 442        Assessment/Plan   The primary encounter diagnosis was Typical atrial flutter (Multi). A diagnosis of Symptomatic bradycardia was also pertinent to this visit.  Patient now 4 months post A-fib and a flutter ablation. She doesn't really feel dizzy anymore, but admits to low energy and worsening LE edema  Her ECG is junctional vs NSR with a very long 1st degree AVB    Continue Eliquis for stroke prevention  Continue lisinopril, amlodipine for blood pressure  We will get a 1 week holter monitor and assess need for PPM.  Patient aware and agreeable to the plan  ECG and plan reviewed with Dr. Aguila

## 2025-02-10 ENCOUNTER — HOSPITAL ENCOUNTER (OUTPATIENT)
Dept: RADIOLOGY | Facility: HOSPITAL | Age: 80
Discharge: HOME | End: 2025-02-10
Payer: MEDICARE

## 2025-02-10 DIAGNOSIS — K76.9 LIVER LESION, RIGHT LOBE: ICD-10-CM

## 2025-02-10 PROCEDURE — 2500000004 HC RX 250 GENERAL PHARMACY W/ HCPCS (ALT 636 FOR OP/ED): Performed by: FAMILY MEDICINE

## 2025-02-10 PROCEDURE — 74183 MRI ABD W/O CNTR FLWD CNTR: CPT | Performed by: RADIOLOGY

## 2025-02-10 PROCEDURE — 74183 MRI ABD W/O CNTR FLWD CNTR: CPT

## 2025-02-10 PROCEDURE — A9581 GADOXETATE DISODIUM INJ: HCPCS | Performed by: FAMILY MEDICINE

## 2025-02-10 RX ADMIN — GADOXETATE DISODIUM 5 ML: 181.43 INJECTION, SOLUTION INTRAVENOUS at 11:49

## 2025-02-12 NOTE — RESULT ENCOUNTER NOTE
The MRI is stable since the last one, which is a good sign.  They are recommending repeating it in 6 months to verify it is continuing to be stable.

## 2025-02-13 DIAGNOSIS — K76.9 LIVER LESION: ICD-10-CM

## 2025-02-17 ENCOUNTER — APPOINTMENT (OUTPATIENT)
Dept: HEMATOLOGY/ONCOLOGY | Facility: HOSPITAL | Age: 80
End: 2025-02-17
Payer: MEDICARE

## 2025-02-17 NOTE — TUMOR BOARD NOTE
Benign 4a lesion.    Ddx: atypical hemangioma? FNH?    Will review with patient in the office.

## 2025-02-18 LAB — BODY SURFACE AREA: 1.43 M2

## 2025-02-22 LAB
ATRIAL RATE: 60 BPM
Q ONSET: 221 MS
QRS COUNT: 9 BEATS
QRS DURATION: 84 MS
QT INTERVAL: 454 MS
QTC CALCULATION(BAZETT): 441 MS
QTC FREDERICIA: 446 MS
R AXIS: 57 DEGREES
T AXIS: -28 DEGREES
T OFFSET: 448 MS
VENTRICULAR RATE: 57 BPM

## 2025-02-26 ENCOUNTER — APPOINTMENT (OUTPATIENT)
Dept: GASTROENTEROLOGY | Facility: CLINIC | Age: 80
End: 2025-02-26
Payer: MEDICARE

## 2025-02-27 ENCOUNTER — OFFICE VISIT (OUTPATIENT)
Dept: GASTROENTEROLOGY | Facility: CLINIC | Age: 80
End: 2025-02-27
Payer: MEDICARE

## 2025-02-27 VITALS
SYSTOLIC BLOOD PRESSURE: 130 MMHG | DIASTOLIC BLOOD PRESSURE: 79 MMHG | OXYGEN SATURATION: 99 % | WEIGHT: 105 LBS | TEMPERATURE: 97.2 F | BODY MASS INDEX: 20.51 KG/M2 | HEART RATE: 54 BPM

## 2025-02-27 DIAGNOSIS — Z71.2 ENCOUNTER TO DISCUSS TEST RESULTS: ICD-10-CM

## 2025-02-27 DIAGNOSIS — K76.9 LIVER LESION: Primary | ICD-10-CM

## 2025-02-27 PROCEDURE — 99213 OFFICE O/P EST LOW 20 MIN: CPT | Performed by: INTERNAL MEDICINE

## 2025-02-27 PROCEDURE — 3078F DIAST BP <80 MM HG: CPT | Performed by: INTERNAL MEDICINE

## 2025-02-27 PROCEDURE — 1126F AMNT PAIN NOTED NONE PRSNT: CPT | Performed by: INTERNAL MEDICINE

## 2025-02-27 PROCEDURE — G2211 COMPLEX E/M VISIT ADD ON: HCPCS | Performed by: INTERNAL MEDICINE

## 2025-02-27 PROCEDURE — 3075F SYST BP GE 130 - 139MM HG: CPT | Performed by: INTERNAL MEDICINE

## 2025-02-27 PROCEDURE — 1123F ACP DISCUSS/DSCN MKR DOCD: CPT | Performed by: INTERNAL MEDICINE

## 2025-02-27 ASSESSMENT — PAIN SCALES - GENERAL: PAINLEVEL_OUTOF10: 0-NO PAIN

## 2025-02-27 NOTE — PROGRESS NOTES
Subjective     Follow up evaluation of a liver lesion.    History of Present Illness:   Sophie Mendosa is a 79 y.o. female who presents to the [facility] for a followup evaluation.    Briefly, 78 y/o F with PMHx of recent PE and atrial flutter with RVR on Eliquis, systolic HF, CKD 3, hypothyroidism, and MGUS. She was hospitalized in March (3/17 - 3/4/24)  and prior to that from 2/29/24 - 3/5/24. She had a RLL segmental PE. She also developed new onset A flutter/Afib with RVR. During these hospitalizations she was evaluated by Gastroenterology. She reported symptoms of constipation, abdominal fullness, and excess bowel gas. There were incidental imaging findings: gastric wall thickening (vs. underdistention) and a liver lesion on CT scan.   Patient reports that she was aware of a liver lesion and has discussed with her prior GI doctor (Dr. Soto) years ago. It is unclear to me if this is the same lesion in question.    This outpatient referral (to Hepatology) was made during her March hospitalizations.    Since her initial appointment with me, she has undergone serial imaging, which has been reviewed at our TB meetings.   She had a MRI liver on 8/9/24 and a follow up MRI liver on 2/10/25. I recently reviewed her imaging at our Tumor Board meeting on 2/17/25. She is here for follow up.    Review of Systems  Review of Systems  Neg ROS.    Past Medical History   has a past medical history of Arthritis, Atrial fibrillation (Multi), CHF (congestive heart failure), CKD (chronic kidney disease), Diabetes mellitus (Multi), Hypertension, Hypothyroidism, LV (left ventricular) mural thrombus (03/01/2024), Major depressive disorder, single episode, unspecified, Pulmonary embolus (02/29/2024), and Stroke (Multi).     Social History   reports that she has quit smoking. Her smoking use included cigarettes. She started smoking about 50 years ago. She has a 50.2 pack-year smoking history. She has never used smokeless  tobacco. She reports that she does not drink alcohol and does not use drugs.     Family History  family history includes CVA in her mother; Diabetes in her brother and sister; Heart attack in her brother and mother; Heart failure in her mother and another family member; Hypertension in her brother, mother, and sister; Prostate cancer in her father; cardiac disorder in her brother and mother.     Allergies  Allergies   Allergen Reactions    Gabapentin Other     Patient unsure of allergy    Garlic Unknown and Swelling       Medications  Current Outpatient Medications   Medication Instructions    amLODIPine (NORVASC) 10 mg, oral, Daily    atorvastatin (LIPITOR) 40 mg, oral, Daily    cyanocobalamin (VITAMIN B-12) 500 mcg, Daily    Eliquis 5 mg, oral, 2 times daily    furosemide (LASIX) 40 mg, Daily PRN    levothyroxine (SYNTHROID, LEVOXYL) 75 mcg, oral, Daily    lisinopril 40 mg, oral, Daily        Objective   Visit Vitals  /79   Pulse 54   Temp 36.2 °C (97.2 °F)          8/16/2024     2:28 PM 9/16/2024     1:16 PM 11/18/2024     1:54 PM 11/21/2024    10:19 AM 11/25/2024     1:30 PM 2/3/2025    11:58 AM 2/27/2025     2:42 PM   Vitals   Systolic 120 112 124 131 119 113 130   Diastolic 81 74 72 71 75 64 79   BP Location Right arm Left arm Left arm  Left arm Left arm    Heart Rate 124 117 61 71 60 57 54   Temp       36.2 °C (97.2 °F)   Height 1.524 m (5') 1.524 m (5') 1.524 m (5') 1.524 m (5') 1.524 m (5') 1.524 m (5')    Weight (lb) 104.19 104 105.1 104.2 105 107.1 105   BMI 20.35 kg/m2 20.31 kg/m2 20.53 kg/m2 20.35 kg/m2 20.51 kg/m2 20.92 kg/m2 20.51 kg/m2   BSA (m2) 1.42 m2 1.41 m2 1.42 m2 1.42 m2 1.42 m2 1.43 m2 1.42 m2   Visit Report Report Report Report Report Report Report Report     Physical Exam  Vitals and nursing note reviewed.   Constitutional:       General: She is awake.      Appearance: Normal appearance.   HENT:      Head: Normocephalic and atraumatic.      Nose: Nose normal.      Mouth/Throat:       Mouth: Mucous membranes are moist.   Eyes:      Pupils: Pupils are equal, round, and reactive to light.   Neck:      Thyroid: No thyroid mass.      Trachea: Phonation normal.   Cardiovascular:      Rate and Rhythm: Normal rate and regular rhythm.      Heart sounds: Normal heart sounds. No murmur heard.     No gallop.   Pulmonary:      Effort: Pulmonary effort is normal. No respiratory distress.      Breath sounds: Normal air entry. No decreased breath sounds, wheezing, rhonchi or rales.   Abdominal:      General: Bowel sounds are normal. There is no distension.      Palpations: Abdomen is soft. There is hepatomegaly. There is no shifting dullness, fluid wave or splenomegaly.      Tenderness: There is no abdominal tenderness.   Musculoskeletal:      Cervical back: Neck supple.      Right lower leg: No edema.      Left lower leg: No edema.   Skin:     General: Skin is warm.      Capillary Refill: Capillary refill takes less than 2 seconds.   Neurological:      General: No focal deficit present.      Mental Status: She is alert and oriented to person, place, and time. Mental status is at baseline.      Cranial Nerves: Cranial nerves 2-12 are intact.      Motor: Motor function is intact.   Psychiatric:         Attention and Perception: Attention and perception normal.         Mood and Affect: Mood normal.         Speech: Speech normal.         Behavior: Behavior normal.     Labs    WBC   Date/Time Value Ref Range Status   10/09/2024 04:33 PM 5.7 4.4 - 11.3 x10*3/uL Final     Hemoglobin   Date/Time Value Ref Range Status   10/09/2024 04:33 PM 11.9 (L) 12.0 - 16.0 g/dL Final     Hematocrit   Date/Time Value Ref Range Status   10/09/2024 04:33 PM 36.4 36.0 - 46.0 % Final     MCV   Date/Time Value Ref Range Status   10/09/2024 04:33 PM 83 80 - 100 fL Final     Platelets   Date/Time Value Ref Range Status   10/09/2024 04:33  150 - 450 x10*3/uL Final        Total Protein   Date/Time Value Ref Range Status   08/01/2024  04:11 PM 7.1 6.4 - 8.2 g/dL Final     Albumin   Date/Time Value Ref Range Status   08/01/2024 04:11 PM 4.5 3.4 - 5.0 g/dL Final     AST   Date/Time Value Ref Range Status   08/01/2024 04:11 PM 13 9 - 39 U/L Final     ALT   Date/Time Value Ref Range Status   08/01/2024 04:11 PM 14 7 - 45 U/L Final     Comment:     Patients treated with Sulfasalazine may generate falsely decreased results for ALT.     Alkaline Phosphatase   Date/Time Value Ref Range Status   08/01/2024 04:11  33 - 136 U/L Final     Bilirubin, Total   Date/Time Value Ref Range Status   08/01/2024 04:11 PM 0.5 0.0 - 1.2 mg/dL Final        Vitamin D, 25-Hydroxy   Date/Time Value Ref Range Status   09/25/2018 05:57 AM 9 (A) ng/mL Final     Comment:     .  DEFICIENCY:         < 20  NG/ML  INSUFFICIENCY:      20-29 NG/ML  OPTIMUM LEVEL:      30-80 NG/ML  POSSIBLE TOXICITY:  > 80  NG/ML    THIS ASSAY ACCURATELY QUANTIFIES THE SUM OF  VITAMIN D3, 25-HYDROXY AND VIT D2,25-HYDROXY.          AST   Date/Time Value Ref Range Status   08/01/2024 04:11 PM 13 9 - 39 U/L Final     ALT   Date/Time Value Ref Range Status   08/01/2024 04:11 PM 14 7 - 45 U/L Final     Comment:     Patients treated with Sulfasalazine may generate falsely decreased results for ALT.     Alkaline Phosphatase   Date/Time Value Ref Range Status   08/01/2024 04:11  33 - 136 U/L Final     Bilirubin, Total   Date/Time Value Ref Range Status   08/01/2024 04:11 PM 0.5 0.0 - 1.2 mg/dL Final     Albumin   Date/Time Value Ref Range Status   08/01/2024 04:11 PM 4.5 3.4 - 5.0 g/dL Final     Total Protein   Date/Time Value Ref Range Status   08/01/2024 04:11 PM 7.1 6.4 - 8.2 g/dL Final        Protime   Date/Time Value Ref Range Status   02/29/2024 07:26 PM 12.1 9.8 - 12.8 seconds Final     INR   Date/Time Value Ref Range Status   02/29/2024 07:26 PM 1.1 0.9 - 1.1 Final     CT Scan  3/17/24    LIVER:  There is heterogeneous enhancement of the liver.  Heterogeneous hypodense lesions are noted at  the liver. A representative lesion at the anterior left hepatic lobe measures 1.9 cm.    PERITONEUM/RETROPERITONEUM/LYMPH NODES:  No free fluid or free air.  No retroperitoneal hemorrhage. No pathologically enlarged lymph nodes are identified.    IMPRESSION:  1. Heterogeneous enhancement of the liver, may be seen with hepatic congestion or hepatitis. Please correlate with laboratory values.  2. Hepatic lesions, underlying neoplasm is not excludable. Nonemergent contrast-enhanced MRI/MRCP can be obtained for further evaluation.  3. Diffuse wall thickening at the stomach, may be secondary to underdistention versus gastritis. Evaluation with upper endoscopy as clinically warranted.  4. Stool ball distending the rectum.  5. Renal cortical scarring, may represent sequela of prior infection.  6. Enlarged fibroid uterus.  7. Small right pleural effusion. Mild bibasilar atelectasis.  8. Cardiomegaly with right atrial enlargement.  9. Severe degenerative changes at the right hip.      MRI  2/10/25  LIVER:  IMPRESSION:  2 cm focus of T2 hyperintensity near the anterior margin of the liver  is stable compared to studies dating to nearly 1 year ago. This area  is slightly hypointense to liver on the hepatobiliary phase which  suggests it is not composed of normal hepatocytes, however as before  this does not appear masslike. Stability is reassuring.     Felt to be a FNH vs atypical hemangioma.     Assessment/Plan   Sophie Mendosa is a 79 y.o. female who presents to Liver Clinic for follow up evaluation of a liver mass.    I suspect she has chronic passive congestion of the liver based on her cardiac history and appearance of the liver on CT. Her liver is enlarged on physical exam and appears enlarged and congested on CT.    There are hepatic lesion described on this CT. Further characterization/evaluation with contrast enhanced MRI x 2 demonstrates stability. It appears to be a benign observation: FNH vs atypical hemangioma.  At this time, I do not believe it requires any further attention.    I have recommended Hepatitis A/B/C screening serologies - these were negative.    Recommendation:  I provided reassurance that I believe the liver observation is benign and stable. I do not recommend ongoing follow up for this observation. She can return to liver clinic on an as needed basis.    Carlos Vallecillo MD    Instructions      Carlos Vallecillo MD

## 2025-02-27 NOTE — PATIENT INSTRUCTIONS
Welcome to Dr. [facility].  Dr. Vallecillo sees patients at the following sites:  [place] [address] [facility] at [facility] [address], [address] at [facility] at [facility], [facility] [address]    Dr. Vallecillo's hepatology care coordinator, Maureen FRAIRE, can be reached at [phone].  Dr. Vallecillo's , Raquel Yun, can be reached at [phone].    We reviewed your recent MRI and liver lesion.  It appears to be stable and benign.  Likely either a hemangioma or FNH lesion.

## 2025-02-27 NOTE — LETTER
March 4, 2025     Madelaine Galvez MD  [address]  [address]  [facility] [address]    Patient: Sophie Mendosa   YOB: 1945   Date of Visit: 2/27/2025       Dear Dr. Madelaine Galvez MD:    Thank you for referring Sophie Mendosa to me for evaluation. Below are my notes for this consultation.  If you have questions, please do not hesitate to call me. I look forward to following your patient along with you.       Sincerely,     Carlos Vallecillo MD      CC: No Recipients  ______________________________________________________________________________________    Subjective     Follow up evaluation of a liver lesion.    History of Present Illness:   Sophie Mendosa is a 79 y.o. female who presents to the [facility] for a followup evaluation.    Briefly, 80 y/o F with PMHx of recent PE and atrial flutter with RVR on Eliquis, systolic HF, CKD 3, hypothyroidism, and MGUS. She was hospitalized in March (3/17 - 3/4/24)  and prior to that from 2/29/24 - 3/5/24. She had a RLL segmental PE. She also developed new onset A flutter/Afib with RVR. During these hospitalizations she was evaluated by Gastroenterology. She reported symptoms of constipation, abdominal fullness, and excess bowel gas. There were incidental imaging findings: gastric wall thickening (vs. underdistention) and a liver lesion on CT scan.   Patient reports that she was aware of a liver lesion and has discussed with her prior GI doctor (Dr. Soto) years ago. It is unclear to me if this is the same lesion in question.    This outpatient referral (to Hepatology) was made during her March hospitalizations.    Since her initial appointment with me, she has undergone serial imaging, which has been reviewed at our TB meetings.   She had a MRI liver on 8/9/24 and a follow up MRI liver on 2/10/25. I recently reviewed her imaging at our Tumor Board meeting on 2/17/25. She is here for follow up.    Review of Systems  Review of Systems  Neg ROS.    Past  Medical History   has a past medical history of Arthritis, Atrial fibrillation (Multi), CHF (congestive heart failure), CKD (chronic kidney disease), Diabetes mellitus (Multi), Hypertension, Hypothyroidism, LV (left ventricular) mural thrombus (03/01/2024), Major depressive disorder, single episode, unspecified, Pulmonary embolus (02/29/2024), and Stroke (Multi).     Social History   reports that she has quit smoking. Her smoking use included cigarettes. She started smoking about 50 years ago. She has a 50.2 pack-year smoking history. She has never used smokeless tobacco. She reports that she does not drink alcohol and does not use drugs.     Family History  family history includes CVA in her mother; Diabetes in her brother and sister; Heart attack in her brother and mother; Heart failure in her mother and another family member; Hypertension in her brother, mother, and sister; Prostate cancer in her father; cardiac disorder in her brother and mother.     Allergies  Allergies   Allergen Reactions   • Gabapentin Other     Patient unsure of allergy   • Garlic Unknown and Swelling       Medications  Current Outpatient Medications   Medication Instructions   • amLODIPine (NORVASC) 10 mg, oral, Daily   • atorvastatin (LIPITOR) 40 mg, oral, Daily   • cyanocobalamin (VITAMIN B-12) 500 mcg, Daily   • Eliquis 5 mg, oral, 2 times daily   • furosemide (LASIX) 40 mg, Daily PRN   • levothyroxine (SYNTHROID, LEVOXYL) 75 mcg, oral, Daily   • lisinopril 40 mg, oral, Daily        Objective   Visit Vitals  /79   Pulse 54   Temp 36.2 °C (97.2 °F)          8/16/2024     2:28 PM 9/16/2024     1:16 PM 11/18/2024     1:54 PM 11/21/2024    10:19 AM 11/25/2024     1:30 PM 2/3/2025    11:58 AM 2/27/2025     2:42 PM   Vitals   Systolic 120 112 124 131 119 113 130   Diastolic 81 74 72 71 75 64 79   BP Location Right arm Left arm Left arm  Left arm Left arm    Heart Rate 124 117 61 71 60 57 54   Temp       36.2 °C (97.2 °F)   Height 1.524  m (5') 1.524 m (5') 1.524 m (5') 1.524 m (5') 1.524 m (5') 1.524 m (5')    Weight (lb) 104.19 104 105.1 104.2 105 107.1 105   BMI 20.35 kg/m2 20.31 kg/m2 20.53 kg/m2 20.35 kg/m2 20.51 kg/m2 20.92 kg/m2 20.51 kg/m2   BSA (m2) 1.42 m2 1.41 m2 1.42 m2 1.42 m2 1.42 m2 1.43 m2 1.42 m2   Visit Report Report Report Report Report Report Report Report     Physical Exam  Vitals and nursing note reviewed.   Constitutional:       General: She is awake.      Appearance: Normal appearance.   HENT:      Head: Normocephalic and atraumatic.      Nose: Nose normal.      Mouth/Throat:      Mouth: Mucous membranes are moist.   Eyes:      Pupils: Pupils are equal, round, and reactive to light.   Neck:      Thyroid: No thyroid mass.      Trachea: Phonation normal.   Cardiovascular:      Rate and Rhythm: Normal rate and regular rhythm.      Heart sounds: Normal heart sounds. No murmur heard.     No gallop.   Pulmonary:      Effort: Pulmonary effort is normal. No respiratory distress.      Breath sounds: Normal air entry. No decreased breath sounds, wheezing, rhonchi or rales.   Abdominal:      General: Bowel sounds are normal. There is no distension.      Palpations: Abdomen is soft. There is hepatomegaly. There is no shifting dullness, fluid wave or splenomegaly.      Tenderness: There is no abdominal tenderness.   Musculoskeletal:      Cervical back: Neck supple.      Right lower leg: No edema.      Left lower leg: No edema.   Skin:     General: Skin is warm.      Capillary Refill: Capillary refill takes less than 2 seconds.   Neurological:      General: No focal deficit present.      Mental Status: She is alert and oriented to person, place, and time. Mental status is at baseline.      Cranial Nerves: Cranial nerves 2-12 are intact.      Motor: Motor function is intact.   Psychiatric:         Attention and Perception: Attention and perception normal.         Mood and Affect: Mood normal.         Speech: Speech normal.         Behavior:  Behavior normal.     Labs    WBC   Date/Time Value Ref Range Status   10/09/2024 04:33 PM 5.7 4.4 - 11.3 x10*3/uL Final     Hemoglobin   Date/Time Value Ref Range Status   10/09/2024 04:33 PM 11.9 (L) 12.0 - 16.0 g/dL Final     Hematocrit   Date/Time Value Ref Range Status   10/09/2024 04:33 PM 36.4 36.0 - 46.0 % Final     MCV   Date/Time Value Ref Range Status   10/09/2024 04:33 PM 83 80 - 100 fL Final     Platelets   Date/Time Value Ref Range Status   10/09/2024 04:33  150 - 450 x10*3/uL Final        Total Protein   Date/Time Value Ref Range Status   08/01/2024 04:11 PM 7.1 6.4 - 8.2 g/dL Final     Albumin   Date/Time Value Ref Range Status   08/01/2024 04:11 PM 4.5 3.4 - 5.0 g/dL Final     AST   Date/Time Value Ref Range Status   08/01/2024 04:11 PM 13 9 - 39 U/L Final     ALT   Date/Time Value Ref Range Status   08/01/2024 04:11 PM 14 7 - 45 U/L Final     Comment:     Patients treated with Sulfasalazine may generate falsely decreased results for ALT.     Alkaline Phosphatase   Date/Time Value Ref Range Status   08/01/2024 04:11  33 - 136 U/L Final     Bilirubin, Total   Date/Time Value Ref Range Status   08/01/2024 04:11 PM 0.5 0.0 - 1.2 mg/dL Final        Vitamin D, 25-Hydroxy   Date/Time Value Ref Range Status   09/25/2018 05:57 AM 9 (A) ng/mL Final     Comment:     .  DEFICIENCY:         < 20  NG/ML  INSUFFICIENCY:      20-29 NG/ML  OPTIMUM LEVEL:      30-80 NG/ML  POSSIBLE TOXICITY:  > 80  NG/ML    THIS ASSAY ACCURATELY QUANTIFIES THE SUM OF  VITAMIN D3, 25-HYDROXY AND VIT D2,25-HYDROXY.          AST   Date/Time Value Ref Range Status   08/01/2024 04:11 PM 13 9 - 39 U/L Final     ALT   Date/Time Value Ref Range Status   08/01/2024 04:11 PM 14 7 - 45 U/L Final     Comment:     Patients treated with Sulfasalazine may generate falsely decreased results for ALT.     Alkaline Phosphatase   Date/Time Value Ref Range Status   08/01/2024 04:11  33 - 136 U/L Final     Bilirubin, Total   Date/Time  Value Ref Range Status   08/01/2024 04:11 PM 0.5 0.0 - 1.2 mg/dL Final     Albumin   Date/Time Value Ref Range Status   08/01/2024 04:11 PM 4.5 3.4 - 5.0 g/dL Final     Total Protein   Date/Time Value Ref Range Status   08/01/2024 04:11 PM 7.1 6.4 - 8.2 g/dL Final        Protime   Date/Time Value Ref Range Status   02/29/2024 07:26 PM 12.1 9.8 - 12.8 seconds Final     INR   Date/Time Value Ref Range Status   02/29/2024 07:26 PM 1.1 0.9 - 1.1 Final     CT Scan  3/17/24    LIVER:  There is heterogeneous enhancement of the liver.  Heterogeneous hypodense lesions are noted at the liver. A representative lesion at the anterior left hepatic lobe measures 1.9 cm.    PERITONEUM/RETROPERITONEUM/LYMPH NODES:  No free fluid or free air.  No retroperitoneal hemorrhage. No pathologically enlarged lymph nodes are identified.    IMPRESSION:  1. Heterogeneous enhancement of the liver, may be seen with hepatic congestion or hepatitis. Please correlate with laboratory values.  2. Hepatic lesions, underlying neoplasm is not excludable. Nonemergent contrast-enhanced MRI/MRCP can be obtained for further evaluation.  3. Diffuse wall thickening at the stomach, may be secondary to underdistention versus gastritis. Evaluation with upper endoscopy as clinically warranted.  4. Stool ball distending the rectum.  5. Renal cortical scarring, may represent sequela of prior infection.  6. Enlarged fibroid uterus.  7. Small right pleural effusion. Mild bibasilar atelectasis.  8. Cardiomegaly with right atrial enlargement.  9. Severe degenerative changes at the right hip.      MRI  2/10/25  LIVER:  IMPRESSION:  2 cm focus of T2 hyperintensity near the anterior margin of the liver  is stable compared to studies dating to nearly 1 year ago. This area  is slightly hypointense to liver on the hepatobiliary phase which  suggests it is not composed of normal hepatocytes, however as before  this does not appear masslike. Stability is reassuring.     Felt  to be a FNH vs atypical hemangioma.     Assessment/Plan   Sophie Mendosa is a 79 y.o. female who presents to Liver Clinic for follow up evaluation of a liver mass.    I suspect she has chronic passive congestion of the liver based on her cardiac history and appearance of the liver on CT. Her liver is enlarged on physical exam and appears enlarged and congested on CT.    There are hepatic lesion described on this CT. Further characterization/evaluation with contrast enhanced MRI x 2 demonstrates stability. It appears to be a benign observation: FNH vs atypical hemangioma. At this time, I do not believe it requires any further attention.    I have recommended Hepatitis A/B/C screening serologies - these were negative.    Recommendation:  I provided reassurance that I believe the liver observation is benign and stable. I do not recommend ongoing follow up for this observation. She can return to liver clinic on an as needed basis.    Carlos Vallecillo MD    Instructions      Carlos Vallecillo MD

## 2025-03-03 ENCOUNTER — OFFICE VISIT (OUTPATIENT)
Dept: CARDIOLOGY | Facility: CLINIC | Age: 80
End: 2025-03-03
Payer: MEDICARE

## 2025-03-03 VITALS
HEIGHT: 65 IN | HEART RATE: 60 BPM | OXYGEN SATURATION: 95 % | SYSTOLIC BLOOD PRESSURE: 135 MMHG | BODY MASS INDEX: 18.16 KG/M2 | WEIGHT: 109 LBS | DIASTOLIC BLOOD PRESSURE: 71 MMHG

## 2025-03-03 VITALS — BODY MASS INDEX: 21.4 KG/M2 | WEIGHT: 109 LBS | HEIGHT: 60 IN

## 2025-03-03 DIAGNOSIS — I48.0 PAF (PAROXYSMAL ATRIAL FIBRILLATION) (MULTI): ICD-10-CM

## 2025-03-03 DIAGNOSIS — I10 BENIGN ESSENTIAL HYPERTENSION: Primary | ICD-10-CM

## 2025-03-03 DIAGNOSIS — I48.3 TYPICAL ATRIAL FLUTTER (MULTI): ICD-10-CM

## 2025-03-03 DIAGNOSIS — I49.5 TACHY-BRADY SYNDROME (MULTI): Primary | ICD-10-CM

## 2025-03-03 DIAGNOSIS — I50.9 CONGESTIVE HEART FAILURE, UNSPECIFIED HF CHRONICITY, UNSPECIFIED HEART FAILURE TYPE: ICD-10-CM

## 2025-03-03 LAB
ATRIAL RATE: 60 BPM
P AXIS: 81 DEGREES
P OFFSET: 190 MS
P ONSET: 159 MS
PR INTERVAL: 130 MS
Q ONSET: 224 MS
QRS COUNT: 10 BEATS
QRS DURATION: 82 MS
QT INTERVAL: 454 MS
QTC CALCULATION(BAZETT): 454 MS
QTC FREDERICIA: 454 MS
R AXIS: 65 DEGREES
T AXIS: 77 DEGREES
T OFFSET: 451 MS
VENTRICULAR RATE: 60 BPM

## 2025-03-03 PROCEDURE — 1159F MED LIST DOCD IN RCRD: CPT | Performed by: NURSE PRACTITIONER

## 2025-03-03 PROCEDURE — 99214 OFFICE O/P EST MOD 30 MIN: CPT | Performed by: INTERNAL MEDICINE

## 2025-03-03 PROCEDURE — 93005 ELECTROCARDIOGRAM TRACING: CPT | Performed by: NURSE PRACTITIONER

## 2025-03-03 PROCEDURE — 99214 OFFICE O/P EST MOD 30 MIN: CPT | Performed by: NURSE PRACTITIONER

## 2025-03-03 PROCEDURE — 3078F DIAST BP <80 MM HG: CPT | Performed by: INTERNAL MEDICINE

## 2025-03-03 PROCEDURE — 3075F SYST BP GE 130 - 139MM HG: CPT | Performed by: INTERNAL MEDICINE

## 2025-03-03 PROCEDURE — 1126F AMNT PAIN NOTED NONE PRSNT: CPT | Performed by: INTERNAL MEDICINE

## 2025-03-03 PROCEDURE — 1123F ACP DISCUSS/DSCN MKR DOCD: CPT | Performed by: NURSE PRACTITIONER

## 2025-03-03 PROCEDURE — 1123F ACP DISCUSS/DSCN MKR DOCD: CPT | Performed by: INTERNAL MEDICINE

## 2025-03-03 PROCEDURE — 1160F RVW MEDS BY RX/DR IN RCRD: CPT | Performed by: NURSE PRACTITIONER

## 2025-03-03 RX ORDER — FUROSEMIDE 80 MG/1
80 TABLET ORAL DAILY
Qty: 90 TABLET | Refills: 1 | Status: SHIPPED | OUTPATIENT
Start: 2025-03-03 | End: 2026-03-03

## 2025-03-03 RX ORDER — CARVEDILOL 3.12 MG/1
3.12 TABLET ORAL
Qty: 180 TABLET | Refills: 1 | Status: SHIPPED | OUTPATIENT
Start: 2025-03-03 | End: 2026-03-03

## 2025-03-03 ASSESSMENT — ENCOUNTER SYMPTOMS
LOSS OF SENSATION IN FEET: 1
BLOATING: 0
CONSTIPATION: 0
COUGH: 0
VOMITING: 0
DYSURIA: 0
HEMOPTYSIS: 0
ALTERED MENTAL STATUS: 0
DEPRESSION: 1
MEMORY LOSS: 0
NAUSEA: 0
HEMATURIA: 0
FEVER: 0
CHILLS: 0
DEPRESSION: 0
MYALGIAS: 0
FALLS: 0
HEADACHES: 0
OCCASIONAL FEELINGS OF UNSTEADINESS: 1
DIARRHEA: 0
ABDOMINAL PAIN: 0
WHEEZING: 0

## 2025-03-03 ASSESSMENT — PAIN SCALES - GENERAL: PAINLEVEL_OUTOF10: 0-NO PAIN

## 2025-03-03 NOTE — PATIENT INSTRUCTIONS
Stop Amlodipine  Add Carvedilol 3.125mg 2x/day  Increase Furosemide from 40 to 80mg 1x/day (can take 40mg x2 until run out)  Goal BP at least <140/90, optimal <130/80

## 2025-03-03 NOTE — PROGRESS NOTES
Chief Complaint   Patient presents with    Atrial Fibrillation    Cardiomyopathy        HPI  80 yo BF w/ h/o parox AF/FL s/p RFA 10/24, mild ICM, LV apical thrombus, PE, cor calc on CT, athero of Ao, CVA 9/18, HTN, HLD, GERD, TOB (quit) now here for cardiology f/u. +occ chest heavy in bed that resolves with sitting up. No other chest pain. No dyspnea at rest. +occ DEL VALLE. No orthopnea/PND. +occ brief palps. No LH/dizzy/syncope. No edema. No claudication. +occ cough.  3/24 hospitalized due to palpitations, found to have AF/FL and underwent RAISA/DCCV. She was also found to have an LV thrombus and a PE and was started on Eliquis.   ECG 9/18: SB (45), LVH  ECG 12/20: SB (50), ?SMI, nonsp T-wave changes  ECG 5/22: SB (54), ?SMI, nonsp T-wave changes  ECG 4/24: SB (51), SMI, nonsp T-wave changes  ECG 8/24: AFL (121), nonsp ST-T changes  ECG 9/24: AFIB (116)  ECG 11/24: SR (61), nonsp T-wave changes  ECG 11/24: JXN with 1 Sinus (57), nonsp T-wave changes  ECG 2/25: Jxnal (57), nonsp ST-T changes  ECG 3/25: SR (60), ASMI  HM 3/24: SR, HR  (avg 54), SVT x15 (long 11b)  HM 2/25: SR< HR  (avg 59), PC <1%, PAC 10%, SVT x31 (long 1m8s)  Echo 9/18: EF 60-65%, AsAo plaque, mild-mod TR, PASP 37-42  Echo 3/24: EF 50-55%, apical inferior HK w/ ?thrombus, mod LAE  Echo 8/24: AF/FL (HR 120s), EF 45%, apical inf AK, mild-mod MR, mod TR, PASP 30  RAISA 3/24: EF 50-55%, no PFO  JOSE 1/21: no ischemia, EF 55-60% -> 65-70%  cMRI 3/24: EF: 48%, infarction of the apical/inferior wall, small LV apical thrombus, nl RV, mod MR  CXR 2/24:  no acute abnl  CXR 8/24: no acute abnl, CM, COPD  CT chest 10/20: mod-sev cor calc, mod athero of Ao, no aneurysm, CAMI, no peric eff  CT chest 2/22: sev cor calc, LAE, tr PE, mod athero of Ao, no aneurysm, nl PA  CT lung 3/23: sev CAC, slight CM, tr PE, sev AA  CTA chest 2/24: small PE, R PA 3.0cm, sev CAC, nl heart size, sm PE, AA, emphysema  Carotid US 9/18; plaque, no HDSS  MRI brain 9/18; LACA CVA  MRA  head/neck 9/18: carotid athero, no HDSS, no anuerysm   LE US 3/24: no DVT    Review of Systems   Constitutional: Negative for chills, fever and malaise/fatigue.   HENT:  Negative for hearing loss.    Eyes:  Negative for visual disturbance.   Respiratory:  Negative for cough, hemoptysis and wheezing.    Skin:  Negative for rash.   Musculoskeletal:  Negative for falls and myalgias.   Gastrointestinal:  Negative for bloating, abdominal pain, constipation, diarrhea, dysphagia, nausea and vomiting.   Genitourinary:  Negative for dysuria and hematuria.   Neurological:  Negative for headaches.   Psychiatric/Behavioral:  Negative for altered mental status, depression and memory loss.       Social History     Tobacco Use    Smoking status: Former     Current packs/day: 1.00     Average packs/day: 1 pack/day for 50.2 years (50.2 ttl pk-yrs)     Types: Cigarettes     Start date: 1975    Smokeless tobacco: Never    Tobacco comments:     Working on quitting   Substance Use Topics    Alcohol use: Never      Family History   Problem Relation Name Age of Onset    Other (cardiac disorder) Mother      Other (CVA) Mother      Heart failure Mother      Heart attack Mother      Hypertension Mother      Prostate cancer Father      Diabetes Sister      Hypertension Sister      Diabetes Brother      Other (cardiac disorder) Brother      Heart attack Brother      Hypertension Brother      Heart failure Other PATERNAL HALF SISTER       Allergies   Allergen Reactions    Gabapentin Other     Patient unsure of allergy    Garlic Unknown and Swelling      Current Outpatient Medications   Medication Instructions    amLODIPine (NORVASC) 10 mg, oral, Daily    atorvastatin (LIPITOR) 40 mg, oral, Daily    cyanocobalamin (VITAMIN B-12) 500 mcg, Daily    Eliquis 5 mg, oral, 2 times daily    furosemide (LASIX) 40 mg, Daily PRN    levothyroxine (SYNTHROID, LEVOXYL) 75 mcg, oral, Daily    lisinopril 40 mg, oral, Daily      /71 (BP Location: Left arm,  "Patient Position: Sitting)   Pulse 60   Ht 1.651 m (5' 5\")   Wt 49.4 kg (109 lb)   SpO2 95%   BMI 18.14 kg/m²           2024     1:16 PM 2024     1:54 PM 2024    10:19 AM 2024     1:30 PM 2/3/2025    11:58 AM 2025     2:42 PM 3/3/2025    12:13 PM   Vitals   Systolic 112 124 131 119 113 130 135   Diastolic 74 72 71 75 64 79 71   BP Location Left arm Left arm  Left arm Left arm  Left arm   Heart Rate 117 61 71 60 57 54 60   Temp      36.2 °C (97.2 °F)    Height 1.524 m (5') 1.524 m (5') 1.524 m (5') 1.524 m (5') 1.524 m (5')  1.651 m (5' 5\")   Weight (lb) 104 105.1 104.2 105 107.1 105 109   BMI 20.31 kg/m2 20.53 kg/m2 20.35 kg/m2 20.51 kg/m2 20.92 kg/m2 20.51 kg/m2 18.14 kg/m2   BSA (m2) 1.41 m2 1.42 m2 1.42 m2 1.42 m2 1.43 m2 1.42 m2 1.51 m2   Visit Report Report Report Report Report Report Report Report        Physical Exam  Constitutional:       Appearance: Normal appearance.   HENT:      Head: Normocephalic and atraumatic.      Nose: Nose normal.   Neck:      Vascular: No carotid bruit.   Cardiovascular:      Rate and Rhythm: Normal rate and regular rhythm.      Heart sounds: No murmur heard.  Pulmonary:      Effort: Pulmonary effort is normal.      Breath sounds: Normal breath sounds.   Abdominal:      Palpations: Abdomen is soft.      Tenderness: There is no abdominal tenderness.   Musculoskeletal:      Right lower le+ Pitting Edema present.      Left lower le+ Pitting Edema present.   Skin:     General: Skin is warm and dry.   Neurological:      General: No focal deficit present.      Mental Status: She is alert.   Psychiatric:         Mood and Affect: Mood normal.         Judgment: Judgment normal.        Results/Data  10/24 Cr 1.43, K 5.4, HGB 11.9,    Cr 1.42, K 5.2, LFT nl   TSH 3.45  4 Cr 1.09, K 4.1, HGB 13,   3/24 Cr 1.17, K 4.6, Mg 1.7, HGB 11,    hgba1c 6.1, TSH 0.27, FT4 1.21, BNP 83   Cr 0.96, K 4.6, LFT nl, HGB 12.4, "   12/21 Cr 1.07, K 4.9, LFT nl, hgba1c 6.4, TSH 0.44  10/20 Cr 0.85, K 4.7, LDL 54, HDL 92, TG 67, Chol 160, TSH 0.51   2/24: LDL: 97, A1c: 6.1  4/24 Cr: 1.16, K 4.3,     Assessment/Plan   78 yo BF w/ h/o parox AF/FL s/p RFA 10/24, mild ICM, LV apical thrombus, PE, cor calc on CT, athero of Ao, CVA 9/18, HTN, HLD, GERD, TOB (quit). Doing fair. 1+ pitting LE edema, suspect CHF and/or Amlodipine SE.  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 At this time, with a largely preserved EF and no anginal symptoms, ischemic eval can be deferred.  -continue Eliquis 5 bid  -change Amlodipine 10 every day (?contributing to edema) to Carvedilol 3.125 bid for mild ICM, HTN and h/o palps/AFIB/SVT (careful with HR on slow side)  -continue Lisinopril 40 every day  -increase Lasix from 40 to 80 every day  -continue Atorva 40 every day  -maintain euthyroid  -FU 3 months (earlier if needed)    Familia Hackett MD

## 2025-03-04 ASSESSMENT — ENCOUNTER SYMPTOMS
SHORTNESS OF BREATH: 0
DOUBLE VISION: 0
MYALGIAS: 0
BLURRED VISION: 0
ABDOMINAL PAIN: 0
NEAR-SYNCOPE: 0
DIZZINESS: 1
ORTHOPNEA: 0
SORE THROAT: 0
SYNCOPE: 0
DIAPHORESIS: 0
LIGHT-HEADEDNESS: 1
FEVER: 0
NAUSEA: 0
PND: 0
SNORING: 0
COUGH: 0
HEMOPTYSIS: 0
FALLS: 0
SPUTUM PRODUCTION: 0
PALPITATIONS: 0
DIARRHEA: 0
HEADACHES: 0
IRREGULAR HEARTBEAT: 0
DYSPNEA ON EXERTION: 0
WEAKNESS: 0
VOMITING: 0

## 2025-03-04 NOTE — PROGRESS NOTES
Subjective   Sophie Mendosa is a 79 y.o. female.    Chief Complaint:  Atrial Fibrillation and Bradycardia    79 year old female presents today for 5 month follow up post Afib, atypical and typical Atrial flutter Ablation and 1 month follow up post monitor.  PMH includes Arthritis, Atrial fibrillation, CHF (congestive heart failure, CKD (chronic kidney disease), Diabetes mellitus, Hypertension, Hypothyroidism, HLD, GERD, LV (left ventricular) mural thrombus (03/01/2024), Major depressive disorder,Pulmonary embolus(02/29/2024), and Stroke 09/2018     The patient has a history of paroxysmal AF/atrial flutter since 03/2024. She has a history of a CVA in 09/2018, with a CHADSVASc score of 8. In March 2024, she was hospitalized for palpitations and was diagnosed with new-onset atrial flutter/AF. She underwent a RAISA and DCCV on 03/20/2024. During this time, she was also found to have an LV thrombus and a PE, for which she was started on apixaban.    Her CMR revealed an apical transmural infarct, likely due to obstructive CAD (no prior cath), leading to thrombus formation. With a largely preserved EF and no anginal symptoms, ischemic evaluation was deferred by cardiology. Antiarrhythmic drugs, such as amiodarone, were discussed but she has a tendency toward bradycardia in the 50s when in sinus rhythm.    MR cardiac angio chest w and wo IV contrast for morph FUNCT valve DZ and GREAT vessels 03/04/2024  1. Normal LV size (EDVi 68 ml/m2) with mildly reduced systolic function (LVEF 48%). 2. Severe hypokinesis of the apical inferior segment and true apex. 3. There is evidence of a small LV thrombus measuring 1.1 x 0.58 cm adherent to the apical inferior wall. 4. Subendocardial infarction of the apical inferior wall (50% wall thickness) and transmural infarction of the true apex (% wall thickness). 5. Moderate mitral regurgitation. 6. Normal RV size and systolic function (RVEF 59%).    Now s/p PVI, CTI, posterior wall  isolation, and left atypical atrial flutter RFA with Dr. Aguila 10/16/2024  ECG 11/18/2024 NSR HR 61 bpm, non specific T wave abnormality  ECG 2/3/2025 Junctional rhythm with some sinus beats with prolonged NV interval    Holter monitor 2/3-2/17/2025: Predominant rhythm is NSR with frequent PACs, max  bpm, min HR 43 bpm, average HR 59 bpm.  < 1% PVC burden, 10% PAC burden, 31 episodes of SVT, the longest lasting 1 minute 7.9 seconds, Accelerated junctional and junctional escape rhythm present  ECG 3/4/2025 NSR HR 60 bpm    Patient presents today for follow up post monitor results.  She still admits to being tired throughout the day, she has to sit down in the middle of doing the dishes. She didn't used to need to do that.  It has gotten a little better since our last visit, but not much. She denies any syncope. She saw her general cardiologist today.  Her amlodipine was changed to low dose Coreg due to her LE edema.    Ht 1.524 m (5')   Wt 49.4 kg (109 lb)   BMI 21.29 kg/m²   Current Outpatient Medications on File Prior to Visit   Medication Sig Dispense Refill    apixaban (Eliquis) 5 mg tablet Take 1 tablet (5 mg) by mouth 2 times a day. 180 tablet 3    atorvastatin (Lipitor) 40 mg tablet Take 1 tablet (40 mg) by mouth once daily. 90 tablet 1    cyanocobalamin (Vitamin B-12) 500 mcg tablet Take 1 tablet (500 mcg) by mouth once daily.      levothyroxine (Synthroid, Levoxyl) 75 mcg tablet Take 1 tablet (75 mcg) by mouth once daily. 90 tablet 1    lisinopril 40 mg tablet Take 1 tablet (40 mg) by mouth once daily. 90 tablet 1    [DISCONTINUED] amLODIPine (Norvasc) 10 mg tablet Take 1 tablet (10 mg) by mouth once daily. 90 tablet 1    [DISCONTINUED] furosemide (Lasix) 40 mg tablet Take 1 tablet (40 mg) by mouth once daily as needed (fluid retention).       No current facility-administered medications on file prior to visit.         Review of Systems   Constitutional: Positive for malaise/fatigue. Negative for  diaphoresis and fever.   HENT:  Negative for congestion and sore throat.    Eyes:  Negative for blurred vision and double vision.   Cardiovascular:  Positive for leg swelling. Negative for chest pain, dyspnea on exertion, irregular heartbeat, near-syncope, orthopnea, palpitations, paroxysmal nocturnal dyspnea and syncope.   Respiratory:  Negative for cough, hemoptysis, shortness of breath, snoring and sputum production.    Hematologic/Lymphatic: Negative for bleeding problem.   Skin:  Negative for rash.   Musculoskeletal:  Negative for falls, joint pain and myalgias.   Gastrointestinal:  Negative for abdominal pain, diarrhea, nausea and vomiting.   Neurological:  Positive for dizziness and light-headedness. Negative for headaches and weakness.   All other systems reviewed and are negative.      Objective   Constitutional:       Appearance: Healthy appearance. Not in distress.   Eyes:      Conjunctiva/sclera: Conjunctivae normal.      Pupils: Pupils are equal, round, and reactive to light.   HENT:    Mouth/Throat:      Pharynx: Oropharynx is clear.   Pulmonary:      Effort: Pulmonary effort is normal.      Breath sounds: Normal breath sounds. No wheezing. No rhonchi. No rales.   Cardiovascular:      PMI at left midclavicular line. Normal rate. Regular rhythm.      Murmurs: There is no murmur.      No gallop.    Pulses:     Intact distal pulses.   Edema:     Peripheral edema present.     Pretibial: bilateral 1+ edema of the pretibial area.     Ankle: bilateral 1+ edema of the ankle.     Feet: bilateral 1+ edema of the feet.  Abdominal:      General: Bowel sounds are normal.      Palpations: Abdomen is soft.      Tenderness: There is no abdominal tenderness.   Musculoskeletal: Normal range of motion.         General: No tenderness. Skin:     General: Skin is warm and dry.   Neurological:      General: No focal deficit present.      Mental Status: Alert and oriented to person, place and time.       I personally reviewed  her recent TSH, CBC, BMP, cardiac MRI, echocardiogram and monitor results  Lab Review:   Hospital Outpatient Visit on 02/03/2025   Component Date Value    BSA 02/03/2025 1.43    Office Visit on 02/03/2025   Component Date Value    Ventricular Rate 02/03/2025 57     Atrial Rate 02/03/2025 60     QRS Duration 02/03/2025 84     QT Interval 02/03/2025 454     QTC Calculation(Bazett) 02/03/2025 441     R Axis 02/03/2025 57     T Axis 02/03/2025 -28     QRS Count 02/03/2025 9     Q Onset 02/03/2025 221     T Offset 02/03/2025 448     QTC Fredericia 02/03/2025 446        Assessment/Plan   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 Diagnoses of Typical atrial flutter (Multi) and PAF (paroxysmal atrial fibrillation) (Multi) were also pertinent to this visit.  I reviewed her heart monitor with Dr. Aguila.  Given her episodes of junctional rhythm and SVT, she is showing signs of symptomatic Tachy-rola syndrome.  Starting her on Coreg 3.125 mg BID may worsen her episodes of bradycardia.    At this point, we recommend PPM implant for tachy-rola syndrome and symptomatic bradycardia.  She is tired and has worsening LE edema.  I reviewed the procedure with the patient, we discussed risks and benefits.  Patient is going to discuss this with her son and call us to let us know her decision. Plan for dual chamber PPM implant, [employer] if she is agreeable.  If not, she can follow up with me in 3-6 months    The [place] SDM tool was used for shared decision making during this clinic visit. The potential benefits and risks of the procedure were reviewed with the patient based on the best available evidence. Decisions that were made took into account the patient's health goals, preferences, and values. All the patient's questions were addressed.

## 2025-04-03 DIAGNOSIS — I10 BENIGN ESSENTIAL HYPERTENSION: ICD-10-CM

## 2025-04-03 DIAGNOSIS — I50.9 CONGESTIVE HEART FAILURE, UNSPECIFIED HF CHRONICITY, UNSPECIFIED HEART FAILURE TYPE: ICD-10-CM

## 2025-05-01 DIAGNOSIS — I10 BENIGN ESSENTIAL HYPERTENSION: ICD-10-CM

## 2025-05-01 RX ORDER — LISINOPRIL 40 MG/1
40 TABLET ORAL DAILY
Qty: 90 TABLET | Refills: 1 | Status: SHIPPED | OUTPATIENT
Start: 2025-05-01 | End: 2025-10-28

## 2025-05-01 NOTE — TELEPHONE ENCOUNTER
Rx Refill Request Telephone Encounter    Name:  Sophie Mendosa  :  [id number]  Medication Name:    lisinopril 40 mg tablet   Specific Pharmacy location:  [facility]  Date of last appointment:  24  Date of next appointment:  25

## 2025-05-04 PROCEDURE — RXMED WILLOW AMBULATORY MEDICATION CHARGE

## 2025-05-07 ENCOUNTER — PHARMACY VISIT (OUTPATIENT)
Dept: PHARMACY | Facility: CLINIC | Age: 80
End: 2025-05-07
Payer: COMMERCIAL

## 2025-05-22 ENCOUNTER — APPOINTMENT (OUTPATIENT)
Dept: PRIMARY CARE | Facility: CLINIC | Age: 80
End: 2025-05-22
Payer: MEDICARE

## 2025-05-22 VITALS
HEART RATE: 47 BPM | SYSTOLIC BLOOD PRESSURE: 200 MMHG | DIASTOLIC BLOOD PRESSURE: 100 MMHG | BODY MASS INDEX: 19.79 KG/M2 | TEMPERATURE: 96.5 F | HEIGHT: 60 IN | OXYGEN SATURATION: 97 % | WEIGHT: 100.8 LBS

## 2025-05-22 DIAGNOSIS — E11.51 TYPE 2 DIABETES MELLITUS WITH DIABETIC PERIPHERAL ANGIOPATHY WITHOUT GANGRENE, WITHOUT LONG-TERM CURRENT USE OF INSULIN (MULTI): ICD-10-CM

## 2025-05-22 DIAGNOSIS — J43.2 CENTRILOBULAR EMPHYSEMA (MULTI): ICD-10-CM

## 2025-05-22 DIAGNOSIS — I10 BENIGN ESSENTIAL HYPERTENSION: ICD-10-CM

## 2025-05-22 DIAGNOSIS — Z00.00 ROUTINE GENERAL MEDICAL EXAMINATION AT HEALTH CARE FACILITY: Primary | ICD-10-CM

## 2025-05-22 DIAGNOSIS — E78.5 HYPERLIPIDEMIA, UNSPECIFIED HYPERLIPIDEMIA TYPE: ICD-10-CM

## 2025-05-22 DIAGNOSIS — Z87.891 FORMER SMOKER: ICD-10-CM

## 2025-05-22 DIAGNOSIS — E03.9 HYPOTHYROIDISM, UNSPECIFIED TYPE: ICD-10-CM

## 2025-05-22 DIAGNOSIS — Z12.2 SCREENING FOR LUNG CANCER: ICD-10-CM

## 2025-05-22 DIAGNOSIS — J40 BRONCHITIS: ICD-10-CM

## 2025-05-22 DIAGNOSIS — I69.354 HEMIPLEGIA AND HEMIPARESIS FOLLOWING CEREBRAL INFARCTION AFFECTING LEFT NON-DOMINANT SIDE (MULTI): ICD-10-CM

## 2025-05-22 DIAGNOSIS — N18.32 CHRONIC KIDNEY DISEASE, STAGE 3B (MULTI): ICD-10-CM

## 2025-05-22 PROBLEM — M06.9 RHEUMATOID ARTHRITIS: Status: RESOLVED | Noted: 2018-10-20 | Resolved: 2025-05-22

## 2025-05-22 PROCEDURE — 3080F DIAST BP >= 90 MM HG: CPT | Performed by: FAMILY MEDICINE

## 2025-05-22 PROCEDURE — 1159F MED LIST DOCD IN RCRD: CPT | Performed by: FAMILY MEDICINE

## 2025-05-22 PROCEDURE — 3077F SYST BP >= 140 MM HG: CPT | Performed by: FAMILY MEDICINE

## 2025-05-22 PROCEDURE — 1170F FXNL STATUS ASSESSED: CPT | Performed by: FAMILY MEDICINE

## 2025-05-22 PROCEDURE — 99212 OFFICE O/P EST SF 10 MIN: CPT | Performed by: FAMILY MEDICINE

## 2025-05-22 PROCEDURE — 99397 PER PM REEVAL EST PAT 65+ YR: CPT | Performed by: FAMILY MEDICINE

## 2025-05-22 PROCEDURE — G0439 PPPS, SUBSEQ VISIT: HCPCS | Performed by: FAMILY MEDICINE

## 2025-05-22 PROCEDURE — 1160F RVW MEDS BY RX/DR IN RCRD: CPT | Performed by: FAMILY MEDICINE

## 2025-05-22 RX ORDER — DOXYCYCLINE 100 MG/1
100 CAPSULE ORAL 2 TIMES DAILY
Qty: 20 CAPSULE | Refills: 0 | Status: SHIPPED | OUTPATIENT
Start: 2025-05-22 | End: 2025-06-01

## 2025-05-22 ASSESSMENT — ACTIVITIES OF DAILY LIVING (ADL)
DOING_HOUSEWORK: INDEPENDENT
MANAGING_FINANCES: INDEPENDENT
TAKING_MEDICATION: INDEPENDENT
GROCERY_SHOPPING: INDEPENDENT
BATHING: INDEPENDENT
DRESSING: INDEPENDENT

## 2025-05-22 ASSESSMENT — ENCOUNTER SYMPTOMS
OCCASIONAL FEELINGS OF UNSTEADINESS: 0
LOSS OF SENSATION IN FEET: 0
DEPRESSION: 0

## 2025-05-22 NOTE — PROGRESS NOTES
Subjective   Reason for Visit: Sophie Mendosa is an 79 y.o. female here for a Medicare Wellness visit.     Past Medical, Surgical, and Family History reviewed and updated in chart.    Reviewed all medications by prescribing practitioner or clinical pharmacist (such as prescriptions, OTCs, herbal therapies and supplements) and documented in the medical record.    Has been feeling OK.    Has been coughing a lot and bringing up mucous.  Started 2 weeks ago.  Mucous is white with bubbles.  Thinks she had Covid prior cough starting.  Sometimes cough will be hard and make her SOB.    Has BP monitor, but hasn't taken it for a while.  Has some swelling in lower legs and feet.    Will be seeing Dr. Hackett in early June.  She was recommended to have pacemaker, but she hasn't decided about it yet.    Has had colonoscopies in the past.        Patient Care Team:  Madelaine Galvez MD as PCP - General  Madelaine Galvez MD as PCP - [employer] PCP     Review of Systems    Objective   Vitals:  Ht 1.524 m (5')   BMI 21.29 kg/m²       Physical Exam    Assessment & Plan  Benign essential hypertension         Hyperlipidemia, unspecified hyperlipidemia type         Routine general medical examination at health care facility    Orders:    1 Year Follow Up In [facility] - PCP - Wellness Exam; Future    Centrilobular emphysema (Multi)         Type 2 diabetes mellitus with diabetic peripheral angiopathy without gangrene, without long-term current use of insulin (Multi)         Chronic kidney disease, stage 3b (Multi)         Hemiplegia and hemiparesis following cerebral infarction affecting left non-dominant side (Multi)              {AWV Counseling (Optional):64803}      essential hypertension    Orders:    Comprehensive Metabolic Panel; Future    Hyperlipidemia, unspecified hyperlipidemia type    Orders:    Comprehensive Metabolic Panel; Future    Lipid Panel; Future    Routine general medical examination at health care facility    Orders:    1 Year Follow Up In [facility] - PCP - Wellness Exam; Future    Centrilobular emphysema (Multi)         Type 2 diabetes mellitus with diabetic peripheral angiopathy without gangrene, without long-term current use of insulin (Multi)    Orders:    Hemoglobin A1C; Future    Chronic kidney disease, stage 3b (Multi)         Hemiplegia and hemiparesis following cerebral infarction affecting left non-dominant side (Multi)         Hypothyroidism, unspecified type    Orders:    TSH with reflex to Free T4 if abnormal; Future    Screening for lung cancer    Orders:    CT lung screening low dose; Future    Bronchitis    Orders:    XR chest 2 view w obliques; Future    doxycycline (Vibramycin) 100 mg capsule; Take 1 capsule (100 mg) by mouth 2 times a day for 10 days. Take with at least 8 ounces (large glass) of water, do not lie down for 30 minutes after    Former smoker    Orders:    CT lung screening low dose; Future

## 2025-05-22 NOTE — PATIENT INSTRUCTIONS
Immunizations recommended:  --Flu shot every fall.  --Pneumonia vaccines up to  date  --Shingrix,to reduce risk of getting shingles.  It can be done at the pharmacy  --Tetanus every 10 years.  --Covid vaccine.  --RSV vaccine once.    Since colonoscopies have previously been normal, can stop.    Mammogram screening recommendations are changing, but at this time, I recommend a mammogram every 1-2 years in your 40's, and yearly after the age of 50.  Having a family history of breast cancer may change that.  Done 2/29/24.   Will hold off doing this year.    You should try to get 150 minutes of exercise weekly.  Be sure to eat a diet rich in fruits and vegetables, and low in saturated fats and sodium.    Make sure to wear sun screen when outside, and check for changing or unusual moles.    Pre-menopause recommendations are for 1000 mg calcium and 1000 units vitamin D3 daily.  After menopause calcium recommendation is 1200 mg, with 1000 units of vitamin D3  Bone density can be done every 2 years to assess bone loss.--done 6/14/24    I recommend you get a Living Will and Durable Power of  for Healthcare. These documents state what care you would want if you couldn't speak for yourself. They help your loved ones in caring for you if that happens.   Once you have those forms completed, you can keep a copy on file with your doctor.    Specialists being seen:  Hematologist for MGUS  Orthopedist for  hip arthritis  Cardiologist, Dr. Lew KOLB for liver lesion (appears benign), no follow up needed.    History of stroke, with residual left sided weakness.  Taking Eliquis and atorvastatin.    CT  for  lung cancer screening is  due     BP is high today.  Start checking at home.  No changes in medication made.    For ongoing harsh cough, antibiotic sent to the pharmacy.  Check CXR

## 2025-05-23 ENCOUNTER — TELEPHONE (OUTPATIENT)
Dept: RADIOLOGY | Facility: HOSPITAL | Age: 80
End: 2025-05-23
Payer: MEDICARE

## 2025-05-23 NOTE — PROGRESS NOTES
Pharmacist Clinic: Cardiology Management    Sophie Mendosa is a 79 y.o. female was referred to Clinical Pharmacy Team for anticoagulation management.     Referring Provider: Familia Hackett MD    THIS IS A FOLLOW UP PATIENT APPOINTMENT. AT LAST VISIT ON 11/25/2024 WITH PHARMACIST (Jackie Castillo).    Appointment was completed by the patient who was reached at .    REVIEW OF PAST APPNT (IF APPLICABLE):   Today's appointment was 1 month follow up regarding anticoagulation with Eliquis. Sophie is doing well, with no abnormal bruising or bleeding reported. No recent falls, however reports she did almost fall in her kitchen recently as she was unable to turn her left foot and had to catch herself. Discussed seeking medical attention for falls as she is on Eliquis, especially if she hits her head. She also keeps her phone on her while going down the stairs to ensure she can reach someone if she falls.  Will follow up in 6 months.    Allergies   Allergen Reactions    Gabapentin Other     Patient unsure of allergy    Garlic Unknown and Swelling       Past Medical History:   Diagnosis Date    Arthritis     Atrial fibrillation (Multi)     CHF (congestive heart failure)     CKD (chronic kidney disease)     Diabetes mellitus (Multi)     Hypertension     Hypothyroidism     LV (left ventricular) mural thrombus 03/01/2024    Apical    Major depressive disorder, single episode, unspecified     Pulmonary embolus 02/29/2024    Stroke (Multi)        Current Outpatient Medications on File Prior to Visit   Medication Sig Dispense Refill    apixaban (Eliquis) 5 mg tablet Take 1 tablet (5 mg) by mouth 2 times a day. 180 tablet 3    atorvastatin (Lipitor) 40 mg tablet Take 1 tablet (40 mg) by mouth once daily. 90 tablet 1    carvedilol (Coreg) 3.125 mg tablet Take 1 tablet (3.125 mg) by mouth 2 times daily (morning and late afternoon). 180 tablet 1    cyanocobalamin (Vitamin B-12) 500 mcg tablet Take 1 tablet (500 mcg) by  "mouth once daily.      doxycycline (Vibramycin) 100 mg capsule Take 1 capsule (100 mg) by mouth 2 times a day for 10 days. Take with at least 8 ounces (large glass) of water, do not lie down for 30 minutes after 20 capsule 0    furosemide (Lasix) 80 mg tablet Take 1 tablet (80 mg) by mouth once daily. 90 tablet 1    levothyroxine (Synthroid, Levoxyl) 75 mcg tablet Take 1 tablet (75 mcg) by mouth once daily. 90 tablet 1    lisinopril 40 mg tablet Take 1 tablet (40 mg) by mouth once daily. 90 tablet 1     No current facility-administered medications on file prior to visit.         RELEVANT LAB RESULTS  Lab Results   Component Value Date    BILITOT 0.5 08/01/2024    CALCIUM 10.1 10/09/2024    CO2 25 10/09/2024     10/09/2024    CREATININE 1.43 (H) 10/09/2024    GLUCOSE 93 10/09/2024    ALKPHOS 121 08/01/2024    K 5.4 (H) 10/09/2024    PROT 7.1 08/01/2024     10/09/2024    AST 13 08/01/2024    ALT 14 08/01/2024    BUN 26 (H) 10/09/2024    ANIONGAP 16 10/09/2024    MG 1.70 03/05/2024    PHOS 3.7 10/11/2018     04/30/2024    ALBUMIN 4.5 08/01/2024    GFRF 57 (A) 09/22/2023     Lab Results   Component Value Date    TRIG 93 02/29/2024    CHOL 210 (H) 02/29/2024    LDLCALC 97 02/29/2024    HDL 94.1 02/29/2024     No results found for: \"BMCBC\", \"CBCDIF\"     PHARMACEUTICAL ASSESSMENT    Drug Interactions? No clinically significant drug interactions requiring change in therapy found at the time of this visit.     Medication Documentation Review Audit       Reviewed by Mode Leslie MA (Medical Assistant) on 05/22/25 at 1318      Medication Order Taking? Sig Documenting Provider Last Dose Status   apixaban (Eliquis) 5 mg tablet [id number] Yes Take 1 tablet (5 mg) by mouth 2 times a day. Familia Hackett MD  Active   atorvastatin (Lipitor) 40 mg tablet [id number] Yes Take 1 tablet (40 mg) by mouth once daily. Madelaine Galvez MD  Active   carvedilol (Coreg) 3.125 mg tablet [id number] Yes Take 1 tablet (3.125 mg) " by mouth 2 times daily (morning and late afternoon). Familia Hackett MD  Active   cyanocobalamin (Vitamin B-12) 500 mcg tablet [id number] Yes Take 1 tablet (500 mcg) by mouth once daily. Milton Loomis MD  Active   furosemide (Lasix) 80 mg tablet [id number] Yes Take 1 tablet (80 mg) by mouth once daily. Familia Hackett MD  Active   levothyroxine (Synthroid, Levoxyl) 75 mcg tablet [id number] Yes Take 1 tablet (75 mcg) by mouth once daily. Madelaine Galvez MD  Active   lisinopril 40 mg tablet [id number] Yes Take 1 tablet (40 mg) by mouth once daily. Madelaine Galvez MD  Active                    DISEASE MANAGEMENT ASSESSMENT:     ANTICOAGULATION ASSESSMENT    The ASCVD Risk score (Edwar PATEL, et al., 2019) failed to calculate for the following reasons:    Risk score cannot be calculated because patient has a medical history suggesting prior/existing ASCVD    DIAGNOSIS: prevention of nonvalvular atrial fibrilliation stroke and systemic embolism, Hx of LV thrombus and PE (3/2024)  - Patient is projected to be on anticoagulation long term  - [id number] Score: [8] (only included if diagnosis is atrial fibrillation)   Age: [<65 (0)] [65-74 (+1)] [> 75 (+2)]: 2  Sex: [Male/Female (+1)]: 1  CHF history: [No/Yes(+1)]: 1  Hypertension history: [No/Yes(+1)]: 1  Stroke/TIA/thromboembolism history: [No/Yes(+2)]: 2   Vascular disease history (prior MI, peripheral artery disease, aortic plaque): [No/Yes(+1)]: 1  Diabetes history: [No/Yes(+1)]: 0    CURRENT PHARMACOTHERAPY:   Eliquis 5 mg BID  79 years old  45.7 kg  Scr 1.43 mg/dL (10/9/2024 - labs ordered at cardiology appointment 3/3/2025)    RELEVANT PAST MEDICAL HISTORY:   HTN, HLD, PAD, A-flutter/Hx of A-fib, Hx of PE, Hx of LV thrombus, Stage 3a CKD, Hx of cerebral infarction    Affordability/Accessibility:  PAP - active through 10/28/2025  Adherence/Organization: confirms taking Eliquis twice daily (morning and evening); reports one missed dose last month when not feeling  well, but no consistent missed doses  Adverse Reactions: No abnormal bruising or bleeding; no dark/tarry stools, no epistaxis   Recent Hospitalizations: none   Recent Falls/Trauma: none, uses stairs with caution   Changes in Tobacco or Alcohol Intake:   Tobacco: none, does not use  Alcohol: none, does not use    EDUCATION/COUNSELING:   - Counseled patient on MOA, expectations, duration of therapy, contraindications, administration, and monitoring parameters  - Counseled patient of side effects that are indicative of bleeding such as dark tarry stool, unexplainable bruising, or vomiting up a coffee ground like substance      DISCUSSION/NOTES:   Today's appointment was 6 month follow up regarding anticoagulation with Eliquis.  Sophie reports no abnormal bruising or bleeding. Reviewed signs/symptoms indicative of bleeding and when to call her provider.   No recent hospitalizations or falls reported.   Will follow up in 4 months for  PAP renewal.        ASSESSMENT AND PLAN:    Assessment/Plan   Problem List Items Addressed This Visit       Atrial fibrillation, persistent (Multi)    Sophie continues on anticoagulation with Eliquis. No dosage adjustment required for age, weight, and renal function.         Relevant Orders    Referral to Clinical Pharmacy       RECOMMENDATIONS/PLAN    Continue: Eliquis 5 mg BID  Follow up: 4 months    Last Appointment with Referring Provider: 3/3/2025  Next Appointment with Referring Provider: 6/3/2025  Clinical Pharmacist follow up: 9/30/2025  VAF/Application Expiration: Yes; Date: 10/28/2025  Type of Encounter: Sofia Castillo PharmD    Verbal consent to manage patient's drug therapy was obtained from the patient . They were informed they may decline to participate or withdraw from participation in pharmacy services at any time.    Continue all meds under the continuation of care with the referring provider and clinical pharmacy team.

## 2025-05-23 NOTE — TELEPHONE ENCOUNTER
VM message left requesting patient to call and schedule the follow up Lung Cancer Screening exam. Requested they schedule with radiology @ [phone] .Encouraged  to return my call @  [phone] for any additional assistance.

## 2025-05-27 ENCOUNTER — APPOINTMENT (OUTPATIENT)
Dept: PHARMACY | Facility: HOSPITAL | Age: 80
End: 2025-05-27
Payer: MEDICARE

## 2025-05-27 DIAGNOSIS — I48.19 ATRIAL FIBRILLATION, PERSISTENT (MULTI): ICD-10-CM

## 2025-05-27 NOTE — ASSESSMENT & PLAN NOTE
Sophie continues on anticoagulation with Eliquis. No dosage adjustment required for age, weight, and renal function.

## 2025-05-27 NOTE — Clinical Note
Sophie Dodge Dr. reports no abnormal bruising or bleeding with Eliquis. No recent hospitalizations or falls. No changes today, will follow up in 4 months for  PAP renewal.

## 2025-06-03 ENCOUNTER — APPOINTMENT (OUTPATIENT)
Dept: CARDIOLOGY | Facility: CLINIC | Age: 80
End: 2025-06-03
Payer: MEDICARE

## 2025-06-04 ENCOUNTER — HOSPITAL ENCOUNTER (OUTPATIENT)
Dept: RADIOLOGY | Facility: CLINIC | Age: 80
Discharge: HOME | End: 2025-06-04
Payer: MEDICARE

## 2025-06-04 DIAGNOSIS — J40 BRONCHITIS: ICD-10-CM

## 2025-06-04 PROCEDURE — 71046 X-RAY EXAM CHEST 2 VIEWS: CPT

## 2025-06-04 PROCEDURE — 71046 X-RAY EXAM CHEST 2 VIEWS: CPT | Performed by: RADIOLOGY

## 2025-06-05 LAB
ALBUMIN SERPL-MCNC: 4.6 G/DL (ref 3.6–5.1)
ALP SERPL-CCNC: 124 U/L (ref 37–153)
ALT SERPL-CCNC: 11 U/L (ref 6–29)
ANION GAP SERPL CALCULATED.4IONS-SCNC: 11 MMOL/L (CALC) (ref 7–17)
AST SERPL-CCNC: 16 U/L (ref 10–35)
BILIRUB SERPL-MCNC: 0.8 MG/DL (ref 0.2–1.2)
BUN SERPL-MCNC: 21 MG/DL (ref 7–25)
CALCIUM SERPL-MCNC: 10 MG/DL (ref 8.6–10.4)
CHLORIDE SERPL-SCNC: 101 MMOL/L (ref 98–110)
CHOLEST SERPL-MCNC: 154 MG/DL
CHOLEST/HDLC SERPL: 1.8 (CALC)
CO2 SERPL-SCNC: 25 MMOL/L (ref 20–32)
CREAT SERPL-MCNC: 1.14 MG/DL (ref 0.6–1)
EGFRCR SERPLBLD CKD-EPI 2021: 50 ML/MIN/1.73M2
EST. AVERAGE GLUCOSE BLD GHB EST-MCNC: 140 MG/DL
EST. AVERAGE GLUCOSE BLD GHB EST-SCNC: 7.7 MMOL/L
GLUCOSE SERPL-MCNC: 100 MG/DL (ref 65–99)
HBA1C MFR BLD: 6.5 %
HDLC SERPL-MCNC: 88 MG/DL
LDLC SERPL CALC-MCNC: 49 MG/DL (CALC)
NONHDLC SERPL-MCNC: 66 MG/DL (CALC)
POTASSIUM SERPL-SCNC: 4.4 MMOL/L (ref 3.5–5.3)
PROT SERPL-MCNC: 7.7 G/DL (ref 6.1–8.1)
SODIUM SERPL-SCNC: 137 MMOL/L (ref 135–146)
T4 FREE SERPL-MCNC: 1.3 NG/DL (ref 0.8–1.8)
TRIGL SERPL-MCNC: 90 MG/DL
TSH SERPL-ACNC: 7.03 MIU/L (ref 0.4–4.5)

## 2025-06-06 NOTE — RESULT ENCOUNTER NOTE
CXR shows possible pneumonia in the lower right lung.  She is already on an antibiotic.  She has CT of th lungs scheduled, so will see more with that.

## 2025-06-11 ENCOUNTER — HOSPITAL ENCOUNTER (OUTPATIENT)
Dept: RADIOLOGY | Facility: CLINIC | Age: 80
Discharge: HOME | End: 2025-06-11
Payer: MEDICARE

## 2025-06-11 DIAGNOSIS — Z12.2 SCREENING FOR LUNG CANCER: ICD-10-CM

## 2025-06-11 DIAGNOSIS — Z87.891 FORMER SMOKER: ICD-10-CM

## 2025-06-11 PROCEDURE — 71271 CT THORAX LUNG CANCER SCR C-: CPT

## 2025-06-11 PROCEDURE — 71271 CT THORAX LUNG CANCER SCR C-: CPT | Performed by: RADIOLOGY

## 2025-06-16 ENCOUNTER — OFFICE VISIT (OUTPATIENT)
Dept: CARDIOLOGY | Facility: CLINIC | Age: 80
End: 2025-06-16
Payer: MEDICARE

## 2025-06-16 VITALS
HEIGHT: 61 IN | WEIGHT: 101.7 LBS | BODY MASS INDEX: 19.2 KG/M2 | HEART RATE: 56 BPM | SYSTOLIC BLOOD PRESSURE: 188 MMHG | OXYGEN SATURATION: 95 % | DIASTOLIC BLOOD PRESSURE: 76 MMHG

## 2025-06-16 DIAGNOSIS — I70.0 ATHEROSCLEROSIS OF AORTA: ICD-10-CM

## 2025-06-16 DIAGNOSIS — I48.92 ATRIAL FLUTTER, UNSPECIFIED TYPE (MULTI): ICD-10-CM

## 2025-06-16 DIAGNOSIS — I25.10 CORONARY ARTERY CALCIFICATION SEEN ON CT SCAN: ICD-10-CM

## 2025-06-16 DIAGNOSIS — I10 BENIGN ESSENTIAL HYPERTENSION: ICD-10-CM

## 2025-06-16 DIAGNOSIS — I10 HYPERTENSION, UNSPECIFIED TYPE: ICD-10-CM

## 2025-06-16 DIAGNOSIS — I25.5 CARDIOMYOPATHY, ISCHEMIC: ICD-10-CM

## 2025-06-16 DIAGNOSIS — I48.0 PAROXYSMAL ATRIAL FIBRILLATION (MULTI): Primary | ICD-10-CM

## 2025-06-16 DIAGNOSIS — E78.5 HYPERLIPIDEMIA, UNSPECIFIED HYPERLIPIDEMIA TYPE: ICD-10-CM

## 2025-06-16 PROBLEM — I49.5 SINUS NODE DYSFUNCTION (MULTI): Chronic | Status: RESOLVED | Noted: 2024-03-22 | Resolved: 2025-06-16

## 2025-06-16 PROBLEM — R00.1 SINUS BRADYCARDIA: Status: RESOLVED | Noted: 2023-10-04 | Resolved: 2025-06-16

## 2025-06-16 PROBLEM — I73.9 PERIPHERAL ARTERY DISEASE: Status: RESOLVED | Noted: 2023-10-04 | Resolved: 2025-06-16

## 2025-06-16 PROBLEM — I48.19 ATRIAL FIBRILLATION, PERSISTENT (MULTI): Status: RESOLVED | Noted: 2024-09-16 | Resolved: 2025-06-16

## 2025-06-16 PROCEDURE — 3078F DIAST BP <80 MM HG: CPT | Performed by: INTERNAL MEDICINE

## 2025-06-16 PROCEDURE — 1159F MED LIST DOCD IN RCRD: CPT | Performed by: INTERNAL MEDICINE

## 2025-06-16 PROCEDURE — 99212 OFFICE O/P EST SF 10 MIN: CPT

## 2025-06-16 PROCEDURE — 99214 OFFICE O/P EST MOD 30 MIN: CPT | Performed by: INTERNAL MEDICINE

## 2025-06-16 PROCEDURE — 3077F SYST BP >= 140 MM HG: CPT | Performed by: INTERNAL MEDICINE

## 2025-06-16 PROCEDURE — 1126F AMNT PAIN NOTED NONE PRSNT: CPT | Performed by: INTERNAL MEDICINE

## 2025-06-16 RX ORDER — CHLORTHALIDONE 25 MG/1
25 TABLET ORAL DAILY
Qty: 90 TABLET | Refills: 0 | Status: SHIPPED | OUTPATIENT
Start: 2025-06-16 | End: 2026-06-16

## 2025-06-16 RX ORDER — FUROSEMIDE 80 MG/1
80 TABLET ORAL DAILY PRN
Qty: 90 TABLET | Refills: 1 | Status: SHIPPED | OUTPATIENT
Start: 2025-06-16 | End: 2026-06-16

## 2025-06-16 ASSESSMENT — ENCOUNTER SYMPTOMS
FALLS: 0
DIARRHEA: 0
DEPRESSION: 0
NAUSEA: 0
VOMITING: 0
ABDOMINAL PAIN: 0
HEADACHES: 0
LOSS OF SENSATION IN FEET: 1
MEMORY LOSS: 0
ALTERED MENTAL STATUS: 0
CHILLS: 0
BLOATING: 0
MYALGIAS: 0
FEVER: 0
HEMATURIA: 0
OCCASIONAL FEELINGS OF UNSTEADINESS: 1
DYSURIA: 0
COUGH: 0
WHEEZING: 0
HEMOPTYSIS: 0
CONSTIPATION: 0

## 2025-06-16 ASSESSMENT — PATIENT HEALTH QUESTIONNAIRE - PHQ9
6. FEELING BAD ABOUT YOURSELF - OR THAT YOU ARE A FAILURE OR HAVE LET YOURSELF OR YOUR FAMILY DOWN: NOT AT ALL
SUM OF ALL RESPONSES TO PHQ QUESTIONS 1-9: 10
5. POOR APPETITE OR OVEREATING: NOT AT ALL
7. TROUBLE CONCENTRATING ON THINGS, SUCH AS READING THE NEWSPAPER OR WATCHING TELEVISION: NOT AT ALL
SUM OF ALL RESPONSES TO PHQ9 QUESTIONS 1 AND 2: 4
1. LITTLE INTEREST OR PLEASURE IN DOING THINGS: SEVERAL DAYS
9. THOUGHTS THAT YOU WOULD BE BETTER OFF DEAD, OR OF HURTING YOURSELF: NOT AT ALL
8. MOVING OR SPEAKING SO SLOWLY THAT OTHER PEOPLE COULD HAVE NOTICED. OR THE OPPOSITE, BEING SO FIGETY OR RESTLESS THAT YOU HAVE BEEN MOVING AROUND A LOT MORE THAN USUAL: NOT AT ALL
2. FEELING DOWN, DEPRESSED OR HOPELESS: NEARLY EVERY DAY
4. FEELING TIRED OR HAVING LITTLE ENERGY: NEARLY EVERY DAY
3. TROUBLE FALLING OR STAYING ASLEEP OR SLEEPING TOO MUCH: NEARLY EVERY DAY

## 2025-06-16 ASSESSMENT — PAIN SCALES - GENERAL: PAINLEVEL_OUTOF10: 0-NO PAIN

## 2025-06-16 NOTE — PATIENT INSTRUCTIONS
Add Chlorthalidone 25mg 1x/day    Only take Furosemide if needed for swelling or short of breath (hopefully will not need)    Goal BP at least <140/90, optimal <130/80

## 2025-06-16 NOTE — PROGRESS NOTES
Chief Complaint   Patient presents with    Follow-up    Atrial Fibrillation    Hypertension      HPI  80 yo BF w/ h/o parox AF/FL s/p RFA 10/24, mild ICM, LV apical thrombus, PE, cor calc on CT, athero of Ao, CVA 9/18, HTN, HLD, GERD, TOB (quit) now here for cardiology f/u. +occ chest heavy in bed that resolves with sitting up. No other chest pain. No dyspnea at rest. +occ DEL VALLE. No orthopnea/PND. +occ brief palps. No LH/dizzy/syncope. +mild LE edema, less off Amlodipine. No claudication. +occ cough. +occ itching.  3/24 hospitalized due to palpitations, found to have AF/FL and underwent RAISA/DCCV. She was also found to have an LV thrombus and a PE and was started on Eliquis.   ECG 9/18: SB (45), LVH  ECG 12/20: SB (50), ?SMI, nonsp T-wave changes  ECG 5/22: SB (54), ?SMI, nonsp T-wave changes  ECG 4/24: SB (51), SMI, nonsp T-wave changes  ECG 8/24: AFL (121), nonsp ST-T changes  ECG 9/24: AFIB (116)  ECG 11/24: SR (61), nonsp T-wave changes  ECG 11/24: JXN with 1 Sinus (57), nonsp T-wave changes  ECG 2/25: Jxnal (57), nonsp ST-T changes  ECG 3/25: SR (60), ASMI  HM 3/24: SR, HR  (avg 54), SVT x15 (long 11b)  HM 2/25: SR< HR  (avg 59), PC <1%, PAC 10%, SVT x31 (long 1m8s)  Echo 9/18: EF 60-65%, AsAo plaque, mild-mod TR, PASP 37-42  Echo 3/24: EF 50-55%, apical inferior HK w/ ?thrombus, mod LAE  Echo 8/24: AF/FL (HR 120s), EF 45%, apical inf AK, mild-mod MR, mod TR, PASP 30  RAISA 3/24: EF 50-55%, no PFO  JOSE 1/21: no ischemia, EF 55-60% -> 65-70%  cMRI 3/24: EF: 48%, infarction of the apical/inferior wall, small LV apical thrombus, nl RV, mod MR  CXR 2/24:  no acute abnl  CXR 8/24: no acute abnl, CM, COPD  CT chest 10/20: mod-sev cor calc, mod athero of Ao, no aneurysm, CAMI, no peric eff  CT chest 2/22: sev cor calc, LAE, tr PE, mod athero of Ao, no aneurysm, nl PA  CT lung 3/23: sev CAC, slight CM, tr PE, sev AA  CTA chest 2/24: small PE, R PA 3.0cm, sev CAC, nl heart size, sm PE, AA, emphysema  CT lung 6/25:  sev CAC, CAMI, AA, no aneurysm, emphysema  Carotid US 9/18; plaque, no HDSS  MRI brain 9/18; LACA CVA  MRA head/neck 9/18: carotid athero, no HDSS, no anuerysm   LE US 3/24: no DVT    Review of Systems   Constitutional: Negative for chills, fever and malaise/fatigue.   HENT:  Negative for hearing loss.    Eyes:  Negative for visual disturbance.   Respiratory:  Negative for cough, hemoptysis and wheezing.    Skin:  Negative for rash.   Musculoskeletal:  Negative for falls and myalgias.   Gastrointestinal:  Negative for bloating, abdominal pain, constipation, diarrhea, dysphagia, nausea and vomiting.   Genitourinary:  Negative for dysuria and hematuria.   Neurological:  Negative for headaches.   Psychiatric/Behavioral:  Negative for altered mental status, depression and memory loss.       Social History     Tobacco Use    Smoking status: Former     Current packs/day: 1.00     Average packs/day: 1 pack/day for 50.5 years (50.5 ttl pk-yrs)     Types: Cigarettes     Start date: 1975    Smokeless tobacco: Never    Tobacco comments:     Working on quitting   Substance Use Topics    Alcohol use: Never      Family History   Problem Relation Name Age of Onset    Other (cardiac disorder) Mother      Other (CVA) Mother      Heart failure Mother      Heart attack Mother      Hypertension Mother      Prostate cancer Father      Diabetes Sister      Hypertension Sister      Diabetes Brother      Other (cardiac disorder) Brother      Heart attack Brother      Hypertension Brother      Heart failure Other PATERNAL HALF SISTER       Allergies   Allergen Reactions    Gabapentin Other     Patient unsure of allergy    Garlic Unknown and Swelling      Current Outpatient Medications   Medication Instructions    atorvastatin (LIPITOR) 40 mg, oral, Daily    carvedilol (COREG) 3.125 mg, oral, 2 times daily (morning and late afternoon)    cyanocobalamin (VITAMIN B-12) 500 mcg, Daily    Eliquis 5 mg, oral, 2 times daily    furosemide (LASIX) 80  "mg, oral, Daily PRN    levothyroxine (SYNTHROID, LEVOXYL) 75 mcg, oral, Daily    lisinopril 40 mg, oral, Daily      BP (!) 188/76 (BP Location: Right arm, Patient Position: Sitting, BP Cuff Size: Adult)   Pulse 56   Ht 1.537 m (5' 0.5\")   Wt 46.1 kg (101 lb 11.2 oz)   SpO2 95%   BMI 19.53 kg/m²           2/3/2025    11:58 AM 2/27/2025     2:42 PM 3/3/2025    12:13 PM 3/3/2025     1:28 PM 5/22/2025     1:12 PM 6/16/2025     4:16 PM 6/16/2025     4:25 PM   Vitals   Systolic 113 130 135  200 195 188   Diastolic 64 79 71  100 74 76   BP Location Left arm  Left arm  Left arm Right arm Right arm   Heart Rate 57 54 60  47     Temp  36.2 °C (97.2 °F)   35.8 °C (96.5 °F)     Height 1.524 m (5')  1.651 m (5' 5\") 1.524 m (5') 1.524 m (5') 1.537 m (5' 0.5\")    Weight (lb) 107.1 105 109 109 100.8 101.7    BMI 20.92 kg/m2 20.51 kg/m2 18.14 kg/m2 21.29 kg/m2 19.69 kg/m2 19.53 kg/m2    BSA (m2) 1.43 m2 1.42 m2 1.51 m2 1.45 m2 1.39 m2 1.4 m2    Visit Report Report Report Report    Report Report    Report Report Report Report      Physical Exam  Constitutional:       Appearance: Normal appearance.   HENT:      Head: Normocephalic and atraumatic.      Nose: Nose normal.   Neck:      Vascular: No carotid bruit.   Cardiovascular:      Rate and Rhythm: Normal rate and regular rhythm.      Heart sounds: No murmur heard.  Pulmonary:      Effort: Pulmonary effort is normal.      Breath sounds: Normal breath sounds.   Abdominal:      Palpations: Abdomen is soft.      Tenderness: There is no abdominal tenderness.   Musculoskeletal:      Right lower leg: Edema present.      Left lower leg: Edema present.      Comments: Tr LE edema   Skin:     General: Skin is warm and dry.   Neurological:      General: No focal deficit present.      Mental Status: She is alert.   Psychiatric:         Mood and Affect: Mood normal.         Judgment: Judgment normal.        Results/Data  6/25 Cr 1.14, K 4.4, LFT nl, LDL 49, HDL 88, TG 90, Chol 154, hgba1c " 6.5, TSH 7.03, FT4 1.3  10/24 Cr 1.43, K 5.4, HGB 11.9,   8/24 Cr 1.42, K 5.2, LFT nl  6/24 TSH 3.45  4/24 Cr 1.09, K 4.1, HGB 13,   3/24 Cr 1.17, K 4.6, Mg 1.7, HGB 11,   2/24 hgba1c 6.1, TSH 0.27, FT4 1.21, BNP 83  2/22 Cr 0.96, K 4.6, LFT nl, HGB 12.4,   12/21 Cr 1.07, K 4.9, LFT nl, hgba1c 6.4, TSH 0.44  10/20 Cr 0.85, K 4.7, LDL 54, HDL 92, TG 67, Chol 160, TSH 0.51   2/24: LDL: 97, A1c: 6.1  4/24 Cr: 1.16, K 4.3,     Assessment/Plan   78 yo BF w/ h/o parox AF/FL s/p RFA 10/24, mild ICM, LV apical thrombus, PE, cor calc on CT, athero of Ao, CVA 9/18, HTN, HLD, GERD, TOB (quit). Doing fair. BP needs better control. Try adding Chlorthalidone; she only takes Lasix prn like 2-3d/wk - hopefully she wont need it (also she c/o itching which could be from Lasix).  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 At this time, with a near preserved EF and no anginal symptoms, ischemic eval can be deferred.  -continue Eliquis 5 bid  -continue Carvedilol 3.125 bid (HR too slow to increase)  -continue Lisinopril 40 every day  -add Chlorthalidone 25 1x/day  -continue Lasix 80 every day prn (only taking 2-3d/wk; hopefully will not need so we can just max Chlorthalidone to help BP more)  -continue Atorva 40 every day  -maintain euthyroid  -FU 1 month for BP check and med adjustment (earlier if needed)    Familia Hackett MD

## 2025-07-08 ENCOUNTER — TELEPHONE (OUTPATIENT)
Dept: PRIMARY CARE | Facility: CLINIC | Age: 80
End: 2025-07-08
Payer: MEDICARE

## 2025-07-10 NOTE — TELEPHONE ENCOUNTER
Spoke with son.  He is noting some signs of dementia in his mom.  She lives by herself in 2 story home.  Puts off getting POA, medical and otherwise.  He has been encouraging her to do those things, as well as move into safer place, on one story, with help available.  Will try to discuss with patient at next visit.  Encouraged son to come with her, if able to.  He is only child, checks on her in the AM about 3 times a week, sometimes causes him to be late to work.  FMLA completed.

## 2025-07-18 ENCOUNTER — OFFICE VISIT (OUTPATIENT)
Dept: CARDIOLOGY | Facility: CLINIC | Age: 80
End: 2025-07-18
Payer: MEDICARE

## 2025-07-18 VITALS
HEIGHT: 61 IN | HEART RATE: 50 BPM | BODY MASS INDEX: 18.69 KG/M2 | SYSTOLIC BLOOD PRESSURE: 180 MMHG | OXYGEN SATURATION: 98 % | WEIGHT: 99 LBS | DIASTOLIC BLOOD PRESSURE: 90 MMHG

## 2025-07-18 DIAGNOSIS — R06.09 DOE (DYSPNEA ON EXERTION): ICD-10-CM

## 2025-07-18 DIAGNOSIS — I10 BENIGN ESSENTIAL HYPERTENSION: ICD-10-CM

## 2025-07-18 PROCEDURE — 1159F MED LIST DOCD IN RCRD: CPT | Performed by: INTERNAL MEDICINE

## 2025-07-18 PROCEDURE — 3079F DIAST BP 80-89 MM HG: CPT | Performed by: INTERNAL MEDICINE

## 2025-07-18 PROCEDURE — 99212 OFFICE O/P EST SF 10 MIN: CPT

## 2025-07-18 PROCEDURE — 3077F SYST BP >= 140 MM HG: CPT | Performed by: INTERNAL MEDICINE

## 2025-07-18 PROCEDURE — 99214 OFFICE O/P EST MOD 30 MIN: CPT | Performed by: INTERNAL MEDICINE

## 2025-07-18 PROCEDURE — 1126F AMNT PAIN NOTED NONE PRSNT: CPT | Performed by: INTERNAL MEDICINE

## 2025-07-18 RX ORDER — SPIRONOLACTONE 25 MG/1
25 TABLET ORAL DAILY
Qty: 30 TABLET | Refills: 1 | Status: SHIPPED | OUTPATIENT
Start: 2025-07-18 | End: 2025-07-18

## 2025-07-18 ASSESSMENT — ENCOUNTER SYMPTOMS
NAUSEA: 0
FALLS: 0
HEMOPTYSIS: 0
ALTERED MENTAL STATUS: 0
ABDOMINAL PAIN: 0
HEADACHES: 0
CONSTIPATION: 0
CHILLS: 0
LOSS OF SENSATION IN FEET: 0
MYALGIAS: 0
HEMATURIA: 0
DIARRHEA: 0
FEVER: 0
WHEEZING: 0
DEPRESSION: 0
BLOATING: 0
OCCASIONAL FEELINGS OF UNSTEADINESS: 0
DYSURIA: 0
COUGH: 0
VOMITING: 0
MEMORY LOSS: 0

## 2025-07-18 ASSESSMENT — COLUMBIA-SUICIDE SEVERITY RATING SCALE - C-SSRS
6. HAVE YOU EVER DONE ANYTHING, STARTED TO DO ANYTHING, OR PREPARED TO DO ANYTHING TO END YOUR LIFE?: NO
2. HAVE YOU ACTUALLY HAD ANY THOUGHTS OF KILLING YOURSELF?: NO
1. IN THE PAST MONTH, HAVE YOU WISHED YOU WERE DEAD OR WISHED YOU COULD GO TO SLEEP AND NOT WAKE UP?: NO

## 2025-07-18 ASSESSMENT — PAIN SCALES - GENERAL: PAINLEVEL_OUTOF10: 0-NO PAIN

## 2025-07-18 ASSESSMENT — PATIENT HEALTH QUESTIONNAIRE - PHQ9
SUM OF ALL RESPONSES TO PHQ9 QUESTIONS 1 AND 2: 0
1. LITTLE INTEREST OR PLEASURE IN DOING THINGS: NOT AT ALL
2. FEELING DOWN, DEPRESSED OR HOPELESS: NOT AT ALL

## 2025-07-18 NOTE — PROGRESS NOTES
Chief Complaint   Patient presents with    Hypertension    Atrial Fibrillation       HPI  78 yo BF w/ h/o parox AF/FL s/p RFA 10/24, mild ICM, LV apical thrombus, PE, cor calc on CT, athero of Ao, CVA 9/18, HTN, HLD, GERD, TOB (quit) now here for cardiology f/u. Itch/rash on Chlorthalidone (she had some itching before but no rash).  +occ chest heavy in bed that resolves with sitting up. No other chest pain. No dyspnea at rest. +occ DEL VALLE. No orthopnea/PND. +occ brief palps. No LH/dizzy/syncope. +mild LE edema, less off Amlodipine. No claudication. +occ cough. +occ itching.  3/24 hospitalized due to palpitations, found to have AF/FL and underwent RAISA/DCCV. She was also found to have an LV thrombus and a PE and was started on Eliquis.   ECG 9/18: SB (45), LVH  ECG 12/20: SB (50), ?SMI, nonsp T-wave changes  ECG 5/22: SB (54), ?SMI, nonsp T-wave changes  ECG 4/24: SB (51), SMI, nonsp T-wave changes  ECG 8/24: AFL (121), nonsp ST-T changes  ECG 9/24: AFIB (116)  ECG 11/24: SR (61), nonsp T-wave changes  ECG 11/24: JXN with 1 Sinus (57), nonsp T-wave changes  ECG 2/25: Jxnal (57), nonsp ST-T changes  ECG 3/25: SR (60), ASMI  HM 3/24: SR, HR  (avg 54), SVT x15 (long 11b)  HM 2/25: SR< HR  (avg 59), PC <1%, PAC 10%, SVT x31 (long 1m8s)  Echo 9/18: EF 60-65%, AsAo plaque, mild-mod TR, PASP 37-42  Echo 3/24: EF 50-55%, apical inferior HK w/ ?thrombus, mod LAE  Echo 8/24: AF/FL (HR 120s), EF 45%, apical inf AK, mild-mod MR, mod TR, PASP 30  RAISA 3/24: EF 50-55%, no PFO  JOSE 1/21: no ischemia, EF 55-60% -> 65-70%  cMRI 3/24: EF: 48%, infarction of the apical/inferior wall, small LV apical thrombus, nl RV, mod MR  CXR 2/24:  no acute abnl  CXR 8/24: no acute abnl, CM, COPD  CT chest 10/20: mod-sev cor calc, mod athero of Ao, no aneurysm, CAMI, no peric eff  CT chest 2/22: sev cor calc, LAE, tr PE, mod athero of Ao, no aneurysm, nl PA  CT lung 3/23: sev CAC, slight CM, tr PE, sev AA  CTA chest 2/24: small PE, R PA 3.0cm,  sev CAC, nl heart size, sm PE, AA, emphysema  CT lung 6/25: sev CAC, CAMI, AA, no aneurysm, emphysema  Carotid US 9/18; plaque, no HDSS  MRI brain 9/18; LACA CVA  MRA head/neck 9/18: carotid athero, no HDSS, no anuerysm   LE US 3/24: no DVT    Review of Systems   Constitutional: Negative for chills, fever and malaise/fatigue.   HENT:  Negative for hearing loss.    Eyes:  Negative for visual disturbance.   Respiratory:  Negative for cough, hemoptysis and wheezing.    Skin:  Negative for rash.   Musculoskeletal:  Negative for falls and myalgias.   Gastrointestinal:  Negative for bloating, abdominal pain, constipation, diarrhea, dysphagia, nausea and vomiting.   Genitourinary:  Negative for dysuria and hematuria.   Neurological:  Negative for headaches.   Psychiatric/Behavioral:  Negative for altered mental status, depression and memory loss.       Social History     Tobacco Use    Smoking status: Former     Current packs/day: 1.00     Average packs/day: 1 pack/day for 50.5 years (50.5 ttl pk-yrs)     Types: Cigarettes     Start date: 1975    Smokeless tobacco: Never    Tobacco comments:     Working on quitting   Substance Use Topics    Alcohol use: Never      Family History   Problem Relation Name Age of Onset    Other (cardiac disorder) Mother      Other (CVA) Mother      Heart failure Mother      Heart attack Mother      Hypertension Mother      Prostate cancer Father      Diabetes Sister      Hypertension Sister      Diabetes Brother      Other (cardiac disorder) Brother      Heart attack Brother      Hypertension Brother      Heart failure Other PATERNAL HALF SISTER       Allergies   Allergen Reactions    Gabapentin Other     Patient unsure of allergy    Garlic Unknown and Swelling      Current Outpatient Medications   Medication Instructions    atorvastatin (LIPITOR) 40 mg, oral, Daily    carvedilol (COREG) 3.125 mg, oral, 2 times daily (morning and late afternoon)    chlorthalidone (HYGROTON) 25 mg, oral, Daily  "   cyanocobalamin (VITAMIN B-12) 500 mcg, Daily    Eliquis 5 mg, oral, 2 times daily    furosemide (LASIX) 80 mg, oral, Daily PRN    levothyroxine (SYNTHROID, LEVOXYL) 75 mcg, oral, Daily    lisinopril 40 mg, oral, Daily      BP (!) 196/86 (BP Location: Left arm, Patient Position: Sitting, BP Cuff Size: Small adult)   Pulse 50   Ht (!) 1.549 m (5' 1\")   Wt (!) 44.9 kg (99 lb)   SpO2 98%   BMI 18.71 kg/m²           3/3/2025    12:13 PM 3/3/2025     1:28 PM 5/22/2025     1:12 PM 6/16/2025     4:16 PM 6/16/2025     4:25 PM 7/18/2025    11:06 AM 7/18/2025    11:13 AM   Vitals   Systolic 135  200 195 188 199 196   Diastolic 71  100 74 76 84 86   BP Location Left arm  Left arm Right arm Right arm Right arm Left arm   Heart Rate 60  47  56 50    Temp   35.8 °C (96.5 °F)       Height 1.651 m (5' 5\") 1.524 m (5') 1.524 m (5') 1.537 m (5' 0.5\")  1.549 m (5' 1\")    Weight (lb) 109 109 100.8 101.7  99    BMI 18.14 kg/m2 21.29 kg/m2 19.69 kg/m2 19.53 kg/m2  18.71 kg/m2    BSA (m2) 1.51 m2 1.45 m2 1.39 m2 1.4 m2  1.39 m2    Visit Report Report    Report Report    Report Report Report Report Report Report      Physical Exam  Constitutional:       Appearance: Normal appearance.   HENT:      Head: Normocephalic and atraumatic.      Nose: Nose normal.   Neck:      Vascular: No carotid bruit.     Cardiovascular:      Rate and Rhythm: Normal rate and regular rhythm.      Heart sounds: No murmur heard.  Pulmonary:      Effort: Pulmonary effort is normal.      Breath sounds: Normal breath sounds.   Abdominal:      Palpations: Abdomen is soft.      Tenderness: There is no abdominal tenderness.     Musculoskeletal:      Right lower leg: Edema present.      Left lower leg: Edema present.      Comments: Tr LE edema     Skin:     General: Skin is warm and dry.     Neurological:      General: No focal deficit present.      Mental Status: She is alert.     Psychiatric:         Mood and Affect: Mood normal.         Judgment: Judgment " normal.        Results/Data  6/25 Cr 1.14, K 4.4, LFT nl, LDL 49, HDL 88, TG 90, Chol 154, hgba1c 6.5, TSH 7.03, FT4 1.3  10/24 Cr 1.43, K 5.4, HGB 11.9,   8/24 Cr 1.42, K 5.2, LFT nl  6/24 TSH 3.45  4/24 Cr 1.09, K 4.1, HGB 13,   3/24 Cr 1.17, K 4.6, Mg 1.7, HGB 11,   2/24 hgba1c 6.1, TSH 0.27, FT4 1.21, BNP 83  2/22 Cr 0.96, K 4.6, LFT nl, HGB 12.4,   12/21 Cr 1.07, K 4.9, LFT nl, hgba1c 6.4, TSH 0.44  10/20 Cr 0.85, K 4.7, LDL 54, HDL 92, TG 67, Chol 160, TSH 0.51   2/24: LDL: 97, A1c: 6.1  4/24 Cr: 1.16, K 4.3,     Assessment/Plan   80 yo BF w/ h/o parox AF/FL s/p RFA 10/24, mild ICM, LV apical thrombus, PE, cor calc on CT, athero of Ao, CVA 9/18, HTN, HLD, GERD, TOB (quit). Doing fair. BP needs better control. Suspect rash/itch from Chlorthalidone. Will try changing to Sprio.   -continue Eliquis 5 bid  -continue Carvedilol 3.125 bid (HR too slow to increase)  -continue Lisinopril 40 every day  -try change Chlorthalidone 25 1x/day to Geoffrey 25 every day -> check Cr/K on it  -continue Lasix 80 every day prn (only occ taking for leg stiffness)  -edema on Amlodipine  -continue Atorva 40 every day  -maintain euthyroid  -FU 2 month for BP check and med adjustment (earlier if needed); she has appt with PCP in 1 month    Familia Hackett MD

## 2025-07-21 PROCEDURE — RXMED WILLOW AMBULATORY MEDICATION CHARGE

## 2025-07-22 DIAGNOSIS — I67.9 CVD (CEREBROVASCULAR DISEASE): ICD-10-CM

## 2025-07-22 DIAGNOSIS — E03.9 HYPOTHYROIDISM, UNSPECIFIED TYPE: ICD-10-CM

## 2025-07-22 RX ORDER — ATORVASTATIN CALCIUM 40 MG/1
40 TABLET, FILM COATED ORAL DAILY
Qty: 90 TABLET | Refills: 1 | Status: SHIPPED | OUTPATIENT
Start: 2025-07-22

## 2025-07-22 RX ORDER — LEVOTHYROXINE SODIUM 75 UG/1
TABLET ORAL
Qty: 105 TABLET | Refills: 0 | Status: SHIPPED | OUTPATIENT
Start: 2025-07-22

## 2025-07-23 ENCOUNTER — PHARMACY VISIT (OUTPATIENT)
Dept: PHARMACY | Facility: CLINIC | Age: 80
End: 2025-07-23
Payer: COMMERCIAL

## 2025-08-21 ENCOUNTER — APPOINTMENT (OUTPATIENT)
Dept: PRIMARY CARE | Facility: CLINIC | Age: 80
End: 2025-08-21
Payer: MEDICARE

## 2025-08-21 VITALS
DIASTOLIC BLOOD PRESSURE: 100 MMHG | HEIGHT: 61 IN | BODY MASS INDEX: 19.41 KG/M2 | TEMPERATURE: 96.9 F | WEIGHT: 102.8 LBS | HEART RATE: 54 BPM | OXYGEN SATURATION: 92 % | SYSTOLIC BLOOD PRESSURE: 200 MMHG

## 2025-08-21 DIAGNOSIS — E03.9 PRIMARY HYPOTHYROIDISM: ICD-10-CM

## 2025-08-21 DIAGNOSIS — I13.0 HYPERTENSIVE HEART AND CHRONIC KIDNEY DISEASE WITH HEART FAILURE AND STAGE 1 THROUGH STAGE 4 CHRONIC KIDNEY DISEASE, OR UNSPECIFIED CHRONIC KIDNEY DISEASE: ICD-10-CM

## 2025-08-21 DIAGNOSIS — I10 BENIGN ESSENTIAL HYPERTENSION: Primary | ICD-10-CM

## 2025-08-21 DIAGNOSIS — J40 BRONCHITIS: ICD-10-CM

## 2025-08-21 DIAGNOSIS — R21 RASH: ICD-10-CM

## 2025-08-21 PROCEDURE — 3077F SYST BP >= 140 MM HG: CPT | Performed by: FAMILY MEDICINE

## 2025-08-21 PROCEDURE — 3080F DIAST BP >= 90 MM HG: CPT | Performed by: FAMILY MEDICINE

## 2025-08-21 PROCEDURE — 1160F RVW MEDS BY RX/DR IN RCRD: CPT | Performed by: FAMILY MEDICINE

## 2025-08-21 PROCEDURE — 99214 OFFICE O/P EST MOD 30 MIN: CPT | Performed by: FAMILY MEDICINE

## 2025-08-21 PROCEDURE — 1159F MED LIST DOCD IN RCRD: CPT | Performed by: FAMILY MEDICINE

## 2025-08-21 RX ORDER — DOXYCYCLINE 100 MG/1
100 CAPSULE ORAL 2 TIMES DAILY
Qty: 20 CAPSULE | Refills: 0 | Status: SHIPPED | OUTPATIENT
Start: 2025-08-21 | End: 2025-08-31

## 2025-08-21 ASSESSMENT — ENCOUNTER SYMPTOMS
OCCASIONAL FEELINGS OF UNSTEADINESS: 0
DEPRESSION: 0
LOSS OF SENSATION IN FEET: 0

## 2025-08-24 LAB
ALBUMIN SERPL-MCNC: 4.4 G/DL (ref 3.6–5.1)
BNP SERPL-MCNC: NORMAL PG/ML
BUN SERPL-MCNC: 14 MG/DL (ref 7–25)
BUN/CREAT SERPL: 12 (CALC) (ref 6–22)
CALCIUM SERPL-MCNC: 10.1 MG/DL (ref 8.6–10.4)
CHLORIDE SERPL-SCNC: 106 MMOL/L (ref 98–110)
CO2 SERPL-SCNC: 27 MMOL/L (ref 20–32)
CREAT SERPL-MCNC: 1.13 MG/DL (ref 0.6–1)
EGFRCR SERPLBLD CKD-EPI 2021: 50 ML/MIN/1.73M2
GLUCOSE SERPL-MCNC: 84 MG/DL (ref 65–99)
PHOSPHATE SERPL-MCNC: 3.4 MG/DL (ref 2.1–4.3)
POTASSIUM SERPL-SCNC: 6 MMOL/L (ref 3.5–5.3)
SODIUM SERPL-SCNC: 140 MMOL/L (ref 135–146)
T4 FREE SERPL-MCNC: 1.2 NG/DL (ref 0.8–1.8)
TSH SERPL-ACNC: 8.83 MIU/L (ref 0.4–4.5)

## 2025-08-27 DIAGNOSIS — E03.9 HYPOTHYROIDISM, UNSPECIFIED TYPE: ICD-10-CM

## 2025-08-27 RX ORDER — LEVOTHYROXINE SODIUM 88 UG/1
88 TABLET ORAL DAILY
Qty: 90 TABLET | Refills: 1 | Status: SHIPPED | OUTPATIENT
Start: 2025-08-27

## 2025-08-28 ENCOUNTER — TELEPHONE (OUTPATIENT)
Dept: PRIMARY CARE | Facility: CLINIC | Age: 80
End: 2025-08-28
Payer: MEDICARE

## 2025-09-30 ENCOUNTER — APPOINTMENT (OUTPATIENT)
Dept: PHARMACY | Facility: HOSPITAL | Age: 80
End: 2025-09-30
Payer: MEDICARE

## 2025-11-25 ENCOUNTER — APPOINTMENT (OUTPATIENT)
Dept: PRIMARY CARE | Facility: CLINIC | Age: 80
End: 2025-11-25
Payer: MEDICARE

## (undated) DEVICE — CABLE, HYPERTRONICS, 25 X 34-PIN, RED, 10FT (REPROCESS)

## (undated) DEVICE — CABLE, CONNECTOR, 10FT

## (undated) DEVICE — PAD, ELECTRODE DEFIB PADPRO ADULT STRL W/ADAPTER

## (undated) DEVICE — INTRODUCER, HEMOSTASIS, STR/J .038 IN, 8.5FR 12CM

## (undated) DEVICE — NEEDLE, NRG TRANSSEPTAL, 98CM, CURVE C0

## (undated) DEVICE — TUBING SET, IRRIGATION, SMARTABLATE

## (undated) DEVICE — CABLE, BLACK, QUADRIPOLAR

## (undated) DEVICE — CATHETER, THERMOCOOL SMART TOUCH, SF, D-F CURVE

## (undated) DEVICE — CATHETER, EPL, 7FR DUO, 2/8 2M 95CM, STEERABLE LGCRL

## (undated) DEVICE — CABLE, CONNECTOR, DAIG RESPONSE, 210CM, BLACK

## (undated) DEVICE — CONNECTOR, CATHETER, RESPONSE, RED HEX

## (undated) DEVICE — PATCHES, EXTERNAL REFERENCE, CARTO3

## (undated) DEVICE — CATHETER, PENTARAY, NAV ECO, 7FR, 2-6-2 SPACING, D CURVE

## (undated) DEVICE — SHEATH, STEERABLE, SUREFLEX, MEDIUM CURVE

## (undated) DEVICE — CLOSURE SYSTEM, VASCULAR, MVP 6-12FR, VENOUS

## (undated) DEVICE — CATHETER, ACUSON ACUNAV ULTRASOUND, 8FR 90CM  (REPROCESSED)

## (undated) DEVICE — CABLE, CARTO 3 SYSTEM, ECO INTERFACE, 34-PIN, SPLIT HANDLE (REPROCESSED)

## (undated) DEVICE — CABLE, 34 HYP, 34 LEMO, 10FT, SMART TOUCH (REPROCESS)

## (undated) DEVICE — INTRODUCER, SHEATH, FAST-CATH, 8FR X 12CM, C-LOCK